# Patient Record
Sex: FEMALE | Race: WHITE | Employment: OTHER
[De-identification: names, ages, dates, MRNs, and addresses within clinical notes are randomized per-mention and may not be internally consistent; named-entity substitution may affect disease eponyms.]

---

## 2017-01-09 ENCOUNTER — OFFICE VISIT (OUTPATIENT)
Dept: NEUROLOGY | Facility: CLINIC | Age: 67
End: 2017-01-09

## 2017-01-09 VITALS
DIASTOLIC BLOOD PRESSURE: 74 MMHG | HEART RATE: 92 BPM | WEIGHT: 166 LBS | SYSTOLIC BLOOD PRESSURE: 161 MMHG | HEIGHT: 63 IN | BODY MASS INDEX: 29.41 KG/M2

## 2017-01-09 DIAGNOSIS — I67.1 CEREBRAL ANEURYSM: Primary | ICD-10-CM

## 2017-01-09 PROCEDURE — 99215 OFFICE O/P EST HI 40 MIN: CPT | Performed by: PSYCHIATRY & NEUROLOGY

## 2017-01-09 RX ORDER — HYDROCHLOROTHIAZIDE 25 MG/1
1 TABLET ORAL DAILY
COMMUNITY
Start: 2016-12-30 | End: 2017-08-03 | Stop reason: ALTCHOICE

## 2017-01-09 RX ORDER — ROSUVASTATIN CALCIUM 5 MG/1
5 TABLET, COATED ORAL NIGHTLY
Qty: 30 TABLET | Refills: 5 | Status: SHIPPED | OUTPATIENT
Start: 2017-01-09 | End: 2017-08-07 | Stop reason: ALTCHOICE

## 2017-01-18 ENCOUNTER — OFFICE VISIT (OUTPATIENT)
Dept: INTERNAL MEDICINE | Facility: CLINIC | Age: 67
End: 2017-01-18

## 2017-01-18 VITALS
HEIGHT: 63 IN | HEART RATE: 79 BPM | SYSTOLIC BLOOD PRESSURE: 160 MMHG | DIASTOLIC BLOOD PRESSURE: 77 MMHG | RESPIRATION RATE: 12 BRPM | BODY MASS INDEX: 29.8 KG/M2 | OXYGEN SATURATION: 98 % | WEIGHT: 168.2 LBS

## 2017-01-18 DIAGNOSIS — J02.9 SORE THROAT: ICD-10-CM

## 2017-01-18 DIAGNOSIS — I10 ESSENTIAL HYPERTENSION: ICD-10-CM

## 2017-01-18 DIAGNOSIS — E11.22 TYPE 2 DIABETES MELLITUS WITH STAGE 4 CHRONIC KIDNEY DISEASE, WITH LONG-TERM CURRENT USE OF INSULIN (HCC): Primary | ICD-10-CM

## 2017-01-18 DIAGNOSIS — N18.4 TYPE 2 DIABETES MELLITUS WITH STAGE 4 CHRONIC KIDNEY DISEASE, WITH LONG-TERM CURRENT USE OF INSULIN (HCC): Primary | ICD-10-CM

## 2017-01-18 DIAGNOSIS — Z79.4 TYPE 2 DIABETES MELLITUS WITH STAGE 4 CHRONIC KIDNEY DISEASE, WITH LONG-TERM CURRENT USE OF INSULIN (HCC): Primary | ICD-10-CM

## 2017-01-18 PROCEDURE — 87880 STREP A ASSAY W/OPTIC: CPT | Performed by: INTERNAL MEDICINE

## 2017-01-18 PROCEDURE — 3014F SCREEN MAMMO DOC REV: CPT | Performed by: INTERNAL MEDICINE

## 2017-01-18 PROCEDURE — 1111F DSCHRG MED/CURRENT MED MERGE: CPT | Performed by: INTERNAL MEDICINE

## 2017-01-18 PROCEDURE — G8419 CALC BMI OUT NRM PARAM NOF/U: HCPCS | Performed by: INTERNAL MEDICINE

## 2017-01-18 PROCEDURE — G8427 DOCREV CUR MEDS BY ELIG CLIN: HCPCS | Performed by: INTERNAL MEDICINE

## 2017-01-18 PROCEDURE — 3046F HEMOGLOBIN A1C LEVEL >9.0%: CPT | Performed by: INTERNAL MEDICINE

## 2017-01-18 PROCEDURE — 4004F PT TOBACCO SCREEN RCVD TLK: CPT | Performed by: INTERNAL MEDICINE

## 2017-01-18 PROCEDURE — 99214 OFFICE O/P EST MOD 30 MIN: CPT | Performed by: INTERNAL MEDICINE

## 2017-01-18 PROCEDURE — 3017F COLORECTAL CA SCREEN DOC REV: CPT | Performed by: INTERNAL MEDICINE

## 2017-01-18 PROCEDURE — G8484 FLU IMMUNIZE NO ADMIN: HCPCS | Performed by: INTERNAL MEDICINE

## 2017-01-18 PROCEDURE — G8598 ASA/ANTIPLAT THER USED: HCPCS | Performed by: INTERNAL MEDICINE

## 2017-01-18 RX ORDER — FOLIC ACID 1 MG/1
1 TABLET ORAL 2 TIMES DAILY
Qty: 60 TABLET | Refills: 3 | Status: SHIPPED | OUTPATIENT
Start: 2017-01-18 | End: 2017-05-30 | Stop reason: SDUPTHER

## 2017-01-18 ASSESSMENT — PATIENT HEALTH QUESTIONNAIRE - PHQ9
SUM OF ALL RESPONSES TO PHQ9 QUESTIONS 1 & 2: 0
2. FEELING DOWN, DEPRESSED OR HOPELESS: 0
1. LITTLE INTEREST OR PLEASURE IN DOING THINGS: 0
SUM OF ALL RESPONSES TO PHQ QUESTIONS 1-9: 0

## 2017-01-30 ENCOUNTER — OFFICE VISIT (OUTPATIENT)
Dept: NEUROLOGY | Facility: CLINIC | Age: 67
End: 2017-01-30

## 2017-01-30 VITALS
BODY MASS INDEX: 29.77 KG/M2 | WEIGHT: 168 LBS | SYSTOLIC BLOOD PRESSURE: 122 MMHG | DIASTOLIC BLOOD PRESSURE: 68 MMHG | HEART RATE: 84 BPM | HEIGHT: 63 IN

## 2017-01-30 DIAGNOSIS — I63.81 LEFT SIDED LACUNAR INFARCTION (HCC): Primary | ICD-10-CM

## 2017-01-30 PROCEDURE — 4004F PT TOBACCO SCREEN RCVD TLK: CPT | Performed by: PSYCHIATRY & NEUROLOGY

## 2017-01-30 PROCEDURE — G8400 PT W/DXA NO RESULTS DOC: HCPCS | Performed by: PSYCHIATRY & NEUROLOGY

## 2017-01-30 PROCEDURE — 99215 OFFICE O/P EST HI 40 MIN: CPT | Performed by: PSYCHIATRY & NEUROLOGY

## 2017-01-30 PROCEDURE — 3017F COLORECTAL CA SCREEN DOC REV: CPT | Performed by: PSYCHIATRY & NEUROLOGY

## 2017-01-30 PROCEDURE — 4040F PNEUMOC VAC/ADMIN/RCVD: CPT | Performed by: PSYCHIATRY & NEUROLOGY

## 2017-01-30 PROCEDURE — G8598 ASA/ANTIPLAT THER USED: HCPCS | Performed by: PSYCHIATRY & NEUROLOGY

## 2017-01-30 PROCEDURE — 3014F SCREEN MAMMO DOC REV: CPT | Performed by: PSYCHIATRY & NEUROLOGY

## 2017-01-30 PROCEDURE — 1123F ACP DISCUSS/DSCN MKR DOCD: CPT | Performed by: PSYCHIATRY & NEUROLOGY

## 2017-01-30 PROCEDURE — G8420 CALC BMI NORM PARAMETERS: HCPCS | Performed by: PSYCHIATRY & NEUROLOGY

## 2017-01-30 PROCEDURE — G8484 FLU IMMUNIZE NO ADMIN: HCPCS | Performed by: PSYCHIATRY & NEUROLOGY

## 2017-01-30 PROCEDURE — G8427 DOCREV CUR MEDS BY ELIG CLIN: HCPCS | Performed by: PSYCHIATRY & NEUROLOGY

## 2017-01-30 PROCEDURE — 1090F PRES/ABSN URINE INCON ASSESS: CPT | Performed by: PSYCHIATRY & NEUROLOGY

## 2017-02-04 DIAGNOSIS — N18.4 CHRONIC KIDNEY DISEASE, STAGE IV (SEVERE) (HCC): ICD-10-CM

## 2017-02-04 DIAGNOSIS — E11.22 TYPE 2 DIABETES MELLITUS WITH STAGE 4 CHRONIC KIDNEY DISEASE, WITHOUT LONG-TERM CURRENT USE OF INSULIN (HCC): ICD-10-CM

## 2017-02-04 DIAGNOSIS — I10 ESSENTIAL HYPERTENSION: ICD-10-CM

## 2017-02-04 DIAGNOSIS — N18.4 TYPE 2 DIABETES MELLITUS WITH STAGE 4 CHRONIC KIDNEY DISEASE, WITHOUT LONG-TERM CURRENT USE OF INSULIN (HCC): ICD-10-CM

## 2017-02-06 RX ORDER — LABETALOL 300 MG/1
TABLET, FILM COATED ORAL
Qty: 60 TABLET | Refills: 3 | Status: SHIPPED | OUTPATIENT
Start: 2017-02-06 | End: 2017-11-30 | Stop reason: SDUPTHER

## 2017-03-04 ENCOUNTER — HOSPITAL ENCOUNTER (OUTPATIENT)
Age: 67
Setting detail: SPECIMEN
Discharge: HOME | End: 2017-03-04
Payer: COMMERCIAL

## 2017-03-04 LAB
ALBUMIN SERPL-MCNC: 4.8 G/DL (ref 3.5–5.2)
ANION GAP SERPL CALCULATED.3IONS-SCNC: 18 MMOL/L (ref 9–17)
BUN BLDV-MCNC: 21 MG/DL (ref 8–23)
BUN/CREAT BLD: ABNORMAL (ref 9–20)
CALCIUM IONIZED: 1.19 MMOL/L (ref 1.13–1.33)
CALCIUM SERPL-MCNC: 9.1 MG/DL (ref 8.6–10.4)
CHLORIDE BLD-SCNC: 94 MMOL/L (ref 98–107)
CO2: 24 MMOL/L (ref 20–31)
CREAT SERPL-MCNC: 2.48 MG/DL (ref 0.5–0.9)
CREATININE URINE: 80.2 MG/DL (ref 28–217)
GFR AFRICAN AMERICAN: 24 ML/MIN
GFR NON-AFRICAN AMERICAN: 20 ML/MIN
GFR SERPL CREATININE-BSD FRML MDRD: ABNORMAL ML/MIN/{1.73_M2}
GFR SERPL CREATININE-BSD FRML MDRD: ABNORMAL ML/MIN/{1.73_M2}
GLUCOSE BLD-MCNC: 208 MG/DL (ref 70–99)
HCT VFR BLD CALC: 31.4 % (ref 36–46)
HEMOGLOBIN: 10.8 G/DL (ref 12–16)
MCH RBC QN AUTO: 28.9 PG (ref 26–34)
MCHC RBC AUTO-ENTMCNC: 34.2 G/DL (ref 31–37)
MCV RBC AUTO: 84.4 FL (ref 80–100)
MICROALBUMIN/CREAT 24H UR: 1414 MG/L
MICROALBUMIN/CREAT UR-RTO: 1763 MCG/MG CREAT
PDW BLD-RTO: 14.7 % (ref 12.5–15.4)
PHOSPHORUS: 4 MG/DL (ref 2.6–4.5)
PLATELET # BLD: 293 K/UL (ref 140–450)
PMV BLD AUTO: 9.3 FL (ref 6–12)
POTASSIUM SERPL-SCNC: 3.3 MMOL/L (ref 3.7–5.3)
PTH INTACT: 221.9 PG/ML (ref 15–65)
RBC # BLD: 3.73 M/UL (ref 4–5.2)
SODIUM BLD-SCNC: 136 MMOL/L (ref 135–144)
URIC ACID: 5.8 MG/DL (ref 2.4–5.7)
VITAMIN D 25-HYDROXY: 26.9 NG/ML (ref 30–100)
WBC # BLD: 10.2 K/UL (ref 3.5–11)

## 2017-03-04 PROCEDURE — 84550 ASSAY OF BLOOD/URIC ACID: CPT

## 2017-03-04 PROCEDURE — 82043 UR ALBUMIN QUANTITATIVE: CPT

## 2017-03-04 PROCEDURE — 85027 COMPLETE CBC AUTOMATED: CPT

## 2017-03-04 PROCEDURE — 82330 ASSAY OF CALCIUM: CPT

## 2017-03-04 PROCEDURE — 83970 ASSAY OF PARATHORMONE: CPT

## 2017-03-04 PROCEDURE — 82570 ASSAY OF URINE CREATININE: CPT

## 2017-03-04 PROCEDURE — 80048 BASIC METABOLIC PNL TOTAL CA: CPT

## 2017-03-04 PROCEDURE — 82040 ASSAY OF SERUM ALBUMIN: CPT

## 2017-03-04 PROCEDURE — 36415 COLL VENOUS BLD VENIPUNCTURE: CPT

## 2017-03-04 PROCEDURE — 82306 VITAMIN D 25 HYDROXY: CPT

## 2017-03-04 PROCEDURE — 84100 ASSAY OF PHOSPHORUS: CPT

## 2017-04-27 ENCOUNTER — OFFICE VISIT (OUTPATIENT)
Dept: INTERNAL MEDICINE | Age: 67
End: 2017-04-27
Payer: COMMERCIAL

## 2017-04-27 VITALS
HEART RATE: 84 BPM | HEIGHT: 63 IN | WEIGHT: 163.8 LBS | OXYGEN SATURATION: 99 % | SYSTOLIC BLOOD PRESSURE: 161 MMHG | BODY MASS INDEX: 29.02 KG/M2 | RESPIRATION RATE: 12 BRPM | DIASTOLIC BLOOD PRESSURE: 79 MMHG

## 2017-04-27 DIAGNOSIS — I10 ESSENTIAL HYPERTENSION: ICD-10-CM

## 2017-04-27 DIAGNOSIS — N18.4 TYPE 2 DIABETES MELLITUS WITH STAGE 4 CHRONIC KIDNEY DISEASE, WITH LONG-TERM CURRENT USE OF INSULIN (HCC): Primary | ICD-10-CM

## 2017-04-27 DIAGNOSIS — E11.22 TYPE 2 DIABETES MELLITUS WITH STAGE 4 CHRONIC KIDNEY DISEASE, WITH LONG-TERM CURRENT USE OF INSULIN (HCC): Primary | ICD-10-CM

## 2017-04-27 DIAGNOSIS — Z79.4 TYPE 2 DIABETES MELLITUS WITH STAGE 4 CHRONIC KIDNEY DISEASE, WITH LONG-TERM CURRENT USE OF INSULIN (HCC): Primary | ICD-10-CM

## 2017-04-27 LAB — HBA1C MFR BLD: 9.8 %

## 2017-04-27 PROCEDURE — G8420 CALC BMI NORM PARAMETERS: HCPCS | Performed by: INTERNAL MEDICINE

## 2017-04-27 PROCEDURE — 83036 HEMOGLOBIN GLYCOSYLATED A1C: CPT | Performed by: INTERNAL MEDICINE

## 2017-04-27 PROCEDURE — 3017F COLORECTAL CA SCREEN DOC REV: CPT | Performed by: INTERNAL MEDICINE

## 2017-04-27 PROCEDURE — G8427 DOCREV CUR MEDS BY ELIG CLIN: HCPCS | Performed by: INTERNAL MEDICINE

## 2017-04-27 PROCEDURE — 99214 OFFICE O/P EST MOD 30 MIN: CPT | Performed by: INTERNAL MEDICINE

## 2017-04-27 PROCEDURE — G8598 ASA/ANTIPLAT THER USED: HCPCS | Performed by: INTERNAL MEDICINE

## 2017-04-27 PROCEDURE — 1090F PRES/ABSN URINE INCON ASSESS: CPT | Performed by: INTERNAL MEDICINE

## 2017-04-27 PROCEDURE — 4004F PT TOBACCO SCREEN RCVD TLK: CPT | Performed by: INTERNAL MEDICINE

## 2017-04-27 PROCEDURE — 3014F SCREEN MAMMO DOC REV: CPT | Performed by: INTERNAL MEDICINE

## 2017-04-27 PROCEDURE — 1123F ACP DISCUSS/DSCN MKR DOCD: CPT | Performed by: INTERNAL MEDICINE

## 2017-04-27 PROCEDURE — 4040F PNEUMOC VAC/ADMIN/RCVD: CPT | Performed by: INTERNAL MEDICINE

## 2017-04-27 PROCEDURE — G8400 PT W/DXA NO RESULTS DOC: HCPCS | Performed by: INTERNAL MEDICINE

## 2017-04-27 PROCEDURE — 3046F HEMOGLOBIN A1C LEVEL >9.0%: CPT | Performed by: INTERNAL MEDICINE

## 2017-04-27 RX ORDER — CLOPIDOGREL BISULFATE 75 MG/1
75 TABLET ORAL DAILY
Qty: 90 TABLET | Refills: 3 | Status: SHIPPED | OUTPATIENT
Start: 2017-04-27 | End: 2018-05-01 | Stop reason: SDUPTHER

## 2017-04-27 ASSESSMENT — PATIENT HEALTH QUESTIONNAIRE - PHQ9
2. FEELING DOWN, DEPRESSED OR HOPELESS: 0
1. LITTLE INTEREST OR PLEASURE IN DOING THINGS: 0
SUM OF ALL RESPONSES TO PHQ9 QUESTIONS 1 & 2: 0
SUM OF ALL RESPONSES TO PHQ QUESTIONS 1-9: 0

## 2017-05-30 RX ORDER — FOLIC ACID 1 MG/1
1 TABLET ORAL 2 TIMES DAILY
Qty: 60 TABLET | Refills: 3 | Status: SHIPPED | OUTPATIENT
Start: 2017-05-30 | End: 2017-11-01 | Stop reason: SDUPTHER

## 2017-07-26 ENCOUNTER — HOSPITAL ENCOUNTER (OUTPATIENT)
Age: 67
Setting detail: SPECIMEN
Discharge: HOME | End: 2017-07-26
Payer: MEDICARE

## 2017-07-26 DIAGNOSIS — Z79.4 TYPE 2 DIABETES MELLITUS WITH STAGE 4 CHRONIC KIDNEY DISEASE, WITH LONG-TERM CURRENT USE OF INSULIN (HCC): ICD-10-CM

## 2017-07-26 DIAGNOSIS — D63.1 ANEMIA OF CHRONIC KIDNEY FAILURE, STAGE 4 (SEVERE) (HCC): ICD-10-CM

## 2017-07-26 DIAGNOSIS — N18.4 TYPE 2 DIABETES MELLITUS WITH STAGE 4 CHRONIC KIDNEY DISEASE, WITH LONG-TERM CURRENT USE OF INSULIN (HCC): ICD-10-CM

## 2017-07-26 DIAGNOSIS — R80.9 PROTEINURIA: ICD-10-CM

## 2017-07-26 DIAGNOSIS — I10 ESSENTIAL HYPERTENSION: ICD-10-CM

## 2017-07-26 DIAGNOSIS — N18.4 ANEMIA OF CHRONIC KIDNEY FAILURE, STAGE 4 (SEVERE) (HCC): ICD-10-CM

## 2017-07-26 DIAGNOSIS — E11.22 TYPE 2 DIABETES MELLITUS WITH STAGE 4 CHRONIC KIDNEY DISEASE, WITH LONG-TERM CURRENT USE OF INSULIN (HCC): ICD-10-CM

## 2017-07-26 LAB
ALBUMIN SERPL-MCNC: 4.7 G/DL (ref 3.5–5.2)
ANION GAP SERPL CALCULATED.3IONS-SCNC: 18 MMOL/L (ref 9–17)
BUN BLDV-MCNC: 38 MG/DL (ref 8–23)
BUN/CREAT BLD: ABNORMAL (ref 9–20)
CALCIUM SERPL-MCNC: 9.6 MG/DL (ref 8.6–10.4)
CHLORIDE BLD-SCNC: 94 MMOL/L (ref 98–107)
CO2: 21 MMOL/L (ref 20–31)
CREAT SERPL-MCNC: 2.96 MG/DL (ref 0.5–0.9)
CREATININE URINE: 59 MG/DL (ref 28–217)
GFR AFRICAN AMERICAN: 19 ML/MIN
GFR NON-AFRICAN AMERICAN: 16 ML/MIN
GFR SERPL CREATININE-BSD FRML MDRD: ABNORMAL ML/MIN/{1.73_M2}
GFR SERPL CREATININE-BSD FRML MDRD: ABNORMAL ML/MIN/{1.73_M2}
GLUCOSE BLD-MCNC: 119 MG/DL (ref 70–99)
HCT VFR BLD CALC: 32.4 % (ref 36–46)
HEMOGLOBIN: 11.2 G/DL (ref 12–16)
MCH RBC QN AUTO: 29.2 PG (ref 26–34)
MCHC RBC AUTO-ENTMCNC: 34.7 G/DL (ref 31–37)
MCV RBC AUTO: 84.2 FL (ref 80–100)
PDW BLD-RTO: 14.4 % (ref 12.5–15.4)
PHOSPHORUS: 4.3 MG/DL (ref 2.6–4.5)
PLATELET # BLD: 303 K/UL (ref 140–450)
PMV BLD AUTO: 8.5 FL (ref 6–12)
POTASSIUM SERPL-SCNC: 3.4 MMOL/L (ref 3.7–5.3)
PTH INTACT: 111 PG/ML (ref 15–65)
RBC # BLD: 3.85 M/UL (ref 4–5.2)
SODIUM BLD-SCNC: 133 MMOL/L (ref 135–144)
TOTAL PROTEIN, URINE: 103 MG/DL
URIC ACID: 6.2 MG/DL (ref 2.4–5.7)
VITAMIN D 25-HYDROXY: 31.7 NG/ML (ref 30–100)
WBC # BLD: 10.4 K/UL (ref 3.5–11)

## 2017-07-26 PROCEDURE — 84100 ASSAY OF PHOSPHORUS: CPT

## 2017-07-26 PROCEDURE — 85027 COMPLETE CBC AUTOMATED: CPT

## 2017-07-26 PROCEDURE — 83970 ASSAY OF PARATHORMONE: CPT

## 2017-07-26 PROCEDURE — 84550 ASSAY OF BLOOD/URIC ACID: CPT

## 2017-07-26 PROCEDURE — 82570 ASSAY OF URINE CREATININE: CPT

## 2017-07-26 PROCEDURE — 84156 ASSAY OF PROTEIN URINE: CPT

## 2017-07-26 PROCEDURE — 82306 VITAMIN D 25 HYDROXY: CPT

## 2017-07-26 PROCEDURE — 82040 ASSAY OF SERUM ALBUMIN: CPT

## 2017-07-26 PROCEDURE — 80048 BASIC METABOLIC PNL TOTAL CA: CPT

## 2017-07-26 PROCEDURE — 36415 COLL VENOUS BLD VENIPUNCTURE: CPT

## 2017-08-07 ENCOUNTER — OFFICE VISIT (OUTPATIENT)
Dept: INTERNAL MEDICINE | Age: 67
End: 2017-08-07
Payer: MEDICARE

## 2017-08-07 VITALS
BODY MASS INDEX: 29.23 KG/M2 | HEART RATE: 55 BPM | DIASTOLIC BLOOD PRESSURE: 71 MMHG | OXYGEN SATURATION: 98 % | WEIGHT: 165 LBS | SYSTOLIC BLOOD PRESSURE: 134 MMHG | HEIGHT: 63 IN

## 2017-08-07 DIAGNOSIS — N18.4 TYPE 2 DIABETES MELLITUS WITH STAGE 4 CHRONIC KIDNEY DISEASE, WITH LONG-TERM CURRENT USE OF INSULIN (HCC): Primary | ICD-10-CM

## 2017-08-07 DIAGNOSIS — E11.22 TYPE 2 DIABETES MELLITUS WITH STAGE 4 CHRONIC KIDNEY DISEASE, WITH LONG-TERM CURRENT USE OF INSULIN (HCC): Primary | ICD-10-CM

## 2017-08-07 DIAGNOSIS — I10 ESSENTIAL HYPERTENSION: ICD-10-CM

## 2017-08-07 DIAGNOSIS — Z13.820 OSTEOPOROSIS SCREENING: ICD-10-CM

## 2017-08-07 DIAGNOSIS — Z79.4 TYPE 2 DIABETES MELLITUS WITH STAGE 4 CHRONIC KIDNEY DISEASE, WITH LONG-TERM CURRENT USE OF INSULIN (HCC): Primary | ICD-10-CM

## 2017-08-07 LAB — HBA1C MFR BLD: 7.9 %

## 2017-08-07 PROCEDURE — 99214 OFFICE O/P EST MOD 30 MIN: CPT | Performed by: INTERNAL MEDICINE

## 2017-08-07 PROCEDURE — 83036 HEMOGLOBIN GLYCOSYLATED A1C: CPT | Performed by: INTERNAL MEDICINE

## 2017-08-07 RX ORDER — AMLODIPINE BESYLATE 5 MG/1
5 TABLET ORAL DAILY
Qty: 90 TABLET | Refills: 3 | Status: SHIPPED
Start: 2017-08-07 | End: 2017-12-11 | Stop reason: SDUPTHER

## 2017-08-07 ASSESSMENT — ENCOUNTER SYMPTOMS: SHORTNESS OF BREATH: 1

## 2017-08-11 ENCOUNTER — HOSPITAL ENCOUNTER (OUTPATIENT)
Dept: MAMMOGRAPHY | Age: 67
Discharge: HOME | End: 2017-08-11
Payer: MEDICARE

## 2017-08-11 DIAGNOSIS — Z13.820 OSTEOPOROSIS SCREENING: ICD-10-CM

## 2017-08-11 PROCEDURE — 77080 DXA BONE DENSITY AXIAL: CPT

## 2017-08-16 ENCOUNTER — HOSPITAL ENCOUNTER (OUTPATIENT)
Age: 67
Discharge: HOME | End: 2017-08-16
Payer: MEDICARE

## 2017-08-16 DIAGNOSIS — E87.6 HYPOKALEMIA: ICD-10-CM

## 2017-08-16 DIAGNOSIS — D63.1 ANEMIA OF CHRONIC KIDNEY FAILURE, STAGE 4 (SEVERE) (HCC): ICD-10-CM

## 2017-08-16 DIAGNOSIS — Z79.4 TYPE 2 DIABETES MELLITUS WITH STAGE 4 CHRONIC KIDNEY DISEASE, WITH LONG-TERM CURRENT USE OF INSULIN (HCC): ICD-10-CM

## 2017-08-16 DIAGNOSIS — N18.4 TYPE 2 DIABETES MELLITUS WITH STAGE 4 CHRONIC KIDNEY DISEASE, WITH LONG-TERM CURRENT USE OF INSULIN (HCC): ICD-10-CM

## 2017-08-16 DIAGNOSIS — E87.1 HYPONATREMIA: ICD-10-CM

## 2017-08-16 DIAGNOSIS — I10 ESSENTIAL HYPERTENSION: ICD-10-CM

## 2017-08-16 DIAGNOSIS — N18.4 ANEMIA OF CHRONIC KIDNEY FAILURE, STAGE 4 (SEVERE) (HCC): ICD-10-CM

## 2017-08-16 DIAGNOSIS — E11.22 TYPE 2 DIABETES MELLITUS WITH STAGE 4 CHRONIC KIDNEY DISEASE, WITH LONG-TERM CURRENT USE OF INSULIN (HCC): ICD-10-CM

## 2017-08-16 LAB
ANION GAP SERPL CALCULATED.3IONS-SCNC: 21 MMOL/L (ref 9–17)
BUN BLDV-MCNC: 24 MG/DL (ref 8–23)
BUN/CREAT BLD: ABNORMAL (ref 9–20)
CALCIUM SERPL-MCNC: 9.7 MG/DL (ref 8.6–10.4)
CHLORIDE BLD-SCNC: 97 MMOL/L (ref 98–107)
CO2: 19 MMOL/L (ref 20–31)
CREAT SERPL-MCNC: 2.46 MG/DL (ref 0.5–0.9)
GFR AFRICAN AMERICAN: 24 ML/MIN
GFR NON-AFRICAN AMERICAN: 20 ML/MIN
GFR SERPL CREATININE-BSD FRML MDRD: ABNORMAL ML/MIN/{1.73_M2}
GFR SERPL CREATININE-BSD FRML MDRD: ABNORMAL ML/MIN/{1.73_M2}
GLUCOSE BLD-MCNC: 132 MG/DL (ref 70–99)
POTASSIUM SERPL-SCNC: 3.7 MMOL/L (ref 3.7–5.3)
SODIUM BLD-SCNC: 137 MMOL/L (ref 135–144)

## 2017-08-16 PROCEDURE — 36415 COLL VENOUS BLD VENIPUNCTURE: CPT

## 2017-08-16 PROCEDURE — 80048 BASIC METABOLIC PNL TOTAL CA: CPT

## 2017-11-01 NOTE — TELEPHONE ENCOUNTER
From: Dagmar Rueda  Sent: 11/1/2017 3:23 PM EDT  Subject: Medication Renewal Request    Joan Raquel.  Dudley Isbell would like a refill of the following medications:  allopurinol (ZYLOPRIM) 300 MG tablet Kiana Avery MD]  folic acid (FOLVITE) 1 MG tablet Kiana Avery MD]    Preferred pharmacy: [address] [phone] [facility] [phone]    Comment:

## 2017-11-02 RX ORDER — FOLIC ACID 1 MG/1
1 TABLET ORAL 2 TIMES DAILY
Qty: 60 TABLET | Refills: 3 | Status: SHIPPED | OUTPATIENT
Start: 2017-11-02 | End: 2017-11-07 | Stop reason: SDUPTHER

## 2017-11-02 RX ORDER — ALLOPURINOL 300 MG/1
300 TABLET ORAL DAILY
Qty: 90 TABLET | Refills: 3 | Status: SHIPPED | OUTPATIENT
Start: 2017-11-02 | End: 2018-05-01 | Stop reason: SDUPTHER

## 2017-11-07 ENCOUNTER — HOSPITAL ENCOUNTER (OUTPATIENT)
Age: 67
Discharge: HOME | End: 2017-11-07
Payer: MEDICARE

## 2017-11-07 ENCOUNTER — OFFICE VISIT (OUTPATIENT)
Dept: INTERNAL MEDICINE | Age: 67
End: 2017-11-07
Payer: MEDICARE

## 2017-11-07 VITALS
BODY MASS INDEX: 29.12 KG/M2 | HEART RATE: 80 BPM | DIASTOLIC BLOOD PRESSURE: 79 MMHG | SYSTOLIC BLOOD PRESSURE: 130 MMHG | WEIGHT: 170.6 LBS | RESPIRATION RATE: 12 BRPM | HEIGHT: 64 IN

## 2017-11-07 DIAGNOSIS — Z79.4 TYPE 2 DIABETES MELLITUS WITH STAGE 4 CHRONIC KIDNEY DISEASE, WITH LONG-TERM CURRENT USE OF INSULIN (HCC): ICD-10-CM

## 2017-11-07 DIAGNOSIS — E55.9 HYPOVITAMINOSIS D: ICD-10-CM

## 2017-11-07 DIAGNOSIS — Z79.4 TYPE 2 DIABETES MELLITUS WITH STAGE 4 CHRONIC KIDNEY DISEASE, WITH LONG-TERM CURRENT USE OF INSULIN (HCC): Primary | ICD-10-CM

## 2017-11-07 DIAGNOSIS — D63.1 ANEMIA OF CHRONIC KIDNEY FAILURE, STAGE 4 (SEVERE) (HCC): ICD-10-CM

## 2017-11-07 DIAGNOSIS — M54.50 CHRONIC BILATERAL LOW BACK PAIN WITHOUT SCIATICA: ICD-10-CM

## 2017-11-07 DIAGNOSIS — N18.4 TYPE 2 DIABETES MELLITUS WITH STAGE 4 CHRONIC KIDNEY DISEASE, WITH LONG-TERM CURRENT USE OF INSULIN (HCC): ICD-10-CM

## 2017-11-07 DIAGNOSIS — I10 ESSENTIAL HYPERTENSION: ICD-10-CM

## 2017-11-07 DIAGNOSIS — N18.4 ANEMIA OF CHRONIC KIDNEY FAILURE, STAGE 4 (SEVERE) (HCC): ICD-10-CM

## 2017-11-07 DIAGNOSIS — E11.22 TYPE 2 DIABETES MELLITUS WITH STAGE 4 CHRONIC KIDNEY DISEASE, WITH LONG-TERM CURRENT USE OF INSULIN (HCC): Primary | ICD-10-CM

## 2017-11-07 DIAGNOSIS — E11.22 TYPE 2 DIABETES MELLITUS WITH STAGE 4 CHRONIC KIDNEY DISEASE, WITH LONG-TERM CURRENT USE OF INSULIN (HCC): ICD-10-CM

## 2017-11-07 DIAGNOSIS — N18.4 TYPE 2 DIABETES MELLITUS WITH STAGE 4 CHRONIC KIDNEY DISEASE, WITH LONG-TERM CURRENT USE OF INSULIN (HCC): Primary | ICD-10-CM

## 2017-11-07 DIAGNOSIS — I73.9 CLAUDICATION (HCC): ICD-10-CM

## 2017-11-07 DIAGNOSIS — G89.29 CHRONIC BILATERAL LOW BACK PAIN WITHOUT SCIATICA: ICD-10-CM

## 2017-11-07 LAB
ALBUMIN SERPL-MCNC: 4.4 G/DL (ref 3.5–5.2)
ANION GAP SERPL CALCULATED.3IONS-SCNC: 20 MMOL/L (ref 9–17)
BUN BLDV-MCNC: 37 MG/DL (ref 8–23)
BUN/CREAT BLD: ABNORMAL (ref 9–20)
CALCIUM SERPL-MCNC: 9.6 MG/DL (ref 8.6–10.4)
CHLORIDE BLD-SCNC: 97 MMOL/L (ref 98–107)
CO2: 18 MMOL/L (ref 20–31)
CREAT SERPL-MCNC: 2.74 MG/DL (ref 0.5–0.9)
CREATININE URINE: 81 MG/DL (ref 28–217)
GFR AFRICAN AMERICAN: 21 ML/MIN
GFR NON-AFRICAN AMERICAN: 17 ML/MIN
GFR SERPL CREATININE-BSD FRML MDRD: ABNORMAL ML/MIN/{1.73_M2}
GFR SERPL CREATININE-BSD FRML MDRD: ABNORMAL ML/MIN/{1.73_M2}
GLUCOSE BLD-MCNC: 134 MG/DL (ref 70–99)
HBA1C MFR BLD: 7.8 %
HCT VFR BLD CALC: 29 % (ref 36.3–47.1)
HEMOGLOBIN: 9.6 G/DL (ref 11.9–15.1)
MCH RBC QN AUTO: 29 PG (ref 25.2–33.5)
MCHC RBC AUTO-ENTMCNC: 33.1 G/DL (ref 28.4–34.8)
MCV RBC AUTO: 87.6 FL (ref 82.6–102.9)
PDW BLD-RTO: 14 % (ref 11.8–14.4)
PHOSPHORUS: 4.5 MG/DL (ref 2.6–4.5)
PLATELET # BLD: 287 K/UL (ref 138–453)
PMV BLD AUTO: 10.8 FL (ref 8.1–13.5)
POTASSIUM SERPL-SCNC: 4.4 MMOL/L (ref 3.7–5.3)
PTH INTACT: 117 PG/ML (ref 15–65)
RBC # BLD: 3.31 M/UL (ref 3.95–5.11)
SODIUM BLD-SCNC: 135 MMOL/L (ref 135–144)
TOTAL PROTEIN, URINE: 179 MG/DL
URIC ACID: 5.6 MG/DL (ref 2.4–5.7)
VITAMIN D 25-HYDROXY: 23.1 NG/ML (ref 30–100)
WBC # BLD: 11.5 K/UL (ref 3.5–11.3)

## 2017-11-07 PROCEDURE — 83970 ASSAY OF PARATHORMONE: CPT

## 2017-11-07 PROCEDURE — 80048 BASIC METABOLIC PNL TOTAL CA: CPT

## 2017-11-07 PROCEDURE — 85027 COMPLETE CBC AUTOMATED: CPT

## 2017-11-07 PROCEDURE — 82040 ASSAY OF SERUM ALBUMIN: CPT

## 2017-11-07 PROCEDURE — 99214 OFFICE O/P EST MOD 30 MIN: CPT | Performed by: INTERNAL MEDICINE

## 2017-11-07 PROCEDURE — 82570 ASSAY OF URINE CREATININE: CPT

## 2017-11-07 PROCEDURE — 83036 HEMOGLOBIN GLYCOSYLATED A1C: CPT | Performed by: INTERNAL MEDICINE

## 2017-11-07 PROCEDURE — 36415 COLL VENOUS BLD VENIPUNCTURE: CPT

## 2017-11-07 PROCEDURE — 84550 ASSAY OF BLOOD/URIC ACID: CPT

## 2017-11-07 PROCEDURE — 82306 VITAMIN D 25 HYDROXY: CPT

## 2017-11-07 PROCEDURE — 84100 ASSAY OF PHOSPHORUS: CPT

## 2017-11-07 PROCEDURE — 84156 ASSAY OF PROTEIN URINE: CPT

## 2017-11-07 RX ORDER — NICOTINE 21 MG/24HR
1 PATCH, TRANSDERMAL 24 HOURS TRANSDERMAL DAILY
Qty: 30 PATCH | Refills: 3 | Status: SHIPPED | OUTPATIENT
Start: 2017-11-07 | End: 2019-09-05

## 2017-11-07 RX ORDER — FOLIC ACID 1 MG/1
1 TABLET ORAL 2 TIMES DAILY
Qty: 60 TABLET | Refills: 3 | Status: SHIPPED | OUTPATIENT
Start: 2017-11-07 | End: 2018-04-20 | Stop reason: SDUPTHER

## 2017-11-07 ASSESSMENT — ENCOUNTER SYMPTOMS
BACK PAIN: 1
GASTROINTESTINAL NEGATIVE: 1

## 2017-11-07 NOTE — PROGRESS NOTES
[facility] PHYSICIANS  [facility] INTERNAL MEDICINE [place] [address] [address]  [address] [facility] [address]  Dept: [phone]  Dept Fax: [phone]    Rolan Garcia is a 74 y.o. female who presents today for   Chief Complaint   Patient presents with    Hypertension    Diabetes    Back Pain     x's 5 months gets worse when PT attempts to go for walks     and follow up of chronic medical problems:   Patient Active Problem List   Diagnosis    Hypercholesteremia    Gout    Vitamin D deficiency    Anemia in CKD (chronic kidney disease)    Type 2 diabetes mellitus with diabetic chronic kidney disease ([address]    Chronic kidney disease (CKD)    Essential hypertension    Hypercholesteremia    Asymptomatic bacteriuria    Hypokalemia    Acute infarct posterior limb internal capsule on the left    Left sided lacunar infarction ([address]    Type 2 diabetes mellitus with stage 4 chronic kidney disease, with long-term current use of insulin (HCC)    Cerebral aneurysm   .     Past Medical History:   Diagnosis Date    Anemia in chronic kidney disease     Cerebral artery occlusion with cerebral infarction ([address] 12/2016    LEFT SIDED LACUNAL INFARCTION    Chronic kidney disease     STAGE 4 / FOLLOWS WITH DR RODRIGUEZ    Gout, arthritis     Hyperlipidemia     Hypertension     Left sided lacunar infarction ([facility] 12/2016    Peripheral vascular disease (HCC)     Type II or unspecified type diabetes mellitus without mention of complication, not stated as uncontrolled        Past Surgical History:   Procedure Laterality Date    COLONOSCOPY      X2    OTHER SURGICAL HISTORY  01/27/2017    CEREBRAL ANGIOGRAM / DIAGNOSTIC / DR NOVA    TONSILLECTOMY         Family History   Problem Relation Age of Onset    Diabetes Mother     Heart Disease Father        Social History   Substance Use Topics    Smoking status: Current Every Day Smoker     Packs/day: 1.00     Years: 40.00     Types: Cigarettes     Last attempt to quit: colonoscopy  11/02/2020    Zostavax vaccine  Addressed    DEXA (modify frequency per FRAX score)  Completed     /79   Pulse 80   Resp 12   Ht 5' 4\" (1.626 m)   Wt 170 lb 9.6 oz (77.4 kg)   Breastfeeding? No   BMI 29.28 kg/m²     Assessment:      1. Type 2 diabetes mellitus with stage 4 chronic kidney disease, with long-term current use of insulin ([facility])  POCT glycosylated hemoglobin (Hb A1C)   2. Claudication ([address]  Ultrasound doppler arterial legs bilateral   3. Chronic bilateral low back pain without sciatica  XR LUMBAR AP LAT L5S1 [place]       Plan:       1. Gale Darling received counseling on the following healthy behaviors: medication adherence    2. Prior labs and health maintenance reviewed. 3.  Discussed use, benefit, and side effects of prescribed medications. Barriers to medication compliance addressed. All her questions were answered. Pt voiced understanding. Gale Darling will continue current medications, diet and exercise. Orders Placed This Encounter   Medications    folic acid (FOLVITE) 1 MG tablet     Sig: Take 1 tablet by mouth 2 times daily     Dispense:  60 tablet     Refill:  3    nicotine (NICODERM CQ) 21 MG/24HR     Sig: Place 1 patch onto the skin daily     Dispense:  30 patch     Refill:  3          Completed Refills               Requested Prescriptions     Signed Prescriptions Disp Refills    folic acid (FOLVITE) 1 MG tablet 60 tablet 3     Sig: Take 1 tablet by mouth 2 times daily    nicotine (NICODERM CQ) 21 MG/24HR 30 patch 3     Sig: Place 1 patch onto the skin daily       4. Patient given educational materials - see patient instructions    5. Was a self-tracking handout given in paper form or via [employer]? No  If yes, see orders or list here.       Orders Placed This Encounter   Procedures    XR LUMBAR AP LAT L5S1 [place]     Order Specific Question:   Reason for exam:     Answer:   claudication     Order Specific Question:   Reason for exam:     Answer:

## 2017-11-10 ENCOUNTER — HOSPITAL ENCOUNTER (OUTPATIENT)
Dept: VASCULAR LAB | Age: 67
Discharge: HOME | End: 2017-11-10
Payer: MEDICARE

## 2017-11-10 DIAGNOSIS — I73.9 CLAUDICATION (HCC): ICD-10-CM

## 2017-11-10 PROCEDURE — 93923 UPR/LXTR ART STDY 3+ LVLS: CPT

## 2017-11-20 ENCOUNTER — OFFICE VISIT (OUTPATIENT)
Dept: INTERNAL MEDICINE | Age: 67
End: 2017-11-20
Payer: MEDICARE

## 2017-11-20 VITALS — WEIGHT: 169.2 LBS | HEIGHT: 64 IN | BODY MASS INDEX: 28.89 KG/M2 | RESPIRATION RATE: 12 BRPM

## 2017-11-20 DIAGNOSIS — Z00.00 ROUTINE GENERAL MEDICAL EXAMINATION AT A HEALTH CARE FACILITY: ICD-10-CM

## 2017-11-20 DIAGNOSIS — Z00.00 INITIAL MEDICARE ANNUAL WELLNESS VISIT: Primary | ICD-10-CM

## 2017-11-20 PROCEDURE — G0438 PPPS, INITIAL VISIT: HCPCS | Performed by: INTERNAL MEDICINE

## 2017-11-20 ASSESSMENT — ENCOUNTER SYMPTOMS
GASTROINTESTINAL NEGATIVE: 1
EYES NEGATIVE: 1
RESPIRATORY NEGATIVE: 1

## 2017-11-20 ASSESSMENT — LIFESTYLE VARIABLES: HOW OFTEN DO YOU HAVE A DRINK CONTAINING ALCOHOL: 0

## 2017-11-20 ASSESSMENT — ANXIETY QUESTIONNAIRES: GAD7 TOTAL SCORE: 0

## 2017-11-20 ASSESSMENT — PATIENT HEALTH QUESTIONNAIRE - PHQ9: SUM OF ALL RESPONSES TO PHQ QUESTIONS 1-9: 2

## 2017-11-20 NOTE — PROGRESS NOTES
[facility] PHYSICIANS  [facility] INTERNAL MEDICINE  [place] [address] [address]  [address]  [address]  [address]  Dept: [phone]  Dept Fax: [phone]    Connie Vasquez is a 74 y.o. female who presents today for No chief complaint on file. and follow up of chronic medical problems:   Patient Active Problem List   Diagnosis    Hypercholesteremia    Gout    Vitamin D deficiency    Anemia in CKD (chronic kidney disease)    Type 2 diabetes mellitus with diabetic chronic kidney disease (HCC)    Chronic kidney disease (CKD)    Essential hypertension    Hypercholesteremia    Asymptomatic bacteriuria    Hypokalemia    Acute infarct posterior limb internal capsule on the left    Left sided lacunar infarction ([address]    Type 2 diabetes mellitus with stage 4 chronic kidney disease, with long-term current use of insulin (HCC)    Cerebral aneurysm   .     Past Medical History:   Diagnosis Date    Anemia in chronic kidney disease     Cerebral artery occlusion with cerebral infarction ([address] 12/2016    LEFT SIDED LACUNAL INFARCTION    Chronic kidney disease     STAGE 4 / FOLLOWS WITH DR RODRIGUEZ    Gout, arthritis     Hyperlipidemia     Hypertension     Left sided lacunar infarction ([address] 12/2016    Peripheral vascular disease (HCC)     Type II or unspecified type diabetes mellitus without mention of complication, not stated as uncontrolled        Past Surgical History:   Procedure Laterality Date    COLONOSCOPY      X2    OTHER SURGICAL HISTORY  01/27/2017    CEREBRAL ANGIOGRAM / DIAGNOSTIC / DR NOVA    TONSILLECTOMY         Family History   Problem Relation Age of Onset    Diabetes Mother     Heart Disease Father        Social History   Substance Use Topics    Smoking status: Current Every Day Smoker     Packs/day: 1.00     Years: 40.00     Types: Cigarettes     Last attempt to quit: 9/6/2014    Smokeless tobacco: Never Used      Comment: down to 5 cigs a day     Alcohol use No      Current Outpatient Prescriptions   Medication Sig Dispense Refill    folic acid (FOLVITE) 1 MG tablet Take 1 tablet by mouth 2 times daily 60 tablet 3    nicotine (NICODERM CQ) 21 MG/24HR Place 1 patch onto the skin daily 30 patch 3    allopurinol (ZYLOPRIM) 300 MG tablet Take 1 tablet by mouth daily 90 tablet 3    amLODIPine (NORVASC) 5 MG tablet Take 1 tablet by mouth daily 90 tablet 3    insulin glargine (LANTUS SOLOSTAR) 100 UNIT/ML injection pen 20 units in skin every night 5 Pen 3    clopidogrel (PLAVIX) 75 MG tablet Take 1 tablet by mouth daily 90 tablet 3    labetalol (NORMODYNE) 300 MG tablet TAKE ONE TABLET BY MOUTH TWICE DAILY 60 tablet 3    magnesium hydroxide (MILK OF MAGNESIA) 400 MG/5ML suspension Take 30 mLs by mouth daily as needed for Constipation      glucose monitoring kit (FREESTYLE) monitoring kit 1 1 kit 0    Insulin Pen Needle 31G X 4 MM MISC 4 times daily 100 each 5    Alcohol Swabs (ALCOHOL PREP) 70 % PADS 4 time daily 100 each 3    FREESTYLE LANCETS MISC 1 each by Does not apply route daily 100 each 3    Glucose Blood (BLOOD GLUCOSE TEST STRIPS) STRP Test 4 times daily Diagnosis 100 strip 11    Lancet Device MISC 1 Device by Does not apply route once for 1 dose 100 Device 0    b complex vitamins capsule Take 1 capsule by mouth daily      vitamin D (ERGOCALCIFEROL) 400 UNITS CAPS Take 1 capsule by mouth daily. 90 capsule 3     No current facility-administered medications for this visit.       No Known Allergies    Health Maintenance   Topic Date Due    Smoker: low dose lung CT screening  01/28/2007    Breast cancer screen  12/18/2017    Hepatitis C screen  12/21/2017 (Originally 1950)    HIV screen  12/21/2017 (Originally 1/28/1967)    Diabetic retinal exam  01/07/2018 (Originally 5/10/2017)    Diabetic foot exam  04/27/2018 (Originally 12/10/2016)    DTaP/Tdap/Td vaccine (1 - Tdap) 04/27/2018 (Originally 1/28/1971)    Pneumococcal high/highest risk (1 of 2 - PCV13) 04/27/2018 STRIPS) STRP Test 4 times daily Diagnosis Yes Pamela Hoover MD   Lancet Device MISC 1 Device by Does not apply route once for 1 dose Yes Pamela Hoover MD   b complex vitamins capsule Take 1 capsule by mouth daily Yes Historical Provider, MD   vitamin D (ERGOCALCIFEROL) 400 UNITS CAPS Take 1 capsule by mouth daily.  Yes Yumi Cronin MD     Past Medical History:   Diagnosis Date    Anemia in chronic kidney disease     Cerebral artery occlusion with cerebral infarction ([facility] 12/2016    LEFT SIDED LACUNAL INFARCTION    Chronic kidney disease     STAGE 4 / FOLLOWS WITH DR RODRIGUEZ    Gout, arthritis     Hyperlipidemia     Hypertension     Left sided lacunar infarction [facility]) 12/2016    Peripheral vascular disease (HCC)     Type II or unspecified type diabetes mellitus without mention of complication, not stated as uncontrolled      Past Surgical History:   Procedure Laterality Date    COLONOSCOPY      X2    OTHER SURGICAL HISTORY  01/27/2017    CEREBRAL ANGIOGRAM / DIAGNOSTIC / DR Annelise Dhaliwal    TONSILLECTOMY       Family History   Problem Relation Age of Onset    Diabetes Mother     Heart Disease Father        CareTeam (Including outside providers/suppliers regularly involved in providing care):   Patient Care Team:  Yumi Cronin MD as PCP - General (Internal Medicine)  Nathan Carbajal as Consulting Physician (Ophthalmology)    Wt Readings from Last 3 Encounters:   11/20/17 169 lb 3.2 oz (76.7 kg)   11/07/17 170 lb 9.6 oz (77.4 kg)   09/07/17 168 lb 9.6 oz (76.5 kg)     Vitals:    11/20/17 0801   Resp: 12   Weight: 169 lb 3.2 oz (76.7 kg)   Height: 5' 4\" (1.626 m)       General Appearance: alert and oriented to person, place and time, well developed and well- nourished, in no acute distress  Skin: warm and dry, no rash or erythema  Head: normocephalic and atraumatic  Eyes: pupils equal, round, and reactive to light, extraocular eye movements intact, conjunctivae normal  ENT: tympanic membrane, external ear and ear canal normal bilaterally, nose without deformity, nasal mucosa and turbinates normal without polyps  Neck: supple and non-tender without mass, no thyromegaly or thyroid nodules, no cervical lymphadenopathy  Pulmonary/Chest: clear to auscultation bilaterally- no wheezes, rales or rhonchi, normal air movement, no respiratory distress  Cardiovascular: normal rate, regular rhythm, normal S1 and S2, no murmurs, rubs, clicks, or gallops, distal pulses intact, no carotid bruits  Abdomen: soft, non-tender, non-distended, normal bowel sounds, no masses or organomegaly  Extremities: no cyanosis, clubbing or edema  Musculoskeletal: normal range of motion, no joint swelling, deformity or tenderness  Neurologic: reflexes normal and symmetric, no cranial nerve deficit, gait, coordination and speech normal      Patient's complete Health Risk Assessment and screening values have been reviewed and are found in Flowsheets. The following problems were reviewed today and where indicated follow up appointments were made and/or referrals ordered.     Positive Risk Factor Screenings with Interventions:     Substance Abuse:  Social History     Tobacco History     Smoking Status  Current Every Day Smoker Last attempt to quit  9/6/2014 Smoking Frequency  1 pack/day for 40 years (40 pk yrs) Smoking Tobacco Type  Cigarettes    Smokeless Tobacco Use  Never Used    Tobacco Comment  down to 5 cigs a day           Alcohol History     Alcohol Use Status  No          Drug Use     Drug Use Status  No          Sexual Activity     Sexually Active  Not Asked               Audit Questionnaire: Screen for Alcohol Misuse  How often do you have a drink containing alcohol?: Never  Substance Abuse Interventions:  · None indicated    General Health:  General  In general, how would you say your health is?: Fair  In the past 7 days, have you experienced any of the following?: None of These  Do you get the social and emotional support that you need?: Yes  Do you have a Living Will?: (!) No  General Health Risk Interventions:  · None indicated    Health Habits/Nutrition:  Health Habits/Nutrition  Do you exercise for at least 20 minutes 2-3 times per week?: Yes  Have you lost any weight without trying in the past 3 months?: No  Do you eat fewer than 2 meals per day?: No  Have you seen a dentist within the past year?: (!) No  Body mass index is 29.04 kg/m². Health Habits/Nutrition Interventions:  · None indicated    Safety:  Safety  Do you have working smoke detectors?: Yes  Have all throw rugs been removed or fastened?: Yes  Do you have non-slip mats in all bathtubs?: (!) No  Do all of your stairways have a railing or banister?: (!) No  Are your doorways, halls and stairs free of clutter?: Yes  Do you always fasten your seatbelt when you are in a car?: Yes  Safety Interventions:  · None indicated      Personalized Preventive Plan   Current Health Maintenance Status    There is no immunization history on file for this patient. Health Maintenance   Topic Date Due    Breast cancer screen  12/18/2017    Hepatitis C screen  12/21/2015 (Originally 1950)    HIV screen  12/21/2017 (Originally 1/28/1967)    Diabetic retinal exam  01/07/2018 (Originally 5/10/2017)    Smoker: low dose lung CT screening  02/20/2018 (Originally 1/28/2007)    Diabetic foot exam  04/27/2018 (Originally 12/10/2016)    DTaP/Tdap/Td vaccine (1 - Tdap) 04/27/2018 (Originally 1/28/1971)    Pneumococcal high/highest risk (1 of 2 - PCV13) 04/27/2018 (Originally 1/28/2017)    Flu vaccine (1) 05/07/2018 (Originally 9/1/2017)    Lipid screen  12/20/2017    Diabetic hemoglobin A1C test  11/07/2018    Cervical cancer screen  04/21/2019    Colon cancer screen colonoscopy  11/02/2020    Zostavax vaccine  Addressed    DEXA (modify frequency per FRAX score)  Completed     Recommendations for Preventive Services Due: see orders.   Recommended screening schedule for the next 5-10 years is provided to the patient in written form: see Patient Instructions/AVS.

## 2017-11-30 DIAGNOSIS — N18.4 TYPE 2 DIABETES MELLITUS WITH STAGE 4 CHRONIC KIDNEY DISEASE, WITHOUT LONG-TERM CURRENT USE OF INSULIN (HCC): ICD-10-CM

## 2017-11-30 DIAGNOSIS — N18.4 CHRONIC KIDNEY DISEASE, STAGE IV (SEVERE) (HCC): ICD-10-CM

## 2017-11-30 DIAGNOSIS — E11.22 TYPE 2 DIABETES MELLITUS WITH STAGE 4 CHRONIC KIDNEY DISEASE, WITHOUT LONG-TERM CURRENT USE OF INSULIN (HCC): ICD-10-CM

## 2017-11-30 DIAGNOSIS — I10 ESSENTIAL HYPERTENSION: ICD-10-CM

## 2017-11-30 RX ORDER — LABETALOL 300 MG/1
TABLET, FILM COATED ORAL
Qty: 60 TABLET | Refills: 3 | Status: SHIPPED | OUTPATIENT
Start: 2017-11-30 | End: 2018-04-01 | Stop reason: SDUPTHER

## 2017-11-30 NOTE — TELEPHONE ENCOUNTER
(chronic kidney disease)     Type 2 diabetes mellitus with diabetic chronic kidney disease (HCC)     Chronic kidney disease (CKD)     Essential hypertension     Hypercholesteremia     Asymptomatic bacteriuria     Hypokalemia     Acute infarct posterior limb internal capsule on the left     Left sided lacunar infarction ([address]     Type 2 diabetes mellitus with stage 4 chronic kidney disease, with long-term current use of insulin (HCC)     Cerebral aneurysm

## 2018-02-08 NOTE — TELEPHONE ENCOUNTER
kidney disease)     Type 2 diabetes mellitus with diabetic chronic kidney disease (HCC)     Chronic kidney disease (CKD)     Essential hypertension     Hypercholesteremia     Asymptomatic bacteriuria     Hypokalemia     Acute infarct posterior limb internal capsule on the left     Left sided lacunar infarction ([address]     Type 2 diabetes mellitus with stage 4 chronic kidney disease, with long-term current use of insulin (HCC)     Cerebral aneurysm

## 2018-02-14 ENCOUNTER — OFFICE VISIT (OUTPATIENT)
Dept: INTERNAL MEDICINE | Age: 68
End: 2018-02-14
Payer: MEDICARE

## 2018-02-14 VITALS
SYSTOLIC BLOOD PRESSURE: 164 MMHG | RESPIRATION RATE: 12 BRPM | OXYGEN SATURATION: 98 % | WEIGHT: 170.6 LBS | HEIGHT: 64 IN | DIASTOLIC BLOOD PRESSURE: 79 MMHG | BODY MASS INDEX: 29.12 KG/M2 | HEART RATE: 73 BPM

## 2018-02-14 DIAGNOSIS — N18.4 TYPE 2 DIABETES MELLITUS WITH STAGE 4 CHRONIC KIDNEY DISEASE, WITH LONG-TERM CURRENT USE OF INSULIN (HCC): Primary | ICD-10-CM

## 2018-02-14 DIAGNOSIS — Z13.220 SCREENING FOR HYPERLIPIDEMIA: ICD-10-CM

## 2018-02-14 DIAGNOSIS — E11.22 TYPE 2 DIABETES MELLITUS WITH STAGE 4 CHRONIC KIDNEY DISEASE, WITH LONG-TERM CURRENT USE OF INSULIN (HCC): Primary | ICD-10-CM

## 2018-02-14 DIAGNOSIS — Z79.4 TYPE 2 DIABETES MELLITUS WITH STAGE 4 CHRONIC KIDNEY DISEASE, WITH LONG-TERM CURRENT USE OF INSULIN (HCC): Primary | ICD-10-CM

## 2018-02-14 DIAGNOSIS — Z12.31 ENCOUNTER FOR SCREENING MAMMOGRAM FOR BREAST CANCER: ICD-10-CM

## 2018-02-14 DIAGNOSIS — I73.9 PAD (PERIPHERAL ARTERY DISEASE) (HCC): ICD-10-CM

## 2018-02-14 LAB — HBA1C MFR BLD: 7.8 %

## 2018-02-14 PROCEDURE — 99214 OFFICE O/P EST MOD 30 MIN: CPT | Performed by: INTERNAL MEDICINE

## 2018-02-14 PROCEDURE — 83036 HEMOGLOBIN GLYCOSYLATED A1C: CPT | Performed by: INTERNAL MEDICINE

## 2018-02-14 ASSESSMENT — ENCOUNTER SYMPTOMS
RESPIRATORY NEGATIVE: 1
EYES NEGATIVE: 1
ALLERGIC/IMMUNOLOGIC NEGATIVE: 1
BACK PAIN: 1
CONSTIPATION: 1

## 2018-02-14 NOTE — PROGRESS NOTES
day     Alcohol use No      Current Outpatient Prescriptions   Medication Sig Dispense Refill    insulin glargine (LANTUS SOLOSTAR) 100 UNIT/ML injection pen 25 units in skin every night 10 pen 3    amLODIPine (NORVASC) 5 MG tablet Take 1 tablet by mouth daily 90 tablet 3    labetalol (NORMODYNE) 300 MG tablet TAKE ONE TABLET BY MOUTH TWICE DAILY 60 tablet 3    folic acid (FOLVITE) 1 MG tablet Take 1 tablet by mouth 2 times daily 60 tablet 3    nicotine (NICODERM CQ) 21 MG/24HR Place 1 patch onto the skin daily 30 patch 3    allopurinol (ZYLOPRIM) 300 MG tablet Take 1 tablet by mouth daily 90 tablet 3    clopidogrel (PLAVIX) 75 MG tablet Take 1 tablet by mouth daily 90 tablet 3    magnesium hydroxide (MILK OF MAGNESIA) 400 MG/5ML suspension Take 30 mLs by mouth daily as needed for Constipation      glucose monitoring kit (FREESTYLE) monitoring kit 1 1 kit 0    Insulin Pen Needle 31G X 4 MM MISC 4 times daily 100 each 5    Alcohol Swabs (ALCOHOL PREP) 70 % PADS 4 time daily 100 each 3    FREESTYLE LANCETS MISC 1 each by Does not apply route daily 100 each 3    Glucose Blood (BLOOD GLUCOSE TEST STRIPS) STRP Test 4 times daily Diagnosis 100 strip 11    b complex vitamins capsule Take 1 capsule by mouth daily      vitamin D (ERGOCALCIFEROL) 400 UNITS CAPS Take 1 capsule by mouth daily. 90 capsule 3    Lancet Device MISC 1 Device by Does not apply route once for 1 dose 100 Device 0     No current facility-administered medications for this visit.       No Known Allergies    Health Maintenance   Topic Date Due    Breast cancer screen  12/18/2017    Lipid screen  12/20/2017    Smoker: low dose lung CT screening  02/20/2018 (Originally 1/28/2007)    Diabetic foot exam  04/27/2018 (Originally 12/10/2016)    DTaP/Tdap/Td vaccine (1 - Tdap) 04/27/2018 (Originally 1/28/1971)    Pneumococcal high/highest risk (1 of 2 - PCV13) 04/27/2018 (Originally 1/28/2017)    Hepatitis C screen  05/06/2016 (Originally 1950)    Flu vaccine (1) 05/07/2018 (Originally 9/1/2017)    Diabetic retinal exam  08/14/2018 (Originally 5/10/2017)    A1C test (Diabetic or Prediabetic)  11/07/2018    Potassium monitoring  11/07/2018    Creatinine monitoring  11/07/2018    Colon cancer screen colonoscopy  11/02/2020    Zostavax vaccine  Addressed    DEXA (modify frequency per FRAX score)  Completed       Controlled Substances Monitoring:      HPI:     Doppler of legs shows significant PAD. Will refer to vascular. Diabetes   She presents for her follow-up diabetic visit. She has type 2 diabetes mellitus. Diabetic complications include nephropathy. Risk factors for coronary artery disease include diabetes mellitus and hypertension. Current diabetic treatment includes insulin injections and oral agent (monotherapy). She is compliant with treatment all of the time. An ACE inhibitor/angiotensin II receptor blocker is contraindicated. Hypertension   This is a chronic problem. The current episode started more than 1 year ago. The problem is unchanged. The problem is controlled. Risk factors for coronary artery disease include diabetes mellitus and dyslipidemia. Past treatments include calcium channel blockers and beta blockers. The current treatment provides moderate improvement. Hypertensive end-organ damage includes kidney disease. Identifiable causes of hypertension include chronic renal disease. ROS:     Review of Systems   Constitutional: Negative. HENT: Negative. Eyes: Negative. Respiratory: Negative. Cardiovascular: Negative. Gastrointestinal: Positive for constipation. Endocrine: Negative. Genitourinary: Negative. Musculoskeletal: Positive for arthralgias and back pain. Skin: Negative. Allergic/Immunologic: Negative. Neurological: Negative. Hematological: Negative. Psychiatric/Behavioral: Negative.         Objective:     Physical Exam   Constitutional: She is oriented to person, orders or list here. Orders Placed This Encounter   Procedures    WHITNEY Digital Screen Bilateral [id number]     Standing Status:   Future     Standing Expiration Date:   2/14/2019     Order Specific Question:   Reason for exam:     Answer:   Breast cancer screening    Lipid Panel     Standing Status:   Future     Standing Expiration Date:   2/14/2019     Order Specific Question:   Is Patient Fasting?/# of Hours     Answer:   12 hours   Ziggy Encinas MD, Vascular Surgery [address]     Referral Priority:   Routine     Referral Type:   Consult for Advice and Opinion     Referral Reason:   Specialty Services Required     Referred to Provider:   Gopal Markham MD     Requested Specialty:   Vascular Surgery     Number of Visits Requested:   1    POCT glycosylated hemoglobin (Hb A1C)       Return in about 3 months (around 5/14/2018). Patient agreed with treatment plan.     Electronically signed by Ruba Waldron MD on 2/14/2018 at 2:39 PM

## 2018-02-20 ENCOUNTER — TELEPHONE (OUTPATIENT)
Dept: VASCULAR SURGERY | Age: 68
End: 2018-02-20

## 2018-03-02 ENCOUNTER — INITIAL CONSULT (OUTPATIENT)
Dept: VASCULAR SURGERY | Age: 68
End: 2018-03-02
Payer: MEDICARE

## 2018-03-02 VITALS
DIASTOLIC BLOOD PRESSURE: 80 MMHG | HEIGHT: 64 IN | OXYGEN SATURATION: 97 % | WEIGHT: 170.64 LBS | SYSTOLIC BLOOD PRESSURE: 142 MMHG | HEART RATE: 99 BPM | BODY MASS INDEX: 29.13 KG/M2 | RESPIRATION RATE: 19 BRPM

## 2018-03-02 DIAGNOSIS — I73.9 PAD (PERIPHERAL ARTERY DISEASE) (HCC): Primary | ICD-10-CM

## 2018-03-02 PROCEDURE — 99203 OFFICE O/P NEW LOW 30 MIN: CPT | Performed by: SURGERY

## 2018-03-03 ASSESSMENT — ENCOUNTER SYMPTOMS
SHORTNESS OF BREATH: 0
BACK PAIN: 1
ALLERGIC/IMMUNOLOGIC NEGATIVE: 1
GASTROINTESTINAL NEGATIVE: 1
EYES NEGATIVE: 1

## 2018-03-14 ENCOUNTER — TELEPHONE (OUTPATIENT)
Dept: VASCULAR SURGERY | Age: 68
End: 2018-03-14

## 2018-03-14 NOTE — TELEPHONE ENCOUNTER
Are we suppose to be getting a carotid u/s on this patient? If so, can you put an order in for this? Thanks!

## 2018-03-15 ENCOUNTER — OFFICE VISIT (OUTPATIENT)
Dept: FAMILY MEDICINE CLINIC | Age: 68
End: 2018-03-15
Payer: MEDICARE

## 2018-03-15 VITALS
HEIGHT: 64 IN | WEIGHT: 168 LBS | DIASTOLIC BLOOD PRESSURE: 78 MMHG | BODY MASS INDEX: 28.68 KG/M2 | TEMPERATURE: 98.3 F | SYSTOLIC BLOOD PRESSURE: 138 MMHG | HEART RATE: 94 BPM | OXYGEN SATURATION: 98 %

## 2018-03-15 DIAGNOSIS — T78.40XA ALLERGIC DISORDER, INITIAL ENCOUNTER: ICD-10-CM

## 2018-03-15 DIAGNOSIS — L03.211 CELLULITIS OF FACE: Primary | ICD-10-CM

## 2018-03-15 DIAGNOSIS — R22.0 SWELLING OF RIGHT SIDE OF FACE: ICD-10-CM

## 2018-03-15 PROCEDURE — 99213 OFFICE O/P EST LOW 20 MIN: CPT | Performed by: FAMILY MEDICINE

## 2018-03-15 RX ORDER — AMOXICILLIN AND CLAVULANATE POTASSIUM 250; 125 MG/1; MG/1
1 TABLET, FILM COATED ORAL 3 TIMES DAILY
Qty: 30 TABLET | Refills: 0 | Status: SHIPPED | OUTPATIENT
Start: 2018-03-15 | End: 2018-03-25

## 2018-03-15 RX ORDER — PREDNISONE 20 MG/1
20 TABLET ORAL 2 TIMES DAILY
Qty: 10 TABLET | Refills: 0 | Status: SHIPPED | OUTPATIENT
Start: 2018-03-15 | End: 2018-03-20

## 2018-03-15 ASSESSMENT — ENCOUNTER SYMPTOMS
SINUS PRESSURE: 0
CHEST TIGHTNESS: 0
WHEEZING: 0
COUGH: 0
SINUS PAIN: 0
ABDOMINAL PAIN: 0
BACK PAIN: 0
SORE THROAT: 0
DIARRHEA: 0
EYE PAIN: 0
EYE DISCHARGE: 0
SHORTNESS OF BREATH: 0
RHINORRHEA: 0
EYE ITCHING: 0
COLOR CHANGE: 1
PHOTOPHOBIA: 0
EYE REDNESS: 0
NAUSEA: 0

## 2018-03-15 NOTE — PROGRESS NOTES
[address] WALK-IN FAMILY MEDICINE   [address] [address]  Dept: [phone]  Dept Fax: [phone]    Leonel Ratliff is a 79 y.o. female who presents today for her medical conditions/complaints as noted below. Leonel Ratliff is c/o of Swelling (right side of face and around eye)        HPI:     Patient here with right facial swelling. She first noticed swelling and mild pain just below her right ear in her neck glands 2 days ago. Yesterday swelling extended on to her right cheek and today around her right eye reddened and warm to the touch. No drainage. No fever. No visual problems. No eye drainage. No cold/cough. Mild sore throat. No earache. Right ear feels clogged at times. Has had chronic sinus problems in the past. No insect sting recalled. No topical preparation on the face. Similar problem 12 years ago treated with steroids and antibiotics.         Past Medical History:   Diagnosis Date    Anemia in chronic kidney disease     Cerebral artery occlusion with cerebral infarction ([address] 12/2016    LEFT SIDED LACUNAL INFARCTION    Chronic kidney disease     STAGE 4 / FOLLOWS WITH DR RODRIGUEZ    Gout, arthritis     Hyperlipidemia     Hypertension     Left sided lacunar infarction ([address] 12/2016    Peripheral vascular disease (HCC)     Type II or unspecified type diabetes mellitus without mention of complication, not stated as uncontrolled       Past Surgical History:   Procedure Laterality Date    COLONOSCOPY      X2    OTHER SURGICAL HISTORY  01/27/2017    CEREBRAL ANGIOGRAM / DIAGNOSTIC / DR NOVA    TONSILLECTOMY         Family History   Problem Relation Age of Onset    Diabetes Mother     Heart Disease Father        Social History   Substance Use Topics    Smoking status: Former Smoker     Packs/day: 1.00     Years: 40.00     Types: Cigarettes     Start date: 3/15/1973     Quit date: 9/6/2014    Smokeless tobacco: Never Used      Comment:

## 2018-03-15 NOTE — PATIENT INSTRUCTIONS
Patient Education        Allergic Reaction: Care Instructions  Your Care Instructions    An allergic reaction is an excessive response from your immune system to a medicine, chemical, food, insect bite, or other substance. A reaction can range from mild to life-threatening. Some people have a mild rash, hives, and itching or stomach cramps. In severe reactions, swelling of your tongue and throat can close up your airway so that you cannot breathe. Follow-up care is a key part of your treatment and safety. Be sure to make and go to all appointments, and call your doctor if you are having problems. It's also a good idea to know your test results and keep a list of the medicines you take. How can you care for yourself at home? · If you know what caused your allergic reaction, be sure to avoid it. Your allergy may become more severe each time you have a reaction. · Take an over-the-counter antihistamine, such as cetirizine (Zyrtec) or loratadine (Claritin), to treat mild symptoms. Read and follow directions on the label. Some antihistamines can make you feel sleepy. Do not give antihistamines to a child unless you have checked with your doctor first. Mild symptoms include sneezing or an itchy or runny nose; an itchy mouth; a few hives or mild itching; and mild nausea or stomach discomfort. · Do not scratch hives or a rash. Put a cold, moist towel on them or take cool baths to relieve itching. Put ice packs on hives, swelling, or insect stings for 10 to 15 minutes at a time. Put a thin cloth between the ice pack and your skin. Do not take hot baths or showers. They will make the itching worse. · Your doctor may prescribe a shot of epinephrine to carry with you in case you have a severe reaction. Learn how to give yourself the shot and keep it with you at all times. Make sure it is not .   · Go to the emergency room every time you have a severe reaction, even if you have used your shot of epinephrine and are feeling better. Symptoms can come back after a shot. · Wear medical alert jewelry that lists your allergies. You can buy this at most drugstores. · If your child has a severe allergy, make sure that his or her teachers, babysitters, coaches, and other caregivers know about the allergy. They should have an epinephrine shot, know how and when to give it, and have a plan to take your child to the hospital.  When should you call for help? Give an epinephrine shot if:  ? · You think you are having a severe allergic reaction. ? · You have symptoms in more than one body area, such as mild nausea and an itchy mouth. ? After giving an epinephrine shot call 911, even if you feel better. ?Call 911 if:  ? · You have symptoms of a severe allergic reaction. These may include:  ¨ Sudden raised, red areas (hives) all over your body. ¨ Swelling of the throat, mouth, lips, or tongue. ¨ Trouble breathing. ¨ Passing out (losing consciousness). Or you may feel very lightheaded or suddenly feel weak, confused, or restless. ? · You have been given an epinephrine shot, even if you feel better. ?Call your doctor now or seek immediate medical care if:  ? · You have symptoms of an allergic reaction, such as:  ¨ A rash or hives (raised, red areas on the skin). ¨ Itching. ¨ Swelling. ¨ Belly pain, nausea, or vomiting. ? Watch closely for changes in your health, and be sure to contact your doctor if:  ? · You do not get better as expected. Where can you learn more? Go to [email or web address] org and sign in to your [facility] account. Enter V346 in the [facility] box to learn more about \"Allergic Reaction: Care Instructions. \"     If you do not have an account, please click on the \"Sign Up Now\" link. Current as of: September 29, 2016  Content Version: 11.5  © 4948-5218 [employer]wise, Incorporated. Care instructions adapted under license by [facility] ([facility]).  If you have questions about a medical condition or this instruction, always ask your healthcare professional. [address] any warranty or liability for your use of this information. Patient Education        Cellulitis: Care Instructions  Your Care Instructions    Cellulitis is a skin infection. It often occurs after a break in the skin from a scrape, cut, bite, or puncture, or after a rash. The doctor has checked you carefully, but problems can develop later. If you notice any problems or new symptoms, get medical treatment right away. Follow-up care is a key part of your treatment and safety. Be sure to make and go to all appointments, and call your doctor if you are having problems. It's also a good idea to know your test results and keep a list of the medicines you take. How can you care for yourself at home? · Take your antibiotics as directed. Do not stop taking them just because you feel better. You need to take the full course of antibiotics. · Prop up the infected area on pillows to reduce pain and swelling. Try to keep the area above the level of your heart as often as you can. · If your doctor told you how to care for your wound, follow your doctor's instructions. If you did not get instructions, follow this general advice:  ¨ Wash the wound with clean water 2 times a day. Don't use hydrogen peroxide or alcohol, which can slow healing. ¨ You may cover the wound with a thin layer of petroleum jelly, such as Vaseline, and a nonstick bandage. ¨ Apply more petroleum jelly and replace the bandage as needed. · Be safe with medicines. Take pain medicines exactly as directed. ¨ If the doctor gave you a prescription medicine for pain, take it as prescribed. ¨ If you are not taking a prescription pain medicine, ask your doctor if you can take an over-the-counter medicine. To prevent cellulitis in the future  · Try to prevent cuts, scrapes, or other injuries to your skin.  Cellulitis most often occurs where there is a break

## 2018-04-01 DIAGNOSIS — E11.22 TYPE 2 DIABETES MELLITUS WITH STAGE 4 CHRONIC KIDNEY DISEASE, WITHOUT LONG-TERM CURRENT USE OF INSULIN (HCC): ICD-10-CM

## 2018-04-01 DIAGNOSIS — N18.4 TYPE 2 DIABETES MELLITUS WITH STAGE 4 CHRONIC KIDNEY DISEASE, WITHOUT LONG-TERM CURRENT USE OF INSULIN (HCC): ICD-10-CM

## 2018-04-01 DIAGNOSIS — N18.4 CHRONIC KIDNEY DISEASE, STAGE IV (SEVERE) (HCC): ICD-10-CM

## 2018-04-01 DIAGNOSIS — I10 ESSENTIAL HYPERTENSION: ICD-10-CM

## 2018-04-02 RX ORDER — LABETALOL 300 MG/1
TABLET, FILM COATED ORAL
Qty: 60 TABLET | Refills: 3 | Status: SHIPPED | OUTPATIENT
Start: 2018-04-02 | End: 2018-05-01 | Stop reason: SDUPTHER

## 2018-04-11 ENCOUNTER — HOSPITAL ENCOUNTER (OUTPATIENT)
Age: 68
Discharge: HOME | End: 2018-04-11
Payer: MEDICARE

## 2018-04-11 DIAGNOSIS — N18.4 STAGE 4 CHRONIC KIDNEY DISEASE (HCC): ICD-10-CM

## 2018-04-11 DIAGNOSIS — D63.1 ANEMIA OF CHRONIC RENAL FAILURE, STAGE 4 (SEVERE) (HCC): ICD-10-CM

## 2018-04-11 DIAGNOSIS — I10 ESSENTIAL HYPERTENSION: ICD-10-CM

## 2018-04-11 DIAGNOSIS — N18.4 ANEMIA OF CHRONIC RENAL FAILURE, STAGE 4 (SEVERE) (HCC): ICD-10-CM

## 2018-04-11 DIAGNOSIS — E55.9 VITAMIN D DEFICIENCY: ICD-10-CM

## 2018-04-11 DIAGNOSIS — Z13.220 SCREENING FOR HYPERLIPIDEMIA: ICD-10-CM

## 2018-04-11 LAB
CHOLESTEROL/HDL RATIO: 5.1
CHOLESTEROL: 193 MG/DL
CREATININE URINE: 131.4 MG/DL (ref 28–217)
HDLC SERPL-MCNC: 38 MG/DL
LDL CHOLESTEROL: 115 MG/DL (ref 0–130)
TOTAL PROTEIN, URINE: 398 MG/DL
TRIGL SERPL-MCNC: 199 MG/DL
VLDLC SERPL CALC-MCNC: ABNORMAL MG/DL (ref 1–30)

## 2018-04-11 PROCEDURE — 80061 LIPID PANEL: CPT

## 2018-04-11 PROCEDURE — 84156 ASSAY OF PROTEIN URINE: CPT

## 2018-04-11 PROCEDURE — 36415 COLL VENOUS BLD VENIPUNCTURE: CPT

## 2018-04-11 PROCEDURE — 82570 ASSAY OF URINE CREATININE: CPT

## 2018-04-20 RX ORDER — FOLIC ACID 1 MG/1
1 TABLET ORAL 2 TIMES DAILY
Qty: 60 TABLET | Refills: 3 | Status: SHIPPED | OUTPATIENT
Start: 2018-04-20 | End: 2018-09-02 | Stop reason: SDUPTHER

## 2018-05-01 DIAGNOSIS — I10 ESSENTIAL HYPERTENSION: ICD-10-CM

## 2018-05-01 DIAGNOSIS — N18.4 CHRONIC KIDNEY DISEASE, STAGE IV (SEVERE) (HCC): ICD-10-CM

## 2018-05-01 DIAGNOSIS — N18.4 TYPE 2 DIABETES MELLITUS WITH STAGE 4 CHRONIC KIDNEY DISEASE, WITHOUT LONG-TERM CURRENT USE OF INSULIN (HCC): ICD-10-CM

## 2018-05-01 DIAGNOSIS — E11.22 TYPE 2 DIABETES MELLITUS WITH STAGE 4 CHRONIC KIDNEY DISEASE, WITHOUT LONG-TERM CURRENT USE OF INSULIN (HCC): ICD-10-CM

## 2018-05-01 RX ORDER — ALLOPURINOL 300 MG/1
300 TABLET ORAL DAILY
Qty: 90 TABLET | Refills: 3 | Status: SHIPPED | OUTPATIENT
Start: 2018-05-01 | End: 2019-05-23 | Stop reason: SDUPTHER

## 2018-05-01 RX ORDER — CLOPIDOGREL BISULFATE 75 MG/1
75 TABLET ORAL DAILY
Qty: 90 TABLET | Refills: 3 | Status: SHIPPED | OUTPATIENT
Start: 2018-05-01 | End: 2019-05-23 | Stop reason: SDUPTHER

## 2018-05-01 RX ORDER — LABETALOL 300 MG/1
TABLET, FILM COATED ORAL
Qty: 60 TABLET | Refills: 3 | Status: SHIPPED | OUTPATIENT
Start: 2018-05-01 | End: 2019-06-21

## 2018-05-16 ENCOUNTER — OFFICE VISIT (OUTPATIENT)
Dept: INTERNAL MEDICINE | Age: 68
End: 2018-05-16
Payer: MEDICARE

## 2018-05-16 ENCOUNTER — HOSPITAL ENCOUNTER (OUTPATIENT)
Age: 68
Discharge: HOME | End: 2018-05-16
Payer: MEDICARE

## 2018-05-16 VITALS
BODY MASS INDEX: 27.62 KG/M2 | WEIGHT: 161 LBS | DIASTOLIC BLOOD PRESSURE: 75 MMHG | SYSTOLIC BLOOD PRESSURE: 124 MMHG | OXYGEN SATURATION: 98 % | HEART RATE: 89 BPM

## 2018-05-16 DIAGNOSIS — N18.4 TYPE 2 DIABETES MELLITUS WITH STAGE 4 CHRONIC KIDNEY DISEASE, WITH LONG-TERM CURRENT USE OF INSULIN (HCC): Primary | ICD-10-CM

## 2018-05-16 DIAGNOSIS — I10 ESSENTIAL HYPERTENSION: ICD-10-CM

## 2018-05-16 DIAGNOSIS — Z79.4 TYPE 2 DIABETES MELLITUS WITH STAGE 4 CHRONIC KIDNEY DISEASE, WITH LONG-TERM CURRENT USE OF INSULIN (HCC): Primary | ICD-10-CM

## 2018-05-16 DIAGNOSIS — E11.22 TYPE 2 DIABETES MELLITUS WITH STAGE 4 CHRONIC KIDNEY DISEASE, WITH LONG-TERM CURRENT USE OF INSULIN (HCC): Primary | ICD-10-CM

## 2018-05-16 LAB
ANION GAP SERPL CALCULATED.3IONS-SCNC: 17 MMOL/L (ref 9–17)
BUN BLDV-MCNC: 33 MG/DL (ref 8–23)
BUN/CREAT BLD: ABNORMAL (ref 9–20)
CALCIUM SERPL-MCNC: 9.3 MG/DL (ref 8.6–10.4)
CHLORIDE BLD-SCNC: 100 MMOL/L (ref 98–107)
CO2: 19 MMOL/L (ref 20–31)
CREAT SERPL-MCNC: 2.76 MG/DL (ref 0.5–0.9)
GFR AFRICAN AMERICAN: 21 ML/MIN
GFR NON-AFRICAN AMERICAN: 17 ML/MIN
GFR SERPL CREATININE-BSD FRML MDRD: ABNORMAL ML/MIN/{1.73_M2}
GFR SERPL CREATININE-BSD FRML MDRD: ABNORMAL ML/MIN/{1.73_M2}
GLUCOSE BLD-MCNC: 175 MG/DL (ref 70–99)
HCT VFR BLD CALC: 25.7 % (ref 36.3–47.1)
HEMOGLOBIN: 8.4 G/DL (ref 11.9–15.1)
MCH RBC QN AUTO: 28.9 PG (ref 25.2–33.5)
MCHC RBC AUTO-ENTMCNC: 32.7 G/DL (ref 28.4–34.8)
MCV RBC AUTO: 88.3 FL (ref 82.6–102.9)
NRBC AUTOMATED: 0 PER 100 WBC
PDW BLD-RTO: 15.2 % (ref 11.8–14.4)
PHOSPHORUS: 3.8 MG/DL (ref 2.6–4.5)
PLATELET # BLD: 271 K/UL (ref 138–453)
PMV BLD AUTO: 11.4 FL (ref 8.1–13.5)
POTASSIUM SERPL-SCNC: 3.9 MMOL/L (ref 3.7–5.3)
RBC # BLD: 2.91 M/UL (ref 3.95–5.11)
SODIUM BLD-SCNC: 136 MMOL/L (ref 135–144)
URIC ACID: 4.6 MG/DL (ref 2.4–5.7)
VITAMIN D 25-HYDROXY: 24.3 NG/ML (ref 30–100)
WBC # BLD: 9.8 K/UL (ref 3.5–11.3)

## 2018-05-16 PROCEDURE — 85027 COMPLETE CBC AUTOMATED: CPT

## 2018-05-16 PROCEDURE — 84100 ASSAY OF PHOSPHORUS: CPT

## 2018-05-16 PROCEDURE — 80048 BASIC METABOLIC PNL TOTAL CA: CPT

## 2018-05-16 PROCEDURE — 36415 COLL VENOUS BLD VENIPUNCTURE: CPT

## 2018-05-16 PROCEDURE — 84550 ASSAY OF BLOOD/URIC ACID: CPT

## 2018-05-16 PROCEDURE — 82306 VITAMIN D 25 HYDROXY: CPT

## 2018-05-16 PROCEDURE — 99214 OFFICE O/P EST MOD 30 MIN: CPT | Performed by: INTERNAL MEDICINE

## 2018-05-16 ASSESSMENT — ENCOUNTER SYMPTOMS
EYES NEGATIVE: 1
GASTROINTESTINAL NEGATIVE: 1
ALLERGIC/IMMUNOLOGIC NEGATIVE: 1
RESPIRATORY NEGATIVE: 1
BACK PAIN: 1

## 2018-05-29 ENCOUNTER — TELEPHONE (OUTPATIENT)
Dept: VASCULAR SURGERY | Age: 68
End: 2018-05-29

## 2018-06-29 ENCOUNTER — HOSPITAL ENCOUNTER (OUTPATIENT)
Age: 68
Discharge: HOME | End: 2018-06-29
Payer: MEDICARE

## 2018-06-29 DIAGNOSIS — D63.1 ANEMIA IN STAGE 4 CHRONIC KIDNEY DISEASE (HCC): ICD-10-CM

## 2018-06-29 DIAGNOSIS — N18.4 ANEMIA IN STAGE 4 CHRONIC KIDNEY DISEASE (HCC): ICD-10-CM

## 2018-06-29 LAB
FERRITIN: 16 UG/L (ref 13–150)
IRON SATURATION: 22 % (ref 20–55)
IRON: 78 UG/DL (ref 37–145)
TOTAL IRON BINDING CAPACITY: 360 UG/DL (ref 250–450)
UNSATURATED IRON BINDING CAPACITY: 282 UG/DL (ref 112–347)

## 2018-06-29 PROCEDURE — 82728 ASSAY OF FERRITIN: CPT

## 2018-06-29 PROCEDURE — 83550 IRON BINDING TEST: CPT

## 2018-06-29 PROCEDURE — 36415 COLL VENOUS BLD VENIPUNCTURE: CPT

## 2018-06-29 PROCEDURE — 83540 ASSAY OF IRON: CPT

## 2018-07-19 ENCOUNTER — TELEPHONE (OUTPATIENT)
Dept: INFUSION THERAPY | Facility: MEDICAL CENTER | Age: 68
End: 2018-07-19

## 2018-07-26 ENCOUNTER — HOSPITAL ENCOUNTER (OUTPATIENT)
Dept: INFUSION THERAPY | Facility: MEDICAL CENTER | Age: 68
Discharge: HOME | End: 2018-07-26
Payer: MEDICARE

## 2018-07-26 VITALS — DIASTOLIC BLOOD PRESSURE: 71 MMHG | TEMPERATURE: 98.4 F | SYSTOLIC BLOOD PRESSURE: 160 MMHG | RESPIRATION RATE: 18 BRPM

## 2018-07-26 DIAGNOSIS — N18.4 ANEMIA IN STAGE 4 CHRONIC KIDNEY DISEASE (HCC): ICD-10-CM

## 2018-07-26 DIAGNOSIS — D63.1 ANEMIA IN STAGE 4 CHRONIC KIDNEY DISEASE (HCC): ICD-10-CM

## 2018-07-26 PROCEDURE — 2580000003 HC RX 258: Performed by: INTERNAL MEDICINE

## 2018-07-26 PROCEDURE — 6360000002 HC RX W HCPCS: Performed by: INTERNAL MEDICINE

## 2018-07-26 PROCEDURE — 96365 THER/PROPH/DIAG IV INF INIT: CPT

## 2018-07-26 RX ORDER — SODIUM CHLORIDE 9 MG/ML
INJECTION, SOLUTION INTRAVENOUS ONCE
Status: COMPLETED | OUTPATIENT
Start: 2018-07-26 | End: 2018-07-26

## 2018-07-26 RX ORDER — SODIUM CHLORIDE 9 MG/ML
INJECTION, SOLUTION INTRAVENOUS ONCE
Status: CANCELLED | OUTPATIENT
Start: 2018-07-26 | End: 2018-07-26

## 2018-07-26 RX ADMIN — SODIUM CHLORIDE: 9 INJECTION, SOLUTION INTRAVENOUS at 13:37

## 2018-07-26 RX ADMIN — IRON SUCROSE 200 MG: 20 INJECTION, SOLUTION INTRAVENOUS at 13:47

## 2018-07-26 NOTE — PROGRESS NOTES
Pt arrives per ambulatory per self and given lexicomp information on venofer and possible side effects discussed. Pt states understanding. Pt tolerated venofer well and no reactions or complaints and blood return present throughout infusions. Pt has all next appts. Pt discharged per ambulatory per self.

## 2018-08-02 ENCOUNTER — HOSPITAL ENCOUNTER (OUTPATIENT)
Dept: INFUSION THERAPY | Facility: MEDICAL CENTER | Age: 68
Discharge: HOME | End: 2018-08-02
Payer: MEDICARE

## 2018-08-02 VITALS
DIASTOLIC BLOOD PRESSURE: 62 MMHG | SYSTOLIC BLOOD PRESSURE: 131 MMHG | HEART RATE: 74 BPM | RESPIRATION RATE: 18 BRPM | TEMPERATURE: 98.4 F

## 2018-08-02 DIAGNOSIS — N18.4 ANEMIA IN STAGE 4 CHRONIC KIDNEY DISEASE (HCC): ICD-10-CM

## 2018-08-02 DIAGNOSIS — D63.1 ANEMIA IN STAGE 4 CHRONIC KIDNEY DISEASE (HCC): ICD-10-CM

## 2018-08-02 PROCEDURE — 2580000003 HC RX 258: Performed by: INTERNAL MEDICINE

## 2018-08-02 PROCEDURE — 6360000002 HC RX W HCPCS: Performed by: INTERNAL MEDICINE

## 2018-08-02 PROCEDURE — 96365 THER/PROPH/DIAG IV INF INIT: CPT

## 2018-08-02 RX ORDER — SODIUM CHLORIDE 9 MG/ML
INJECTION, SOLUTION INTRAVENOUS ONCE
Status: CANCELLED | OUTPATIENT
Start: 2018-08-02 | End: 2018-08-02

## 2018-08-02 RX ORDER — SODIUM CHLORIDE 9 MG/ML
INJECTION, SOLUTION INTRAVENOUS ONCE
Status: COMPLETED | OUTPATIENT
Start: 2018-08-02 | End: 2018-08-02

## 2018-08-02 RX ADMIN — SODIUM CHLORIDE: 9 INJECTION, SOLUTION INTRAVENOUS at 13:20

## 2018-08-02 RX ADMIN — IRON SUCROSE 200 MG: 20 INJECTION, SOLUTION INTRAVENOUS at 13:45

## 2018-08-02 NOTE — PROGRESS NOTES
1310:  Pt arrives ambulatory for #2 of 5 Venofer infusions. Dr. Jono Rowan orders from 7/5/18 reviewed. Labs from 6/29/18 reviewed and copy of lab results given to patient as requested. VS obtained and PIV established. Pt tolerated Venofer infusion well without complaints. Pt observed for 30 min post Venofer infusion. No s/s adverse reactions noted. PIV discontinued without complications. Sterile gauze dressing applied clean, dry and intact with Coflex dressing overtop to secure. Appointment calendar given to patient. RTC on 8/9/18. Pt discharged home ambulatory without complaints and/or no new concerns voiced.

## 2018-08-09 ENCOUNTER — HOSPITAL ENCOUNTER (OUTPATIENT)
Dept: INFUSION THERAPY | Facility: MEDICAL CENTER | Age: 68
Discharge: HOME | End: 2018-08-09
Payer: MEDICARE

## 2018-08-09 ENCOUNTER — APPOINTMENT (OUTPATIENT)
Dept: INFUSION THERAPY | Facility: MEDICAL CENTER | Age: 68
End: 2018-08-09
Payer: MEDICARE

## 2018-08-09 ENCOUNTER — HOSPITAL ENCOUNTER (OUTPATIENT)
Facility: MEDICAL CENTER | Age: 68
End: 2018-08-09
Payer: MEDICARE

## 2018-08-09 VITALS
TEMPERATURE: 98.3 F | HEART RATE: 90 BPM | DIASTOLIC BLOOD PRESSURE: 64 MMHG | SYSTOLIC BLOOD PRESSURE: 150 MMHG | RESPIRATION RATE: 18 BRPM

## 2018-08-09 DIAGNOSIS — N18.4 ANEMIA IN STAGE 4 CHRONIC KIDNEY DISEASE (HCC): ICD-10-CM

## 2018-08-09 DIAGNOSIS — D63.1 ANEMIA IN STAGE 4 CHRONIC KIDNEY DISEASE (HCC): ICD-10-CM

## 2018-08-09 PROCEDURE — 6360000002 HC RX W HCPCS: Performed by: INTERNAL MEDICINE

## 2018-08-09 PROCEDURE — 96365 THER/PROPH/DIAG IV INF INIT: CPT

## 2018-08-09 PROCEDURE — 2580000003 HC RX 258: Performed by: INTERNAL MEDICINE

## 2018-08-09 RX ORDER — SODIUM CHLORIDE 9 MG/ML
INJECTION, SOLUTION INTRAVENOUS ONCE
Status: CANCELLED | OUTPATIENT
Start: 2018-08-09 | End: 2018-08-09

## 2018-08-09 RX ORDER — SODIUM CHLORIDE 9 MG/ML
INJECTION, SOLUTION INTRAVENOUS ONCE
Status: COMPLETED | OUTPATIENT
Start: 2018-08-09 | End: 2018-08-09

## 2018-08-09 RX ADMIN — SODIUM CHLORIDE: 9 INJECTION, SOLUTION INTRAVENOUS at 14:15

## 2018-08-09 RX ADMIN — IRON SUCROSE 200 MG: 20 INJECTION, SOLUTION INTRAVENOUS at 14:16

## 2018-08-09 NOTE — PROGRESS NOTES
Pt. Arrives for 3rd dose of venofer  Pt. Denies c/o. IV placed with ease. Venofer started over one hour.   venofer completed without difficulty

## 2018-08-16 ENCOUNTER — HOSPITAL ENCOUNTER (OUTPATIENT)
Dept: INFUSION THERAPY | Facility: MEDICAL CENTER | Age: 68
Discharge: HOME | End: 2018-08-16
Payer: MEDICARE

## 2018-08-16 VITALS
DIASTOLIC BLOOD PRESSURE: 59 MMHG | RESPIRATION RATE: 18 BRPM | TEMPERATURE: 98.7 F | HEART RATE: 85 BPM | SYSTOLIC BLOOD PRESSURE: 134 MMHG

## 2018-08-16 DIAGNOSIS — N18.4 ANEMIA IN STAGE 4 CHRONIC KIDNEY DISEASE (HCC): ICD-10-CM

## 2018-08-16 DIAGNOSIS — D63.1 ANEMIA IN STAGE 4 CHRONIC KIDNEY DISEASE (HCC): ICD-10-CM

## 2018-08-16 PROCEDURE — 6360000002 HC RX W HCPCS: Performed by: INTERNAL MEDICINE

## 2018-08-16 PROCEDURE — 96365 THER/PROPH/DIAG IV INF INIT: CPT

## 2018-08-16 PROCEDURE — 2580000003 HC RX 258: Performed by: INTERNAL MEDICINE

## 2018-08-16 RX ORDER — SODIUM CHLORIDE 9 MG/ML
INJECTION, SOLUTION INTRAVENOUS ONCE
Status: CANCELLED | OUTPATIENT
Start: 2018-08-16 | End: 2018-08-16

## 2018-08-16 RX ORDER — SODIUM CHLORIDE 9 MG/ML
INJECTION, SOLUTION INTRAVENOUS ONCE
Status: COMPLETED | OUTPATIENT
Start: 2018-08-16 | End: 2018-08-16

## 2018-08-16 RX ADMIN — SODIUM CHLORIDE: 9 INJECTION, SOLUTION INTRAVENOUS at 13:23

## 2018-08-16 RX ADMIN — IRON SUCROSE 200 MG: 20 INJECTION, SOLUTION INTRAVENOUS at 13:34

## 2018-08-16 NOTE — PROGRESS NOTES
Pt arrives per ambulatory per self and orders reviewed and pt tolerating previous venofer infusions well. Pt tolerated venofer well today and NS flushing line before and after. No reactions or complaints and blood return present throughout infusions. Pt discharged per ambulatory per self and pt has appt to return next week.

## 2018-08-20 ENCOUNTER — OFFICE VISIT (OUTPATIENT)
Dept: INTERNAL MEDICINE | Age: 68
End: 2018-08-20
Payer: MEDICARE

## 2018-08-20 ENCOUNTER — HOSPITAL ENCOUNTER (OUTPATIENT)
Age: 68
Discharge: HOME | End: 2018-08-20
Payer: MEDICARE

## 2018-08-20 VITALS
BODY MASS INDEX: 27.79 KG/M2 | WEIGHT: 162 LBS | SYSTOLIC BLOOD PRESSURE: 146 MMHG | OXYGEN SATURATION: 97 % | DIASTOLIC BLOOD PRESSURE: 72 MMHG | HEART RATE: 84 BPM

## 2018-08-20 DIAGNOSIS — E11.22 TYPE 2 DIABETES MELLITUS WITH STAGE 4 CHRONIC KIDNEY DISEASE, WITH LONG-TERM CURRENT USE OF INSULIN (HCC): Primary | ICD-10-CM

## 2018-08-20 DIAGNOSIS — I10 ESSENTIAL HYPERTENSION: ICD-10-CM

## 2018-08-20 DIAGNOSIS — N18.4 TYPE 2 DIABETES MELLITUS WITH STAGE 4 CHRONIC KIDNEY DISEASE, WITH LONG-TERM CURRENT USE OF INSULIN (HCC): Primary | ICD-10-CM

## 2018-08-20 DIAGNOSIS — N18.4 CHRONIC KIDNEY DISEASE (CKD), ACTIVE MEDICAL MANAGEMENT WITHOUT DIALYSIS, STAGE 4 (SEVERE) (HCC): ICD-10-CM

## 2018-08-20 DIAGNOSIS — D63.1 ANEMIA OF CHRONIC KIDNEY FAILURE, STAGE 4 (SEVERE) (HCC): ICD-10-CM

## 2018-08-20 DIAGNOSIS — Z79.4 TYPE 2 DIABETES MELLITUS WITH STAGE 4 CHRONIC KIDNEY DISEASE, WITH LONG-TERM CURRENT USE OF INSULIN (HCC): Primary | ICD-10-CM

## 2018-08-20 DIAGNOSIS — R80.1 PERSISTENT PROTEINURIA: ICD-10-CM

## 2018-08-20 DIAGNOSIS — N18.4 ANEMIA OF CHRONIC KIDNEY FAILURE, STAGE 4 (SEVERE) (HCC): ICD-10-CM

## 2018-08-20 LAB
ANION GAP SERPL CALCULATED.3IONS-SCNC: 19 MMOL/L (ref 9–17)
BUN BLDV-MCNC: 19 MG/DL (ref 8–23)
BUN/CREAT BLD: ABNORMAL (ref 9–20)
CALCIUM SERPL-MCNC: 9.4 MG/DL (ref 8.6–10.4)
CHLORIDE BLD-SCNC: 103 MMOL/L (ref 98–107)
CO2: 19 MMOL/L (ref 20–31)
CREAT SERPL-MCNC: 2.83 MG/DL (ref 0.5–0.9)
GFR AFRICAN AMERICAN: 20 ML/MIN
GFR NON-AFRICAN AMERICAN: 17 ML/MIN
GFR SERPL CREATININE-BSD FRML MDRD: ABNORMAL ML/MIN/{1.73_M2}
GFR SERPL CREATININE-BSD FRML MDRD: ABNORMAL ML/MIN/{1.73_M2}
GLUCOSE BLD-MCNC: 97 MG/DL (ref 70–99)
HBA1C MFR BLD: 6.7 %
POTASSIUM SERPL-SCNC: 4 MMOL/L (ref 3.7–5.3)
SODIUM BLD-SCNC: 141 MMOL/L (ref 135–144)

## 2018-08-20 PROCEDURE — 99214 OFFICE O/P EST MOD 30 MIN: CPT | Performed by: INTERNAL MEDICINE

## 2018-08-20 PROCEDURE — 36415 COLL VENOUS BLD VENIPUNCTURE: CPT

## 2018-08-20 PROCEDURE — 83036 HEMOGLOBIN GLYCOSYLATED A1C: CPT | Performed by: INTERNAL MEDICINE

## 2018-08-20 PROCEDURE — 80048 BASIC METABOLIC PNL TOTAL CA: CPT

## 2018-08-20 ASSESSMENT — ENCOUNTER SYMPTOMS: GASTROINTESTINAL NEGATIVE: 1

## 2018-08-20 NOTE — PROGRESS NOTES
[facility] PHYSIC[facility]  [facility] INTERNAL MEDICINE [address]  [address] [facility] [place]  [address]  Dept: [phone]  Dept Fax: [phone]    Som Del Real is a 79 y.o. female who presents today for   Chief Complaint   Patient presents with    3 Month Follow-Up    Diabetes    Health Maintenance     Patient will schedule michael    and follow up of chronic medical problems:   Patient Active Problem List   Diagnosis    Hypercholesteremia    Gout    Vitamin D deficiency    Anemia in stage 4 chronic kidney disease ([address]    Type 2 diabetes mellitus with diabetic chronic kidney disease ([address]    Chronic kidney disease (CKD)    Essential hypertension    Hypercholesteremia    Asymptomatic bacteriuria    Hypokalemia    Acute infarct posterior limb internal capsule on the left    Left sided lacunar infarction ([address]    Type 2 diabetes mellitus with stage 4 chronic kidney disease, with long-term current use of insulin (HCC)    Cerebral aneurysm   .     Past Medical History:   Diagnosis Date    Anemia in chronic kidney disease     Cerebral artery occlusion with cerebral infarction ([address] 12/2016    LEFT SIDED LACUNAL INFARCTION    Chronic kidney disease     STAGE 4 / FOLLOWS WITH DR RODRIGUEZ    Gout, arthritis     Hyperlipidemia     Hypertension     Left sided lacunar infarction ([address] 12/2016    Peripheral vascular disease (HCC)     Type II or unspecified type diabetes mellitus without mention of complication, not stated as uncontrolled        Past Surgical History:   Procedure Laterality Date    COLONOSCOPY      X2    OTHER SURGICAL HISTORY  01/27/2017    CEREBRAL ANGIOGRAM / DIAGNOSTIC / DR Naye Aponte    TONSILLECTOMY         Family History   Problem Relation Age of Onset    Diabetes Mother     Heart Disease Father        Social History   Substance Use Topics    Smoking status: Current Every Day Smoker     Packs/day: 1.00     Years: 40.00     Types: Cigarettes     Start date: 3/15/1973     Last

## 2018-08-23 ENCOUNTER — HOSPITAL ENCOUNTER (OUTPATIENT)
Dept: INFUSION THERAPY | Facility: MEDICAL CENTER | Age: 68
Discharge: HOME | End: 2018-08-23
Payer: MEDICARE

## 2018-08-23 VITALS
DIASTOLIC BLOOD PRESSURE: 55 MMHG | SYSTOLIC BLOOD PRESSURE: 134 MMHG | RESPIRATION RATE: 18 BRPM | TEMPERATURE: 98.5 F | HEART RATE: 91 BPM

## 2018-08-23 DIAGNOSIS — D63.1 ANEMIA IN STAGE 4 CHRONIC KIDNEY DISEASE (HCC): ICD-10-CM

## 2018-08-23 DIAGNOSIS — N18.4 ANEMIA IN STAGE 4 CHRONIC KIDNEY DISEASE (HCC): ICD-10-CM

## 2018-08-23 PROCEDURE — 2580000003 HC RX 258: Performed by: INTERNAL MEDICINE

## 2018-08-23 PROCEDURE — 96365 THER/PROPH/DIAG IV INF INIT: CPT

## 2018-08-23 PROCEDURE — 6360000002 HC RX W HCPCS: Performed by: INTERNAL MEDICINE

## 2018-08-23 RX ORDER — SODIUM CHLORIDE 9 MG/ML
INJECTION, SOLUTION INTRAVENOUS ONCE
Status: CANCELLED | OUTPATIENT
Start: 2018-08-23 | End: 2018-08-23

## 2018-08-23 RX ORDER — SODIUM CHLORIDE 9 MG/ML
INJECTION, SOLUTION INTRAVENOUS ONCE
Status: COMPLETED | OUTPATIENT
Start: 2018-08-23 | End: 2018-08-23

## 2018-08-23 RX ADMIN — SODIUM CHLORIDE: 9 INJECTION, SOLUTION INTRAVENOUS at 13:15

## 2018-08-23 RX ADMIN — IRON SUCROSE 200 MG: 20 INJECTION, SOLUTION INTRAVENOUS at 13:15

## 2018-08-23 NOTE — PROGRESS NOTES
Pt. Arrives for venofer  5 of 5  Iv placed with ease. Venofer started over one hour. Venofer completed without difficutly.

## 2018-09-03 NOTE — TELEPHONE ENCOUNTER
Patient Active Problem List:     Hypercholesteremia     Gout     Vitamin D deficiency     Anemia in stage 4 chronic kidney disease (HCC)     Type 2 diabetes mellitus with diabetic chronic kidney disease (HCC)     Chronic kidney disease (CKD)     Essential hypertension     Hypercholesteremia     Asymptomatic bacteriuria     Hypokalemia     Acute infarct posterior limb internal capsule on the left     Left sided lacunar infarction ([facility]     Type 2 diabetes mellitus with stage 4 chronic kidney disease, with long-term current use of insulin (HCC)     Cerebral aneurysm

## 2018-09-04 RX ORDER — FOLIC ACID 1 MG/1
TABLET ORAL
Qty: 60 TABLET | Refills: 3 | Status: SHIPPED | OUTPATIENT
Start: 2018-09-04 | End: 2018-12-30 | Stop reason: SDUPTHER

## 2018-10-02 ENCOUNTER — HOSPITAL ENCOUNTER (OUTPATIENT)
Age: 68
Discharge: HOME | End: 2018-10-02
Payer: MEDICARE

## 2018-10-02 DIAGNOSIS — I10 ESSENTIAL HYPERTENSION: ICD-10-CM

## 2018-10-02 DIAGNOSIS — E11.22 TYPE 2 DIABETES MELLITUS WITH STAGE 4 CHRONIC KIDNEY DISEASE, WITH LONG-TERM CURRENT USE OF INSULIN (HCC): ICD-10-CM

## 2018-10-02 DIAGNOSIS — D63.1 ANEMIA IN STAGE 4 CHRONIC KIDNEY DISEASE (HCC): ICD-10-CM

## 2018-10-02 DIAGNOSIS — N18.4 ANEMIA IN STAGE 4 CHRONIC KIDNEY DISEASE (HCC): ICD-10-CM

## 2018-10-02 DIAGNOSIS — Z79.4 TYPE 2 DIABETES MELLITUS WITH STAGE 4 CHRONIC KIDNEY DISEASE, WITH LONG-TERM CURRENT USE OF INSULIN (HCC): ICD-10-CM

## 2018-10-02 DIAGNOSIS — N18.4 TYPE 2 DIABETES MELLITUS WITH STAGE 4 CHRONIC KIDNEY DISEASE, WITH LONG-TERM CURRENT USE OF INSULIN (HCC): ICD-10-CM

## 2018-10-02 LAB
FERRITIN: 117 UG/L (ref 13–150)
HCT VFR BLD CALC: 29.8 % (ref 36.3–47.1)
HEMOGLOBIN: 9.8 G/DL (ref 11.9–15.1)
IRON SATURATION: 21 % (ref 20–55)
IRON: 58 UG/DL (ref 37–145)
TOTAL IRON BINDING CAPACITY: 278 UG/DL (ref 250–450)
UNSATURATED IRON BINDING CAPACITY: 220 UG/DL (ref 112–347)

## 2018-10-02 PROCEDURE — 83550 IRON BINDING TEST: CPT

## 2018-10-02 PROCEDURE — 85014 HEMATOCRIT: CPT

## 2018-10-02 PROCEDURE — 83540 ASSAY OF IRON: CPT

## 2018-10-02 PROCEDURE — 85018 HEMOGLOBIN: CPT

## 2018-10-02 PROCEDURE — 82728 ASSAY OF FERRITIN: CPT

## 2018-10-02 PROCEDURE — 36415 COLL VENOUS BLD VENIPUNCTURE: CPT

## 2018-10-11 ENCOUNTER — TELEPHONE (OUTPATIENT)
Dept: ONCOLOGY | Age: 68
End: 2018-10-11

## 2018-10-11 DIAGNOSIS — D63.1 ANEMIA IN STAGE 4 CHRONIC KIDNEY DISEASE (HCC): Primary | ICD-10-CM

## 2018-10-11 DIAGNOSIS — N18.4 ANEMIA DUE TO STAGE 4 CHRONIC KIDNEY DISEASE TREATED WITH ERYTHROPOIETIN (HCC): ICD-10-CM

## 2018-10-11 DIAGNOSIS — D63.1 ANEMIA DUE TO STAGE 4 CHRONIC KIDNEY DISEASE TREATED WITH ERYTHROPOIETIN (HCC): ICD-10-CM

## 2018-10-11 DIAGNOSIS — N18.4 ANEMIA IN STAGE 4 CHRONIC KIDNEY DISEASE (HCC): Primary | ICD-10-CM

## 2018-10-26 ENCOUNTER — HOSPITAL ENCOUNTER (OUTPATIENT)
Facility: MEDICAL CENTER | Age: 68
Discharge: HOME | End: 2018-10-26
Payer: MEDICARE

## 2018-10-26 DIAGNOSIS — N18.4 ANEMIA IN STAGE 4 CHRONIC KIDNEY DISEASE (HCC): ICD-10-CM

## 2018-10-26 DIAGNOSIS — N18.4 STAGE 4 CHRONIC KIDNEY DISEASE (HCC): ICD-10-CM

## 2018-10-26 DIAGNOSIS — D63.1 ANEMIA IN STAGE 4 CHRONIC KIDNEY DISEASE (HCC): ICD-10-CM

## 2018-10-29 ENCOUNTER — HOSPITAL ENCOUNTER (OUTPATIENT)
Dept: INFUSION THERAPY | Facility: MEDICAL CENTER | Age: 68
Discharge: HOME | End: 2018-10-29
Payer: MEDICARE

## 2018-10-29 LAB
FERRITIN: 93 UG/L (ref 13–150)
HCT VFR BLD CALC: 30 % (ref 36–46)
HEMOGLOBIN: 10.4 G/DL (ref 12–16)
IRON SATURATION: 24 % (ref 20–55)
IRON: 73 UG/DL (ref 37–145)
TOTAL IRON BINDING CAPACITY: 298 UG/DL (ref 250–450)
UNSATURATED IRON BINDING CAPACITY: 225 UG/DL (ref 112–347)

## 2018-10-29 PROCEDURE — 83550 IRON BINDING TEST: CPT

## 2018-10-29 PROCEDURE — 36415 COLL VENOUS BLD VENIPUNCTURE: CPT

## 2018-10-29 PROCEDURE — 85018 HEMOGLOBIN: CPT

## 2018-10-29 PROCEDURE — 85014 HEMATOCRIT: CPT

## 2018-10-29 PROCEDURE — 82728 ASSAY OF FERRITIN: CPT

## 2018-10-29 PROCEDURE — 83540 ASSAY OF IRON: CPT

## 2018-10-29 NOTE — PROGRESS NOTES
Patient arrives per ambulation for aranesp injection. Orders reviewed. Today's hemoglobin=10.4. As per order, aranesp held. Patient informed of above with understanding voiced. RTC 11/12/18.

## 2018-11-12 ENCOUNTER — HOSPITAL ENCOUNTER (OUTPATIENT)
Dept: INFUSION THERAPY | Facility: MEDICAL CENTER | Age: 68
Discharge: HOME | End: 2018-11-12
Payer: MEDICARE

## 2018-11-12 ENCOUNTER — HOSPITAL ENCOUNTER (OUTPATIENT)
Facility: MEDICAL CENTER | Age: 68
Discharge: HOME | End: 2018-11-12
Payer: MEDICARE

## 2018-11-12 DIAGNOSIS — D63.1 ANEMIA IN STAGE 4 CHRONIC KIDNEY DISEASE (HCC): ICD-10-CM

## 2018-11-12 DIAGNOSIS — N18.4 ANEMIA IN STAGE 4 CHRONIC KIDNEY DISEASE (HCC): ICD-10-CM

## 2018-11-12 LAB
HCT VFR BLD CALC: 31.1 % (ref 36–46)
HEMOGLOBIN: 10.6 G/DL (ref 12–16)

## 2018-11-12 PROCEDURE — 85018 HEMOGLOBIN: CPT

## 2018-11-12 PROCEDURE — 36415 COLL VENOUS BLD VENIPUNCTURE: CPT

## 2018-11-12 PROCEDURE — 85014 HEMATOCRIT: CPT

## 2018-11-12 NOTE — PROGRESS NOTES
1400:  Pt arrives ambulatory for Aranesp injection after having her Hemoglobin/Hematocrit level drawn in outpatient lab today 11/12/18 @1331. Dr. Gillis [place] orders from 10/05/18 reviewed. Labs reviewed. Hemoglobin=10.6 therefore no Aranesp injection indicated for Hemoglobin equal to or greater than 10.0. Pt informed of this and verbalized understanding of this. Copy of labs given to patient as requested. Appointment calendar given to the patient. RTC on 11/26/18. Pt discharged home ambulatory without complaints and/or no new concerns voiced.

## 2018-11-26 ENCOUNTER — HOSPITAL ENCOUNTER (OUTPATIENT)
Facility: MEDICAL CENTER | Age: 68
Discharge: HOME | End: 2018-11-26
Payer: MEDICARE

## 2018-11-26 ENCOUNTER — HOSPITAL ENCOUNTER (OUTPATIENT)
Dept: INFUSION THERAPY | Facility: MEDICAL CENTER | Age: 68
Discharge: HOME | End: 2018-11-26
Payer: MEDICARE

## 2018-11-26 DIAGNOSIS — D63.1 ANEMIA IN STAGE 4 CHRONIC KIDNEY DISEASE (HCC): ICD-10-CM

## 2018-11-26 DIAGNOSIS — N18.4 ANEMIA IN STAGE 4 CHRONIC KIDNEY DISEASE (HCC): ICD-10-CM

## 2018-11-26 LAB
HCT VFR BLD CALC: 31.4 % (ref 36–46)
HEMOGLOBIN: 10.9 G/DL (ref 12–16)
IRON SATURATION: 23 % (ref 20–55)
IRON: 74 UG/DL (ref 37–145)
TOTAL IRON BINDING CAPACITY: 318 UG/DL (ref 250–450)
UNSATURATED IRON BINDING CAPACITY: 244 UG/DL (ref 112–347)

## 2018-11-26 PROCEDURE — 85014 HEMATOCRIT: CPT

## 2018-11-26 PROCEDURE — 85018 HEMOGLOBIN: CPT

## 2018-11-26 PROCEDURE — 83550 IRON BINDING TEST: CPT

## 2018-11-26 PROCEDURE — 36415 COLL VENOUS BLD VENIPUNCTURE: CPT

## 2018-11-26 PROCEDURE — 83540 ASSAY OF IRON: CPT

## 2018-11-26 NOTE — PROGRESS NOTES
Patient here for labs and possible Aranesp. Labs reviewed. Hemoglobin=10.9. No Aranesp needed. Has a return visit scheduled. Discharged ambulatory per self.

## 2018-12-03 DIAGNOSIS — N18.4 CHRONIC KIDNEY DISEASE, STAGE IV (SEVERE) (HCC): ICD-10-CM

## 2018-12-03 DIAGNOSIS — N18.4 TYPE 2 DIABETES MELLITUS WITH STAGE 4 CHRONIC KIDNEY DISEASE, WITHOUT LONG-TERM CURRENT USE OF INSULIN (HCC): ICD-10-CM

## 2018-12-03 DIAGNOSIS — E11.22 TYPE 2 DIABETES MELLITUS WITH STAGE 4 CHRONIC KIDNEY DISEASE, WITHOUT LONG-TERM CURRENT USE OF INSULIN (HCC): ICD-10-CM

## 2018-12-03 DIAGNOSIS — I10 ESSENTIAL HYPERTENSION: ICD-10-CM

## 2018-12-04 RX ORDER — LABETALOL 300 MG/1
TABLET, FILM COATED ORAL
Qty: 60 TABLET | Refills: 3 | Status: SHIPPED | OUTPATIENT
Start: 2018-12-04 | End: 2019-03-21

## 2018-12-10 ENCOUNTER — HOSPITAL ENCOUNTER (OUTPATIENT)
Dept: INFUSION THERAPY | Facility: MEDICAL CENTER | Age: 68
Discharge: HOME | End: 2018-12-10
Payer: MEDICARE

## 2018-12-10 ENCOUNTER — HOSPITAL ENCOUNTER (OUTPATIENT)
Facility: MEDICAL CENTER | Age: 68
Discharge: HOME | End: 2018-12-10
Payer: MEDICARE

## 2018-12-10 VITALS
TEMPERATURE: 98 F | RESPIRATION RATE: 20 BRPM | DIASTOLIC BLOOD PRESSURE: 64 MMHG | HEART RATE: 79 BPM | SYSTOLIC BLOOD PRESSURE: 156 MMHG

## 2018-12-10 DIAGNOSIS — D63.1 ANEMIA IN STAGE 4 CHRONIC KIDNEY DISEASE (HCC): ICD-10-CM

## 2018-12-10 DIAGNOSIS — N18.4 ANEMIA IN STAGE 4 CHRONIC KIDNEY DISEASE (HCC): ICD-10-CM

## 2018-12-10 DIAGNOSIS — N18.4 ANEMIA DUE TO STAGE 4 CHRONIC KIDNEY DISEASE TREATED WITH ERYTHROPOIETIN (HCC): ICD-10-CM

## 2018-12-10 DIAGNOSIS — D63.1 ANEMIA DUE TO STAGE 4 CHRONIC KIDNEY DISEASE TREATED WITH ERYTHROPOIETIN (HCC): ICD-10-CM

## 2018-12-10 LAB
HCT VFR BLD CALC: 29.7 % (ref 36–46)
HEMOGLOBIN: 9.9 G/DL (ref 12–16)

## 2018-12-10 PROCEDURE — 6360000002 HC RX W HCPCS: Performed by: INTERNAL MEDICINE

## 2018-12-10 PROCEDURE — 85018 HEMOGLOBIN: CPT

## 2018-12-10 PROCEDURE — 36415 COLL VENOUS BLD VENIPUNCTURE: CPT

## 2018-12-10 PROCEDURE — 96372 THER/PROPH/DIAG INJ SC/IM: CPT

## 2018-12-10 PROCEDURE — 85014 HEMATOCRIT: CPT

## 2018-12-10 RX ADMIN — DARBEPOETIN ALFA 60 MCG: 60 INJECTION, SOLUTION INTRAVENOUS; SUBCUTANEOUS at 15:12

## 2018-12-10 NOTE — PROGRESS NOTES
Patient here for labs and Aranesp if needed. Feels well today. Labs reviewed. Hemoglobin =9.9. Aranesp given per [facility], band aid to site. Has a return visit scheduled. Discharged ambulatory per self.

## 2018-12-23 PROBLEM — R80.1 PERSISTENT PROTEINURIA: Status: ACTIVE | Noted: 2018-12-23

## 2018-12-23 PROBLEM — Z72.0 TOBACCO ABUSE DISORDER: Status: ACTIVE | Noted: 2018-12-23

## 2018-12-23 PROBLEM — N25.81 SECONDARY HYPERPARATHYROIDISM OF RENAL ORIGIN (HCC): Status: ACTIVE | Noted: 2018-12-23

## 2018-12-23 PROBLEM — E87.20 METABOLIC ACIDOSIS: Status: ACTIVE | Noted: 2018-12-23

## 2018-12-28 ENCOUNTER — HOSPITAL ENCOUNTER (OUTPATIENT)
Facility: MEDICAL CENTER | Age: 68
Discharge: HOME | End: 2018-12-28
Payer: MEDICARE

## 2018-12-28 ENCOUNTER — HOSPITAL ENCOUNTER (OUTPATIENT)
Dept: INFUSION THERAPY | Facility: MEDICAL CENTER | Age: 68
Discharge: HOME | End: 2018-12-28
Payer: MEDICARE

## 2018-12-28 DIAGNOSIS — D63.1 ANEMIA IN STAGE 4 CHRONIC KIDNEY DISEASE (HCC): ICD-10-CM

## 2018-12-28 DIAGNOSIS — N18.4 ANEMIA IN STAGE 4 CHRONIC KIDNEY DISEASE (HCC): ICD-10-CM

## 2018-12-28 LAB
HCT VFR BLD CALC: 31.7 % (ref 36–46)
HEMOGLOBIN: 10.6 G/DL (ref 12–16)

## 2018-12-28 PROCEDURE — 85018 HEMOGLOBIN: CPT

## 2018-12-28 PROCEDURE — 85014 HEMATOCRIT: CPT

## 2018-12-28 PROCEDURE — 36415 COLL VENOUS BLD VENIPUNCTURE: CPT

## 2018-12-28 NOTE — PROGRESS NOTES
Met with patient in [facility] after lab results obtained. Patient Hgb 10.6 therefore patient informed that no Aranesp injection to be administered today. Provided patient with January calendar; RTC 1/11/19.

## 2019-01-03 RX ORDER — FOLIC ACID 1 MG/1
TABLET ORAL
Qty: 60 TABLET | Refills: 3 | Status: SHIPPED | OUTPATIENT
Start: 2019-01-03 | End: 2019-05-23 | Stop reason: SDUPTHER

## 2019-01-11 ENCOUNTER — HOSPITAL ENCOUNTER (OUTPATIENT)
Facility: MEDICAL CENTER | Age: 69
Discharge: HOME | End: 2019-01-11
Payer: MEDICARE

## 2019-01-11 ENCOUNTER — HOSPITAL ENCOUNTER (OUTPATIENT)
Dept: INFUSION THERAPY | Facility: MEDICAL CENTER | Age: 69
Discharge: HOME | End: 2019-01-11
Payer: MEDICARE

## 2019-01-11 DIAGNOSIS — D63.1 ANEMIA IN STAGE 4 CHRONIC KIDNEY DISEASE (HCC): ICD-10-CM

## 2019-01-11 DIAGNOSIS — N18.4 ANEMIA IN STAGE 4 CHRONIC KIDNEY DISEASE (HCC): ICD-10-CM

## 2019-01-11 LAB
HCT VFR BLD CALC: 31.4 % (ref 36–46)
HEMOGLOBIN: 10.5 G/DL (ref 12–16)

## 2019-01-11 PROCEDURE — 36415 COLL VENOUS BLD VENIPUNCTURE: CPT

## 2019-01-11 PROCEDURE — 85014 HEMATOCRIT: CPT

## 2019-01-11 PROCEDURE — 85018 HEMOGLOBIN: CPT

## 2019-01-11 NOTE — PROGRESS NOTES
Pt in waiting area and given copy of labs and HGB 10.5 and given copy of appt. Pt states weak. States very tired and states needs later appt. Pt when asked states needs help changing appt. Pt to front window and appt changed to 1:30 pm on 1/25/19. Pt given calendar and time of appt recorded. Then pt states I do not know if my BP or my blood sugar is ok, because I had to get up so early. BP is 90/46 and notified not able to get blood sugar. Pt given piece of candy and taken by wheelchair to cafeteria. Once there pt states needs to go to the bathroom first due to feels nauseated. Did not want to go to ER. Pt steady with walking.

## 2019-01-22 ENCOUNTER — HOSPITAL ENCOUNTER (OUTPATIENT)
Facility: MEDICAL CENTER | Age: 69
End: 2019-01-22
Payer: MEDICARE

## 2019-01-25 ENCOUNTER — HOSPITAL ENCOUNTER (OUTPATIENT)
Dept: INFUSION THERAPY | Facility: MEDICAL CENTER | Age: 69
End: 2019-01-25
Payer: MEDICARE

## 2019-01-25 ENCOUNTER — TELEPHONE (OUTPATIENT)
Dept: INFUSION THERAPY | Facility: MEDICAL CENTER | Age: 69
End: 2019-01-25

## 2019-02-04 ENCOUNTER — HOSPITAL ENCOUNTER (OUTPATIENT)
Dept: INFUSION THERAPY | Facility: MEDICAL CENTER | Age: 69
Discharge: HOME | End: 2019-02-04
Payer: MEDICARE

## 2019-02-04 ENCOUNTER — HOSPITAL ENCOUNTER (OUTPATIENT)
Facility: MEDICAL CENTER | Age: 69
Discharge: HOME | End: 2019-02-04
Payer: MEDICARE

## 2019-02-04 DIAGNOSIS — D63.1 ANEMIA IN STAGE 4 CHRONIC KIDNEY DISEASE (HCC): ICD-10-CM

## 2019-02-04 DIAGNOSIS — N18.4 ANEMIA IN STAGE 4 CHRONIC KIDNEY DISEASE (HCC): ICD-10-CM

## 2019-02-04 LAB
HCT VFR BLD CALC: 29.4 % (ref 36–46)
HEMOGLOBIN: 10 G/DL (ref 12–16)

## 2019-02-04 PROCEDURE — 36415 COLL VENOUS BLD VENIPUNCTURE: CPT

## 2019-02-04 PROCEDURE — 85018 HEMOGLOBIN: CPT

## 2019-02-04 PROCEDURE — 85014 HEMATOCRIT: CPT

## 2019-02-04 NOTE — PROGRESS NOTES
Pt arrives per ambulatory with lab check and HGB is 10.0, no aranesp indicated today per order. Pt given copy of lab value and calendar with next appt in 2 weeks. Pt states feeling better than when seen per writer at last appt. Again encouraged pt to see her md sooner than March, when she is scheduled with Dr Yusef Higginbotham. Pt states will see PCP or go to ER if needed. Pt discharged from waiting area, and no further concerns voiced.

## 2019-02-14 DIAGNOSIS — N18.4 ANEMIA IN STAGE 4 CHRONIC KIDNEY DISEASE (HCC): Primary | ICD-10-CM

## 2019-02-14 DIAGNOSIS — D63.1 ANEMIA IN STAGE 4 CHRONIC KIDNEY DISEASE (HCC): Primary | ICD-10-CM

## 2019-02-18 ENCOUNTER — HOSPITAL ENCOUNTER (OUTPATIENT)
Dept: INFUSION THERAPY | Facility: MEDICAL CENTER | Age: 69
Discharge: HOME | End: 2019-02-18
Payer: MEDICARE

## 2019-02-18 ENCOUNTER — HOSPITAL ENCOUNTER (OUTPATIENT)
Facility: MEDICAL CENTER | Age: 69
Discharge: HOME | End: 2019-02-18
Payer: MEDICARE

## 2019-02-18 VITALS
DIASTOLIC BLOOD PRESSURE: 65 MMHG | SYSTOLIC BLOOD PRESSURE: 151 MMHG | RESPIRATION RATE: 18 BRPM | HEART RATE: 93 BPM | TEMPERATURE: 98 F

## 2019-02-18 DIAGNOSIS — D63.1 ANEMIA IN STAGE 4 CHRONIC KIDNEY DISEASE (HCC): ICD-10-CM

## 2019-02-18 DIAGNOSIS — N18.4 STAGE 4 CHRONIC KIDNEY DISEASE (HCC): ICD-10-CM

## 2019-02-18 DIAGNOSIS — N18.4 ANEMIA DUE TO STAGE 4 CHRONIC KIDNEY DISEASE TREATED WITH ERYTHROPOIETIN (HCC): ICD-10-CM

## 2019-02-18 DIAGNOSIS — D63.1 ANEMIA DUE TO STAGE 4 CHRONIC KIDNEY DISEASE TREATED WITH ERYTHROPOIETIN (HCC): ICD-10-CM

## 2019-02-18 DIAGNOSIS — N18.4 ANEMIA IN STAGE 4 CHRONIC KIDNEY DISEASE (HCC): ICD-10-CM

## 2019-02-18 LAB
FERRITIN: 41 UG/L (ref 13–150)
HCT VFR BLD CALC: 28.4 % (ref 36–46)
HEMOGLOBIN: 9.4 G/DL (ref 12–16)
IRON SATURATION: 20 % (ref 20–55)
IRON: 63 UG/DL (ref 37–145)
TOTAL IRON BINDING CAPACITY: 316 UG/DL (ref 250–450)
UNSATURATED IRON BINDING CAPACITY: 253 UG/DL (ref 112–347)

## 2019-02-18 PROCEDURE — 83550 IRON BINDING TEST: CPT

## 2019-02-18 PROCEDURE — 82728 ASSAY OF FERRITIN: CPT

## 2019-02-18 PROCEDURE — 85018 HEMOGLOBIN: CPT

## 2019-02-18 PROCEDURE — 36415 COLL VENOUS BLD VENIPUNCTURE: CPT

## 2019-02-18 PROCEDURE — 85014 HEMATOCRIT: CPT

## 2019-02-18 PROCEDURE — 83540 ASSAY OF IRON: CPT

## 2019-02-18 PROCEDURE — 6360000002 HC RX W HCPCS: Performed by: INTERNAL MEDICINE

## 2019-02-18 PROCEDURE — 96372 THER/PROPH/DIAG INJ SC/IM: CPT

## 2019-02-18 RX ADMIN — DARBEPOETIN ALFA 60 MCG: 60 INJECTION, SOLUTION INTRAVENOUS; SUBCUTANEOUS at 14:52

## 2019-02-18 NOTE — PROGRESS NOTES
Pt arrives per ambulatory per self and labs and orders reviewed and HGB is 9.4 today and pt tolerated aranesp 60 mcg well and bandaid to site and no bleeding. No reactions or complaints. Pt given calendar with next 2 appts. Pt discharged per ambulatory per self.

## 2019-02-22 RX ORDER — SODIUM CHLORIDE 9 MG/ML
INJECTION, SOLUTION INTRAVENOUS ONCE
Status: CANCELLED | OUTPATIENT
Start: 2019-02-22 | End: 2019-02-22

## 2019-03-01 ENCOUNTER — HOSPITAL ENCOUNTER (OUTPATIENT)
Age: 69
Discharge: HOME | End: 2019-03-01
Payer: MEDICARE

## 2019-03-01 DIAGNOSIS — N18.4 ANEMIA DUE TO STAGE 4 CHRONIC KIDNEY DISEASE TREATED WITH ERYTHROPOIETIN (HCC): ICD-10-CM

## 2019-03-01 DIAGNOSIS — Z72.0 TOBACCO ABUSE DISORDER: ICD-10-CM

## 2019-03-01 DIAGNOSIS — I10 ESSENTIAL HYPERTENSION: ICD-10-CM

## 2019-03-01 DIAGNOSIS — Z79.4 TYPE 2 DIABETES MELLITUS WITH STAGE 4 CHRONIC KIDNEY DISEASE, WITH LONG-TERM CURRENT USE OF INSULIN (HCC): ICD-10-CM

## 2019-03-01 DIAGNOSIS — E11.22 TYPE 2 DIABETES MELLITUS WITH STAGE 4 CHRONIC KIDNEY DISEASE, WITH LONG-TERM CURRENT USE OF INSULIN (HCC): ICD-10-CM

## 2019-03-01 DIAGNOSIS — N18.4 TYPE 2 DIABETES MELLITUS WITH STAGE 4 CHRONIC KIDNEY DISEASE, WITH LONG-TERM CURRENT USE OF INSULIN (HCC): ICD-10-CM

## 2019-03-01 DIAGNOSIS — D63.1 ANEMIA DUE TO STAGE 4 CHRONIC KIDNEY DISEASE TREATED WITH ERYTHROPOIETIN (HCC): ICD-10-CM

## 2019-03-01 LAB
ALBUMIN SERPL-MCNC: 4.7 G/DL (ref 3.5–5.2)
ANION GAP SERPL CALCULATED.3IONS-SCNC: 19 MMOL/L (ref 9–17)
BUN BLDV-MCNC: 38 MG/DL (ref 8–23)
BUN/CREAT BLD: ABNORMAL (ref 9–20)
CALCIUM SERPL-MCNC: 9.7 MG/DL (ref 8.6–10.4)
CHLORIDE BLD-SCNC: 106 MMOL/L (ref 98–107)
CO2: 15 MMOL/L (ref 20–31)
CREAT SERPL-MCNC: 3.26 MG/DL (ref 0.5–0.9)
CREATININE URINE: 80.6 MG/DL (ref 28–217)
GFR AFRICAN AMERICAN: 17 ML/MIN
GFR NON-AFRICAN AMERICAN: 14 ML/MIN
GFR SERPL CREATININE-BSD FRML MDRD: ABNORMAL ML/MIN/{1.73_M2}
GFR SERPL CREATININE-BSD FRML MDRD: ABNORMAL ML/MIN/{1.73_M2}
GLUCOSE BLD-MCNC: 125 MG/DL (ref 70–99)
HCT VFR BLD CALC: 30 % (ref 36.3–47.1)
HEMOGLOBIN: 9.8 G/DL (ref 11.9–15.1)
MCH RBC QN AUTO: 30.2 PG (ref 25.2–33.5)
MCHC RBC AUTO-ENTMCNC: 32.7 G/DL (ref 28.4–34.8)
MCV RBC AUTO: 92.3 FL (ref 82.6–102.9)
NRBC AUTOMATED: 0 PER 100 WBC
PDW BLD-RTO: 14.6 % (ref 11.8–14.4)
PHOSPHORUS: 4.7 MG/DL (ref 2.6–4.5)
PLATELET # BLD: 287 K/UL (ref 138–453)
PMV BLD AUTO: 10.6 FL (ref 8.1–13.5)
POTASSIUM SERPL-SCNC: 4.9 MMOL/L (ref 3.7–5.3)
PTH INTACT: 177 PG/ML (ref 15–65)
RBC # BLD: 3.25 M/UL (ref 3.95–5.11)
SODIUM BLD-SCNC: 140 MMOL/L (ref 135–144)
TOTAL PROTEIN, URINE: 116 MG/DL
URIC ACID: 4.8 MG/DL (ref 2.4–5.7)
VITAMIN D 25-HYDROXY: 37.8 NG/ML (ref 30–100)
WBC # BLD: 10.5 K/UL (ref 3.5–11.3)

## 2019-03-01 PROCEDURE — 82040 ASSAY OF SERUM ALBUMIN: CPT

## 2019-03-01 PROCEDURE — 84156 ASSAY OF PROTEIN URINE: CPT

## 2019-03-01 PROCEDURE — 36415 COLL VENOUS BLD VENIPUNCTURE: CPT

## 2019-03-01 PROCEDURE — 80048 BASIC METABOLIC PNL TOTAL CA: CPT

## 2019-03-01 PROCEDURE — 83970 ASSAY OF PARATHORMONE: CPT

## 2019-03-01 PROCEDURE — 85027 COMPLETE CBC AUTOMATED: CPT

## 2019-03-01 PROCEDURE — 82306 VITAMIN D 25 HYDROXY: CPT

## 2019-03-01 PROCEDURE — 82570 ASSAY OF URINE CREATININE: CPT

## 2019-03-01 PROCEDURE — 84100 ASSAY OF PHOSPHORUS: CPT

## 2019-03-01 PROCEDURE — 84550 ASSAY OF BLOOD/URIC ACID: CPT

## 2019-03-04 ENCOUNTER — HOSPITAL ENCOUNTER (OUTPATIENT)
Dept: INFUSION THERAPY | Facility: MEDICAL CENTER | Age: 69
Discharge: HOME | End: 2019-03-04
Payer: MEDICARE

## 2019-03-04 ENCOUNTER — HOSPITAL ENCOUNTER (OUTPATIENT)
Dept: INFUSION THERAPY | Facility: MEDICAL CENTER | Age: 69
End: 2019-03-04

## 2019-03-04 ENCOUNTER — HOSPITAL ENCOUNTER (OUTPATIENT)
Facility: MEDICAL CENTER | Age: 69
Discharge: HOME | End: 2019-03-04
Payer: MEDICARE

## 2019-03-04 DIAGNOSIS — N18.4 ANEMIA IN STAGE 4 CHRONIC KIDNEY DISEASE (HCC): ICD-10-CM

## 2019-03-04 DIAGNOSIS — D63.1 ANEMIA IN STAGE 4 CHRONIC KIDNEY DISEASE (HCC): ICD-10-CM

## 2019-03-04 LAB
HCT VFR BLD CALC: 32.3 % (ref 36–46)
HEMOGLOBIN: 10.8 G/DL (ref 12–16)

## 2019-03-04 PROCEDURE — 36415 COLL VENOUS BLD VENIPUNCTURE: CPT

## 2019-03-04 PROCEDURE — 85014 HEMATOCRIT: CPT

## 2019-03-04 PROCEDURE — 85018 HEMOGLOBIN: CPT

## 2019-03-04 NOTE — PROGRESS NOTES
Orders and labs reviewed. Patient hgb 10.8 therefore no Aranesp injection today. Met with patient in outer lobby informing of same. Copy of lab result provided to patient with calendar with RTC date.

## 2019-03-18 ENCOUNTER — HOSPITAL ENCOUNTER (OUTPATIENT)
Dept: INFUSION THERAPY | Facility: MEDICAL CENTER | Age: 69
Discharge: HOME | End: 2019-03-18
Payer: MEDICARE

## 2019-03-18 ENCOUNTER — HOSPITAL ENCOUNTER (OUTPATIENT)
Facility: MEDICAL CENTER | Age: 69
Discharge: HOME | End: 2019-03-18
Payer: MEDICARE

## 2019-03-18 VITALS
RESPIRATION RATE: 18 BRPM | HEART RATE: 69 BPM | SYSTOLIC BLOOD PRESSURE: 148 MMHG | DIASTOLIC BLOOD PRESSURE: 62 MMHG | TEMPERATURE: 97.4 F

## 2019-03-18 DIAGNOSIS — N18.4 ANEMIA IN STAGE 4 CHRONIC KIDNEY DISEASE (HCC): ICD-10-CM

## 2019-03-18 DIAGNOSIS — D63.1 ANEMIA DUE TO STAGE 4 CHRONIC KIDNEY DISEASE TREATED WITH ERYTHROPOIETIN (HCC): Primary | ICD-10-CM

## 2019-03-18 DIAGNOSIS — D63.1 ANEMIA IN STAGE 4 CHRONIC KIDNEY DISEASE (HCC): ICD-10-CM

## 2019-03-18 DIAGNOSIS — N18.4 ANEMIA DUE TO STAGE 4 CHRONIC KIDNEY DISEASE TREATED WITH ERYTHROPOIETIN (HCC): Primary | ICD-10-CM

## 2019-03-18 LAB
HCT VFR BLD CALC: 32.3 % (ref 36–46)
HEMOGLOBIN: 10.9 G/DL (ref 12–16)
IRON SATURATION: 19 % (ref 20–55)
IRON: 61 UG/DL (ref 37–145)
TOTAL IRON BINDING CAPACITY: 319 UG/DL (ref 250–450)
UNSATURATED IRON BINDING CAPACITY: 258 UG/DL (ref 112–347)

## 2019-03-18 PROCEDURE — 83550 IRON BINDING TEST: CPT

## 2019-03-18 PROCEDURE — 85018 HEMOGLOBIN: CPT

## 2019-03-18 PROCEDURE — 99212 OFFICE O/P EST SF 10 MIN: CPT

## 2019-03-18 PROCEDURE — 83540 ASSAY OF IRON: CPT

## 2019-03-18 PROCEDURE — 85014 HEMATOCRIT: CPT

## 2019-03-18 PROCEDURE — 36415 COLL VENOUS BLD VENIPUNCTURE: CPT

## 2019-03-18 RX ORDER — SODIUM CHLORIDE 9 MG/ML
INJECTION, SOLUTION INTRAVENOUS ONCE
Status: CANCELLED | OUTPATIENT
Start: 2019-03-25 | End: 2019-03-25

## 2019-03-18 RX ORDER — SODIUM CHLORIDE 9 MG/ML
INJECTION, SOLUTION INTRAVENOUS ONCE
Status: DISCONTINUED | OUTPATIENT
Start: 2019-03-18 | End: 2019-03-19 | Stop reason: HOSPADM

## 2019-03-18 RX ORDER — SODIUM CHLORIDE 9 MG/ML
INJECTION, SOLUTION INTRAVENOUS ONCE
Status: CANCELLED | OUTPATIENT
Start: 2019-03-18 | End: 2019-03-18

## 2019-03-18 NOTE — PROGRESS NOTES
Pt here informed her she does not need Aranesp. Hemoglobin of 10.9. Attempted to start iv to left arm iv with #242gauge, unable to obtain blood return. 2nd attempt to left forearm with # 24 gauge, blood return obtained and then iv infiltrated. 2 nd RN attempted if to right forearm, Rahel Yeung. #22 gauge, good blood return, started IV of odalys saline. IV infiltrated, removed IV. Odin Levine attempted IV to right antecubital unable to obtain iv. Pt in tears and wants to reschedule for next week. Informed pharmacy, and had pt reschedule next week. Pt obtained new schedule.

## 2019-03-25 ENCOUNTER — APPOINTMENT (OUTPATIENT)
Dept: INFUSION THERAPY | Facility: MEDICAL CENTER | Age: 69
End: 2019-03-25
Payer: MEDICARE

## 2019-03-27 ENCOUNTER — HOSPITAL ENCOUNTER (OUTPATIENT)
Dept: INFUSION THERAPY | Facility: MEDICAL CENTER | Age: 69
Discharge: HOME | End: 2019-03-27
Payer: MEDICARE

## 2019-03-27 VITALS
RESPIRATION RATE: 18 BRPM | SYSTOLIC BLOOD PRESSURE: 156 MMHG | TEMPERATURE: 97.7 F | DIASTOLIC BLOOD PRESSURE: 88 MMHG | HEART RATE: 85 BPM

## 2019-03-27 DIAGNOSIS — D63.1 ANEMIA IN STAGE 4 CHRONIC KIDNEY DISEASE (HCC): ICD-10-CM

## 2019-03-27 DIAGNOSIS — D63.1 ANEMIA DUE TO STAGE 4 CHRONIC KIDNEY DISEASE TREATED WITH ERYTHROPOIETIN (HCC): Primary | ICD-10-CM

## 2019-03-27 DIAGNOSIS — N18.4 ANEMIA DUE TO STAGE 4 CHRONIC KIDNEY DISEASE TREATED WITH ERYTHROPOIETIN (HCC): Primary | ICD-10-CM

## 2019-03-27 DIAGNOSIS — N18.4 ANEMIA IN STAGE 4 CHRONIC KIDNEY DISEASE (HCC): ICD-10-CM

## 2019-03-27 PROCEDURE — 6360000002 HC RX W HCPCS: Performed by: INTERNAL MEDICINE

## 2019-03-27 PROCEDURE — 96374 THER/PROPH/DIAG INJ IV PUSH: CPT

## 2019-03-27 PROCEDURE — 2580000003 HC RX 258: Performed by: INTERNAL MEDICINE

## 2019-03-27 RX ORDER — SODIUM CHLORIDE 9 MG/ML
INJECTION, SOLUTION INTRAVENOUS ONCE
Status: COMPLETED | OUTPATIENT
Start: 2019-03-27 | End: 2019-03-27

## 2019-03-27 RX ORDER — SODIUM CHLORIDE 9 MG/ML
INJECTION, SOLUTION INTRAVENOUS ONCE
Status: CANCELLED | OUTPATIENT
Start: 2019-03-27 | End: 2019-03-27

## 2019-03-27 RX ADMIN — SODIUM CHLORIDE: 9 INJECTION, SOLUTION INTRAVENOUS at 13:45

## 2019-03-27 RX ADMIN — FERRIC CARBOXYMALTOSE INJECTION 750 MG: 50 INJECTION, SOLUTION INTRAVENOUS at 13:50

## 2019-04-01 ENCOUNTER — HOSPITAL ENCOUNTER (OUTPATIENT)
Dept: INFUSION THERAPY | Facility: MEDICAL CENTER | Age: 69
Discharge: HOME | End: 2019-04-01
Payer: MEDICARE

## 2019-04-01 ENCOUNTER — HOSPITAL ENCOUNTER (OUTPATIENT)
Facility: MEDICAL CENTER | Age: 69
Discharge: HOME | End: 2019-04-01
Payer: MEDICARE

## 2019-04-01 VITALS
TEMPERATURE: 97.4 F | DIASTOLIC BLOOD PRESSURE: 72 MMHG | HEART RATE: 75 BPM | RESPIRATION RATE: 18 BRPM | SYSTOLIC BLOOD PRESSURE: 158 MMHG

## 2019-04-01 DIAGNOSIS — N18.4 ANEMIA IN STAGE 4 CHRONIC KIDNEY DISEASE (HCC): ICD-10-CM

## 2019-04-01 DIAGNOSIS — D63.1 ANEMIA DUE TO STAGE 4 CHRONIC KIDNEY DISEASE TREATED WITH ERYTHROPOIETIN (HCC): Primary | ICD-10-CM

## 2019-04-01 DIAGNOSIS — D63.1 ANEMIA IN STAGE 4 CHRONIC KIDNEY DISEASE (HCC): ICD-10-CM

## 2019-04-01 DIAGNOSIS — N18.4 ANEMIA DUE TO STAGE 4 CHRONIC KIDNEY DISEASE TREATED WITH ERYTHROPOIETIN (HCC): Primary | ICD-10-CM

## 2019-04-01 LAB
HCT VFR BLD CALC: 30.4 % (ref 36–46)
HEMOGLOBIN: 10.6 G/DL (ref 12–16)

## 2019-04-01 PROCEDURE — 85018 HEMOGLOBIN: CPT

## 2019-04-01 PROCEDURE — 6360000002 HC RX W HCPCS: Performed by: INTERNAL MEDICINE

## 2019-04-01 PROCEDURE — 96374 THER/PROPH/DIAG INJ IV PUSH: CPT

## 2019-04-01 PROCEDURE — 85014 HEMATOCRIT: CPT

## 2019-04-01 PROCEDURE — 36415 COLL VENOUS BLD VENIPUNCTURE: CPT

## 2019-04-01 PROCEDURE — 2580000003 HC RX 258: Performed by: INTERNAL MEDICINE

## 2019-04-01 RX ORDER — SODIUM CHLORIDE 9 MG/ML
INJECTION, SOLUTION INTRAVENOUS ONCE
Status: CANCELLED | OUTPATIENT
Start: 2019-04-08

## 2019-04-01 RX ORDER — SODIUM CHLORIDE 9 MG/ML
INJECTION, SOLUTION INTRAVENOUS ONCE
Status: COMPLETED | OUTPATIENT
Start: 2019-04-01 | End: 2019-04-01

## 2019-04-01 RX ADMIN — FERRIC CARBOXYMALTOSE INJECTION 750 MG: 50 INJECTION, SOLUTION INTRAVENOUS at 15:41

## 2019-04-01 RX ADMIN — SODIUM CHLORIDE: 9 INJECTION, SOLUTION INTRAVENOUS at 15:30

## 2019-04-01 NOTE — PROGRESS NOTES
Patient arrive ambulatory for possible Aranesp injection and 2 of 2 injectafer infusions. Order and labs reviewed. Hgb 10.6 today therefore no Aranesp injection to be administered. Vitals as charted. Peripheral IV established per policy. Injectafer infused with no sign of adverse reaction; line flushed with normal saline while monitoring patient for 30 minutes post infusion. Patient continues to deny complaint or concern. Intact IV catheter removed with pressure dressing applied. Patient ambulate off unit per self at discharge.

## 2019-04-03 DIAGNOSIS — N18.4 TYPE 2 DIABETES MELLITUS WITH STAGE 4 CHRONIC KIDNEY DISEASE, WITHOUT LONG-TERM CURRENT USE OF INSULIN (HCC): ICD-10-CM

## 2019-04-03 DIAGNOSIS — E11.22 TYPE 2 DIABETES MELLITUS WITH STAGE 4 CHRONIC KIDNEY DISEASE, WITHOUT LONG-TERM CURRENT USE OF INSULIN (HCC): ICD-10-CM

## 2019-04-03 DIAGNOSIS — I10 ESSENTIAL HYPERTENSION: ICD-10-CM

## 2019-04-03 DIAGNOSIS — N18.4 CHRONIC KIDNEY DISEASE, STAGE IV (SEVERE) (HCC): ICD-10-CM

## 2019-04-04 RX ORDER — LABETALOL 300 MG/1
TABLET, FILM COATED ORAL
Qty: 60 TABLET | Refills: 3 | Status: SHIPPED | OUTPATIENT
Start: 2019-04-04 | End: 2019-07-05 | Stop reason: SDUPTHER

## 2019-04-15 ENCOUNTER — HOSPITAL ENCOUNTER (OUTPATIENT)
Dept: INFUSION THERAPY | Facility: MEDICAL CENTER | Age: 69
Discharge: HOME | End: 2019-04-15
Payer: MEDICARE

## 2019-04-15 ENCOUNTER — HOSPITAL ENCOUNTER (OUTPATIENT)
Facility: MEDICAL CENTER | Age: 69
Discharge: HOME | End: 2019-04-15
Payer: MEDICARE

## 2019-04-15 DIAGNOSIS — N18.4 ANEMIA IN STAGE 4 CHRONIC KIDNEY DISEASE (HCC): ICD-10-CM

## 2019-04-15 DIAGNOSIS — D63.1 ANEMIA IN STAGE 4 CHRONIC KIDNEY DISEASE (HCC): ICD-10-CM

## 2019-04-15 LAB
HCT VFR BLD CALC: 30.3 % (ref 36–46)
HEMOGLOBIN: 10.6 G/DL (ref 12–16)
IRON SATURATION: 39 % (ref 20–55)
IRON: 97 UG/DL (ref 37–145)
TOTAL IRON BINDING CAPACITY: 246 UG/DL (ref 250–450)
UNSATURATED IRON BINDING CAPACITY: 149 UG/DL (ref 112–347)

## 2019-04-15 PROCEDURE — 83540 ASSAY OF IRON: CPT

## 2019-04-15 PROCEDURE — 36415 COLL VENOUS BLD VENIPUNCTURE: CPT

## 2019-04-15 PROCEDURE — 85014 HEMATOCRIT: CPT

## 2019-04-15 PROCEDURE — 83550 IRON BINDING TEST: CPT

## 2019-04-15 PROCEDURE — 85018 HEMOGLOBIN: CPT

## 2019-04-15 NOTE — PROGRESS NOTES
1355:   Met with patient out in the front lobby after labs drawn in outpatient lab today 4/15/19 @1307 reviewed for possible Aranesp injection. Hemoglobin=10.6 and as per Dr. Lisa Hemp orders from 10/5/18 Aranesp held for Hemoglobin greater than or equal to 10.0. Repeat labs and RTC on 4/29/19. Discharged home ambulatory without concerns or complaints.

## 2019-04-29 ENCOUNTER — HOSPITAL ENCOUNTER (OUTPATIENT)
Dept: INFUSION THERAPY | Facility: MEDICAL CENTER | Age: 69
Discharge: HOME | End: 2019-04-29
Payer: MEDICARE

## 2019-04-29 ENCOUNTER — HOSPITAL ENCOUNTER (OUTPATIENT)
Facility: MEDICAL CENTER | Age: 69
Discharge: HOME | End: 2019-04-29
Payer: MEDICARE

## 2019-04-29 DIAGNOSIS — D63.1 ANEMIA IN STAGE 4 CHRONIC KIDNEY DISEASE (HCC): ICD-10-CM

## 2019-04-29 DIAGNOSIS — N18.4 ANEMIA IN STAGE 4 CHRONIC KIDNEY DISEASE (HCC): ICD-10-CM

## 2019-04-29 LAB
HCT VFR BLD CALC: 30.6 % (ref 36.3–47.1)
HEMOGLOBIN: 10.4 G/DL (ref 11.9–15.1)

## 2019-04-29 PROCEDURE — 85018 HEMOGLOBIN: CPT

## 2019-04-29 PROCEDURE — 85014 HEMATOCRIT: CPT

## 2019-04-29 PROCEDURE — 36415 COLL VENOUS BLD VENIPUNCTURE: CPT

## 2019-04-29 NOTE — PROGRESS NOTES
Pt arrives per ambulatory and given lab results , HGB 10.4 today. Aranesp not needed. Pt given calendar with next appt. Pt discharged from waiting area.

## 2019-05-10 ENCOUNTER — TELEPHONE (OUTPATIENT)
Dept: ONCOLOGY | Age: 69
End: 2019-05-10

## 2019-05-13 ENCOUNTER — HOSPITAL ENCOUNTER (OUTPATIENT)
Dept: INFUSION THERAPY | Facility: MEDICAL CENTER | Age: 69
End: 2019-05-13

## 2019-05-23 ENCOUNTER — OFFICE VISIT (OUTPATIENT)
Dept: PRIMARY CARE CLINIC | Age: 69
End: 2019-05-23
Payer: MEDICARE

## 2019-05-23 ENCOUNTER — HOSPITAL ENCOUNTER (OUTPATIENT)
Age: 69
Discharge: HOME | End: 2019-05-25
Payer: MEDICARE

## 2019-05-23 VITALS
OXYGEN SATURATION: 99 % | BODY MASS INDEX: 30.65 KG/M2 | DIASTOLIC BLOOD PRESSURE: 76 MMHG | WEIGHT: 173 LBS | SYSTOLIC BLOOD PRESSURE: 174 MMHG | HEART RATE: 91 BPM

## 2019-05-23 DIAGNOSIS — N18.4 CKD (CHRONIC KIDNEY DISEASE), STAGE IV (HCC): ICD-10-CM

## 2019-05-23 DIAGNOSIS — E55.9 HYPOVITAMINOSIS D: ICD-10-CM

## 2019-05-23 DIAGNOSIS — N18.4 ANEMIA OF CHRONIC KIDNEY FAILURE, STAGE 4 (SEVERE) (HCC): ICD-10-CM

## 2019-05-23 DIAGNOSIS — E87.20 METABOLIC ACIDOSIS: ICD-10-CM

## 2019-05-23 DIAGNOSIS — Z13.220 SCREENING FOR HYPERLIPIDEMIA: ICD-10-CM

## 2019-05-23 DIAGNOSIS — E11.22 TYPE 2 DIABETES MELLITUS WITH STAGE 4 CHRONIC KIDNEY DISEASE, WITH LONG-TERM CURRENT USE OF INSULIN (HCC): Primary | ICD-10-CM

## 2019-05-23 DIAGNOSIS — I10 ESSENTIAL HYPERTENSION: ICD-10-CM

## 2019-05-23 DIAGNOSIS — D63.1 ANEMIA OF CHRONIC KIDNEY FAILURE, STAGE 4 (SEVERE) (HCC): ICD-10-CM

## 2019-05-23 DIAGNOSIS — Z79.4 TYPE 2 DIABETES MELLITUS WITH STAGE 4 CHRONIC KIDNEY DISEASE, WITH LONG-TERM CURRENT USE OF INSULIN (HCC): Primary | ICD-10-CM

## 2019-05-23 DIAGNOSIS — N18.4 TYPE 2 DIABETES MELLITUS WITH STAGE 4 CHRONIC KIDNEY DISEASE, WITH LONG-TERM CURRENT USE OF INSULIN (HCC): Primary | ICD-10-CM

## 2019-05-23 DIAGNOSIS — Z12.31 ENCOUNTER FOR SCREENING MAMMOGRAM FOR BREAST CANCER: ICD-10-CM

## 2019-05-23 LAB
ALBUMIN SERPL-MCNC: 4.3 G/DL (ref 3.5–5.2)
ANION GAP SERPL CALCULATED.3IONS-SCNC: 17 MMOL/L (ref 9–17)
BUN BLDV-MCNC: 25 MG/DL (ref 8–23)
BUN/CREAT BLD: ABNORMAL (ref 9–20)
CALCIUM SERPL-MCNC: 9.4 MG/DL (ref 8.6–10.4)
CHLORIDE BLD-SCNC: 99 MMOL/L (ref 98–107)
CO2: 20 MMOL/L (ref 20–31)
CREAT SERPL-MCNC: 3.14 MG/DL (ref 0.5–0.9)
GFR AFRICAN AMERICAN: 18 ML/MIN
GFR NON-AFRICAN AMERICAN: 15 ML/MIN
GFR SERPL CREATININE-BSD FRML MDRD: ABNORMAL ML/MIN/{1.73_M2}
GFR SERPL CREATININE-BSD FRML MDRD: ABNORMAL ML/MIN/{1.73_M2}
GLUCOSE BLD-MCNC: 138 MG/DL (ref 70–99)
PHOSPHORUS: 3.3 MG/DL (ref 2.6–4.5)
POTASSIUM SERPL-SCNC: 4.2 MMOL/L (ref 3.7–5.3)
PTH INTACT: 175.8 PG/ML (ref 15–65)
SODIUM BLD-SCNC: 136 MMOL/L (ref 135–144)
URIC ACID: 5 MG/DL (ref 2.4–5.7)
VITAMIN D 25-HYDROXY: 30.1 NG/ML (ref 30–100)

## 2019-05-23 PROCEDURE — 84100 ASSAY OF PHOSPHORUS: CPT

## 2019-05-23 PROCEDURE — 36415 COLL VENOUS BLD VENIPUNCTURE: CPT

## 2019-05-23 PROCEDURE — 80048 BASIC METABOLIC PNL TOTAL CA: CPT

## 2019-05-23 PROCEDURE — 83970 ASSAY OF PARATHORMONE: CPT

## 2019-05-23 PROCEDURE — 84550 ASSAY OF BLOOD/URIC ACID: CPT

## 2019-05-23 PROCEDURE — 82040 ASSAY OF SERUM ALBUMIN: CPT

## 2019-05-23 PROCEDURE — 99213 OFFICE O/P EST LOW 20 MIN: CPT | Performed by: INTERNAL MEDICINE

## 2019-05-23 PROCEDURE — 82306 VITAMIN D 25 HYDROXY: CPT

## 2019-05-23 RX ORDER — ALLOPURINOL 300 MG/1
300 TABLET ORAL DAILY
Qty: 90 TABLET | Refills: 2 | Status: SHIPPED | OUTPATIENT
Start: 2019-05-23

## 2019-05-23 RX ORDER — FOLIC ACID 1 MG/1
TABLET ORAL
Qty: 60 TABLET | Refills: 2 | Status: SHIPPED | OUTPATIENT
Start: 2019-05-23 | End: 2019-06-21 | Stop reason: SDUPTHER

## 2019-05-23 RX ORDER — CLOPIDOGREL BISULFATE 75 MG/1
75 TABLET ORAL DAILY
Qty: 90 TABLET | Refills: 2 | Status: ON HOLD | OUTPATIENT
Start: 2019-05-23 | End: 2020-02-20 | Stop reason: SDUPTHER

## 2019-05-23 ASSESSMENT — PATIENT HEALTH QUESTIONNAIRE - PHQ9
SUM OF ALL RESPONSES TO PHQ QUESTIONS 1-9: 2
2. FEELING DOWN, DEPRESSED OR HOPELESS: 1
SUM OF ALL RESPONSES TO PHQ QUESTIONS 1-9: 2
1. LITTLE INTEREST OR PLEASURE IN DOING THINGS: 1
SUM OF ALL RESPONSES TO PHQ9 QUESTIONS 1 & 2: 2

## 2019-05-23 NOTE — PROGRESS NOTES
Tobacco Use    Smoking status: Current Every Day Smoker     Packs/day: 1.00     Years: 40.00     Pack years: 40.00     Types: Cigarettes     Start date: 3/15/1973     Last attempt to quit: 2014     Years since quittin.7    Smokeless tobacco: Current User    Tobacco comment: down to 5 cigs a day    Substance Use Topics    Alcohol use: No      Current Outpatient Medications   Medication Sig Dispense Refill    sodium bicarbonate 650 MG tablet Take 1 tablet by mouth 2 times daily 60 tablet 3    labetalol (NORMODYNE) 300 MG tablet TAKE 1 TABLET BY MOUTH TWICE DAILY 60 tablet 3    folic acid (FOLVITE) 1 MG tablet TAKE 1 TABLET BY MOUTH TWICE DAILY 60 tablet 3    amLODIPine (NORVASC) 5 MG tablet Take 1 tablet by mouth daily 90 tablet 3    allopurinol (ZYLOPRIM) 300 MG tablet Take 1 tablet by mouth daily 90 tablet 3    clopidogrel (PLAVIX) 75 MG tablet Take 1 tablet by mouth daily 90 tablet 3    labetalol (NORMODYNE) 300 MG tablet TAKE ONE TABLET BY MOUTH TWICE DAILY 60 tablet 3    insulin glargine (LANTUS SOLOSTAR) 100 UNIT/ML injection pen 25 units in skin every night 10 pen 3    magnesium hydroxide (MILK OF MAGNESIA) 400 MG/5ML suspension Take 30 mLs by mouth daily as needed for Constipation      glucose monitoring kit (FREESTYLE) monitoring kit 1 1 kit 0    Insulin Pen Needle 31G X 4 MM MISC 4 times daily 100 each 5    Alcohol Swabs (ALCOHOL PREP) 70 % PADS 4 time daily 100 each 3    FREESTYLE LANCETS MISC 1 each by Does not apply route daily 100 each 3    Glucose Blood (BLOOD GLUCOSE TEST STRIPS) STRP Test 4 times daily Diagnosis 100 strip 11    b complex vitamins capsule Take 1 capsule by mouth daily      vitamin D (ERGOCALCIFEROL) 400 UNITS CAPS Take 1 capsule by mouth daily.  (Patient taking differently: Take 400 Units by mouth 2 times daily ) 90 capsule 3    nicotine (NICODERM CQ) 21 MG/24HR Place 1 patch onto the skin daily 30 patch 3    Lancet Device MISC 1 Device by Does not apply route once for 1 dose 100 Device 0     No current facility-administered medications for this visit. No Known Allergies    Health Maintenance   Topic Date Due    Hepatitis B Vaccine (1 of 3 - Risk 3-dose series) 01/28/1971    DTaP/Tdap/Td vaccine (1 - Tdap) 01/28/1971    Shingles Vaccine (1 of 2) 01/28/2002    Low dose CT lung screening  01/28/2007    Diabetic foot exam  12/10/2016    Pneumococcal 65+ years Vaccine (1 of 2 - PCV13) 01/28/2017    Diabetic retinal exam  05/10/2017    Breast cancer screen  12/18/2017    Lipid screen  04/11/2019    Hepatitis C screen  02/20/2018 (Originally 1950)    A1C test (Diabetic or Prediabetic)  08/20/2019    Flu vaccine (Season Ended) 09/01/2019    Potassium monitoring  03/01/2020    Creatinine monitoring  03/01/2020    Colon cancer screen colonoscopy  11/02/2020    DEXA (modify frequency per FRAX score)  Completed    HPV vaccine  Aged Out       Controlled Substances Monitoring:      HPI:     HPI    ROS:     Review of Systems    Objective:     Physical Exam   Visit Information    Have you changed or started any medications since your last visit including any over-the-counter medicines, vitamins, or herbal medicines? no   Are you having any side effects from any of your medications? -  no  Have you stopped taking any of your medications? Is so, why? -  no    Have you seen any other physician or provider since your last visit? Yes - Records Obtained  Have you had any other diagnostic tests since your last visit? Yes - Records Obtained  Have you been seen in the emergency room and/or had an admission to a hospital since we last saw you? No  Have you had your routine dental cleaning in the past 6 months? no    Have you activated your [employer] account? If not, what are your barriers?  Yes     Patient Care Team:  Harjit Diaz MD as PCP - S Attributed Provider  Chrystal Quiles MD as Consulting Physician (Ophthalmology)    Medical History Review  Past Medical, Family, and Social History reviewed and does contribute to the patient presenting condition    Health Maintenance   Topic Date Due    Hepatitis B Vaccine (1 of 3 - Risk 3-dose series) 01/28/1971    DTaP/Tdap/Td vaccine (1 - Tdap) 01/28/1971    Shingles Vaccine (1 of 2) 01/28/2002    Low dose CT lung screening  01/28/2007    Diabetic foot exam  12/10/2016    Pneumococcal 65+ years Vaccine (1 of 2 - PCV13) 01/28/2017    Diabetic retinal exam  05/10/2017    Breast cancer screen  12/18/2017    Lipid screen  04/11/2019    Hepatitis C screen  02/20/2018 (Originally 1950)    A1C test (Diabetic or Prediabetic)  08/20/2019    Flu vaccine (Season Ended) 09/01/2019    Potassium monitoring  03/01/2020    Creatinine monitoring  03/01/2020    Colon cancer screen colonoscopy  11/02/2020    DEXA (modify frequency per FRAX score)  Completed    HPV vaccine  Aged Out

## 2019-05-23 NOTE — PROGRESS NOTES
[place] [address] PRIMARY CARE  [id number] [facility] [address]  Dept: [phone]  Dept Fax: [phone]    Talisha Rutherford is a 81 y.o. female who presents today for   Chief Complaint   Patient presents with    Medication Refill    Diabetes    and follow upof chronic medical problems:   Patient Active Problem List   Diagnosis    Hypercholesteremia    Gout    Hypovitaminosis D    Anemia in stage 4 chronic kidney disease ([address]    Type 2 diabetes mellitus with diabetic chronic kidney disease ([address]    Chronic kidney disease (CKD)    Essential hypertension    Hypercholesteremia    Asymptomatic bacteriuria    Hypokalemia    Acute infarct posterior limb internal capsule on the left    Left sided lacunar infarction    Type 2 diabetes mellitus with stage 4 chronic kidney disease, with long-term current use of insulin (HCC)    Cerebral aneurysm    Anemia due to stage 4 chronic kidney disease treated with erythropoietin (HCC)    Tobacco abuse disorder    Secondary hyperparathyroidism of renal origin ([address]    Persistent proteinuria    Metabolic acidosis   .     Past Medical History:   Diagnosis Date    Anemia in chronic kidney disease     Cerebral artery occlusion with cerebral infarction ([address] 12/2016    LEFT SIDED LACUNAL INFARCTION    Chronic kidney disease     STAGE 4 / FOLLOWS WITH DR RODRIGUEZ    Gout, arthritis     Hyperlipidemia     Hypertension     Left sided lacunar infarction 12/2016    Peripheral vascular disease ([facility]     Type II or unspecified type diabetes mellitus without mention of complication, not stated as uncontrolled        Past Surgical History:   Procedure Laterality Date    COLONOSCOPY      X2    OTHER SURGICAL HISTORY  01/27/2017    CEREBRAL ANGIOGRAM / DIAGNOSTIC / DR NOVA    TONSILLECTOMY         Family History   Problem Relation Age of Onset    Diabetes Mother     Heart Disease Father        Social History person, place, and time. Skin: Skin is warm and dry. Psychiatric: She has a normal mood and affect.  Her behavior is normal.

## 2019-06-10 ENCOUNTER — HOSPITAL ENCOUNTER (OUTPATIENT)
Facility: MEDICAL CENTER | Age: 69
Discharge: HOME | End: 2019-06-10
Payer: MEDICARE

## 2019-06-10 ENCOUNTER — HOSPITAL ENCOUNTER (OUTPATIENT)
Dept: INFUSION THERAPY | Facility: MEDICAL CENTER | Age: 69
Discharge: HOME | End: 2019-06-10
Payer: MEDICARE

## 2019-06-10 DIAGNOSIS — N18.4 ANEMIA IN STAGE 4 CHRONIC KIDNEY DISEASE (HCC): ICD-10-CM

## 2019-06-10 DIAGNOSIS — D63.1 ANEMIA IN STAGE 4 CHRONIC KIDNEY DISEASE (HCC): ICD-10-CM

## 2019-06-10 LAB
FERRITIN: 432 UG/L (ref 13–150)
HCT VFR BLD CALC: 32.5 % (ref 36.3–47.1)
HEMOGLOBIN: 10.9 G/DL (ref 11.9–15.1)
IRON SATURATION: 22 % (ref 20–55)
IRON: 53 UG/DL (ref 37–145)
TOTAL IRON BINDING CAPACITY: 236 UG/DL (ref 250–450)
UNSATURATED IRON BINDING CAPACITY: 183 UG/DL (ref 112–347)

## 2019-06-10 PROCEDURE — 36415 COLL VENOUS BLD VENIPUNCTURE: CPT

## 2019-06-10 PROCEDURE — 85014 HEMATOCRIT: CPT

## 2019-06-10 PROCEDURE — 82728 ASSAY OF FERRITIN: CPT

## 2019-06-10 PROCEDURE — 83540 ASSAY OF IRON: CPT

## 2019-06-10 PROCEDURE — 85018 HEMOGLOBIN: CPT

## 2019-06-10 PROCEDURE — 83550 IRON BINDING TEST: CPT

## 2019-06-10 NOTE — PROGRESS NOTES
Pt arrived to clinic in stable condition. Labs reviewed.  HGB 10.9 pt left ambulatory without treatment per protocal

## 2019-06-18 ENCOUNTER — HOSPITAL ENCOUNTER (OUTPATIENT)
Age: 69
Discharge: HOME | End: 2019-06-18
Payer: MEDICARE

## 2019-06-18 DIAGNOSIS — Z13.220 SCREENING FOR HYPERLIPIDEMIA: ICD-10-CM

## 2019-06-18 LAB
CHOLESTEROL, FASTING: 186 MG/DL
CHOLESTEROL/HDL RATIO: 6.6
HDLC SERPL-MCNC: 28 MG/DL
LDL CHOLESTEROL: 106 MG/DL (ref 0–130)
TRIGLYCERIDE, FASTING: 261 MG/DL
VLDLC SERPL CALC-MCNC: ABNORMAL MG/DL (ref 1–30)

## 2019-06-18 PROCEDURE — 80061 LIPID PANEL: CPT

## 2019-06-18 PROCEDURE — 36415 COLL VENOUS BLD VENIPUNCTURE: CPT

## 2019-06-21 ENCOUNTER — OFFICE VISIT (OUTPATIENT)
Dept: PRIMARY CARE CLINIC | Age: 69
End: 2019-06-21
Payer: MEDICARE

## 2019-06-21 VITALS
DIASTOLIC BLOOD PRESSURE: 76 MMHG | OXYGEN SATURATION: 97 % | HEART RATE: 85 BPM | RESPIRATION RATE: 20 BRPM | HEIGHT: 64 IN | BODY MASS INDEX: 28.85 KG/M2 | SYSTOLIC BLOOD PRESSURE: 142 MMHG | WEIGHT: 169 LBS

## 2019-06-21 DIAGNOSIS — N18.4 TYPE 2 DIABETES MELLITUS WITH STAGE 4 CHRONIC KIDNEY DISEASE, WITH LONG-TERM CURRENT USE OF INSULIN (HCC): ICD-10-CM

## 2019-06-21 DIAGNOSIS — I10 ESSENTIAL HYPERTENSION: Primary | ICD-10-CM

## 2019-06-21 DIAGNOSIS — E11.22 TYPE 2 DIABETES MELLITUS WITH STAGE 4 CHRONIC KIDNEY DISEASE, WITH LONG-TERM CURRENT USE OF INSULIN (HCC): ICD-10-CM

## 2019-06-21 DIAGNOSIS — E66.3 OVERWEIGHT (BMI 25.0-29.9): ICD-10-CM

## 2019-06-21 DIAGNOSIS — Z76.0 MEDICATION REFILL: ICD-10-CM

## 2019-06-21 DIAGNOSIS — Z79.4 TYPE 2 DIABETES MELLITUS WITH STAGE 4 CHRONIC KIDNEY DISEASE, WITH LONG-TERM CURRENT USE OF INSULIN (HCC): ICD-10-CM

## 2019-06-21 LAB — HBA1C MFR BLD: 7.1 %

## 2019-06-21 PROCEDURE — 99214 OFFICE O/P EST MOD 30 MIN: CPT | Performed by: PHYSICIAN ASSISTANT

## 2019-06-21 PROCEDURE — 83036 HEMOGLOBIN GLYCOSYLATED A1C: CPT | Performed by: PHYSICIAN ASSISTANT

## 2019-06-21 RX ORDER — FOLIC ACID 1 MG/1
1 TABLET ORAL 2 TIMES DAILY
Qty: 60 TABLET | Refills: 3 | Status: SHIPPED | OUTPATIENT
Start: 2019-06-21 | End: 2019-11-21 | Stop reason: SDUPTHER

## 2019-06-21 ASSESSMENT — ENCOUNTER SYMPTOMS: CONSTIPATION: 1

## 2019-06-21 NOTE — PROGRESS NOTES
[facility]  [address]  [address]  Dept: [phone]  Dept Fax: [phone]    Office Progress/Follow Up Note  Date of patient's visit: 6/21/2019  Patient's Name:  Ramon Avery YOB: 1950            Patient Care Team:  Kala Gonzalez PA-C as PCP - General (Physician Assistant)  Kala Gonzalez PA-C as PCP - [facility]  London East MD as Consulting Physician (Ophthalmology)  ================================================================    REASON FOR VISIT/CHIEF COMPLAINT:  Hypertension    HISTORY OF PRESENTING ILLNESS:  History was obtained from: patient. Ramon Avery is a 81 y.o. is here for follow-up for high blood pressure, diabetes. Patient states she is compliant with medications. Patient A1c in office is 7.1, increased from 6.7. Discussed diabetes, A1c reading in depth with patient. Patient blood pressure slightly elevated in office. Discussed high blood pressure in depth with patient. Patient weight is stable. Patient is a smoker, 5 to 10 cigarettes daily. Discussed tobacco cessation and risk in depth with patient. Patient requested medication refills. Labs reviewed, inform patient lab work will be ordered and have completed in the next several weeks. Health maintenance reviewed. Medications reviewed, refilled folic acid 1 mg.     HPI    Patient Active Problem List   Diagnosis    Gout    Hypovitaminosis D    Anemia in stage 4 chronic kidney disease (HCC)    Type 2 diabetes mellitus with diabetic chronic kidney disease (HCC)    Chronic kidney disease (CKD)    Essential hypertension    Hypercholesteremia    Hypokalemia    Acute infarct posterior limb internal capsule on the left    Left sided [facility]ar infarction ([facility] [address]    Type 2 diabetes mellitus with stage 4 chronic kidney disease, with long-term current use of insulin (HCC)    Cerebral aneurysm    Anemia due to stage 4 chronic kidney disease treated with erythropoietin ([facility] [address]    Tobacco abuse disorder    Secondary hyperparathyroidism of renal origin ([facility] [address]    Persistent proteinuria    Metabolic acidosis       Health Maintenance Due   Topic Date Due    Hepatitis B Vaccine (1 of 3 - Risk 3-dose series) 01/28/1971    Shingles Vaccine (1 of 2) 01/28/2002    Low dose CT lung screening  01/28/2007    Diabetic retinal exam  05/10/2017    Annual Wellness Visit (AWV)  11/20/2018       No Known Allergies      Current Outpatient Medications   Medication Sig Dispense Refill    folic acid (FOLVITE) 1 MG tablet Take 1 tablet by mouth 2 times daily 60 tablet 3    allopurinol (ZYLOPRIM) 300 MG tablet Take 1 tablet by mouth daily 90 tablet 2    clopidogrel (PLAVIX) 75 MG tablet Take 1 tablet by mouth daily 90 tablet 2    Insulin Pen Needle 31G X 4 MM MISC 4 times daily 100 each 2    insulin glargine (LANTUS SOLOSTAR) 100 UNIT/ML injection pen 25 units in skin every night 10 pen 2    sodium bicarbonate 650 MG tablet Take 1 tablet by mouth 2 times daily 60 tablet 3    labetalol (NORMODYNE) 300 MG tablet TAKE 1 TABLET BY MOUTH TWICE DAILY 60 tablet 3    amLODIPine (NORVASC) 5 MG tablet Take 1 tablet by mouth daily 90 tablet 3    nicotine (NICODERM CQ) 21 MG/24HR Place 1 patch onto the skin daily 30 patch 3    magnesium hydroxide (MILK OF MAGNESIA) 400 MG/5ML suspension Take 30 mLs by mouth daily as needed for Constipation      glucose monitoring kit (FREESTYLE) monitoring kit 1 1 kit 0    Alcohol Swabs (ALCOHOL PREP) 70 % PADS 4 time daily 100 each 3    FREESTYLE LANCETS MISC 1 each by Does not apply route daily 100 each 3    Glucose Blood (BLOOD GLUCOSE TEST STRIPS) STRP Test 4 times daily Diagnosis 100 strip 11    Lancet Device MISC 1 Device by Does not apply route once for 1 dose 100 Device 0    b complex vitamins capsule Take 1 capsule by mouth daily      vitamin D (ERGOCALCIFEROL) 400 UNITS CAPS Take 1 Cardiovascular: Normal rate, regular rhythm and normal heart sounds. Pulmonary/Chest: Effort normal and breath sounds normal.   Abdominal: Soft. She exhibits no mass. There is no tenderness. Musculoskeletal: She exhibits no tenderness. Neurological: She is alert and oriented to person, place, and time. Skin: Skin is warm and dry. Vitals reviewed. DIAGNOSTIC FINDINGS:  CBC:  Lab Results   Component Value Date    WBC 10.5 03/01/2019    HGB 10.9 06/24/2019     03/01/2019       BMP:    Lab Results   Component Value Date     05/23/2019    K 4.2 05/23/2019    CL 99 05/23/2019    CO2 20 05/23/2019    BUN 25 05/23/2019    CREATININE 3.14 05/23/2019    GLUCOSE 138 05/23/2019       HEMOGLOBIN A1C:   Lab Results   Component Value Date    LABA1C 7.1 06/21/2019       FASTING LIPID PANEL:  Lab Results   Component Value Date    CHOL 193 04/11/2018    HDL 28 (L) 06/18/2019    TRIG 199 (H) 04/11/2018       ASSESSMENT AND PLAN:  Violeta Ontiveros was seen today for hypertension. Diagnoses and all orders for this visit:    Essential hypertension    Type 2 diabetes mellitus with stage 4 chronic kidney disease, with long-term current use of insulin (HCC)  -     POCT glycosylated hemoglobin (Hb A1C)  -     Hepatic Function Panel; Future    Overweight (BMI 25.0-29. 9)    Medication refill  -     folic acid (FOLVITE) 1 MG tablet; Take 1 tablet by mouth 2 times daily      FOLLOW UP AND INSTRUCTIONS:  Return in about 5 months (around 11/21/2019) for DM, HTN, HM. · Violeta Ontiveros received counseling on the following healthy behaviors:tobacco cessation    · Discussed use, benefit, and side effects of prescribed medications. Barriers to medication compliance addressed. All patient questions answered. Pt voiced understanding.      · Patient given educational materials - see patient instructions    Electronically signed by James Oneill PA-C on 6/21/19 at 11:50 AM    This note is created with the assistance of silvestre speech-recognition program. While intendingto generate a document that actually reflects the content of the visit, the document can still have some mistakes which may not have been identified and corrected by editing.

## 2019-06-21 NOTE — PROGRESS NOTES
Visit Information    Have you changed or started any medications since your last visit including any over-the-counter medicines, vitamins, or herbal medicines? no   Have you stopped taking any of your medications? Is so, why? -  no  Are you having any side effects from any of your medications? - no    Have you seen any other physician or provider since your last visit?  no   Have you had any other diagnostic tests since your last visit?  no   Have you been seen in the emergency room and/or had an admission in a hospital since we last saw you?  no   Have you had your routine dental cleaning in the past 6 months?  no     Do you have an active MyChart account? If no, what is the barrier?   Yes    Patient Care Team:  Krista Casillas PA-C as PCP - General (Physician Assistant)  Krista Casillas PA-C as PCP - [facility] Provider  Regina Murillo MD as Consulting Physician (Ophthalmology)    Medical History Review  Past Medical, Family, and Social History reviewed and does not contribute to the patient presenting condition    Health Maintenance   Topic Date Due    Hepatitis B Vaccine (1 of 3 - Risk 3-dose series) 01/28/1971    Shingles Vaccine (1 of 2) 01/28/2002    Low dose CT lung screening  01/28/2007    Diabetic retinal exam  05/10/2017    Breast cancer screen  12/18/2017    Annual Wellness Visit (AWV)  11/20/2018    Pneumococcal 65+ years Vaccine (1 of 2 - PCV13) 08/14/2019 (Originally 1/28/2017)    Hepatitis C screen  02/20/2020 (Originally 1950)    DTaP/Tdap/Td vaccine (1 - Tdap) 05/23/2020 (Originally 1/28/1971)    A1C test (Diabetic or Prediabetic)  08/20/2019    Flu vaccine (Season Ended) 09/01/2019    Diabetic foot exam  05/23/2020    Potassium monitoring  05/23/2020    Creatinine monitoring  05/23/2020    Lipid screen  06/18/2020    Colon cancer screen colonoscopy  11/02/2020    DEXA (modify frequency per FRAX score)  Completed    HPV vaccine  Aged Out

## 2019-06-24 ENCOUNTER — HOSPITAL ENCOUNTER (OUTPATIENT)
Facility: MEDICAL CENTER | Age: 69
Discharge: HOME | End: 2019-06-24
Payer: MEDICARE

## 2019-06-24 ENCOUNTER — HOSPITAL ENCOUNTER (OUTPATIENT)
Dept: INFUSION THERAPY | Facility: MEDICAL CENTER | Age: 69
Discharge: HOME | End: 2019-06-24
Payer: MEDICARE

## 2019-06-24 DIAGNOSIS — N18.4 ANEMIA IN STAGE 4 CHRONIC KIDNEY DISEASE (HCC): ICD-10-CM

## 2019-06-24 DIAGNOSIS — D63.1 ANEMIA IN STAGE 4 CHRONIC KIDNEY DISEASE (HCC): ICD-10-CM

## 2019-06-24 LAB
FERRITIN: 379 UG/L (ref 13–150)
HCT VFR BLD CALC: 32.8 % (ref 36.3–47.1)
HEMOGLOBIN: 10.9 G/DL (ref 11.9–15.1)
IRON SATURATION: 19 % (ref 20–55)
IRON: 47 UG/DL (ref 37–145)
TOTAL IRON BINDING CAPACITY: 245 UG/DL (ref 250–450)
UNSATURATED IRON BINDING CAPACITY: 198 UG/DL (ref 112–347)

## 2019-06-24 PROCEDURE — 83540 ASSAY OF IRON: CPT

## 2019-06-24 PROCEDURE — 36415 COLL VENOUS BLD VENIPUNCTURE: CPT

## 2019-06-24 PROCEDURE — 85014 HEMATOCRIT: CPT

## 2019-06-24 PROCEDURE — 82728 ASSAY OF FERRITIN: CPT

## 2019-06-24 PROCEDURE — 83550 IRON BINDING TEST: CPT

## 2019-06-24 PROCEDURE — 85018 HEMOGLOBIN: CPT

## 2019-06-28 ENCOUNTER — HOSPITAL ENCOUNTER (OUTPATIENT)
Dept: MAMMOGRAPHY | Age: 69
Discharge: HOME | End: 2019-06-30
Payer: MEDICARE

## 2019-06-28 DIAGNOSIS — Z12.31 ENCOUNTER FOR SCREENING MAMMOGRAM FOR BREAST CANCER: ICD-10-CM

## 2019-06-28 PROCEDURE — 77063 BREAST TOMOSYNTHESIS BI: CPT

## 2019-06-30 ASSESSMENT — ENCOUNTER SYMPTOMS
VOMITING: 0
DIARRHEA: 0
COUGH: 0
BACK PAIN: 0
NAUSEA: 0
WHEEZING: 0
SHORTNESS OF BREATH: 0

## 2019-07-05 DIAGNOSIS — I10 ESSENTIAL HYPERTENSION: ICD-10-CM

## 2019-07-05 DIAGNOSIS — N18.4 TYPE 2 DIABETES MELLITUS WITH STAGE 4 CHRONIC KIDNEY DISEASE, WITHOUT LONG-TERM CURRENT USE OF INSULIN (HCC): ICD-10-CM

## 2019-07-05 DIAGNOSIS — E11.22 TYPE 2 DIABETES MELLITUS WITH STAGE 4 CHRONIC KIDNEY DISEASE, WITHOUT LONG-TERM CURRENT USE OF INSULIN (HCC): ICD-10-CM

## 2019-07-05 DIAGNOSIS — N18.4 CHRONIC KIDNEY DISEASE, STAGE IV (SEVERE) (HCC): ICD-10-CM

## 2019-07-05 NOTE — TELEPHONE ENCOUNTER
Health Maintenance   Topic Date Due    Hepatitis B Vaccine (1 of 3 - Risk 3-dose series) 01/28/1971    Shingles Vaccine (1 of 2) 01/28/2002    Low dose CT lung screening  01/28/2007    Diabetic retinal exam  05/10/2017    Annual Wellness Visit (AWV)  11/20/2018    Pneumococcal 65+ years Vaccine (1 of 2 - PCV13) 08/14/2019 (Originally 1/28/2017)    Hepatitis C screen  02/20/2020 (Originally 1950)    DTaP/Tdap/Td vaccine (1 - Tdap) 05/23/2020 (Originally 1/28/1971)    Flu vaccine (1) 09/01/2019    Diabetic foot exam  05/23/2020    Potassium monitoring  05/23/2020    Creatinine monitoring  05/23/2020    Lipid screen  06/18/2020    A1C test (Diabetic or Prediabetic)  06/21/2020    Colon cancer screen colonoscopy  11/02/2020    Breast cancer screen  06/28/2021    DEXA (modify frequency per FRAX score)  Completed    HPV vaccine  Aged Out             (applicable per patient's age: Cancer Screenings, Depression Screening, Fall Risk Screening, Immunizations)    Hemoglobin A1C (%)   Date Value   06/21/2019 7.1   08/20/2018 6.7   02/14/2018 7.8     Microalb/Crt.  Ratio (mcg/mg creat)   Date Value   03/04/2017 1,763 (H)     LDL Cholesterol (mg/dL)   Date Value   06/18/2019 106     AST (U/L)   Date Value   12/19/2016 20     ALT (U/L)   Date Value   12/19/2016 16     BUN (mg/dL)   Date Value   05/23/2019 25 (H)      (goal A1C is < 7)   (goal LDL is <100) need 30-50% reduction from baseline     BP Readings from Last 3 Encounters:   06/21/19 (!) 142/76   05/30/19 (!) 170/68   05/23/19 (!) 174/76    (goal /80)      All Future Testing planned in [facility]:  Lab Frequency Next Occurrence   Hepatic Function Panel Once 12/01/2019   Hemoglobin and Hematocrit, Blood     Iron And TIBC     Ferritin     Basic Metabolic Panel Every 8 Weeks 7/5/2019, 8/30/2019, 10/25/2019, 12/20/2019, 2/14/2020   Albumin Every 8 Weeks 7/5/2019, 8/30/2019, 10/25/2019, 12/20/2019, 2/14/2020   Phosphorus Every 8 Weeks 7/5/2019, 8/30/2019, 10/25/2019, 12/20/2019, 2/14/2020   PTH, Intact Every 8 Weeks 7/5/2019, 8/30/2019, 10/25/2019, 12/20/2019, 2/14/2020   Uric Acid Every 8 Weeks 7/5/2019, 8/30/2019, 10/25/2019, 12/20/2019, 2/14/2020   Vitamin D 25 Hydroxy Every 8 Weeks 7/5/2019, 8/30/2019, 10/25/2019, 12/20/2019, 2/14/2020       Next Visit Date:  Future Appointments   Date Time Provider Brayden Michelle   7/8/2019 11:30 AM STV STA CHAIR 18 [facility] STA MED None   7/22/2019  2:30 PM STV STA CHAIR 17 [facility] STA MED None   9/5/2019  1:30 PM Geoffrey Velazquez MD AFL Neph Ivan None   11/21/2019 11:40 AM Neri Sharma PA-C [facility] WALK IN [facility]            Patient Active Problem List:     Gout     Hypovitaminosis D     Anemia in stage 4 chronic kidney disease (HCC)     Type 2 diabetes mellitus with diabetic chronic kidney disease ([address]     Chronic kidney disease (CKD)     Essential hypertension     Hypercholesteremia     Hypokalemia     Acute infarct posterior limb internal capsule on the left     Left sided lacunar infarction ([address]     Type 2 diabetes mellitus with stage 4 chronic kidney disease, with long-term current use of insulin (HCC)     Cerebral aneurysm     Anemia due to stage 4 chronic kidney disease treated with erythropoietin (HCC)     Tobacco abuse disorder     Secondary hyperparathyroidism of renal origin ([address]     Persistent proteinuria     Metabolic acidosis

## 2019-07-06 RX ORDER — LABETALOL 300 MG/1
TABLET, FILM COATED ORAL
Qty: 60 TABLET | Refills: 3 | Status: SHIPPED | OUTPATIENT
Start: 2019-07-06 | End: 2019-11-21 | Stop reason: SDUPTHER

## 2019-07-08 ENCOUNTER — HOSPITAL ENCOUNTER (OUTPATIENT)
Dept: INFUSION THERAPY | Facility: MEDICAL CENTER | Age: 69
Discharge: HOME | End: 2019-07-08
Payer: MEDICARE

## 2019-07-08 ENCOUNTER — HOSPITAL ENCOUNTER (OUTPATIENT)
Facility: MEDICAL CENTER | Age: 69
Discharge: HOME | End: 2019-07-08
Payer: MEDICARE

## 2019-07-08 DIAGNOSIS — D63.1 ANEMIA IN STAGE 4 CHRONIC KIDNEY DISEASE (HCC): ICD-10-CM

## 2019-07-08 DIAGNOSIS — N18.4 ANEMIA IN STAGE 4 CHRONIC KIDNEY DISEASE (HCC): ICD-10-CM

## 2019-07-08 LAB
FERRITIN: 384 UG/L (ref 13–150)
HCT VFR BLD CALC: 33.4 % (ref 36.3–47.1)
HEMOGLOBIN: 11.2 G/DL (ref 11.9–15.1)
IRON SATURATION: 21 % (ref 20–55)
IRON: 54 UG/DL (ref 37–145)
TOTAL IRON BINDING CAPACITY: 252 UG/DL (ref 250–450)
UNSATURATED IRON BINDING CAPACITY: 198 UG/DL (ref 112–347)

## 2019-07-08 PROCEDURE — 83540 ASSAY OF IRON: CPT

## 2019-07-08 PROCEDURE — 36415 COLL VENOUS BLD VENIPUNCTURE: CPT

## 2019-07-08 PROCEDURE — 85018 HEMOGLOBIN: CPT

## 2019-07-08 PROCEDURE — 85014 HEMATOCRIT: CPT

## 2019-07-08 PROCEDURE — 83550 IRON BINDING TEST: CPT

## 2019-07-08 PROCEDURE — 82728 ASSAY OF FERRITIN: CPT

## 2019-07-08 NOTE — PROGRESS NOTES
Pt notified HGB 11.2 and aranesp not indicated and pt given copy of labs and calendar with next appts. Pt discharged per ambulatory per self from the waiting area.

## 2019-07-19 RX ORDER — SODIUM CHLORIDE 9 MG/ML
INJECTION, SOLUTION INTRAVENOUS CONTINUOUS
Status: CANCELLED | OUTPATIENT
Start: 2019-08-06

## 2019-07-22 ENCOUNTER — HOSPITAL ENCOUNTER (OUTPATIENT)
Age: 69
Discharge: HOME | End: 2019-07-22
Payer: MEDICARE

## 2019-07-22 ENCOUNTER — HOSPITAL ENCOUNTER (OUTPATIENT)
Dept: INFUSION THERAPY | Facility: MEDICAL CENTER | Age: 69
Discharge: HOME | End: 2019-07-22
Payer: MEDICARE

## 2019-07-22 ENCOUNTER — HOSPITAL ENCOUNTER (OUTPATIENT)
Facility: MEDICAL CENTER | Age: 69
Discharge: HOME | End: 2019-07-22
Payer: MEDICARE

## 2019-07-22 DIAGNOSIS — I10 ESSENTIAL HYPERTENSION: ICD-10-CM

## 2019-07-22 DIAGNOSIS — D63.1 ANEMIA IN STAGE 4 CHRONIC KIDNEY DISEASE (HCC): ICD-10-CM

## 2019-07-22 DIAGNOSIS — E11.22 TYPE 2 DIABETES MELLITUS WITH STAGE 4 CHRONIC KIDNEY DISEASE, WITH LONG-TERM CURRENT USE OF INSULIN (HCC): ICD-10-CM

## 2019-07-22 DIAGNOSIS — N18.4 TYPE 2 DIABETES MELLITUS WITH STAGE 4 CHRONIC KIDNEY DISEASE, WITH LONG-TERM CURRENT USE OF INSULIN (HCC): ICD-10-CM

## 2019-07-22 DIAGNOSIS — N18.4 ANEMIA IN STAGE 4 CHRONIC KIDNEY DISEASE (HCC): ICD-10-CM

## 2019-07-22 DIAGNOSIS — Z79.4 TYPE 2 DIABETES MELLITUS WITH STAGE 4 CHRONIC KIDNEY DISEASE, WITH LONG-TERM CURRENT USE OF INSULIN (HCC): ICD-10-CM

## 2019-07-22 DIAGNOSIS — E87.20 METABOLIC ACIDOSIS: ICD-10-CM

## 2019-07-22 LAB
ALBUMIN SERPL-MCNC: 4.4 G/DL (ref 3.5–5.2)
ALBUMIN SERPL-MCNC: 4.6 G/DL (ref 3.5–5.2)
ALBUMIN/GLOBULIN RATIO: 1.4 (ref 1–2.5)
ALP BLD-CCNC: 127 U/L (ref 35–104)
ALT SERPL-CCNC: 11 U/L (ref 5–33)
ANION GAP SERPL CALCULATED.3IONS-SCNC: 20 MMOL/L (ref 9–17)
AST SERPL-CCNC: 14 U/L
BILIRUB SERPL-MCNC: 0.16 MG/DL (ref 0.3–1.2)
BILIRUBIN DIRECT: <0.08 MG/DL
BILIRUBIN, INDIRECT: ABNORMAL MG/DL (ref 0–1)
BUN BLDV-MCNC: 28 MG/DL (ref 8–23)
BUN/CREAT BLD: ABNORMAL (ref 9–20)
CALCIUM SERPL-MCNC: 9.7 MG/DL (ref 8.6–10.4)
CHLORIDE BLD-SCNC: 102 MMOL/L (ref 98–107)
CO2: 19 MMOL/L (ref 20–31)
CREAT SERPL-MCNC: 3.38 MG/DL (ref 0.5–0.9)
FERRITIN: 382 UG/L (ref 13–150)
GFR AFRICAN AMERICAN: 16 ML/MIN
GFR NON-AFRICAN AMERICAN: 14 ML/MIN
GFR SERPL CREATININE-BSD FRML MDRD: ABNORMAL ML/MIN/{1.73_M2}
GFR SERPL CREATININE-BSD FRML MDRD: ABNORMAL ML/MIN/{1.73_M2}
GLOBULIN: ABNORMAL G/DL (ref 1.5–3.8)
GLUCOSE BLD-MCNC: 144 MG/DL (ref 70–99)
HCT VFR BLD CALC: 33.9 % (ref 36.3–47.1)
HEMOGLOBIN: 11.6 G/DL (ref 11.9–15.1)
IRON SATURATION: 28 % (ref 20–55)
IRON: 69 UG/DL (ref 37–145)
PHOSPHORUS: 4.1 MG/DL (ref 2.6–4.5)
POTASSIUM SERPL-SCNC: 4 MMOL/L (ref 3.7–5.3)
PTH INTACT: 198 PG/ML (ref 15–65)
SODIUM BLD-SCNC: 141 MMOL/L (ref 135–144)
TOTAL IRON BINDING CAPACITY: 244 UG/DL (ref 250–450)
TOTAL PROTEIN: 7.5 G/DL (ref 6.4–8.3)
UNSATURATED IRON BINDING CAPACITY: 175 UG/DL (ref 112–347)
URIC ACID: 5 MG/DL (ref 2.4–5.7)
VITAMIN D 25-HYDROXY: 30.8 NG/ML (ref 30–100)

## 2019-07-22 PROCEDURE — 83540 ASSAY OF IRON: CPT

## 2019-07-22 PROCEDURE — 82306 VITAMIN D 25 HYDROXY: CPT

## 2019-07-22 PROCEDURE — 84550 ASSAY OF BLOOD/URIC ACID: CPT

## 2019-07-22 PROCEDURE — 36415 COLL VENOUS BLD VENIPUNCTURE: CPT

## 2019-07-22 PROCEDURE — 84100 ASSAY OF PHOSPHORUS: CPT

## 2019-07-22 PROCEDURE — 80076 HEPATIC FUNCTION PANEL: CPT

## 2019-07-22 PROCEDURE — 82728 ASSAY OF FERRITIN: CPT

## 2019-07-22 PROCEDURE — 85018 HEMOGLOBIN: CPT

## 2019-07-22 PROCEDURE — 80048 BASIC METABOLIC PNL TOTAL CA: CPT

## 2019-07-22 PROCEDURE — 83550 IRON BINDING TEST: CPT

## 2019-07-22 PROCEDURE — 85014 HEMATOCRIT: CPT

## 2019-07-22 PROCEDURE — 83970 ASSAY OF PARATHORMONE: CPT

## 2019-07-22 NOTE — PROGRESS NOTES
Met with patient in [facility] to inform of HGB 11.6 and that no Aranesp injection needed today. Patient provided with copy Hgb level. Other labs still in process and patient states will obtain those from [address] [place] [address] [phone] when available. Provided patient with copy of schedule with RTC date.

## 2019-07-23 LAB
ALBUMIN SERPL-MCNC: 4.8 G/DL (ref 3.5–5.2)
ALBUMIN/GLOBULIN RATIO: ABNORMAL (ref 1–2.5)
ALP BLD-CCNC: 128 U/L (ref 35–104)
ALT SERPL-CCNC: 11 U/L (ref 5–33)
AST SERPL-CCNC: 22 U/L
BILIRUB SERPL-MCNC: 0.18 MG/DL (ref 0.3–1.2)
BILIRUBIN DIRECT: <0.08 MG/DL
BILIRUBIN, INDIRECT: ABNORMAL MG/DL (ref 0–1)
GLOBULIN: ABNORMAL G/DL (ref 1.5–3.8)
TOTAL PROTEIN: 7.4 G/DL (ref 6.4–8.3)

## 2019-08-05 ENCOUNTER — APPOINTMENT (OUTPATIENT)
Dept: INFUSION THERAPY | Facility: MEDICAL CENTER | Age: 69
End: 2019-08-05
Payer: MEDICARE

## 2019-08-06 ENCOUNTER — HOSPITAL ENCOUNTER (OUTPATIENT)
Facility: MEDICAL CENTER | Age: 69
Discharge: HOME | End: 2019-08-06
Payer: MEDICARE

## 2019-08-06 ENCOUNTER — HOSPITAL ENCOUNTER (OUTPATIENT)
Dept: INFUSION THERAPY | Facility: MEDICAL CENTER | Age: 69
Discharge: HOME | End: 2019-08-06
Payer: MEDICARE

## 2019-08-06 VITALS
SYSTOLIC BLOOD PRESSURE: 172 MMHG | RESPIRATION RATE: 18 BRPM | TEMPERATURE: 97.8 F | DIASTOLIC BLOOD PRESSURE: 90 MMHG | HEART RATE: 72 BPM

## 2019-08-06 DIAGNOSIS — D63.1 ANEMIA IN STAGE 4 CHRONIC KIDNEY DISEASE (HCC): ICD-10-CM

## 2019-08-06 DIAGNOSIS — N18.4 ANEMIA DUE TO STAGE 4 CHRONIC KIDNEY DISEASE TREATED WITH ERYTHROPOIETIN (HCC): Primary | ICD-10-CM

## 2019-08-06 DIAGNOSIS — N18.4 ANEMIA IN STAGE 4 CHRONIC KIDNEY DISEASE (HCC): ICD-10-CM

## 2019-08-06 DIAGNOSIS — D63.1 ANEMIA DUE TO STAGE 4 CHRONIC KIDNEY DISEASE TREATED WITH ERYTHROPOIETIN (HCC): Primary | ICD-10-CM

## 2019-08-06 LAB
FERRITIN: 355 UG/L (ref 13–150)
HCT VFR BLD CALC: 32.9 % (ref 36.3–47.1)
HEMOGLOBIN: 11.4 G/DL (ref 11.9–15.1)
IRON SATURATION: 25 % (ref 20–55)
IRON: 63 UG/DL (ref 37–145)
TOTAL IRON BINDING CAPACITY: 255 UG/DL (ref 250–450)
UNSATURATED IRON BINDING CAPACITY: 192 UG/DL (ref 112–347)

## 2019-08-06 PROCEDURE — 6360000002 HC RX W HCPCS: Performed by: INTERNAL MEDICINE

## 2019-08-06 PROCEDURE — 82728 ASSAY OF FERRITIN: CPT

## 2019-08-06 PROCEDURE — 96365 THER/PROPH/DIAG IV INF INIT: CPT

## 2019-08-06 PROCEDURE — 83540 ASSAY OF IRON: CPT

## 2019-08-06 PROCEDURE — 85014 HEMATOCRIT: CPT

## 2019-08-06 PROCEDURE — 2580000003 HC RX 258: Performed by: INTERNAL MEDICINE

## 2019-08-06 PROCEDURE — 96374 THER/PROPH/DIAG INJ IV PUSH: CPT

## 2019-08-06 PROCEDURE — 36415 COLL VENOUS BLD VENIPUNCTURE: CPT

## 2019-08-06 PROCEDURE — 83550 IRON BINDING TEST: CPT

## 2019-08-06 PROCEDURE — 85018 HEMOGLOBIN: CPT

## 2019-08-06 RX ORDER — SODIUM CHLORIDE 9 MG/ML
INJECTION, SOLUTION INTRAVENOUS CONTINUOUS
Status: CANCELLED | OUTPATIENT
Start: 2019-08-13

## 2019-08-06 RX ORDER — SODIUM CHLORIDE 9 MG/ML
INJECTION, SOLUTION INTRAVENOUS CONTINUOUS
Status: DISCONTINUED | OUTPATIENT
Start: 2019-08-06 | End: 2019-08-06 | Stop reason: HOSPADM

## 2019-08-06 RX ADMIN — FERRIC CARBOXYMALTOSE INJECTION 750 MG: 50 INJECTION, SOLUTION INTRAVENOUS at 12:00

## 2019-08-06 RX ADMIN — SODIUM CHLORIDE: 9 INJECTION, SOLUTION INTRAVENOUS at 11:50

## 2019-08-06 NOTE — PROGRESS NOTES
1140:  Pt arrives ambulatory for her #1 of 2 Injectafer infusions and possible Aranesp injection. Orders reviewed. VS obtained. Pt feels well today. Denies fevers/chills/illnesses or infections. Labs drawn in outpatient lab today @7945 reviewed. Hemoglobin=11.4 therefore no Aranesp indicated for Hemoglobin greater than or equal to 10.0. PIV established as charted in LDA's. IVF's hung as per [facility] for med line. Pt educated on Injectafer and side effects to look for and report right away and that they can happen up to 24 hours post infusion. Pt verbalized understanding of this. Injectafer hung as per MAR. Pt tolerated Injectafer well without complaints. Pt observed for 30 minutes post Injectafer infusions. No adverse reactions noted. PIV discontinued as charted in LDA's. Pt has next appointment. RTC on 8/13/19. Discharged to home.

## 2019-08-13 ENCOUNTER — HOSPITAL ENCOUNTER (OUTPATIENT)
Dept: INFUSION THERAPY | Facility: MEDICAL CENTER | Age: 69
Discharge: HOME | End: 2019-08-13
Payer: MEDICARE

## 2019-08-13 VITALS
TEMPERATURE: 98 F | DIASTOLIC BLOOD PRESSURE: 47 MMHG | HEART RATE: 64 BPM | SYSTOLIC BLOOD PRESSURE: 104 MMHG | RESPIRATION RATE: 18 BRPM

## 2019-08-13 DIAGNOSIS — N18.4 ANEMIA IN STAGE 4 CHRONIC KIDNEY DISEASE (HCC): ICD-10-CM

## 2019-08-13 DIAGNOSIS — D63.1 ANEMIA DUE TO STAGE 4 CHRONIC KIDNEY DISEASE TREATED WITH ERYTHROPOIETIN (HCC): Primary | ICD-10-CM

## 2019-08-13 DIAGNOSIS — N18.4 ANEMIA DUE TO STAGE 4 CHRONIC KIDNEY DISEASE TREATED WITH ERYTHROPOIETIN (HCC): Primary | ICD-10-CM

## 2019-08-13 DIAGNOSIS — D63.1 ANEMIA IN STAGE 4 CHRONIC KIDNEY DISEASE (HCC): ICD-10-CM

## 2019-08-13 PROCEDURE — 96374 THER/PROPH/DIAG INJ IV PUSH: CPT

## 2019-08-13 PROCEDURE — 6360000002 HC RX W HCPCS: Performed by: INTERNAL MEDICINE

## 2019-08-13 PROCEDURE — 2580000003 HC RX 258: Performed by: INTERNAL MEDICINE

## 2019-08-13 RX ORDER — SODIUM CHLORIDE 9 MG/ML
INJECTION, SOLUTION INTRAVENOUS CONTINUOUS
Status: CANCELLED | OUTPATIENT
Start: 2019-08-13

## 2019-08-13 RX ORDER — SODIUM CHLORIDE 9 MG/ML
INJECTION, SOLUTION INTRAVENOUS CONTINUOUS
Status: ACTIVE | OUTPATIENT
Start: 2019-08-13 | End: 2019-08-13

## 2019-08-13 RX ADMIN — FERRIC CARBOXYMALTOSE INJECTION 750 MG: 50 INJECTION, SOLUTION INTRAVENOUS at 11:47

## 2019-08-13 RX ADMIN — SODIUM CHLORIDE: 9 INJECTION, SOLUTION INTRAVENOUS at 11:36

## 2019-08-13 NOTE — PROGRESS NOTES
Patient here for Injectafer infusion. No complaints today. IV started. Infusing. No complaints. Completed. Tolerated well. Observed x 30 minutes. No problems or complaints. IV discontinued intact, dressing to site. Has a return visit scheduled. Discharged ambulatory per self.

## 2019-08-20 ENCOUNTER — HOSPITAL ENCOUNTER (OUTPATIENT)
Facility: MEDICAL CENTER | Age: 69
Discharge: HOME | End: 2019-08-20
Payer: MEDICARE

## 2019-08-20 ENCOUNTER — HOSPITAL ENCOUNTER (OUTPATIENT)
Dept: INFUSION THERAPY | Facility: MEDICAL CENTER | Age: 69
Discharge: HOME | End: 2019-08-20
Payer: MEDICARE

## 2019-08-20 DIAGNOSIS — N18.4 ANEMIA IN STAGE 4 CHRONIC KIDNEY DISEASE (HCC): ICD-10-CM

## 2019-08-20 DIAGNOSIS — D63.1 ANEMIA DUE TO STAGE 4 CHRONIC KIDNEY DISEASE TREATED WITH ERYTHROPOIETIN (HCC): ICD-10-CM

## 2019-08-20 DIAGNOSIS — N18.4 ANEMIA DUE TO STAGE 4 CHRONIC KIDNEY DISEASE TREATED WITH ERYTHROPOIETIN (HCC): ICD-10-CM

## 2019-08-20 DIAGNOSIS — D63.1 ANEMIA IN STAGE 4 CHRONIC KIDNEY DISEASE (HCC): ICD-10-CM

## 2019-08-20 LAB
HCT VFR BLD CALC: 34 % (ref 36.3–47.1)
HEMOGLOBIN: 11.4 G/DL (ref 11.9–15.1)

## 2019-08-20 PROCEDURE — 96372 THER/PROPH/DIAG INJ SC/IM: CPT

## 2019-08-20 PROCEDURE — 36415 COLL VENOUS BLD VENIPUNCTURE: CPT

## 2019-08-20 PROCEDURE — 85014 HEMATOCRIT: CPT

## 2019-08-20 PROCEDURE — 85018 HEMOGLOBIN: CPT

## 2019-08-20 NOTE — PROGRESS NOTES
Patient here for labs and possible Aranesp. Feels well. Labs reviewed. Hemoglobin=11.4. No Aranesp needed today. Has a return visit scheduled. Discharged ambulatory per self.

## 2019-09-03 ENCOUNTER — HOSPITAL ENCOUNTER (OUTPATIENT)
Dept: INFUSION THERAPY | Facility: MEDICAL CENTER | Age: 69
Discharge: HOME | End: 2019-09-03
Payer: MEDICARE

## 2019-09-03 ENCOUNTER — HOSPITAL ENCOUNTER (OUTPATIENT)
Facility: MEDICAL CENTER | Age: 69
Discharge: HOME | End: 2019-09-03
Payer: MEDICARE

## 2019-09-03 DIAGNOSIS — N18.4 ANEMIA IN STAGE 4 CHRONIC KIDNEY DISEASE (HCC): ICD-10-CM

## 2019-09-03 DIAGNOSIS — D63.1 ANEMIA IN STAGE 4 CHRONIC KIDNEY DISEASE (HCC): ICD-10-CM

## 2019-09-03 LAB
HCT VFR BLD CALC: 33.5 % (ref 36.3–47.1)
HEMOGLOBIN: 11.5 G/DL (ref 11.9–15.1)
IRON SATURATION: 47 % (ref 20–55)
IRON: 103 UG/DL (ref 37–145)
TOTAL IRON BINDING CAPACITY: 217 UG/DL (ref 250–450)
UNSATURATED IRON BINDING CAPACITY: 114 UG/DL (ref 112–347)

## 2019-09-03 PROCEDURE — 83540 ASSAY OF IRON: CPT

## 2019-09-03 PROCEDURE — 85014 HEMATOCRIT: CPT

## 2019-09-03 PROCEDURE — 83550 IRON BINDING TEST: CPT

## 2019-09-03 PROCEDURE — 36415 COLL VENOUS BLD VENIPUNCTURE: CPT

## 2019-09-03 PROCEDURE — 85018 HEMOGLOBIN: CPT

## 2019-09-03 NOTE — PROGRESS NOTES
Bard Murphy arrives ambulatory alone  Order for [employer] reviewed and hg is 11.5  aranesp held per orders  Reviewed next appointment and verbalizes understanding  Discharged per ambulatory

## 2019-10-02 ENCOUNTER — HOSPITAL ENCOUNTER (OUTPATIENT)
Dept: INFUSION THERAPY | Facility: MEDICAL CENTER | Age: 69
Discharge: HOME | End: 2019-10-02
Payer: MEDICARE

## 2019-10-02 ENCOUNTER — HOSPITAL ENCOUNTER (OUTPATIENT)
Facility: MEDICAL CENTER | Age: 69
Discharge: HOME | End: 2019-10-02
Payer: MEDICARE

## 2019-10-02 DIAGNOSIS — D63.1 ANEMIA IN STAGE 4 CHRONIC KIDNEY DISEASE (HCC): ICD-10-CM

## 2019-10-02 DIAGNOSIS — N18.4 ANEMIA IN STAGE 4 CHRONIC KIDNEY DISEASE (HCC): ICD-10-CM

## 2019-10-02 LAB
HCT VFR BLD CALC: 34.1 % (ref 36.3–47.1)
HEMOGLOBIN: 11.6 G/DL (ref 11.9–15.1)

## 2019-10-02 PROCEDURE — 85018 HEMOGLOBIN: CPT

## 2019-10-02 PROCEDURE — 36415 COLL VENOUS BLD VENIPUNCTURE: CPT

## 2019-10-02 PROCEDURE — 85014 HEMATOCRIT: CPT

## 2019-10-02 NOTE — PROGRESS NOTES
Patient here for labs and Aranesp injection. Labs reviewed. Hemoglobin=11.6  No Aranesp needed. Order has  and have called MD office to request new one. Patient also will call to expedite the process. No appointment scheduled as order will have to be precerted again. Office to call when approved. Discharged ambulatory with friend.

## 2019-11-21 ENCOUNTER — OFFICE VISIT (OUTPATIENT)
Dept: PRIMARY CARE CLINIC | Age: 69
End: 2019-11-21
Payer: MEDICARE

## 2019-11-21 VITALS
DIASTOLIC BLOOD PRESSURE: 64 MMHG | SYSTOLIC BLOOD PRESSURE: 121 MMHG | WEIGHT: 170 LBS | OXYGEN SATURATION: 99 % | HEART RATE: 59 BPM | BODY MASS INDEX: 29.18 KG/M2 | TEMPERATURE: 97 F

## 2019-11-21 DIAGNOSIS — N18.4 TYPE 2 DIABETES MELLITUS WITH STAGE 4 CHRONIC KIDNEY DISEASE, WITH LONG-TERM CURRENT USE OF INSULIN (HCC): Primary | ICD-10-CM

## 2019-11-21 DIAGNOSIS — E66.3 OVERWEIGHT (BMI 25.0-29.9): ICD-10-CM

## 2019-11-21 DIAGNOSIS — Z76.0 MEDICATION REFILL: ICD-10-CM

## 2019-11-21 DIAGNOSIS — Z79.4 TYPE 2 DIABETES MELLITUS WITH STAGE 4 CHRONIC KIDNEY DISEASE, WITH LONG-TERM CURRENT USE OF INSULIN (HCC): Primary | ICD-10-CM

## 2019-11-21 DIAGNOSIS — E11.22 TYPE 2 DIABETES MELLITUS WITH STAGE 4 CHRONIC KIDNEY DISEASE, WITH LONG-TERM CURRENT USE OF INSULIN (HCC): Primary | ICD-10-CM

## 2019-11-21 DIAGNOSIS — Z71.6 TOBACCO ABUSE COUNSELING: ICD-10-CM

## 2019-11-21 DIAGNOSIS — N18.4 CHRONIC KIDNEY DISEASE, STAGE IV (SEVERE) (HCC): ICD-10-CM

## 2019-11-21 DIAGNOSIS — Z72.0 TOBACCO ABUSE: ICD-10-CM

## 2019-11-21 DIAGNOSIS — Z00.00 ANNUAL PHYSICAL EXAM: ICD-10-CM

## 2019-11-21 LAB — HBA1C MFR BLD: 6.8 %

## 2019-11-21 PROCEDURE — 83036 HEMOGLOBIN GLYCOSYLATED A1C: CPT | Performed by: PHYSICIAN ASSISTANT

## 2019-11-21 PROCEDURE — G0439 PPPS, SUBSEQ VISIT: HCPCS | Performed by: PHYSICIAN ASSISTANT

## 2019-11-21 RX ORDER — FOLIC ACID 1 MG/1
1 TABLET ORAL 2 TIMES DAILY
Qty: 60 TABLET | Refills: 3 | Status: SHIPPED | OUTPATIENT
Start: 2019-11-21

## 2019-11-21 RX ORDER — LABETALOL 300 MG/1
TABLET, FILM COATED ORAL
Qty: 60 TABLET | Refills: 3 | Status: SHIPPED | OUTPATIENT
Start: 2019-11-21

## 2019-11-21 ASSESSMENT — ENCOUNTER SYMPTOMS
CONSTIPATION: 1
COUGH: 0
BACK PAIN: 1
SHORTNESS OF BREATH: 1
WHEEZING: 0

## 2019-11-29 ASSESSMENT — ENCOUNTER SYMPTOMS
DIARRHEA: 0
VOMITING: 0
SORE THROAT: 0
EYE PAIN: 0
RHINORRHEA: 0
ABDOMINAL PAIN: 0
NAUSEA: 0

## 2020-01-29 ENCOUNTER — HOSPITAL ENCOUNTER (OUTPATIENT)
Age: 70
Discharge: HOME | End: 2020-01-29
Payer: MEDICARE

## 2020-01-29 LAB
ALBUMIN SERPL-MCNC: 4.6 G/DL (ref 3.5–5.2)
ANION GAP SERPL CALCULATED.3IONS-SCNC: 18 MMOL/L (ref 9–17)
BUN BLDV-MCNC: 25 MG/DL (ref 8–23)
BUN/CREAT BLD: ABNORMAL (ref 9–20)
CALCIUM SERPL-MCNC: 9.7 MG/DL (ref 8.6–10.4)
CHLORIDE BLD-SCNC: 104 MMOL/L (ref 98–107)
CO2: 19 MMOL/L (ref 20–31)
CREAT SERPL-MCNC: 3.67 MG/DL (ref 0.5–0.9)
GFR AFRICAN AMERICAN: 15 ML/MIN
GFR NON-AFRICAN AMERICAN: 12 ML/MIN
GFR SERPL CREATININE-BSD FRML MDRD: ABNORMAL ML/MIN/{1.73_M2}
GFR SERPL CREATININE-BSD FRML MDRD: ABNORMAL ML/MIN/{1.73_M2}
GLUCOSE BLD-MCNC: 78 MG/DL (ref 70–99)
PHOSPHORUS: 4 MG/DL (ref 2.6–4.5)
POTASSIUM SERPL-SCNC: 3.9 MMOL/L (ref 3.7–5.3)
PTH INTACT: 236.7 PG/ML (ref 15–65)
SODIUM BLD-SCNC: 141 MMOL/L (ref 135–144)
URIC ACID: 4.5 MG/DL (ref 2.4–5.7)
VITAMIN D 25-HYDROXY: 25.1 NG/ML (ref 30–100)

## 2020-01-29 PROCEDURE — 82306 VITAMIN D 25 HYDROXY: CPT

## 2020-01-29 PROCEDURE — 83970 ASSAY OF PARATHORMONE: CPT

## 2020-01-29 PROCEDURE — 82040 ASSAY OF SERUM ALBUMIN: CPT

## 2020-01-29 PROCEDURE — 36415 COLL VENOUS BLD VENIPUNCTURE: CPT

## 2020-01-29 PROCEDURE — 84100 ASSAY OF PHOSPHORUS: CPT

## 2020-01-29 PROCEDURE — 84550 ASSAY OF BLOOD/URIC ACID: CPT

## 2020-01-29 PROCEDURE — 80048 BASIC METABOLIC PNL TOTAL CA: CPT

## 2020-02-19 ENCOUNTER — APPOINTMENT (OUTPATIENT)
Dept: GENERAL RADIOLOGY | Age: 70
DRG: 233 | End: 2020-02-19
Payer: MEDICARE

## 2020-02-19 ENCOUNTER — HOSPITAL ENCOUNTER (INPATIENT)
Age: 70
LOS: 21 days | Discharge: HOSPICE | DRG: 233 | End: 2020-03-12
Attending: EMERGENCY MEDICINE | Admitting: INTERNAL MEDICINE
Payer: MEDICARE

## 2020-02-19 LAB
ABSOLUTE EOS #: 0.98 K/UL (ref 0–0.44)
ABSOLUTE IMMATURE GRANULOCYTE: 0.09 K/UL (ref 0–0.3)
ABSOLUTE LYMPH #: 1.78 K/UL (ref 1.1–3.7)
ABSOLUTE MONO #: 0.55 K/UL (ref 0.1–1.2)
ALLEN TEST: ABNORMAL
ANION GAP: 12 MMOL/L (ref 7–16)
BASOPHILS # BLD: 0 % (ref 0–2)
BASOPHILS ABSOLUTE: 0.06 K/UL (ref 0–0.2)
DIFFERENTIAL TYPE: ABNORMAL
EOSINOPHILS RELATIVE PERCENT: 6 % (ref 1–4)
FIO2: ABNORMAL
GFR NON-AFRICAN AMERICAN: 11 ML/MIN
GFR SERPL CREATININE-BSD FRML MDRD: 13 ML/MIN
GFR SERPL CREATININE-BSD FRML MDRD: ABNORMAL ML/MIN/{1.73_M2}
GLUCOSE BLD-MCNC: 270 MG/DL (ref 74–100)
HCO3 VENOUS: 21.6 MMOL/L (ref 22–29)
HCT VFR BLD CALC: 31.3 % (ref 36.3–47.1)
HEMOGLOBIN: 10.4 G/DL (ref 11.9–15.1)
IMMATURE GRANULOCYTES: 1 %
LYMPHOCYTES # BLD: 12 % (ref 24–43)
MCH RBC QN AUTO: 31.1 PG (ref 25.2–33.5)
MCHC RBC AUTO-ENTMCNC: 33.2 G/DL (ref 28.4–34.8)
MCV RBC AUTO: 93.7 FL (ref 82.6–102.9)
MODE: ABNORMAL
MONOCYTES # BLD: 4 % (ref 3–12)
NEGATIVE BASE EXCESS, VEN: 3 (ref 0–2)
NRBC AUTOMATED: 0 PER 100 WBC
O2 DEVICE/FLOW/%: ABNORMAL
O2 SAT, VEN: 99 % (ref 60–85)
PATIENT TEMP: ABNORMAL
PCO2, VEN: 37.4 MM HG (ref 41–51)
PDW BLD-RTO: 13.7 % (ref 11.8–14.4)
PH VENOUS: 7.37 (ref 7.32–7.43)
PLATELET # BLD: 267 K/UL (ref 138–453)
PLATELET ESTIMATE: ABNORMAL
PMV BLD AUTO: 11.4 FL (ref 8.1–13.5)
PO2, VEN: 150.5 MM HG (ref 30–50)
POC CHLORIDE: 105 MMOL/L (ref 98–107)
POC CREATININE: 4.14 MG/DL (ref 0.51–1.19)
POC HEMATOCRIT: 31 % (ref 36–46)
POC HEMOGLOBIN: 10.4 G/DL (ref 12–16)
POC IONIZED CALCIUM: 1.18 MMOL/L (ref 1.15–1.33)
POC LACTIC ACID: 1.15 MMOL/L (ref 0.56–1.39)
POC PCO2 TEMP: ABNORMAL MM HG
POC PH TEMP: ABNORMAL
POC PO2 TEMP: ABNORMAL MM HG
POC POTASSIUM: 3.6 MMOL/L (ref 3.5–4.5)
POC SODIUM: 139 MMOL/L (ref 138–146)
POSITIVE BASE EXCESS, VEN: ABNORMAL (ref 0–3)
RBC # BLD: 3.34 M/UL (ref 3.95–5.11)
RBC # BLD: ABNORMAL 10*6/UL
SAMPLE SITE: ABNORMAL
SEG NEUTROPHILS: 77 % (ref 36–65)
SEGMENTED NEUTROPHILS ABSOLUTE COUNT: 11.88 K/UL (ref 1.5–8.1)
TOTAL CO2, VENOUS: 23 MMOL/L (ref 23–30)
WBC # BLD: 15.3 K/UL (ref 3.5–11.3)
WBC # BLD: ABNORMAL 10*3/UL

## 2020-02-19 PROCEDURE — 99285 EMERGENCY DEPT VISIT HI MDM: CPT

## 2020-02-19 PROCEDURE — 80048 BASIC METABOLIC PNL TOTAL CA: CPT

## 2020-02-19 PROCEDURE — 85025 COMPLETE CBC W/AUTO DIFF WBC: CPT

## 2020-02-19 PROCEDURE — 85014 HEMATOCRIT: CPT

## 2020-02-19 PROCEDURE — 94660 CPAP INITIATION&MGMT: CPT

## 2020-02-19 PROCEDURE — 84132 ASSAY OF SERUM POTASSIUM: CPT

## 2020-02-19 PROCEDURE — 82330 ASSAY OF CALCIUM: CPT

## 2020-02-19 PROCEDURE — 96375 TX/PRO/DX INJ NEW DRUG ADDON: CPT

## 2020-02-19 PROCEDURE — 82803 BLOOD GASES ANY COMBINATION: CPT

## 2020-02-19 PROCEDURE — 6360000002 HC RX W HCPCS: Performed by: GENERAL PRACTICE

## 2020-02-19 PROCEDURE — 94640 AIRWAY INHALATION TREATMENT: CPT

## 2020-02-19 PROCEDURE — 2500000003 HC RX 250 WO HCPCS

## 2020-02-19 PROCEDURE — 83880 ASSAY OF NATRIURETIC PEPTIDE: CPT

## 2020-02-19 PROCEDURE — 82565 ASSAY OF CREATININE: CPT

## 2020-02-19 PROCEDURE — 82435 ASSAY OF BLOOD CHLORIDE: CPT

## 2020-02-19 PROCEDURE — 84484 ASSAY OF TROPONIN QUANT: CPT

## 2020-02-19 PROCEDURE — 83605 ASSAY OF LACTIC ACID: CPT

## 2020-02-19 PROCEDURE — 84295 ASSAY OF SERUM SODIUM: CPT

## 2020-02-19 PROCEDURE — 71045 X-RAY EXAM CHEST 1 VIEW: CPT

## 2020-02-19 PROCEDURE — 83735 ASSAY OF MAGNESIUM: CPT

## 2020-02-19 PROCEDURE — 93005 ELECTROCARDIOGRAM TRACING: CPT | Performed by: GENERAL PRACTICE

## 2020-02-19 PROCEDURE — 6360000002 HC RX W HCPCS

## 2020-02-19 PROCEDURE — 96365 THER/PROPH/DIAG IV INF INIT: CPT

## 2020-02-19 PROCEDURE — 82947 ASSAY GLUCOSE BLOOD QUANT: CPT

## 2020-02-19 RX ORDER — METHYLPREDNISOLONE SODIUM SUCCINATE 125 MG/2ML
125 INJECTION, POWDER, LYOPHILIZED, FOR SOLUTION INTRAMUSCULAR; INTRAVENOUS ONCE
Status: COMPLETED | OUTPATIENT
Start: 2020-02-19 | End: 2020-02-19

## 2020-02-19 RX ORDER — ALBUTEROL SULFATE 2.5 MG/3ML
15 SOLUTION RESPIRATORY (INHALATION)
Status: DISCONTINUED | OUTPATIENT
Start: 2020-02-19 | End: 2020-02-22

## 2020-02-19 RX ORDER — MAGNESIUM SULFATE 1 G/100ML
1 INJECTION INTRAVENOUS
Status: DISPENSED | OUTPATIENT
Start: 2020-02-20 | End: 2020-02-20

## 2020-02-19 RX ORDER — ALBUTEROL SULFATE 2.5 MG/3ML
2.5 SOLUTION RESPIRATORY (INHALATION)
Status: DISCONTINUED | OUTPATIENT
Start: 2020-02-19 | End: 2020-02-22

## 2020-02-19 RX ADMIN — MAGNESIUM SULFATE HEPTAHYDRATE 1 G: 1 INJECTION, SOLUTION INTRAVENOUS at 23:36

## 2020-02-19 RX ADMIN — ALBUTEROL SULFATE 5 MG: 2.5 SOLUTION RESPIRATORY (INHALATION) at 23:20

## 2020-02-19 RX ADMIN — METHYLPREDNISOLONE SODIUM SUCCINATE 125 MG: 125 INJECTION, POWDER, FOR SOLUTION INTRAMUSCULAR; INTRAVENOUS at 23:35

## 2020-02-19 RX ADMIN — IPRATROPIUM BROMIDE 0.5 MG: 0.5 SOLUTION RESPIRATORY (INHALATION) at 23:20

## 2020-02-19 NOTE — TELEPHONE ENCOUNTER
Health Maintenance   Topic Date Due    Hepatitis B vaccine (1 of 3 - Risk 3-dose series) 01/28/1971    Shingles Vaccine (1 of 2) 01/28/2002    Low dose CT lung screening  01/28/2007    Pneumococcal 65+ yrs at Risk Vaccine (1 of 2 - PCV13) 01/28/2017    Diabetic retinal exam  05/10/2017    Hepatitis C screen  02/20/2020 (Originally 1950)    DTaP/Tdap/Td vaccine (1 - Tdap) 05/23/2020 (Originally 1[birth year]28/1961)    Flu vaccine (1) 06/30/2020 (Originally 9/1/2019)    Diabetic foot exam  05/23/2020    Lipid screen  06/18/2020    A1C test (Diabetic or Prediabetic)  06/21/2020    Colon cancer screen colonoscopy  11/02/2020    Annual Wellness Visit (AWV)  11/21/2020    Potassium monitoring  01/29/2021    Creatinine monitoring  01/29/2021    Breast cancer screen  06/28/2021    DEXA (modify frequency per FRAX score)  Completed    Hepatitis A vaccine  Aged Out    Hib vaccine  Aged Out    Meningococcal (ACWY) vaccine  Aged Out             (applicable per patient's age: Cancer Screenings, Depression Screening, Fall Risk Screening, Immunizations)    Hemoglobin A1C (%)   Date Value   11/21/2019 6.8   06/21/2019 7.1   08/20/2018 6.7     Microalb/Crt.  Ratio (mcg/mg creat)   Date Value   03/04/2017 1,763 (H)     LDL Cholesterol (mg/dL)   Date Value   06/18/2019 106     AST (U/L)   Date Value   07/22/2019 14     ALT (U/L)   Date Value   07/22/2019 11     BUN (mg/dL)   Date Value   01/29/2020 25 (H)      (goal A1C is < 7)   (goal LDL is <100) need 30-50% reduction from baseline     BP Readings from Last 3 Encounters:   11/21/19 121/64   09/05/19 124/68   08/13/19 (!) 104/47    (goal /80)      All Future Testing planned in [facility]:  Lab Frequency Next Occurrence   Basic Metabolic Panel     Albumin     Phosphorus     PTH, Intact     Uric Acid     Vitamin D 25 Hydroxy         Next Visit Date:  Future Appointments   Date Time Provider Brayden Evangelistai   4/2/2020  3:10 PM Shea Dailey MD AFL Neph Ivan None 4/23/2020 11:00 AM [email or web address]            Patient Active Problem List:     Gout     Hypovitaminosis D     Anemia in stage 4 chronic kidney disease (HCC)     Type 2 diabetes mellitus with diabetic chronic kidney disease (HCC)     Chronic kidney disease (CKD)     Essential hypertension     Hypercholesteremia     Hypokalemia     Acute infarct posterior limb internal capsule on the left     Left sided lacunar infarction ([address]     Type 2 diabetes mellitus with stage 4 chronic kidney disease, with long-term current use of insulin (HCC)     Cerebral aneurysm     Anemia due to stage 4 chronic kidney disease treated with erythropoietin (HCC)     Tobacco abuse     Secondary hyperparathyroidism of renal origin ([address]     Persistent proteinuria     Metabolic acidosis

## 2020-02-20 ENCOUNTER — APPOINTMENT (OUTPATIENT)
Dept: ULTRASOUND IMAGING | Age: 70
DRG: 233 | End: 2020-02-20
Payer: MEDICARE

## 2020-02-20 PROBLEM — N17.9 AKI (ACUTE KIDNEY INJURY) (HCC): Status: ACTIVE | Noted: 2020-02-20

## 2020-02-20 LAB
-: ABNORMAL
AMORPHOUS: ABNORMAL
ANION GAP SERPL CALCULATED.3IONS-SCNC: 22 MMOL/L (ref 9–17)
BACTERIA: ABNORMAL
BILIRUBIN URINE: NEGATIVE
BNP INTERPRETATION: ABNORMAL
BUN BLDV-MCNC: 24 MG/DL (ref 8–23)
BUN/CREAT BLD: ABNORMAL (ref 9–20)
CALCIUM SERPL-MCNC: 9.5 MG/DL (ref 8.6–10.4)
CASTS UA: ABNORMAL /LPF (ref 0–8)
CHLORIDE BLD-SCNC: 104 MMOL/L (ref 98–107)
CO2: 16 MMOL/L (ref 20–31)
COLOR: YELLOW
CREAT SERPL-MCNC: 3.97 MG/DL (ref 0.5–0.9)
CRYSTALS, UA: ABNORMAL /HPF
EKG ATRIAL RATE: 118 BPM
EKG ATRIAL RATE: 82 BPM
EKG ATRIAL RATE: 85 BPM
EKG P AXIS: 46 DEGREES
EKG P AXIS: 50 DEGREES
EKG P AXIS: 54 DEGREES
EKG P-R INTERVAL: 122 MS
EKG P-R INTERVAL: 134 MS
EKG P-R INTERVAL: 136 MS
EKG Q-T INTERVAL: 352 MS
EKG Q-T INTERVAL: 424 MS
EKG Q-T INTERVAL: 436 MS
EKG QRS DURATION: 102 MS
EKG QRS DURATION: 92 MS
EKG QRS DURATION: 98 MS
EKG QTC CALCULATION (BAZETT): 493 MS
EKG QTC CALCULATION (BAZETT): 504 MS
EKG QTC CALCULATION (BAZETT): 509 MS
EKG R AXIS: 68 DEGREES
EKG R AXIS: 81 DEGREES
EKG R AXIS: 81 DEGREES
EKG T AXIS: -33 DEGREES
EKG T AXIS: 115 DEGREES
EKG T AXIS: 125 DEGREES
EKG VENTRICULAR RATE: 118 BPM
EKG VENTRICULAR RATE: 82 BPM
EKG VENTRICULAR RATE: 85 BPM
EPITHELIAL CELLS UA: ABNORMAL /HPF (ref 0–5)
GFR AFRICAN AMERICAN: 14 ML/MIN
GFR NON-AFRICAN AMERICAN: 11 ML/MIN
GFR SERPL CREATININE-BSD FRML MDRD: ABNORMAL ML/MIN/{1.73_M2}
GFR SERPL CREATININE-BSD FRML MDRD: ABNORMAL ML/MIN/{1.73_M2}
GLUCOSE BLD-MCNC: 254 MG/DL (ref 65–105)
GLUCOSE BLD-MCNC: 267 MG/DL (ref 70–99)
GLUCOSE BLD-MCNC: 287 MG/DL (ref 65–105)
GLUCOSE BLD-MCNC: 301 MG/DL (ref 65–105)
GLUCOSE URINE: ABNORMAL
INR BLD: 1
KETONES, URINE: ABNORMAL
LEUKOCYTE ESTERASE, URINE: NEGATIVE
MAGNESIUM: 1.5 MG/DL (ref 1.6–2.6)
MUCUS: ABNORMAL
NITRITE, URINE: NEGATIVE
OTHER OBSERVATIONS UA: ABNORMAL
PARTIAL THROMBOPLASTIN TIME: 24.8 SEC (ref 20.5–30.5)
PARTIAL THROMBOPLASTIN TIME: 36.5 SEC (ref 20.5–30.5)
PARTIAL THROMBOPLASTIN TIME: 36.8 SEC (ref 20.5–30.5)
PARTIAL THROMBOPLASTIN TIME: 38.2 SEC (ref 20.5–30.5)
PH UA: 6 (ref 5–8)
POTASSIUM SERPL-SCNC: 3.8 MMOL/L (ref 3.7–5.3)
PRO-BNP: 7287 PG/ML
PROTEIN UA: ABNORMAL
PROTHROMBIN TIME: 10.3 SEC (ref 9–12)
RBC UA: ABNORMAL /HPF (ref 0–4)
RENAL EPITHELIAL, UA: ABNORMAL /HPF
SODIUM BLD-SCNC: 142 MMOL/L (ref 135–144)
SODIUM,UR: 51 MMOL/L
SPECIFIC GRAVITY UA: 1.02 (ref 1–1.03)
TOTAL PROTEIN, URINE: 551 MG/DL
TRICHOMONAS: ABNORMAL
TROPONIN INTERP: ABNORMAL
TROPONIN T: ABNORMAL NG/ML
TROPONIN, HIGH SENSITIVITY: 160 NG/L (ref 0–14)
TROPONIN, HIGH SENSITIVITY: 409 NG/L (ref 0–14)
TROPONIN, HIGH SENSITIVITY: 476 NG/L (ref 0–14)
TROPONIN, HIGH SENSITIVITY: 500 NG/L (ref 0–14)
TROPONIN, HIGH SENSITIVITY: 56 NG/L (ref 0–14)
TURBIDITY: CLEAR
URINE HGB: ABNORMAL
UROBILINOGEN, URINE: NORMAL
WBC UA: ABNORMAL /HPF (ref 0–5)
YEAST: ABNORMAL

## 2020-02-20 PROCEDURE — 84156 ASSAY OF PROTEIN URINE: CPT

## 2020-02-20 PROCEDURE — 93010 ELECTROCARDIOGRAM REPORT: CPT | Performed by: INTERNAL MEDICINE

## 2020-02-20 PROCEDURE — 97161 PT EVAL LOW COMPLEX 20 MIN: CPT

## 2020-02-20 PROCEDURE — 94761 N-INVAS EAR/PLS OXIMETRY MLT: CPT

## 2020-02-20 PROCEDURE — 2700000000 HC OXYGEN THERAPY PER DAY

## 2020-02-20 PROCEDURE — 85730 THROMBOPLASTIN TIME PARTIAL: CPT

## 2020-02-20 PROCEDURE — 94640 AIRWAY INHALATION TREATMENT: CPT

## 2020-02-20 PROCEDURE — 2580000003 HC RX 258: Performed by: NURSE PRACTITIONER

## 2020-02-20 PROCEDURE — 6360000002 HC RX W HCPCS: Performed by: NURSE PRACTITIONER

## 2020-02-20 PROCEDURE — 6360000002 HC RX W HCPCS: Performed by: GENERAL PRACTICE

## 2020-02-20 PROCEDURE — 84484 ASSAY OF TROPONIN QUANT: CPT

## 2020-02-20 PROCEDURE — 2060000000 HC ICU INTERMEDIATE R&B

## 2020-02-20 PROCEDURE — 2500000003 HC RX 250 WO HCPCS: Performed by: INTERNAL MEDICINE

## 2020-02-20 PROCEDURE — 6370000000 HC RX 637 (ALT 250 FOR IP): Performed by: NURSE PRACTITIONER

## 2020-02-20 PROCEDURE — 6370000000 HC RX 637 (ALT 250 FOR IP): Performed by: INTERNAL MEDICINE

## 2020-02-20 PROCEDURE — 94660 CPAP INITIATION&MGMT: CPT

## 2020-02-20 PROCEDURE — 81001 URINALYSIS AUTO W/SCOPE: CPT

## 2020-02-20 PROCEDURE — 93005 ELECTROCARDIOGRAM TRACING: CPT | Performed by: GENERAL PRACTICE

## 2020-02-20 PROCEDURE — 84300 ASSAY OF URINE SODIUM: CPT

## 2020-02-20 PROCEDURE — 99222 1ST HOSP IP/OBS MODERATE 55: CPT | Performed by: INTERNAL MEDICINE

## 2020-02-20 PROCEDURE — 2500000003 HC RX 250 WO HCPCS: Performed by: NURSE PRACTITIONER

## 2020-02-20 PROCEDURE — 85610 PROTHROMBIN TIME: CPT

## 2020-02-20 PROCEDURE — 97530 THERAPEUTIC ACTIVITIES: CPT

## 2020-02-20 PROCEDURE — 36415 COLL VENOUS BLD VENIPUNCTURE: CPT

## 2020-02-20 PROCEDURE — 76770 US EXAM ABDO BACK WALL COMP: CPT

## 2020-02-20 PROCEDURE — 82947 ASSAY GLUCOSE BLOOD QUANT: CPT

## 2020-02-20 PROCEDURE — 6370000000 HC RX 637 (ALT 250 FOR IP): Performed by: GENERAL PRACTICE

## 2020-02-20 RX ORDER — HEPARIN SODIUM 5000 [USP'U]/ML
5000 INJECTION, SOLUTION INTRAVENOUS; SUBCUTANEOUS EVERY 8 HOURS SCHEDULED
Status: DISCONTINUED | OUTPATIENT
Start: 2020-02-20 | End: 2020-02-20

## 2020-02-20 RX ORDER — ASPIRIN 81 MG/1
81 TABLET, CHEWABLE ORAL DAILY
Status: DISCONTINUED | OUTPATIENT
Start: 2020-02-20 | End: 2020-03-02

## 2020-02-20 RX ORDER — SODIUM BICARBONATE 650 MG/1
1300 TABLET ORAL 2 TIMES DAILY
Status: DISCONTINUED | OUTPATIENT
Start: 2020-02-20 | End: 2020-02-25

## 2020-02-20 RX ORDER — HEPARIN SODIUM 1000 [USP'U]/ML
4000 INJECTION, SOLUTION INTRAVENOUS; SUBCUTANEOUS PRN
Status: DISCONTINUED | OUTPATIENT
Start: 2020-02-20 | End: 2020-03-02

## 2020-02-20 RX ORDER — FAMOTIDINE 20 MG/1
20 TABLET, FILM COATED ORAL DAILY
Status: DISCONTINUED | OUTPATIENT
Start: 2020-02-21 | End: 2020-03-02

## 2020-02-20 RX ORDER — METHYLPREDNISOLONE SODIUM SUCCINATE 125 MG/2ML
40 INJECTION, POWDER, LYOPHILIZED, FOR SOLUTION INTRAMUSCULAR; INTRAVENOUS EVERY 6 HOURS
Status: DISPENSED | OUTPATIENT
Start: 2020-02-20 | End: 2020-02-22

## 2020-02-20 RX ORDER — FOLIC ACID 1 MG/1
1 TABLET ORAL 2 TIMES DAILY
Status: DISCONTINUED | OUTPATIENT
Start: 2020-02-20 | End: 2020-03-02

## 2020-02-20 RX ORDER — FUROSEMIDE 10 MG/ML
10 INJECTION INTRAMUSCULAR; INTRAVENOUS ONCE
Status: DISCONTINUED | OUTPATIENT
Start: 2020-02-20 | End: 2020-02-20

## 2020-02-20 RX ORDER — LABETALOL 100 MG/1
300 TABLET, FILM COATED ORAL 2 TIMES DAILY
Status: DISCONTINUED | OUTPATIENT
Start: 2020-02-20 | End: 2020-02-22

## 2020-02-20 RX ORDER — ATORVASTATIN CALCIUM 40 MG/1
40 TABLET, FILM COATED ORAL NIGHTLY
Status: DISCONTINUED | OUTPATIENT
Start: 2020-02-20 | End: 2020-03-02

## 2020-02-20 RX ORDER — ONDANSETRON 2 MG/ML
4 INJECTION INTRAMUSCULAR; INTRAVENOUS EVERY 6 HOURS PRN
Status: DISCONTINUED | OUTPATIENT
Start: 2020-02-20 | End: 2020-03-02

## 2020-02-20 RX ORDER — HEPARIN SODIUM 1000 [USP'U]/ML
4000 INJECTION, SOLUTION INTRAVENOUS; SUBCUTANEOUS ONCE
Status: COMPLETED | OUTPATIENT
Start: 2020-02-20 | End: 2020-02-20

## 2020-02-20 RX ORDER — POLYETHYLENE GLYCOL 3350 17 G/17G
17 POWDER, FOR SOLUTION ORAL DAILY PRN
Status: DISCONTINUED | OUTPATIENT
Start: 2020-02-20 | End: 2020-03-02

## 2020-02-20 RX ORDER — NICOTINE POLACRILEX 4 MG
15 LOZENGE BUCCAL PRN
Status: DISCONTINUED | OUTPATIENT
Start: 2020-02-20 | End: 2020-03-02

## 2020-02-20 RX ORDER — ALLOPURINOL 300 MG/1
300 TABLET ORAL DAILY
Status: DISCONTINUED | OUTPATIENT
Start: 2020-02-20 | End: 2020-03-02

## 2020-02-20 RX ORDER — SODIUM CHLORIDE 0.9 % (FLUSH) 0.9 %
10 SYRINGE (ML) INJECTION EVERY 12 HOURS SCHEDULED
Status: DISCONTINUED | OUTPATIENT
Start: 2020-02-20 | End: 2020-03-02

## 2020-02-20 RX ORDER — CLOPIDOGREL BISULFATE 75 MG/1
75 TABLET ORAL DAILY
Status: DISCONTINUED | OUTPATIENT
Start: 2020-02-20 | End: 2020-02-24

## 2020-02-20 RX ORDER — SODIUM POLYSTYRENE SULFONATE 15 G/60ML
15 SUSPENSION ORAL; RECTAL
Status: ACTIVE | OUTPATIENT
Start: 2020-02-20 | End: 2020-02-20

## 2020-02-20 RX ORDER — FAMOTIDINE 20 MG/1
20 TABLET, FILM COATED ORAL 2 TIMES DAILY
Status: DISCONTINUED | OUTPATIENT
Start: 2020-02-20 | End: 2020-02-20

## 2020-02-20 RX ORDER — HYDRALAZINE HYDROCHLORIDE 25 MG/1
25 TABLET, FILM COATED ORAL EVERY 8 HOURS SCHEDULED
Status: DISCONTINUED | OUTPATIENT
Start: 2020-02-20 | End: 2020-02-22

## 2020-02-20 RX ORDER — HEPARIN SODIUM 10000 [USP'U]/100ML
12 INJECTION, SOLUTION INTRAVENOUS CONTINUOUS
Status: DISCONTINUED | OUTPATIENT
Start: 2020-02-20 | End: 2020-03-04

## 2020-02-20 RX ORDER — PROMETHAZINE HYDROCHLORIDE 25 MG/1
12.5 TABLET ORAL EVERY 6 HOURS PRN
Status: DISCONTINUED | OUTPATIENT
Start: 2020-02-20 | End: 2020-03-02

## 2020-02-20 RX ORDER — SODIUM POLYSTYRENE SULFONATE 15 G/60ML
30 SUSPENSION ORAL; RECTAL
Status: ACTIVE | OUTPATIENT
Start: 2020-02-20 | End: 2020-02-20

## 2020-02-20 RX ORDER — ALBUTEROL SULFATE 2.5 MG/3ML
2.5 SOLUTION RESPIRATORY (INHALATION)
Status: DISCONTINUED | OUTPATIENT
Start: 2020-02-20 | End: 2020-02-22

## 2020-02-20 RX ORDER — OMEGA-3S/DHA/EPA/FISH OIL/D3 300MG-1000
400 CAPSULE ORAL 2 TIMES DAILY
Status: DISCONTINUED | OUTPATIENT
Start: 2020-02-20 | End: 2020-03-07

## 2020-02-20 RX ORDER — LORAZEPAM 2 MG/ML
1 INJECTION INTRAMUSCULAR ONCE
Status: COMPLETED | OUTPATIENT
Start: 2020-02-20 | End: 2020-02-20

## 2020-02-20 RX ORDER — SODIUM BICARBONATE 650 MG/1
650 TABLET ORAL 2 TIMES DAILY
Status: DISCONTINUED | OUTPATIENT
Start: 2020-02-20 | End: 2020-02-20

## 2020-02-20 RX ORDER — INSULIN GLARGINE 100 [IU]/ML
25 INJECTION, SOLUTION SUBCUTANEOUS NIGHTLY
Status: DISCONTINUED | OUTPATIENT
Start: 2020-02-20 | End: 2020-03-12 | Stop reason: HOSPADM

## 2020-02-20 RX ORDER — BUMETANIDE 0.25 MG/ML
2 INJECTION, SOLUTION INTRAMUSCULAR; INTRAVENOUS DAILY
Status: DISCONTINUED | OUTPATIENT
Start: 2020-02-20 | End: 2020-03-10

## 2020-02-20 RX ORDER — ACETAMINOPHEN 650 MG/1
650 SUPPOSITORY RECTAL EVERY 6 HOURS PRN
Status: DISCONTINUED | OUTPATIENT
Start: 2020-02-20 | End: 2020-03-02

## 2020-02-20 RX ORDER — CLOPIDOGREL BISULFATE 75 MG/1
75 TABLET ORAL DAILY
Qty: 90 TABLET | Refills: 0 | Status: SHIPPED | OUTPATIENT
Start: 2020-02-20

## 2020-02-20 RX ORDER — HEPARIN SODIUM 1000 [USP'U]/ML
2000 INJECTION, SOLUTION INTRAVENOUS; SUBCUTANEOUS PRN
Status: DISCONTINUED | OUTPATIENT
Start: 2020-02-20 | End: 2020-03-02

## 2020-02-20 RX ORDER — SODIUM CHLORIDE 0.9 % (FLUSH) 0.9 %
10 SYRINGE (ML) INJECTION PRN
Status: DISCONTINUED | OUTPATIENT
Start: 2020-02-20 | End: 2020-03-02

## 2020-02-20 RX ORDER — ACETAMINOPHEN 325 MG/1
650 TABLET ORAL EVERY 6 HOURS PRN
Status: DISCONTINUED | OUTPATIENT
Start: 2020-02-20 | End: 2020-03-02

## 2020-02-20 RX ORDER — DEXTROSE MONOHYDRATE 25 G/50ML
12.5 INJECTION, SOLUTION INTRAVENOUS PRN
Status: DISCONTINUED | OUTPATIENT
Start: 2020-02-20 | End: 2020-03-02

## 2020-02-20 RX ORDER — NICOTINE 21 MG/24HR
1 PATCH, TRANSDERMAL 24 HOURS TRANSDERMAL DAILY PRN
Status: DISCONTINUED | OUTPATIENT
Start: 2020-02-20 | End: 2020-03-12 | Stop reason: HOSPADM

## 2020-02-20 RX ORDER — ASPIRIN 81 MG/1
324 TABLET, CHEWABLE ORAL ONCE
Status: COMPLETED | OUTPATIENT
Start: 2020-02-20 | End: 2020-02-20

## 2020-02-20 RX ORDER — DEXTROSE MONOHYDRATE 50 MG/ML
100 INJECTION, SOLUTION INTRAVENOUS PRN
Status: DISCONTINUED | OUTPATIENT
Start: 2020-02-20 | End: 2020-03-02

## 2020-02-20 RX ORDER — IPRATROPIUM BROMIDE AND ALBUTEROL SULFATE 2.5; .5 MG/3ML; MG/3ML
1 SOLUTION RESPIRATORY (INHALATION)
Status: DISCONTINUED | OUTPATIENT
Start: 2020-02-20 | End: 2020-02-22

## 2020-02-20 RX ORDER — PREDNISONE 20 MG/1
40 TABLET ORAL DAILY
Status: DISCONTINUED | OUTPATIENT
Start: 2020-02-22 | End: 2020-02-22

## 2020-02-20 RX ORDER — METOPROLOL TARTRATE 5 MG/5ML
5 INJECTION INTRAVENOUS EVERY 4 HOURS PRN
Status: DISCONTINUED | OUTPATIENT
Start: 2020-02-20 | End: 2020-02-22

## 2020-02-20 RX ADMIN — ASPIRIN 81 MG 324 MG: 81 TABLET ORAL at 03:45

## 2020-02-20 RX ADMIN — SODIUM CHLORIDE, PRESERVATIVE FREE 10 ML: 5 INJECTION INTRAVENOUS at 10:53

## 2020-02-20 RX ADMIN — HEPARIN SODIUM 2000 UNITS: 1000 INJECTION INTRAVENOUS; SUBCUTANEOUS at 23:52

## 2020-02-20 RX ADMIN — INSULIN LISPRO 4 UNITS: 100 INJECTION, SOLUTION INTRAVENOUS; SUBCUTANEOUS at 18:01

## 2020-02-20 RX ADMIN — LABETALOL HCL 300 MG: 100 TABLET, FILM COATED ORAL at 21:25

## 2020-02-20 RX ADMIN — LABETALOL HCL 300 MG: 100 TABLET, FILM COATED ORAL at 10:53

## 2020-02-20 RX ADMIN — HEPARIN SODIUM 4000 UNITS: 1000 INJECTION INTRAVENOUS; SUBCUTANEOUS at 04:15

## 2020-02-20 RX ADMIN — FOLIC ACID 1 MG: 1 TABLET ORAL at 10:53

## 2020-02-20 RX ADMIN — ALLOPURINOL 300 MG: 300 TABLET ORAL at 11:38

## 2020-02-20 RX ADMIN — SODIUM BICARBONATE 1300 MG: 650 TABLET ORAL at 20:01

## 2020-02-20 RX ADMIN — HYDRALAZINE HYDROCHLORIDE 25 MG: 25 TABLET, FILM COATED ORAL at 16:42

## 2020-02-20 RX ADMIN — LABETALOL HCL 300 MG: 100 TABLET, FILM COATED ORAL at 05:40

## 2020-02-20 RX ADMIN — METOPROLOL TARTRATE 5 MG: 5 INJECTION, SOLUTION INTRAVENOUS at 22:20

## 2020-02-20 RX ADMIN — CLOPIDOGREL 75 MG: 75 TABLET, FILM COATED ORAL at 10:53

## 2020-02-20 RX ADMIN — METHYLPREDNISOLONE SODIUM SUCCINATE 40 MG: 125 INJECTION, POWDER, FOR SOLUTION INTRAMUSCULAR; INTRAVENOUS at 07:20

## 2020-02-20 RX ADMIN — HEPARIN SODIUM 12 UNITS/KG/HR: 10000 INJECTION, SOLUTION INTRAVENOUS at 04:14

## 2020-02-20 RX ADMIN — CHOLECALCIFEROL TAB 10 MCG (400 UNIT) 400 UNITS: 10 TAB at 21:25

## 2020-02-20 RX ADMIN — IPRATROPIUM BROMIDE AND ALBUTEROL SULFATE 1 AMPULE: .5; 3 SOLUTION RESPIRATORY (INHALATION) at 08:37

## 2020-02-20 RX ADMIN — INSULIN LISPRO 3 UNITS: 100 INJECTION, SOLUTION INTRAVENOUS; SUBCUTANEOUS at 12:56

## 2020-02-20 RX ADMIN — HYDRALAZINE HYDROCHLORIDE 25 MG: 25 TABLET, FILM COATED ORAL at 20:02

## 2020-02-20 RX ADMIN — BUMETANIDE 2 MG: 0.25 INJECTION INTRAMUSCULAR; INTRAVENOUS at 11:37

## 2020-02-20 RX ADMIN — LORAZEPAM 1 MG: 2 INJECTION INTRAMUSCULAR at 23:15

## 2020-02-20 RX ADMIN — HEPARIN SODIUM 2000 UNITS: 1000 INJECTION INTRAVENOUS; SUBCUTANEOUS at 10:50

## 2020-02-20 RX ADMIN — CHOLECALCIFEROL TAB 10 MCG (400 UNIT) 400 UNITS: 10 TAB at 10:52

## 2020-02-20 RX ADMIN — INSULIN GLARGINE 25 UNITS: 100 INJECTION, SOLUTION SUBCUTANEOUS at 22:11

## 2020-02-20 RX ADMIN — FOLIC ACID 1 MG: 1 TABLET ORAL at 20:02

## 2020-02-20 RX ADMIN — SODIUM CHLORIDE, PRESERVATIVE FREE 10 ML: 5 INJECTION INTRAVENOUS at 21:25

## 2020-02-20 RX ADMIN — IPRATROPIUM BROMIDE AND ALBUTEROL SULFATE 1 AMPULE: .5; 3 SOLUTION RESPIRATORY (INHALATION) at 15:47

## 2020-02-20 RX ADMIN — INSULIN LISPRO 2 UNITS: 100 INJECTION, SOLUTION INTRAVENOUS; SUBCUTANEOUS at 22:11

## 2020-02-20 RX ADMIN — IPRATROPIUM BROMIDE AND ALBUTEROL SULFATE 1 AMPULE: .5; 3 SOLUTION RESPIRATORY (INHALATION) at 11:57

## 2020-02-20 RX ADMIN — METHYLPREDNISOLONE SODIUM SUCCINATE 40 MG: 125 INJECTION, POWDER, FOR SOLUTION INTRAMUSCULAR; INTRAVENOUS at 20:01

## 2020-02-20 RX ADMIN — ASPIRIN 81 MG: 81 TABLET, CHEWABLE ORAL at 16:42

## 2020-02-20 ASSESSMENT — PAIN SCALES - GENERAL: PAINLEVEL_OUTOF10: 0

## 2020-02-20 ASSESSMENT — ENCOUNTER SYMPTOMS
NAUSEA: 0
ABDOMINAL PAIN: 0
EYES NEGATIVE: 1
DIARRHEA: 0
VOMITING: 0
SHORTNESS OF BREATH: 1
WHEEZING: 1

## 2020-02-20 NOTE — ED NOTES
Bed: 20  Expected date: 2/19/20  Expected time: 10:54 PM  Means of arrival: [facility] [employer]  Comments:  LS1  Diff breather      Sedonia GARFIELD Philip  02/19/20 [id number]

## 2020-02-20 NOTE — CARE COORDINATION
Case Management Initial Discharge Plan  Sivakumar Clancy,             Met with:patient to discuss discharge plans. Information verified: address, contacts, phone number, , insurance Yes  PCP: Becky Bullard PA-C  Date of last visit: last month    Insurance Provider: [employer]    Discharge Planning    Living Arrangements:  [place] has 2 stories  6 stairs to climb to get into front door, 14 stairs to climb to reach second floor  Location of bedroom/bathroom in home second    Patient able to perform ADL's:Independent    Current Services (outpatient & in home) none  DME equipment: none  DME provider: none      Potential Assistance Needed:  N/A    Patient agreeable to home care: No  [place] of choice provided:  no    Prior SNF/Rehab Placement and Facility: no  Agreeable to SNF/Rehab: No  [place] of choice provided: no   Evaluation: no    Expected Discharge date:     Patient expects to be discharged to:  home  Follow Up Appointment: Best Day/ Time:      Transportation provider: family  Transportation arrangements needed for discharge: No    Readmission Risk              Risk of Unplanned Readmission:        23             Does patient have a readmission risk score greater than 14?: Yes  If yes, follow-up appointment must be made within 7 days of discharge.      Goals of Care:       Discharge Plan: home with family support          Electronically signed by Marcio Swan RN on 20 at 10:42 AM

## 2020-02-20 NOTE — ED PROVIDER NOTES
[facility]  ED  Emergency Department Encounter  EmergencyMedicine Resident     Pt Kody Copeland  MRN: [id number]  [place] 1950  Date of evaluation: 2/19/20  PCP:  Estrada Phillips PA-C    CHIEF COMPLAINT       Chief Complaint   Patient presents with    Shortness of Breath       HISTORY OF PRESENT ILLNESS  (Location/Symptom, Timing/Onset, Context/Setting, Quality, Duration, Modifying Factors, Severity.)      Kvng Hill is a 78 y.o. female who presents with worsening shortness of breath that started this afternoon. EMS was called and patient was found to be anxious, short of breath saturating at 85% on room air, placed on BiPAP with oxygen saturations increasing to 95% with improvement in work of breathing. Patient states that she has been short of breath on exertion for the last several months. No increased cough or mucus production. She is a every day smoker, smokes 1 pack/day. Stage IV chronic kidney disease not on hemodialysis but \"in discussions with her physician\" regarding the possibility of starting. Still making urine. Patient denies chest pain now or in the past.  No fevers or chills. Patient is not currently taking any anticoagulation. PAST MEDICAL / SURGICAL / SOCIAL / FAMILY HISTORY      has a past medical history of Anemia in chronic kidney disease, Anemia in stage 4 chronic kidney disease ([address] Cerebral artery occlusion with cerebral infarction ([facility] [address] [address] Chronic kidney disease, Gout, arthritis, Hyperlipidemia, Hypertension, Left sided lacunar infarction [facility]), Peripheral vascular disease ([facility] [address] and Type II or unspecified type diabetes mellitus without mention of complication, not stated as uncontrolled. has a past surgical history that includes Tonsillectomy; other surgical history (01/27/2017); and Colonoscopy.       Social History     Socioeconomic History    Marital status: Legally      Spouse name: Not on file    Number of children: Not on 31.1 25.2 - 33.5 pg    MCHC 33.2 28.4 - 34.8 g/dL    RDW 13.7 11.8 - 14.4 %    Platelets 617 371 - 062 k/uL    MPV 11.4 8.1 - 13.5 fL    NRBC Automated 0.0 0.0 per 100 WBC    Differential Type NOT REPORTED     Seg Neutrophils 77 (H) 36 - 65 %    Lymphocytes 12 (L) 24 - 43 %    Monocytes 4 3 - 12 %    Eosinophils % 6 (H) 1 - 4 %    Basophils 0 0 - 2 %    Immature Granulocytes 1 (H) 0 %    Segs Absolute 11.88 (H) 1.50 - 8.10 k/uL    Absolute Lymph # 1.78 1.10 - 3.70 k/uL    Absolute Mono # 0.55 0.10 - 1.20 k/uL    Absolute Eos # 0.98 (H) 0.00 - 0.44 k/uL    Basophils Absolute 0.06 0.00 - 0.20 k/uL    Absolute Immature Granulocyte 0.09 0.00 - 0.30 k/uL    WBC Morphology NOT REPORTED     RBC Morphology NOT REPORTED     Platelet Estimate NOT REPORTED    Troponin   Result Value Ref Range    Troponin, High Sensitivity 56 (HH) 0 - 14 ng/L    Troponin T NOT REPORTED <0.03 ng/mL    Troponin Interp NOT REPORTED    MAGNESIUM   Result Value Ref Range    Magnesium 1.5 (L) 1.6 - 2.6 mg/dL   Troponin   Result Value Ref Range    Troponin, High Sensitivity 160 (HH) 0 - 14 ng/L    Troponin T NOT REPORTED <0.03 ng/mL    Troponin Interp NOT REPORTED    Troponin   Result Value Ref Range    Troponin, High Sensitivity 409 (HH) 0 - 14 ng/L    Troponin T NOT REPORTED <0.03 ng/mL    Troponin Interp NOT REPORTED    Protime-INR   Result Value Ref Range    Protime 10.3 9.0 - 12.0 sec    INR 1.0    APTT   Result Value Ref Range    PTT 24.8 20.5 - 30.5 sec   Venous Blood Gas, POC   Result Value Ref Range    pH, Sushil 7.370 7.320 - 7.430    pCO2, Sushil 37.4 (L) 41.0 - 51.0 mm Hg    pO2, Sushil 150.5 (H) 30.0 - 50.0 mm Hg    HCO3, Venous 21.6 (L) 22.0 - 29.0 mmol/L    Total CO2, Venous 23 23.0 - 30.0 mmol/L    Negative Base Excess, Sushil 3 (H) 0.0 - 2.0    Positive Base Excess, Sushil NOT REPORTED 0.0 - 3.0    O2 Sat, Sushil 99 (H) 60.0 - 85.0 %    O2 Device/Flow/% NOT REPORTED     Parish Test NOT REPORTED     Sample Site NOT REPORTED     Mode NOT REPORTED     FIO2 NOT REPORTED     Pt Temp NOT REPORTED     POC pH Temp NOT REPORTED     POC pCO2 Temp NOT REPORTED mm Hg    POC pO2 Temp NOT REPORTED mm Hg   Creatinine W/GFR Point of Care   Result Value Ref Range    POC Creatinine 4.14 (H) 0.51 - 1.19 mg/dL    GFR Comment 13 (L) >60 mL/min    GFR Non- 11 (L) >60 mL/min    GFR Comment         Lactic Acid, POC   Result Value Ref Range    POC Lactic Acid 1.15 0.56 - 1.39 mmol/L   POCT Glucose   Result Value Ref Range    POC Glucose 270 (H) 74 - 100 mg/dL   Anion Gap (Calc) POC   Result Value Ref Range    Anion Gap 12 7 - 16 mmol/L   EKG 12 Lead   Result Value Ref Range    Ventricular Rate 82 BPM    Atrial Rate 82 BPM    P-R Interval 134 ms    QRS Duration 92 ms    Q-T Interval 436 ms    QTc Calculation (Bazett) 509 ms    P Axis 50 degrees    R Axis 81 degrees    T Axis 125 degrees   EKG 12 Lead   Result Value Ref Range    Ventricular Rate 85 BPM    Atrial Rate 85 BPM    P-R Interval 136 ms    QRS Duration 98 ms    Q-T Interval 424 ms    QTc Calculation (Bazett) 504 ms    P Axis 54 degrees    R Axis 68 degrees    T Axis 115 degrees       RADIOLOGY:  Xr Chest Portable    Result Date: 2/19/2020  EXAMINATION: ONE XRAY VIEW OF THE CHEST 2/19/2020 11:23 pm COMPARISON: December 19 2016 HISTORY: ORDERING SYSTEM PROVIDED HISTORY: sob TECHNOLOGIST PROVIDED HISTORY: sob Reason for Exam: short of breath Acuity: Unknown Type of Exam: Unknown FINDINGS: Moderate edema. Heart and mediastinum normal.  Bony thorax intact. Moderate edema     EKG  Sinus tachycardia, heart rate 118, normal axis, qtc 493, st depression inferior leads,   st change v1 v2, compared with ekg from dec 2016    All EKG's are interpreted by the Emergency Department Physician who either signs or Co-signs this chart in the absence of a cardiologist.    EMERGENCY DEPARTMENT COURSE:  78 y.o. female who presents with acute shortness of breath without chest pain.   It is an end-stage renal disease patient not currently on hemodialysis. Over the course of her stay, patient had a rise in troponin, with no chest pain. Shortness of breath improved significantly on BiPAP. Serial EKGs were performed, and patient developed new T wave inversions in V4 V5. ST segment elevation seen in V1 and V2 with reciprocal ST depression in the inferior leads seen in the initial EKG improved over the course of her stay. Cardiology was updated numerous times throughout patient's ER stay. Patient is currently boarded in the ED, admitted under [facility]. Patient received IV magnesium, Solu-Medrol, aspirin, duo nebs, and heparin drip. ED Course as of Feb 20 0508   Thu Feb 20, 2020   0229 Discussed case with cardiology, sent both EKGs via perfect serve. Cardiology does not believe patient meets criteria for STEMI at this time, however he would like heparin started. Patient already received aspirin. She is already admitted, boarding here in the ED. Upon reevaluation, patient states she feels significant improvement although she is still BiPAP dependent. She currently has no shortness of breath. She denies any chest pain at all at any point in this admission. Her lungs are clear to auscultation bilaterally    [JEAN PIERRE]   0250 Patient continues to feel well. She is agreeable to admission, although reluctantly. She remains on BiPAP    [JEAN PIERRE]   0405 Cardiology alerted to delta. PTT/INR pending and required results before heparin drip started. Continues to deny pain   Troponin, High Sensitivity(!!): 409 [JEAN PIERRE]   0507 EKG obtained at 424: ST depression in inferior leads and ST elevation in precordial leads mildly improved. New T wave inversions in V5 and V6    [JEAN PIERRE]      ED Course User Index  [JEAN PIERRE] Anatoliy Hall,            PROCEDURES:  None    CONSULTS:  IP CONSULT TO CARDIOLOGY  IP CONSULT TO INTERNAL MEDICINE  IP CONSULT TO NEPHROLOGY    CRITICAL CARE:  None    FINAL IMPRESSION      1. COPD exacerbation ([address]    2.  Congestive heart

## 2020-02-20 NOTE — ED NOTES
Pt remains on BIPAP and full cardiac monitor, VSS, NAD noted, Dr. Piero Green updated on pt status.       Kaleigh Talbert RN  02/20/20 8284

## 2020-02-20 NOTE — ED NOTES
Dr. Maria G Du updated on pt elevate troponin level, awaiting cardiology consult for further orders.        Jameson Magana RN  02/20/20 1895

## 2020-02-20 NOTE — PROGRESS NOTES
Paged Cardiology to see if they have been made aware of pt status. Asked about cath today or not. Pt wants to eat. Bun 24 and Cr 3.97. Pt is CKD. Awaiting call back.

## 2020-02-20 NOTE — ED NOTES
Pt resting on cot, NAD noted, remains on BIPAP and full cardiac monitor.       Danie Paul RN  02/20/20 9955

## 2020-02-20 NOTE — PROGRESS NOTES
[facility]  Documentation Note                Admission Dx: HERNÁN (acute kidney injury) ([facility] [address] [N17.9]    Past Medical History:   has a past medical history of Anemia in chronic kidney disease, Anemia in stage 4 chronic kidney disease ([facility] [address] Cerebral artery occlusion with cerebral infarction ([facility] Chronic kidney disease, Gout, arthritis, Hyperlipidemia, Hypertension, Left sided lacunar infarction [facility]), Peripheral vascular disease ([facility] and Type II or unspecified type diabetes mellitus without mention of complication, not stated as uncontrolled. Previous Testing:     ECHO 12/20/16: EF 55%, mild AS/PHTN. Previous office/hospital visit:   None    Home Medications:      Prior to Admission medications    Medication Sig Start Date End Date Taking?  Authorizing Provider   sodium bicarbonate 650 MG tablet TAKE 1 TABLET BY MOUTH TWICE DAILY 2/17/20   Radha Witt MD   folic acid (FOLVITE) 1 MG tablet Take 1 tablet by mouth 2 times daily 11/21/19   Mikhail Blackmon PA-C   labetalol (NORMODYNE) 300 MG tablet TAKE 1 TABLET BY MOUTH TWICE DAILY 11/21/19   Mikhail Blackmon PA-C   allopurinol (ZYLOPRIM) 300 MG tablet Take 1 tablet by mouth daily 5/23/19   Yennifer Watson MD   clopidogrel (PLAVIX) 75 MG tablet Take 1 tablet by mouth daily 5/23/19   Yennifer Watson MD   Insulin Pen Needle 31G X 4 MM MISC 4 times daily 5/23/19   Yennifer Watson MD   insulin glargine (LANTUS SOLOSTAR) 100 UNIT/ML injection pen 25 units in skin every night 5/23/19   Yennifer Watson MD   magnesium hydroxide (MILK OF MAGNESIA) 400 MG/5ML suspension Take 30 mLs by mouth daily as needed for Constipation 12/20/16   Ana Noel MD   glucose monitoring kit (FREESTYLE) monitoring kit 1 12/20/16   Herman Bajwa MD   Alcohol Swabs (ALCOHOL PREP) 70 % PADS 4 time daily 12/20/16   Herman Bajwa MD   FREESTYLE LANCETS MISC 1 each by Does not apply route daily 12/20/16   Herman Bajwa MD   Glucose Blood (BLOOD GLUCOSE TEST STRIPS) STRP Test 4 times daily Diagnosis 12/20/16   Susan Holter, MD   Lancet Device MISC 1 Device by Does not apply route once for 1 dose 12/20/16 6/21/19  Susan Holter, MD   b complex vitamins capsule Take 1 capsule by mouth daily    Historical Provider, MD   vitamin D (ERGOCALCIFEROL) 400 UNITS CAPS Take 1 capsule by mouth daily.   Patient taking differently: Take 400 Units by mouth 2 times daily  12/11/13   Samuel Dorado MD      Current Facility-Administered Medications: furosemide (LASIX) injection 10 mg, 10 mg, Intravenous, Once  allopurinol (ZYLOPRIM) tablet 300 mg, 300 mg, Oral, Daily  clopidogrel (PLAVIX) tablet 75 mg, 75 mg, Oral, Daily  folic acid (FOLVITE) tablet 1 mg, 1 mg, Oral, BID  insulin glargine (LANTUS) injection vial 25 Units, 25 Units, Subcutaneous, Nightly  labetalol (NORMODYNE) tablet 300 mg, 300 mg, Oral, BID  sodium bicarbonate tablet 650 mg, 650 mg, Oral, BID  vitamin D3 (CHOLECALCIFEROL) tablet 400 Units, 400 Units, Oral, BID  sodium chloride flush 0.9 % injection 10 mL, 10 mL, Intravenous, 2 times per day  sodium chloride flush 0.9 % injection 10 mL, 10 mL, Intravenous, PRN  acetaminophen (TYLENOL) tablet 650 mg, 650 mg, Oral, Q6H PRN  acetaminophen (TYLENOL) suppository 650 mg, 650 mg, Rectal, Q6H PRN  promethazine (PHENERGAN) tablet 12.5 mg, 12.5 mg, Oral, Q6H PRN  ondansetron (ZOFRAN) injection 4 mg, 4 mg, Intravenous, Q6H PRN  nicotine (NICODERM CQ) 21 MG/24HR 1 patch, 1 patch, Transdermal, Daily PRN  glucose (GLUTOSE) 40 % oral gel 15 g, 15 g, Oral, PRN  dextrose 50 % IV solution, 12.5 g, Intravenous, PRN  glucagon (rDNA) injection 1 mg, 1 mg, Intramuscular, PRN  dextrose 5 % solution, 100 mL/hr, Intravenous, PRN  sodium polystyrene (KAYEXALATE) 15 GM/60ML suspension 15 g, 15 g, Oral, Once PRN  sodium polystyrene (KAYEXALATE) 15 GM/60ML suspension 30 g, 30 g, Oral, Once PRN  insulin lispro (HUMALOG) injection vial 0-6 Units, 0-6 Units, Subcutaneous, TID WC  insulin lispro (HUMALOG) injection vial 0-3 Units, 0-3 Units, Subcutaneous, Nightly  polyethylene glycol (GLYCOLAX) packet 17 g, 17 g, Oral, Daily PRN  famotidine (PEPCID) tablet 20 mg, 20 mg, Oral, BID  albuterol (PROVENTIL) nebulizer solution 2.5 mg, 2.5 mg, Nebulization, Q2H PRN  ipratropium-albuterol (DUONEB) nebulizer solution 1 ampule, 1 ampule, Inhalation, Q4H WA  methylPREDNISolone sodium (SOLU-MEDROL) injection 40 mg, 40 mg, Intravenous, Q6H **FOLLOWED BY** [START ON 2020] predniSONE (DELTASONE) tablet 40 mg, 40 mg, Oral, Daily  heparin (porcine) injection 4,000 Units, 4,000 Units, Intravenous, PRN  heparin (porcine) injection 2,000 Units, 2,000 Units, Intravenous, PRN  heparin 25,000 units in dextrose 5% 250 mL infusion, 12 Units/kg/hr, Intravenous, Continuous  albuterol (PROVENTIL) nebulizer solution 2.5 mg, 2.5 mg, Nebulization, Q20 Min PRN **AND** [] ipratropium (ATROVENT) 0.02 % nebulizer solution 0.25 mg, 0.25 mg, Nebulization, Once PRN  albuterol (PROVENTIL) nebulizer solution 15 mg, 15 mg, Nebulization, Q1H PRN **AND** [COMPLETED] ipratropium (ATROVENT) 0.02 % nebulizer solution 0.25 mg, 0.25 mg, Nebulization, Once PRN    Labs:     CBC:   Recent Labs     20  2331   WBC 15.3*   HGB 10.4*   HCT 31.3*        BMP:   Recent Labs     20  2311 20  2331   NA  --  142   K  --  3.8   CO2  --  16*   BUN  --  24*   CREATININE 4.14* 3.97*   LABGLOM 11* 11*   GLUCOSE  --  267*     PT/INR:   Recent Labs     20  0401   PROTIME 10.3   INR 1.0     APTT:  Recent Labs     20  0401   APTT 24.8     CARDIAC ENZYMES:  Recent Labs     20  0106 20  0310 20  0657   TROPHS 160* 409* 476*     FASTING LIPID PANEL:  Lab Results   Component Value Date    HDL 28 2019    LDLDIRECT 114 2016    TRIG 199 2018       Harrison Greer [facility]   Pager: [phone]

## 2020-02-20 NOTE — ED NOTES
Pt to ED via EMS a/o x3 with c/o SOB. Pt stated she started to have SOB tonight and her daughter called 911. Pt was given 3 nitro, 2mg versed and an albuterol treatment. Pt has IV in place and arrives on Bipap.    ED team at bedside, call light in reach pt placed on cardiac monitor   Will continue to monitor      Luis Velasco RN  02/19/20 0649

## 2020-02-20 NOTE — ED NOTES
Pt ambulated to  with steady gait, urine obtained and sent to lab.       Ramon Nelson, RN  02/20/20 6759

## 2020-02-20 NOTE — ED PROVIDER NOTES
FACULTY SIGN-OUT  ADDENDUM     Care of this patient was assumed from Dr Sergey Moser. The patient was seen for Shortness of Breath  . The patient's initial evaluation and plan have been discussed with the prior provider who initially evaluated the patient. Nursing Notes, Past Medical Hx, Past Surgical Hx, Social Hx, Allergies, and Family Hx were all reviewed. ED COURSE      The patient was given the following medications:  Orders Placed This Encounter   Medications    magnesium sulfate 1 g in dextrose 5% 100 mL IVPB    methylPREDNISolone sodium (SOLU-MEDROL) injection 125 mg    AND Linked Order Group     albuterol (PROVENTIL) nebulizer solution 2.5 mg     ipratropium (ATROVENT) 0.02 % nebulizer solution 0.25 mg    AND Linked Order Group     albuterol (PROVENTIL) nebulizer solution 15 mg     ipratropium (ATROVENT) 0.02 % nebulizer solution 0.25 mg    furosemide (LASIX) injection 10 mg    allopurinol (ZYLOPRIM) tablet 300 mg    clopidogrel (PLAVIX) tablet 75 mg    folic acid (FOLVITE) tablet 1 mg    insulin glargine (LANTUS) injection vial 25 Units    labetalol (NORMODYNE) tablet 300 mg    sodium bicarbonate tablet 650 mg    vitamin D3 (CHOLECALCIFEROL) tablet 400 Units    sodium chloride flush 0.9 % injection 10 mL    sodium chloride flush 0.9 % injection 10 mL    acetaminophen (TYLENOL) tablet 650 mg    acetaminophen (TYLENOL) suppository 650 mg    promethazine (PHENERGAN) tablet 12.5 mg    ondansetron (ZOFRAN) injection 4 mg    nicotine (NICODERM CQ) 21 MG/24HR 1 patch     As needed if patient is a smoker.     glucose (GLUTOSE) 40 % oral gel 15 g    dextrose 50 % IV solution    glucagon (rDNA) injection 1 mg    dextrose 5 % solution    sodium polystyrene (KAYEXALATE) 15 GM/60ML suspension 15 g    sodium polystyrene (KAYEXALATE) 15 GM/60ML suspension 30 g    insulin lispro (HUMALOG) injection vial 0-6 Units    insulin lispro (HUMALOG) injection vial 0-3 Units    polyethylene glycol

## 2020-02-20 NOTE — CONSULTS
Diabetes Mother     Heart Disease Father        Outpatient Medications:     Medications Prior to Admission: sodium bicarbonate 650 MG tablet, TAKE 1 TABLET BY MOUTH TWICE DAILY  folic acid (FOLVITE) 1 MG tablet, Take 1 tablet by mouth 2 times daily  labetalol (NORMODYNE) 300 MG tablet, TAKE 1 TABLET BY MOUTH TWICE DAILY  allopurinol (ZYLOPRIM) 300 MG tablet, Take 1 tablet by mouth daily  clopidogrel (PLAVIX) 75 MG tablet, Take 1 tablet by mouth daily  Insulin Pen Needle 31G X 4 MM MISC, 4 times daily  insulin glargine (LANTUS SOLOSTAR) 100 UNIT/ML injection pen, 25 units in skin every night  magnesium hydroxide (MILK OF MAGNESIA) 400 MG/5ML suspension, Take 30 mLs by mouth daily as needed for Constipation  glucose monitoring kit (FREESTYLE) monitoring kit, 1  Alcohol Swabs (ALCOHOL PREP) 70 % PADS, 4 time daily  FREESTYLE LANCETS MISC, 1 each by Does not apply route daily  Glucose Blood (BLOOD GLUCOSE TEST STRIPS) STRP, Test 4 times daily Diagnosis  Lancet Device MISC, 1 Device by Does not apply route once for 1 dose  b complex vitamins capsule, Take 1 capsule by mouth daily  vitamin D (ERGOCALCIFEROL) 400 UNITS CAPS, Take 1 capsule by mouth daily.  (Patient taking differently: Take 400 Units by mouth 2 times daily )    Current Medications:     Scheduled Meds:    furosemide  10 mg Intravenous Once    allopurinol  300 mg Oral Daily    clopidogrel  75 mg Oral Daily    folic acid  1 mg Oral BID    insulin glargine  25 Units Subcutaneous Nightly    labetalol  300 mg Oral BID    vitamin D3  400 Units Oral BID    sodium chloride flush  10 mL Intravenous 2 times per day    insulin lispro  0-6 Units Subcutaneous TID     insulin lispro  0-3 Units Subcutaneous Nightly    ipratropium-albuterol  1 ampule Inhalation Q4H WA    methylPREDNISolone  40 mg Intravenous Q6H    Followed by   Kiana Benedict ON 2/22/2020] predniSONE  40 mg Oral Daily    [START ON 2/21/2020] famotidine  20 mg Oral Daily    sodium bicarbonate 1,300 mg Oral BID    aspirin  81 mg Oral Daily     Continuous Infusions:    dextrose      heparin (porcine) 12 Units/kg/hr (20 0414)     PRN Meds:  sodium chloride flush, acetaminophen, acetaminophen, promethazine, ondansetron, nicotine, glucose, dextrose, glucagon (rDNA), dextrose, sodium polystyrene, sodium polystyrene, polyethylene glycol, albuterol, heparin (porcine), heparin (porcine), albuterol **AND** [] ipratropium, albuterol **AND** [COMPLETED] ipratropium    Review of Systems:     Constitutional: No fever, no chills, no lethargy, no weakness. HEENT:  No headache, otalgia, itchy eyes, nasal discharge or sore throat. Cardiac:  No chest pain, dyspnea, orthopnea or PND. Chest:  No cough, phlegm or wheezing. Abdomen:  No abdominal pain, nausea or vomiting. Neuro:  No focal weakness, abnormal movements orseizure like activity. Skin:   No rashes, no itching. :   No hematuria, no pyuria, no dysuria, no flank pain. Extremities:  No swelling or joint pains. ROS was otherwise negative except as mentioned in the [address] Input/Output:       No intake/output data recorded. Patient Vitals for the past 96 hrs (Last 3 readings):   Weight   20 0408 170 lb (77.1 kg)     Vital Signs:   Temperature:  Temp: 99.3 °F (37.4 °C)  TMax:   Temp (24hrs), Av.3 °F (37.4 °C), Min:99.3 °F (37.4 °C), Max:99.3 °F (37.4 °C)    Respirations:  Resp: 18  Pulse:   Pulse: 86  BP:    BP: (!) 184/98  BP Range: Systolic (89QXW), DPX:498 , Min:172 , QN       Diastolic (81KWU), SXX:16, Min:68, Max:99      Physical Examination:     General:  AAO x 3, speaking in full sentences, no accessory muscle use. HEENT: Atraumatic, normocephalic, no throat congestion, moist mucosa. Eyes:   Pupils equal, round and reactive to light, EOMI. Neck:   No JVD, no thyromegaly, no lymphadenopathy.   Chest:  Marked decrease in AE, Bilateral vesicular breath sounds, occ basal crackles  Cardiac:  S1 S2 RR, no murmurs, gallops or

## 2020-02-20 NOTE — CONSULTS
nebulizer solution 15 mg, 15 mg, Nebulization, Q1H PRN **AND** [COMPLETED] ipratropium (ATROVENT) 0.02 % nebulizer solution 0.25 mg, 0.25 mg, Nebulization, Once PRN    Allergies:  Patient has no known allergies. Social History:   reports that she has been smoking cigarettes. She started smoking about 46 years ago. She has a 40.00 pack-year smoking history. She uses smokeless tobacco. She reports that she does not drink alcohol or use drugs. Family History: family history includes Diabetes in her mother; Heart Disease in her father. No h/o sudden cardiac death. No for premature CAD    REVIEW OF SYSTEMS:    · Constitutional: there has been no unanticipated weight loss. There's been No change in energy level, No change in activity level. · Eyes: No visual changes or diplopia. No scleral icterus. · ENT: No Headaches  · Cardiovascular: As mentioned below  · Respiratory: No previous pulmonary problems, No cough  · Gastrointestinal: No abdominal pain. No change in bowel or bladder habits. · Genitourinary: No dysuria, trouble voiding, or hematuria. · Musculoskeletal:  No gait disturbance, No weakness or joint complaints. · Integumentary: No rash or pruritis. · Neurological: No headache, diplopia, change in muscle strength, numbness or tingling. No change in gait, balance, coordination, mood, affect, memory, mentation, behavior. · Psychiatric: No anxiety, or depression. · Endocrine: No temperature intolerance. No excessive thirst, fluid intake, or urination. No tremor. · Hematologic/Lymphatic: No abnormal bruising or bleeding, blood clots or swollen lymph nodes. · Allergic/Immunologic: No nasal congestion or hives. PHYSICAL EXAM:      BP (!) 184/98   Pulse 86   Temp 99.3 °F (37.4 °C) (Axillary)   Resp 18   Wt 170 lb (77.1 kg)   SpO2 97%   BMI 29.18 kg/m²    Constitutional and General Appearance: alert, cooperative, no distress and appears stated age  HEENT: PERRL, no cervical lymphadenopathy. No masses palpable. Normal oral mucosa  Respiratory:  · Decreased breath sound at bases with mild wheezing   Cardiovascular:  · The apical impulse is not displaced  · Heart tones are crisp and normal. regular S1 and S2.  · Jugular venous pulsation Normal  · The carotid upstroke is normal in amplitude and contour without delay or bruit  · Peripheral pulses are symmetrical and full   Abdomen:   · No masses or tenderness  · Bowel sounds present  Extremities:  · +Mild LE edema  Neurological:  · Alert and oriented. · Moves all extremities well  · No abnormalities of mood, affect, memory, mentation, or behavior are noted    DATA:    Diagnostics:      EKG:   Sinus rhythm with possible old septal infarct and inferolateral ST depressions    ECHO:   12/2016  Mild aortic stenosis by doppler (mean gradient 16mmHg, MVA 1.4cm2) and by  planimeter (1.25cm2). No aortic insufficiency  Pulmonic stenosis: mild . Mild pulmonary hypertension. Normal right ventricular size and function. Normal left ventricle size, wall thickness and function (systolic &  diastolic) with an estimated EF > 55%. No segmental wall motion abnormalities seen         Labs:     CBC:   Recent Labs     02/19/20  2331   WBC 15.3*   HGB 10.4*   HCT 31.3*        BMP:   Recent Labs     02/19/20  2311 02/19/20  2331   NA  --  142   K  --  3.8   CO2  --  16*   BUN  --  24*   CREATININE 4.14* 3.97*   LABGLOM 11* 11*   GLUCOSE  --  267*     BNP: No results for input(s): BNP in the last 72 hours. PT/INR:   Recent Labs     02/20/20  0401   PROTIME 10.3   INR 1.0     APTT:  Recent Labs     02/20/20  0401   APTT 24.8     CARDIAC ENZYMES:No results for input(s): CKTOTAL, CKMB, CKMBINDEX, TROPONINI in the last 72 hours. FASTING LIPID PANEL:  Lab Results   Component Value Date    HDL 28 06/18/2019    LDLDIRECT 114 12/20/2016    TRIG 199 04/11/2018     LIVER PROFILE:No results for input(s): AST, ALT, LABALBU in the last 72 hours.     Patient [facility]  [facility]  [phone]

## 2020-02-20 NOTE — PROGRESS NOTES
Physical Therapy    Facility/Department: [facility] CAR 1  Initial Assessment    NAME: Ly Del Rosario  : 1950  MRN: [id number]    Date of Service: 2020  AOB  Discharge Recommendations:  No further therapy required at discharge. PT Equipment Recommendations  Equipment Needed: No    Assessment    Pt cooperative, motivated; per RN, she can get anxious and go into a panic attack, but she didn't during PT session. She tolerated ambulation and ex well on 3L O2 (she states she doesn't wear O2 at home). Body structures, Functions, Activity limitations: Decreased endurance  Prognosis: Good  Decision Making: Low Complexity  PT Education: Goals;PT Role;Plan of Care  Barriers to Learning: none  REQUIRES PT FOLLOW UP: Yes  Activity Tolerance  Activity Tolerance: Patient Tolerated treatment well       Patient Diagnosis(es): The primary encounter diagnosis was COPD exacerbation ([facility] Diagnoses of Congestive heart failure, unspecified HF chronicity, unspecified heart failure type ([address] Dyspnea and respiratory abnormalities, and Stage 4 chronic kidney disease ([address] were also pertinent to this visit. has a past medical history of Anemia in chronic kidney disease, Anemia in stage 4 chronic kidney disease ([address] Cerebral artery occlusion with cerebral infarction ([facility] Chronic kidney disease, Gout, arthritis, Hyperlipidemia, Hypertension, Left sided lacunar infarction [facility]), Peripheral vascular disease ([facility] and Type II or unspecified type diabetes mellitus without mention of complication, not stated as uncontrolled. has a past surgical history that includes Tonsillectomy; other surgical history (2017); and Colonoscopy.     Restrictions  Restrictions/Precautions  Restrictions/Precautions: General Precautions, Fall Risk, Up as Tolerated  Required Braces or Orthoses?: No  Vision/Hearing  Vision: Impaired  Vision Exceptions: Wears glasses at all times  Hearing: Within functional limits Subjective  General  Patient assessed for rehabilitation services?: Yes  Response To Previous Treatment: Not applicable  Family / Caregiver Present: No  Follows Commands: Within Functional Limits  Pain Screening  Patient Currently in Pain: Denies  Vital Signs  Patient Currently in Pain: Denies       Orientation  Orientation  Overall Orientation Status: Within Normal Limits  Social/Functional History  Social/Functional History  Lives With: Alone  Type of Home: House  Home Layout: Two level, Bed/Bath upstairs  Home Access: Stairs to enter without rails  Entrance Stairs - Number of Steps: 6  Receives Help From: Family  ADL Assistance: Independent  Homemaking Assistance: Independent  Homemaking Responsibilities: Yes  Ambulation Assistance: Independent  Transfer Assistance: Independent  Active : Yes  Mode of Transportation: Car  Occupation: Retired    Objective     Observation/Palpation  Posture: Good    AROM RLE (degrees)  RLE AROM: WFL  AROM LLE (degrees)  LLE AROM : WFL  AROM RUE (degrees)  RUE AROM : WFL  AROM LUE (degrees)  LUE AROM : WFL  Strength RLE  Strength RLE: WFL  Strength LLE  Strength LLE: WFL  Strength RUE  Strength RUE: WFL  Strength LUE  Strength LUE: WFL  Tone RLE  RLE Tone: Normotonic  Tone LLE  LLE Tone: Normotonic  Motor Control  Gross Motor?: WFL  Sensation  Overall Sensation Status: WFL  Bed mobility  Rolling to Right: Independent  Supine to Sit: Independent  Scooting: Independent  Transfers  Sit to Stand: Supervision  Stand to sit: Supervision  Bed to Chair: Supervision  Stand Pivot Transfers: Supervision  Ambulation  Ambulation?: Yes  Ambulation 1  Surface: level tile  Device: No Device  Assistance: Supervision  Gait Deviations: None  Distance: 75'x1  Stairs/Curb  Stairs?: No     Balance  Posture: Good  Sitting - Static: Good  Sitting - Dynamic: Good  Standing - Static: Good  Other exercises  Other exercises 1: AROM BLE's seated x 15 reps     Plan   Plan  Times per week: 5 visits weekly  Times per day: Daily  Current Treatment Recommendations: Strengthening, ROM, Balance Training, Functional Mobility Training, Transfer Training, Endurance Training, Gait Training, Stair training  Safety Devices  Type of devices: Call light within reach, Gait belt, Patient at risk for falls, Left in chair, Nurse notified  Restraints  Initially in place: No      AM-PAC Score  AM-PAC Inpatient Mobility Raw Score : 23 (02/20/20 1445)  AM-PAC Inpatient T-Scale Score : 56.93 (02/20/20 1445)  Mobility Inpatient CMS 0-100% Score: 11.2 (02/20/20 1445)  Mobility Inpatient CMS G-Code Modifier : CI (02/20/20 1445)          Goals  Short term goals  Time Frame for Short term goals: 6 visits  Short term goal 1: prevent loss of strength/mobility/independence while pt is in the hospital  Short term goal 2: pt to ambulate 100' independently without device  Short term goal 3: stair ambulation x 1 flight with 1 HR independently  Patient Goals   Patient goals : return home       Therapy Time   Individual Concurrent Group Co-treatment   Time In [address] [place] [address]         Time Out 1443         Minutes 38                 Sharon Records, PT

## 2020-02-21 ENCOUNTER — APPOINTMENT (OUTPATIENT)
Dept: INTERVENTIONAL RADIOLOGY/VASCULAR | Age: 70
DRG: 233 | End: 2020-02-21
Payer: MEDICARE

## 2020-02-21 ENCOUNTER — APPOINTMENT (OUTPATIENT)
Dept: GENERAL RADIOLOGY | Age: 70
DRG: 233 | End: 2020-02-21
Payer: MEDICARE

## 2020-02-21 PROBLEM — I21.4 NSTEMI (NON-ST ELEVATED MYOCARDIAL INFARCTION) (HCC): Status: ACTIVE | Noted: 2020-02-21

## 2020-02-21 PROBLEM — I50.9 ACUTE CHF (CONGESTIVE HEART FAILURE) (HCC): Status: ACTIVE | Noted: 2020-02-21

## 2020-02-21 LAB
ALBUMIN SERPL-MCNC: 4.2 G/DL (ref 3.5–5.2)
ALBUMIN/GLOBULIN RATIO: 1.4 (ref 1–2.5)
ALP BLD-CCNC: 103 U/L (ref 35–104)
ALT SERPL-CCNC: 13 U/L (ref 5–33)
ANION GAP SERPL CALCULATED.3IONS-SCNC: 19 MMOL/L (ref 9–17)
AST SERPL-CCNC: 25 U/L
BILIRUB SERPL-MCNC: 0.29 MG/DL (ref 0.3–1.2)
BUN BLDV-MCNC: 46 MG/DL (ref 8–23)
BUN/CREAT BLD: ABNORMAL (ref 9–20)
CALCIUM SERPL-MCNC: 9.6 MG/DL (ref 8.6–10.4)
CHLORIDE BLD-SCNC: 96 MMOL/L (ref 98–107)
CO2: 15 MMOL/L (ref 20–31)
CREAT SERPL-MCNC: 4.36 MG/DL (ref 0.5–0.9)
ESTIMATED AVERAGE GLUCOSE: 148 MG/DL
GFR AFRICAN AMERICAN: 12 ML/MIN
GFR NON-AFRICAN AMERICAN: 10 ML/MIN
GFR SERPL CREATININE-BSD FRML MDRD: ABNORMAL ML/MIN/{1.73_M2}
GFR SERPL CREATININE-BSD FRML MDRD: ABNORMAL ML/MIN/{1.73_M2}
GLUCOSE BLD-MCNC: 198 MG/DL (ref 65–105)
GLUCOSE BLD-MCNC: 207 MG/DL (ref 65–105)
GLUCOSE BLD-MCNC: 242 MG/DL (ref 70–99)
GLUCOSE BLD-MCNC: 258 MG/DL (ref 65–105)
GLUCOSE BLD-MCNC: 367 MG/DL (ref 65–105)
HBA1C MFR BLD: 6.8 % (ref 4–6)
HCT VFR BLD CALC: 31 % (ref 36.3–47.1)
HEMOGLOBIN: 10.3 G/DL (ref 11.9–15.1)
INR BLD: 1
LV EF: 36 %
LVEF MODALITY: NORMAL
MAGNESIUM: 1.9 MG/DL (ref 1.6–2.6)
MCH RBC QN AUTO: 31.9 PG (ref 25.2–33.5)
MCHC RBC AUTO-ENTMCNC: 33.2 G/DL (ref 28.4–34.8)
MCV RBC AUTO: 96 FL (ref 82.6–102.9)
NRBC AUTOMATED: 0 PER 100 WBC
PARTIAL THROMBOPLASTIN TIME: 40 SEC (ref 20.5–30.5)
PARTIAL THROMBOPLASTIN TIME: 48.7 SEC (ref 20.5–30.5)
PARTIAL THROMBOPLASTIN TIME: 56.8 SEC (ref 20.5–30.5)
PDW BLD-RTO: 13.5 % (ref 11.8–14.4)
PLATELET # BLD: 252 K/UL (ref 138–453)
PMV BLD AUTO: 12 FL (ref 8.1–13.5)
POTASSIUM SERPL-SCNC: 4.2 MMOL/L (ref 3.7–5.3)
PROTHROMBIN TIME: 10.6 SEC (ref 9–12)
RBC # BLD: 3.23 M/UL (ref 3.95–5.11)
SODIUM BLD-SCNC: 130 MMOL/L (ref 135–144)
TOTAL PROTEIN: 7.2 G/DL (ref 6.4–8.3)
TROPONIN INTERP: ABNORMAL
TROPONIN T: ABNORMAL NG/ML
TROPONIN, HIGH SENSITIVITY: 365 NG/L (ref 0–14)
WBC # BLD: 22 K/UL (ref 3.5–11.3)

## 2020-02-21 PROCEDURE — 85730 THROMBOPLASTIN TIME PARTIAL: CPT

## 2020-02-21 PROCEDURE — 94640 AIRWAY INHALATION TREATMENT: CPT

## 2020-02-21 PROCEDURE — 85027 COMPLETE CBC AUTOMATED: CPT

## 2020-02-21 PROCEDURE — 36415 COLL VENOUS BLD VENIPUNCTURE: CPT

## 2020-02-21 PROCEDURE — 6360000002 HC RX W HCPCS: Performed by: NURSE PRACTITIONER

## 2020-02-21 PROCEDURE — 6370000000 HC RX 637 (ALT 250 FOR IP): Performed by: INTERNAL MEDICINE

## 2020-02-21 PROCEDURE — 71045 X-RAY EXAM CHEST 1 VIEW: CPT

## 2020-02-21 PROCEDURE — 97116 GAIT TRAINING THERAPY: CPT

## 2020-02-21 PROCEDURE — 83036 HEMOGLOBIN GLYCOSYLATED A1C: CPT

## 2020-02-21 PROCEDURE — 6360000002 HC RX W HCPCS: Performed by: GENERAL PRACTICE

## 2020-02-21 PROCEDURE — B548ZZA ULTRASONOGRAPHY OF SUPERIOR VENA CAVA, GUIDANCE: ICD-10-PCS | Performed by: RADIOLOGY

## 2020-02-21 PROCEDURE — 6370000000 HC RX 637 (ALT 250 FOR IP): Performed by: NURSE PRACTITIONER

## 2020-02-21 PROCEDURE — 97535 SELF CARE MNGMENT TRAINING: CPT | Performed by: OCCUPATIONAL THERAPIST

## 2020-02-21 PROCEDURE — 93306 TTE W/DOPPLER COMPLETE: CPT

## 2020-02-21 PROCEDURE — 6360000002 HC RX W HCPCS: Performed by: PHYSICIAN ASSISTANT

## 2020-02-21 PROCEDURE — 77001 FLUOROGUIDE FOR VEIN DEVICE: CPT

## 2020-02-21 PROCEDURE — 2709999900 HC NON-CHARGEABLE SUPPLY

## 2020-02-21 PROCEDURE — C1752 CATH,HEMODIALYSIS,SHORT-TERM: HCPCS

## 2020-02-21 PROCEDURE — 94761 N-INVAS EAR/PLS OXIMETRY MLT: CPT

## 2020-02-21 PROCEDURE — 85610 PROTHROMBIN TIME: CPT

## 2020-02-21 PROCEDURE — 84484 ASSAY OF TROPONIN QUANT: CPT

## 2020-02-21 PROCEDURE — 2580000003 HC RX 258: Performed by: NURSE PRACTITIONER

## 2020-02-21 PROCEDURE — 93005 ELECTROCARDIOGRAM TRACING: CPT | Performed by: INTERNAL MEDICINE

## 2020-02-21 PROCEDURE — 2060000000 HC ICU INTERMEDIATE R&B

## 2020-02-21 PROCEDURE — 6370000000 HC RX 637 (ALT 250 FOR IP)

## 2020-02-21 PROCEDURE — 97110 THERAPEUTIC EXERCISES: CPT

## 2020-02-21 PROCEDURE — 2500000003 HC RX 250 WO HCPCS: Performed by: INTERNAL MEDICINE

## 2020-02-21 PROCEDURE — 2700000000 HC OXYGEN THERAPY PER DAY

## 2020-02-21 PROCEDURE — 82947 ASSAY GLUCOSE BLOOD QUANT: CPT

## 2020-02-21 PROCEDURE — 83735 ASSAY OF MAGNESIUM: CPT

## 2020-02-21 PROCEDURE — 99233 SBSQ HOSP IP/OBS HIGH 50: CPT | Performed by: INTERNAL MEDICINE

## 2020-02-21 PROCEDURE — 2500000003 HC RX 250 WO HCPCS: Performed by: STUDENT IN AN ORGANIZED HEALTH CARE EDUCATION/TRAINING PROGRAM

## 2020-02-21 PROCEDURE — 80053 COMPREHEN METABOLIC PANEL: CPT

## 2020-02-21 PROCEDURE — 76937 US GUIDE VASCULAR ACCESS: CPT

## 2020-02-21 PROCEDURE — 36556 INSERT NON-TUNNEL CV CATH: CPT

## 2020-02-21 PROCEDURE — 02H633Z INSERTION OF INFUSION DEVICE INTO RIGHT ATRIUM, PERCUTANEOUS APPROACH: ICD-10-PCS | Performed by: RADIOLOGY

## 2020-02-21 PROCEDURE — 97165 OT EVAL LOW COMPLEX 30 MIN: CPT | Performed by: OCCUPATIONAL THERAPIST

## 2020-02-21 PROCEDURE — 6370000000 HC RX 637 (ALT 250 FOR IP): Performed by: STUDENT IN AN ORGANIZED HEALTH CARE EDUCATION/TRAINING PROGRAM

## 2020-02-21 PROCEDURE — 97530 THERAPEUTIC ACTIVITIES: CPT

## 2020-02-21 RX ORDER — NITROGLYCERIN 0.4 MG/1
0.4 TABLET SUBLINGUAL EVERY 5 MIN PRN
Status: DISCONTINUED | OUTPATIENT
Start: 2020-02-21 | End: 2020-03-02

## 2020-02-21 RX ORDER — NITROGLYCERIN 20 MG/100ML
5 INJECTION INTRAVENOUS CONTINUOUS
Status: DISCONTINUED | OUTPATIENT
Start: 2020-02-21 | End: 2020-02-29

## 2020-02-21 RX ORDER — FENTANYL CITRATE 50 UG/ML
INJECTION, SOLUTION INTRAMUSCULAR; INTRAVENOUS
Status: COMPLETED | OUTPATIENT
Start: 2020-02-21 | End: 2020-02-21

## 2020-02-21 RX ORDER — HEPARIN SODIUM (PORCINE) LOCK FLUSH IV SOLN 100 UNIT/ML 100 UNIT/ML
SOLUTION INTRAVENOUS
Status: COMPLETED | OUTPATIENT
Start: 2020-02-21 | End: 2020-02-21

## 2020-02-21 RX ORDER — NITROGLYCERIN 0.4 MG/1
TABLET SUBLINGUAL
Status: COMPLETED
Start: 2020-02-21 | End: 2020-02-21

## 2020-02-21 RX ADMIN — NITROGLYCERIN 0.4 MG: 0.4 TABLET SUBLINGUAL at 20:21

## 2020-02-21 RX ADMIN — HYDRALAZINE HYDROCHLORIDE 25 MG: 25 TABLET, FILM COATED ORAL at 15:24

## 2020-02-21 RX ADMIN — FAMOTIDINE 20 MG: 20 TABLET, FILM COATED ORAL at 08:56

## 2020-02-21 RX ADMIN — METHYLPREDNISOLONE SODIUM SUCCINATE 40 MG: 125 INJECTION, POWDER, FOR SOLUTION INTRAMUSCULAR; INTRAVENOUS at 08:57

## 2020-02-21 RX ADMIN — ALLOPURINOL 300 MG: 300 TABLET ORAL at 08:56

## 2020-02-21 RX ADMIN — SODIUM BICARBONATE 1300 MG: 650 TABLET ORAL at 21:08

## 2020-02-21 RX ADMIN — ASPIRIN 81 MG: 81 TABLET, CHEWABLE ORAL at 08:57

## 2020-02-21 RX ADMIN — CHOLECALCIFEROL TAB 10 MCG (400 UNIT) 400 UNITS: 10 TAB at 21:08

## 2020-02-21 RX ADMIN — CHOLECALCIFEROL TAB 10 MCG (400 UNIT) 400 UNITS: 10 TAB at 08:56

## 2020-02-21 RX ADMIN — FOLIC ACID 1 MG: 1 TABLET ORAL at 08:56

## 2020-02-21 RX ADMIN — SODIUM CHLORIDE, PRESERVATIVE FREE 10 ML: 5 INJECTION INTRAVENOUS at 09:02

## 2020-02-21 RX ADMIN — CLOPIDOGREL 75 MG: 75 TABLET, FILM COATED ORAL at 09:00

## 2020-02-21 RX ADMIN — METHYLPREDNISOLONE SODIUM SUCCINATE 40 MG: 125 INJECTION, POWDER, FOR SOLUTION INTRAMUSCULAR; INTRAVENOUS at 13:30

## 2020-02-21 RX ADMIN — HEPARIN SODIUM 16 UNITS/KG/HR: 10000 INJECTION, SOLUTION INTRAVENOUS at 05:34

## 2020-02-21 RX ADMIN — NITROGLYCERIN 5 MCG/MIN: 20 INJECTION INTRAVENOUS at 20:31

## 2020-02-21 RX ADMIN — HEPARIN SODIUM 2000 UNITS: 1000 INJECTION INTRAVENOUS; SUBCUTANEOUS at 15:24

## 2020-02-21 RX ADMIN — HYDRALAZINE HYDROCHLORIDE 25 MG: 25 TABLET, FILM COATED ORAL at 05:45

## 2020-02-21 RX ADMIN — FENTANYL CITRATE 50 MCG: 50 INJECTION INTRAMUSCULAR; INTRAVENOUS at 14:32

## 2020-02-21 RX ADMIN — IPRATROPIUM BROMIDE AND ALBUTEROL SULFATE 1 AMPULE: .5; 3 SOLUTION RESPIRATORY (INHALATION) at 13:06

## 2020-02-21 RX ADMIN — INSULIN LISPRO 3 UNITS: 100 INJECTION, SOLUTION INTRAVENOUS; SUBCUTANEOUS at 20:41

## 2020-02-21 RX ADMIN — SODIUM CHLORIDE, PRESERVATIVE FREE 10 ML: 5 INJECTION INTRAVENOUS at 21:09

## 2020-02-21 RX ADMIN — BUMETANIDE 2 MG: 0.25 INJECTION INTRAMUSCULAR; INTRAVENOUS at 08:57

## 2020-02-21 RX ADMIN — IPRATROPIUM BROMIDE AND ALBUTEROL SULFATE 1 AMPULE: .5; 3 SOLUTION RESPIRATORY (INHALATION) at 08:13

## 2020-02-21 RX ADMIN — INSULIN GLARGINE 25 UNITS: 100 INJECTION, SOLUTION SUBCUTANEOUS at 20:41

## 2020-02-21 RX ADMIN — FOLIC ACID 1 MG: 1 TABLET ORAL at 21:08

## 2020-02-21 RX ADMIN — NITROGLYCERIN 0.4 MG: 0.4 TABLET SUBLINGUAL at 20:13

## 2020-02-21 RX ADMIN — LABETALOL HCL 300 MG: 100 TABLET, FILM COATED ORAL at 21:08

## 2020-02-21 RX ADMIN — METHYLPREDNISOLONE SODIUM SUCCINATE 40 MG: 125 INJECTION, POWDER, FOR SOLUTION INTRAMUSCULAR; INTRAVENOUS at 21:08

## 2020-02-21 RX ADMIN — LABETALOL HCL 300 MG: 100 TABLET, FILM COATED ORAL at 08:56

## 2020-02-21 RX ADMIN — METHYLPREDNISOLONE SODIUM SUCCINATE 40 MG: 125 INJECTION, POWDER, FOR SOLUTION INTRAMUSCULAR; INTRAVENOUS at 01:25

## 2020-02-21 RX ADMIN — SODIUM CHLORIDE, PRESERVATIVE FREE 1.3 ML: 5 INJECTION INTRAVENOUS at 14:42

## 2020-02-21 RX ADMIN — HYDRALAZINE HYDROCHLORIDE 25 MG: 25 TABLET, FILM COATED ORAL at 21:07

## 2020-02-21 RX ADMIN — IPRATROPIUM BROMIDE AND ALBUTEROL SULFATE 1 AMPULE: .5; 3 SOLUTION RESPIRATORY (INHALATION) at 16:38

## 2020-02-21 RX ADMIN — INSULIN LISPRO 1 UNITS: 100 INJECTION, SOLUTION INTRAVENOUS; SUBCUTANEOUS at 16:42

## 2020-02-21 RX ADMIN — SODIUM CHLORIDE, PRESERVATIVE FREE 6000 UNITS: 5 INJECTION INTRAVENOUS at 14:41

## 2020-02-21 RX ADMIN — INSULIN LISPRO 3 UNITS: 100 INJECTION, SOLUTION INTRAVENOUS; SUBCUTANEOUS at 11:57

## 2020-02-21 RX ADMIN — SODIUM BICARBONATE 1300 MG: 650 TABLET ORAL at 08:56

## 2020-02-21 ASSESSMENT — PAIN SCALES - GENERAL: PAINLEVEL_OUTOF10: 1

## 2020-02-21 NOTE — PROGRESS NOTES
mucous membranes   NECK:  Some JVD, no significant accessory muscle use, midline trachea  PULMONARY: bilateral air entry with bilateral rales in the bases and decreased breath sounds as well with some scattered wheezes  CARDIOVASCULAR: regular rate and rhythm with positive S1 and S2 and positive S3 and a positive systolic murmur  ABDOMEN: soft and non-tender and non-distended with active bowel sounds  EXTREMITIES: mild peripheral edema    CURRENT MEDICATIONS      allopurinol (ZYLOPRIM) tablet 300 mg, Daily  clopidogrel (PLAVIX) tablet 75 mg, Daily  folic acid (FOLVITE) tablet 1 mg, BID  insulin glargine (LANTUS) injection vial 25 Units, Nightly  labetalol (NORMODYNE) tablet 300 mg, BID  vitamin D3 (CHOLECALCIFEROL) tablet 400 Units, BID  sodium chloride flush 0.9 % injection 10 mL, 2 times per day  sodium chloride flush 0.9 % injection 10 mL, PRN  acetaminophen (TYLENOL) tablet 650 mg, Q6H PRN  acetaminophen (TYLENOL) suppository 650 mg, Q6H PRN  promethazine (PHENERGAN) tablet 12.5 mg, Q6H PRN  ondansetron (ZOFRAN) injection 4 mg, Q6H PRN  nicotine (NICODERM CQ) 21 MG/24HR 1 patch, Daily PRN  glucose (GLUTOSE) 40 % oral gel 15 g, PRN  dextrose 50 % IV solution, PRN  glucagon (rDNA) injection 1 mg, PRN  dextrose 5 % solution, PRN  insulin lispro (HUMALOG) injection vial 0-6 Units, TID WC  insulin lispro (HUMALOG) injection vial 0-3 Units, Nightly  polyethylene glycol (GLYCOLAX) packet 17 g, Daily PRN  albuterol (PROVENTIL) nebulizer solution 2.5 mg, Q2H PRN  ipratropium-albuterol (DUONEB) nebulizer solution 1 ampule, Q4H WA  methylPREDNISolone sodium (SOLU-MEDROL) injection 40 mg, Q6H    Followed by  Ginny La ON 2/22/2020] predniSONE (DELTASONE) tablet 40 mg, Daily  heparin (porcine) injection 4,000 Units, PRN  heparin (porcine) injection 2,000 Units, PRN  heparin 25,000 units in dextrose 5% 250 mL infusion, Continuous  famotidine (PEPCID) tablet 20 mg, Daily  sodium bicarbonate tablet 1,300 mg, BID  aspirin chewable Component Value Date    BRENT 51 02/20/2020      URINE POTASSIUM:  No results found for: KUR  URINE CHLORIDE:  No components found for: CLU  URINE PH:  No components found for: PO4U  URINE OSMOLARITY:  No results found for: OSMOU  URINE CREATININE:    Lab Results   Component Value Date    LABCREA 80.6 03/01/2019     URINE EOSINOPHILS: No components found for: EOSU  URINE PROTEIN:    Lab Results   Component Value Date    TPU 9 03/11/2013     URINALYSIS:  U/A:   Lab Results   Component Value Date    NITRU NEGATIVE 02/20/2020    COLORU YELLOW 02/20/2020    PHUR 6.0 02/20/2020    WBCUA 2 TO 5 02/20/2020    RBCUA 0 TO 2 02/20/2020    MUCUS NOT REPORTED 02/20/2020    TRICHOMONAS NOT REPORTED 02/20/2020    YEAST NOT REPORTED 02/20/2020    BACTERIA NOT REPORTED 02/20/2020    SPECGRAV 1.017 02/20/2020    LEUKOCYTESUR NEGATIVE 02/20/2020    UROBILINOGEN Normal 02/20/2020    BILIRUBINUR NEGATIVE 02/20/2020    GLUCOSEU 1+ 02/20/2020    KETUA TRACE 02/20/2020    AMORPHOUS NOT REPORTED 02/20/2020     ANTIGBM:No results found for: GBMABIGG      RADIOLOGY      Reviewed as available. ASSESSMENT      1. Acute on chronic renal failure and will require dialysis soon  2. High anion gap metabolic acidosis  3. Non-ST elevation myocardial infarction   4. Decompensated congestive heart failure with echo pending with some improvement in symptoms with IV Bumex  5. Decision pending on both cardiac catheterization and dialysis by the patient versus palliative measures  6. Anemia of chronic disease    PLAN      1. Await decision by patient regarding dialysis which would prompt a dialysis catheter placement and cardiac catheterization if she is agreeable   2. Patient discussed with cardiology  3. Follow labs  4. Follow patient's decision-making process       Please do not hesitate to call with questions.     Electronically signed by Gil Bryant MD on 2/21/2020 at 10:36 AM

## 2020-02-21 NOTE — PROGRESS NOTES
Pt had elevated BP. Messaged NP Yunior Drummond after writer (RN) gave her her night time BP medications. Orders recieved. See MAR for medications given. BP had no change. RN notified NP [facility] again, this time informing her that the patient is getting anxious. Orders received. See MAR for medications given. Will continue to monitor.

## 2020-02-21 NOTE — PROGRESS NOTES
Occupational Therapy   Occupational Therapy Initial Assessment  Date: 2020   Patient Name: Symone Smalls  MRN: [id number]     : 1950    Date of Service: 2020    Discharge Recommendations:    No further therapy required at discharge. Assessment   Performance deficits / Impairments: Decreased functional mobility ; Decreased ADL status; Decreased endurance;Decreased balance;Decreased high-level IADLs  Prognosis: Good  OT Education: OT Role;Plan of Care;Energy Conservation  REQUIRES OT FOLLOW UP: Yes  Activity Tolerance  Activity Tolerance: Patient Tolerated treatment well  Safety Devices  Safety Devices in place: Yes  Type of devices: All fall risk precautions in place; Left in chair;Nurse notified;Call light within reach;Gait belt        Patient Diagnosis(es): The primary encounter diagnosis was COPD exacerbation ([facility] Diagnoses of Congestive heart failure, unspecified HF chronicity, unspecified heart failure type ([address] Dyspnea and respiratory abnormalities, and Stage 4 chronic kidney disease ([facility] were also pertinent to this visit. has a past medical history of Acute CHF (congestive heart failure) ([address] Anemia in chronic kidney disease, Anemia in stage 4 chronic kidney disease ([address] Cerebral artery occlusion with cerebral infarction ([facility] Chronic kidney disease, Gout, arthritis, Hyperlipidemia, Hypertension, Left sided lacunar infarction [facility]), Peripheral vascular disease ([facility] and Type II or unspecified type diabetes mellitus without mention of complication, not stated as uncontrolled. has a past surgical history that includes Tonsillectomy; other surgical history (2017); and Colonoscopy.          Restrictions  Restrictions/Precautions  Restrictions/Precautions: General Precautions, Fall Risk  Required Braces or Orthoses?: No  Position Activity Restriction  Other position/activity restrictions: up with assist    Subjective   General  Patient assessed for rehabilitation services?: Yes  Family / Caregiver Present: No  Patient Currently in Pain: Denies  Vital Signs  Patient Currently in Pain: Denies     Social/Functional History  Social/Functional History  Lives With: Alone  Type of Home: House  Home Layout: Two level, Bed/Bath upstairs  Home Access: Stairs to enter without rails  Entrance Stairs - Number of Steps: 6  Bathroom Shower/Tub: Tub/Shower unit  Bathroom Toilet: Standard  Home Equipment: (no DME)  Receives Help From: Family  ADL Assistance: Independent  Homemaking Assistance: Independent  Homemaking Responsibilities: Yes  Ambulation Assistance: Independent  Transfer Assistance: Independent  Active : Yes  Mode of Transportation: Car  Occupation: Retired     Objective   Vision: Impaired  Vision Exceptions: Wears glasses at all times  Hearing: Within functional limits    Orientation  Overall Orientation Status: Within Functional Limits     Balance  Sitting Balance: Independent  Standing Balance: Stand by assistance  Standing Balance  Time: ~1 minute  Activity: functional mobility from bed to chair   Functional Mobility  Functional - Mobility Device: No device  Activity: Other(from bed to chair)  Assist Level: Stand by assistance  ADL  Feeding: Independent(i'ly eating upon exit)  Grooming: Independent(i'ly brushing hair upon arrival)  UE Bathing: Stand by assistance  LE Bathing: Stand by assistance  UE Dressing: Stand by assistance  LE Dressing: Stand by assistance  Toileting: Stand by assistance  Tone RUE  RUE Tone: Normotonic  Tone LUE  LUE Tone: Normotonic  Coordination  Movements Are Fluid And Coordinated: Yes     Bed mobility  Comment: Pt seated EOB upon arrival  Transfers  Sit to stand: Stand by assistance  Stand to sit: Stand by assistance  Transfer Comments: Pt stooping to floor to  call light     Cognition  Overall Cognitive Status: WFL        Sensation  Overall Sensation Status: WFL        LUE AROM (degrees)  LUE AROM : WFL  Left Hand AROM (degrees)  Left Hand AROM: WFL  RUE AROM (degrees)  RUE AROM : WFL  Right Hand AROM (degrees)  Right Hand AROM: WFL  LUE Strength  Gross LUE Strength: WFL  LUE Strength Comment: Pt reported previous CVA, grossly 4/5 throughout LUE  RUE Strength  Gross RUE Strength: WFL      Plan   Plan  Times per week: 3-4x    AM-PAC Score   AM-PAC Inpatient Daily Activity Raw Score: 20 (02/21/20 1054)  AM-PAC Inpatient ADL T-Scale Score : 42.03 (02/21/20 1054)  ADL Inpatient CMS 0-100% Score: 38.32 (02/21/20 1054)  ADL Inpatient CMS G-Code Modifier : Milady Kurt (02/21/20 1054)    Goals  Short term goals  Time Frame for Short term goals: By discharge pt will. Cyrus Whitney term goal 1: demo independent transfers  Short term goal 2: demo independent functional mobility   Short term goal 3: demo ADL routine with set up independently  Short term goal 4: demo 10+ minutes standing tolerance to engage in functional tasks  Short term goal 5: tolerate 20+ minutes of activity       Therapy Time   Individual Concurrent Group Co-treatment   Time In  8:28         Time Out  8:41         Minutes  13         Timed Code Treatment Minutes: [address], OTR/L

## 2020-02-21 NOTE — PROGRESS NOTES
Type 2 diabetes mellitus with diabetic chronic kidney disease ([facility]     Chronic kidney disease (CKD)     Essential hypertension     Hypercholesteremia     Hypokalemia     Acute infarct posterior limb internal capsule on the left     Left sided lacunar infarction (HCC)     Type 2 diabetes mellitus with stage 4 chronic kidney disease, with long-term current use of insulin ([facility])     Cerebral aneurysm     Anemia due to stage 4 chronic kidney disease treated with erythropoietin ([facility])     Tobacco abuse     Secondary hyperparathyroidism of renal origin ([facility]     Persistent proteinuria     Metabolic acidosis     HERNÁN (acute kidney injury) ([facility]     NSTEMI (non-ST elevated myocardial infarction) ([facility]     Acute CHF (congestive heart failure) ([facility])      Assessment / Acute Cardiac Problems:   1. Acute hypoxic resp failure -  has baseline JAY NYHA III  2. Recent possible viral infection   3. NSTEMI- likely type 1 in the setting of uptrending and significantly elevated troponins  4. Acute CHF - volume overload in the setting of CKD  5. CKD  6. H/O CVA  7. DM        Plan of Treatment:   1. Continue plavix, start aspirin  2. Continue heparin infusion   3. ECHO done read pending   4. Nephrology evaluation for clearance for cardiac cath- patient will likely need RRT after contrast exposure. Patient is still making decision and wants to talk to her family. She is inclining towards cath and starting HD after that. Per nephrology patient will need HD catheter with IR placed before cath. Will proceed with cath when the patient had made her decision   5. Will continue with medical management for NSTEMI at this time    6. Currently chest pain free  7. Repeat EKG        Jannet Dance, MD  Fellow, [address] [place] [address]      Attending Cardiologist Addendum: I have reviewed and performed the history, physical, subjective, objective, assessment, and plan with the resident/fellow and agree with the note.

## 2020-02-21 NOTE — CARE COORDINATION
[address] Transitions   2020    Patient: Elzbieta Joy Patient : 1950   MRN: [id number]  Reason for Admission: COPD exacerbation  RARS: Readmission Risk Score: 23    Spoke with: , Fran Abbott - patient sleeping. Current DC plan is to return home with family pending progress. Care Transitions will continue to follow.       Readmission Risk  Patient Active Problem List   Diagnosis    Gout    Hypovitaminosis D    Anemia in stage 4 chronic kidney disease (HCC)    Type 2 diabetes mellitus with diabetic chronic kidney disease ([address]    Chronic kidney disease (CKD)    Essential hypertension    Hypercholesteremia    Hypokalemia    Acute infarct posterior limb internal capsule on the left    Left sided lacunar infarction ([address]    Type 2 diabetes mellitus with stage 4 chronic kidney disease, with long-term current use of insulin (HCC)    Cerebral aneurysm    Anemia due to stage 4 chronic kidney disease treated with erythropoietin ([address]    Tobacco abuse    Secondary hyperparathyroidism of renal origin ([address]    Persistent proteinuria    Metabolic acidosis    HERNÁN (acute kidney injury) ([address]         Follow Up  Future Appointments   Date Time Provider Brayden Michelle   2020  3:10 PM Enzo River MD [facility] None   2020 11:00 AM Tadeo Funez PA-C [facility] Rebecca Pop RN

## 2020-02-21 NOTE — PLAN OF CARE
Problem: Infection:  Goal: Will remain free from infection  Description  Will remain free from infection  Outcome: Met This Shift     Problem: Safety:  Goal: Free from accidental physical injury  Description  Free from accidental physical injury  Outcome: Met This Shift  Goal: Free from intentional harm  Description  Free from intentional harm  Outcome: Met This Shift     Problem: Daily Care:  Goal: Daily care needs are met  Description  Daily care needs are met  Outcome: Met This Shift     Problem: Pain:  Goal: Patient's pain/discomfort is manageable  Description  Patient's pain/discomfort is manageable  Outcome: Met This Shift     Problem: Skin Integrity:  Goal: Skin integrity will stabilize  Description  Skin integrity will stabilize  Outcome: Met This Shift

## 2020-02-21 NOTE — PROGRESS NOTES
Congestive Heart Failure Education completed and charted. CHF booklet given. Patient was receptive to education. Discussed 2000 mg sodium restricted daily in diet. Patient instructed to limit fluid intake to  1.5 to 2 liters per day. Patient instructed to weigh self at the same time of each day each morning, reinforced teaching to monitor for 3-5 lb weight increase over 1-2 days notify physician if change noted. Signs and symptoms of CHF discussed with patient, such as feeling more tired than normal, feeling short of breath, coughing that increases when you lie down, sudden weight gain, swelling of your feet, legs or belly. Patient verbalized understanding to notify physician office if these symptoms occur. Pt's last echo 2016 revealed EF>55%. Her BNP is elevated, education done today. Will monitor for echo results which are Pending.

## 2020-02-21 NOTE — H&P
Soft, nontender, nondistended, normal bowel sounds, no hepatomegaly or splenomegaly  Neurologic: There are no new focal motor or sensory deficits, normal muscle tone and bulk, no abnormal sensation, normal speech, cranial nerves II through XII grossly intact  Skin: No gross lesions, rashes, bruising or bleeding on exposed skin area  Extremities: peripheral pulses palpable, no pedal edema or calf pain with palpation      Investigations:      Laboratory Testing:  Recent Results (from the past 24 hour(s))   Troponin    Collection Time: 02/20/20  3:10 AM   Result Value Ref Range    Troponin, High Sensitivity 409 (HH) 0 - 14 ng/L    Troponin T NOT REPORTED <0.03 ng/mL    Troponin Interp NOT REPORTED    Protime-INR    Collection Time: 02/20/20  4:01 AM   Result Value Ref Range    Protime 10.3 9.0 - 12.0 sec    INR 1.0    APTT    Collection Time: 02/20/20  4:01 AM   Result Value Ref Range    PTT 24.8 20.5 - 30.5 sec   EKG 12 Lead    Collection Time: 02/20/20  4:24 AM   Result Value Ref Range    Ventricular Rate 85 BPM    Atrial Rate 85 BPM    P-R Interval 136 ms    QRS Duration 98 ms    Q-T Interval 424 ms    QTc Calculation (Bazett) 504 ms    P Axis 54 degrees    R Axis 68 degrees    T Axis 115 degrees   Troponin    Collection Time: 02/20/20  6:57 AM   Result Value Ref Range    Troponin, High Sensitivity 476 (HH) 0 - 14 ng/L    Troponin T NOT REPORTED <0.03 ng/mL    Troponin Interp NOT REPORTED    Urinalysis with microscopic    Collection Time: 02/20/20  7:15 AM   Result Value Ref Range    Color, UA YELLOW YELLOW    Turbidity UA CLEAR CLEAR    Glucose, Ur 1+ (A) NEGATIVE    Bilirubin Urine NEGATIVE NEGATIVE    Ketones, Urine TRACE (A) NEGATIVE    Specific Gravity, UA 1.017 1.005 - 1.030    Urine Hgb SMALL (A) NEGATIVE    pH, UA 6.0 5.0 - 8.0    Protein, UA 3+ (A) NEGATIVE    Urobilinogen, Urine Normal Normal    Nitrite, Urine NEGATIVE NEGATIVE    Leukocyte Esterase, Urine NEGATIVE NEGATIVE    -          WBC, UA 2 TO 5 0 - Anemia in stage 4 chronic kidney disease ([facility] 2/21/2020 Yes    Overview Signed 7/29/2017  8:09 PM by [facility]     Updating deleted diagnoses         Type 2 diabetes mellitus with diabetic chronic kidney disease ([facility] 2/21/2020 Yes    Essential hypertension 2/21/2020 Yes    Hypercholesteremia 2/21/2020 Yes    HERNÁN (acute kidney injury) ([facility] 2/20/2020 Yes    NSTEMI (non-ST elevated myocardial infarction) ([facility] 2/21/2020 Yes          Plan:     Patient status inpatient in the Progressive Unit/Step down  - continue heparin WBP, Asa,plavix, statin,BB. Appreciate cardiology input. recommending cardiac cath . ECHO pending.   - continue IV diuresis, monitor renal function. Appreciate nephrology input. D/w patient : likely would need HD after cardiac cath. She had multiple questions about timeline of HD, logistics of HD over long term. Answered questions to best of ability. She is still undecided about proceeding with cardiac cath. Would like to discuss with Dr Lily Villafana before [facility] a decision.   - restart on home dose of insulin. Monitor Bs , ISS will adjust as needed. Check hba1c.   - GI prophylaxis. Consultations:   IP CONSULT TO CARDIOLOGY  IP CONSULT TO INTERNAL MEDICINE  IP CONSULT TO NEPHROLOGY     Patient is admitted as inpatient status because of co-morbidities listed above, severity of signs and symptoms as outlined, requirement for current medical therapies and most importantly because of direct risk to patient if care not provided in a hospital setting.     Violeta Brewer MD    Copy sent to Dr. Aurelia Juarez PA-C

## 2020-02-21 NOTE — PROGRESS NOTES
All fall risk precautions in place, Left in chair, Call light within reach, Gait belt, Patient at risk for falls, Nurse notified  Restraints  Initially in place: No     Therapy Time   Individual Concurrent Group Co-treatment   Time In 1033         Time Out 1118         Minutes 45         Timed Code Treatment Minutes: 40 Minutes(session paused for blood draw)       [address], PTA

## 2020-02-22 LAB
ABSOLUTE EOS #: 0 K/UL (ref 0–0.4)
ABSOLUTE IMMATURE GRANULOCYTE: 0 K/UL (ref 0–0.3)
ABSOLUTE LYMPH #: 0.49 K/UL (ref 1–4.8)
ABSOLUTE MONO #: 0.25 K/UL (ref 0.1–0.8)
ANION GAP SERPL CALCULATED.3IONS-SCNC: 20 MMOL/L (ref 9–17)
BASOPHILS # BLD: 0 % (ref 0–2)
BASOPHILS ABSOLUTE: 0 K/UL (ref 0–0.2)
BUN BLDV-MCNC: 68 MG/DL (ref 8–23)
BUN/CREAT BLD: ABNORMAL (ref 9–20)
CALCIUM SERPL-MCNC: 9.1 MG/DL (ref 8.6–10.4)
CHLORIDE BLD-SCNC: 94 MMOL/L (ref 98–107)
CO2: 18 MMOL/L (ref 20–31)
CREAT SERPL-MCNC: 4.47 MG/DL (ref 0.5–0.9)
DIFFERENTIAL TYPE: ABNORMAL
EOSINOPHILS RELATIVE PERCENT: 0 % (ref 1–4)
GFR AFRICAN AMERICAN: 12 ML/MIN
GFR NON-AFRICAN AMERICAN: 10 ML/MIN
GFR SERPL CREATININE-BSD FRML MDRD: ABNORMAL ML/MIN/{1.73_M2}
GFR SERPL CREATININE-BSD FRML MDRD: ABNORMAL ML/MIN/{1.73_M2}
GLUCOSE BLD-MCNC: 212 MG/DL (ref 65–105)
GLUCOSE BLD-MCNC: 231 MG/DL (ref 65–105)
GLUCOSE BLD-MCNC: 246 MG/DL (ref 70–99)
GLUCOSE BLD-MCNC: 277 MG/DL (ref 65–105)
GLUCOSE BLD-MCNC: 284 MG/DL (ref 65–105)
HAV IGM SER IA-ACNC: NONREACTIVE
HCT VFR BLD CALC: 25.5 % (ref 36.3–47.1)
HEMOGLOBIN: 8.5 G/DL (ref 11.9–15.1)
HEPATITIS B CORE IGM ANTIBODY: NONREACTIVE
HEPATITIS B SURFACE ANTIGEN: NONREACTIVE
HEPATITIS C ANTIBODY: NONREACTIVE
IMMATURE GRANULOCYTES: 0 %
LYMPHOCYTES # BLD: 2 % (ref 24–44)
MAGNESIUM: 1.8 MG/DL (ref 1.6–2.6)
MCH RBC QN AUTO: 31.6 PG (ref 25.2–33.5)
MCHC RBC AUTO-ENTMCNC: 33.3 G/DL (ref 28.4–34.8)
MCV RBC AUTO: 94.8 FL (ref 82.6–102.9)
MONOCYTES # BLD: 1 % (ref 1–7)
MORPHOLOGY: NORMAL
NRBC AUTOMATED: 0 PER 100 WBC
PARTIAL THROMBOPLASTIN TIME: 50 SEC (ref 20.5–30.5)
PDW BLD-RTO: 13.3 % (ref 11.8–14.4)
PHOSPHORUS: 5.5 MG/DL (ref 2.6–4.5)
PLATELET # BLD: 234 K/UL (ref 138–453)
PLATELET ESTIMATE: ABNORMAL
PMV BLD AUTO: 12.2 FL (ref 8.1–13.5)
POTASSIUM SERPL-SCNC: 4.2 MMOL/L (ref 3.7–5.3)
RBC # BLD: 2.69 M/UL (ref 3.95–5.11)
RBC # BLD: ABNORMAL 10*6/UL
SEG NEUTROPHILS: 97 % (ref 36–66)
SEGMENTED NEUTROPHILS ABSOLUTE COUNT: 23.96 K/UL (ref 1.8–7.7)
SODIUM BLD-SCNC: 132 MMOL/L (ref 135–144)
TROPONIN INTERP: ABNORMAL
TROPONIN T: ABNORMAL NG/ML
TROPONIN, HIGH SENSITIVITY: 430 NG/L (ref 0–14)
TROPONIN, HIGH SENSITIVITY: 496 NG/L (ref 0–14)
TROPONIN, HIGH SENSITIVITY: 600 NG/L (ref 0–14)
WBC # BLD: 24.7 K/UL (ref 3.5–11.3)
WBC # BLD: ABNORMAL 10*3/UL

## 2020-02-22 PROCEDURE — 82947 ASSAY GLUCOSE BLOOD QUANT: CPT

## 2020-02-22 PROCEDURE — 99232 SBSQ HOSP IP/OBS MODERATE 35: CPT | Performed by: INTERNAL MEDICINE

## 2020-02-22 PROCEDURE — 90935 HEMODIALYSIS ONE EVALUATION: CPT

## 2020-02-22 PROCEDURE — 2700000000 HC OXYGEN THERAPY PER DAY

## 2020-02-22 PROCEDURE — 6360000002 HC RX W HCPCS: Performed by: GENERAL PRACTICE

## 2020-02-22 PROCEDURE — 80074 ACUTE HEPATITIS PANEL: CPT

## 2020-02-22 PROCEDURE — 6370000000 HC RX 637 (ALT 250 FOR IP): Performed by: STUDENT IN AN ORGANIZED HEALTH CARE EDUCATION/TRAINING PROGRAM

## 2020-02-22 PROCEDURE — 6360000002 HC RX W HCPCS: Performed by: INTERNAL MEDICINE

## 2020-02-22 PROCEDURE — 6370000000 HC RX 637 (ALT 250 FOR IP): Performed by: INTERNAL MEDICINE

## 2020-02-22 PROCEDURE — 2580000003 HC RX 258: Performed by: NURSE PRACTITIONER

## 2020-02-22 PROCEDURE — 83735 ASSAY OF MAGNESIUM: CPT

## 2020-02-22 PROCEDURE — 6360000002 HC RX W HCPCS: Performed by: NURSE PRACTITIONER

## 2020-02-22 PROCEDURE — 36415 COLL VENOUS BLD VENIPUNCTURE: CPT

## 2020-02-22 PROCEDURE — 85025 COMPLETE CBC W/AUTO DIFF WBC: CPT

## 2020-02-22 PROCEDURE — 84100 ASSAY OF PHOSPHORUS: CPT

## 2020-02-22 PROCEDURE — 6370000000 HC RX 637 (ALT 250 FOR IP): Performed by: NURSE PRACTITIONER

## 2020-02-22 PROCEDURE — 2500000003 HC RX 250 WO HCPCS: Performed by: INTERNAL MEDICINE

## 2020-02-22 PROCEDURE — 93005 ELECTROCARDIOGRAM TRACING: CPT | Performed by: INTERNAL MEDICINE

## 2020-02-22 PROCEDURE — 5A1D70Z PERFORMANCE OF URINARY FILTRATION, INTERMITTENT, LESS THAN 6 HOURS PER DAY: ICD-10-PCS | Performed by: INTERNAL MEDICINE

## 2020-02-22 PROCEDURE — 2500000003 HC RX 250 WO HCPCS: Performed by: STUDENT IN AN ORGANIZED HEALTH CARE EDUCATION/TRAINING PROGRAM

## 2020-02-22 PROCEDURE — 84484 ASSAY OF TROPONIN QUANT: CPT

## 2020-02-22 PROCEDURE — 2060000000 HC ICU INTERMEDIATE R&B

## 2020-02-22 PROCEDURE — 2500000003 HC RX 250 WO HCPCS: Performed by: NURSE PRACTITIONER

## 2020-02-22 PROCEDURE — 80048 BASIC METABOLIC PNL TOTAL CA: CPT

## 2020-02-22 PROCEDURE — 85730 THROMBOPLASTIN TIME PARTIAL: CPT

## 2020-02-22 PROCEDURE — 6360000002 HC RX W HCPCS: Performed by: STUDENT IN AN ORGANIZED HEALTH CARE EDUCATION/TRAINING PROGRAM

## 2020-02-22 PROCEDURE — 94640 AIRWAY INHALATION TREATMENT: CPT

## 2020-02-22 RX ORDER — AMLODIPINE BESYLATE 10 MG/1
10 TABLET ORAL DAILY
Status: DISCONTINUED | OUTPATIENT
Start: 2020-02-22 | End: 2020-02-22

## 2020-02-22 RX ORDER — ALBUTEROL SULFATE 2.5 MG/3ML
2.5 SOLUTION RESPIRATORY (INHALATION) EVERY 4 HOURS PRN
Status: DISCONTINUED | OUTPATIENT
Start: 2020-02-22 | End: 2020-02-27

## 2020-02-22 RX ORDER — CARVEDILOL 25 MG/1
25 TABLET ORAL 2 TIMES DAILY
Status: DISCONTINUED | OUTPATIENT
Start: 2020-02-22 | End: 2020-03-01

## 2020-02-22 RX ORDER — LORAZEPAM 2 MG/ML
0.5 INJECTION INTRAMUSCULAR EVERY 4 HOURS PRN
Status: DISCONTINUED | OUTPATIENT
Start: 2020-02-22 | End: 2020-02-24

## 2020-02-22 RX ORDER — LISINOPRIL 5 MG/1
5 TABLET ORAL 2 TIMES DAILY
Status: DISCONTINUED | OUTPATIENT
Start: 2020-02-22 | End: 2020-02-23

## 2020-02-22 RX ORDER — ALBUTEROL SULFATE 2.5 MG/3ML
2.5 SOLUTION RESPIRATORY (INHALATION)
Status: DISCONTINUED | OUTPATIENT
Start: 2020-02-22 | End: 2020-02-22

## 2020-02-22 RX ORDER — LABETALOL HYDROCHLORIDE 5 MG/ML
10 INJECTION, SOLUTION INTRAVENOUS EVERY 4 HOURS PRN
Status: DISCONTINUED | OUTPATIENT
Start: 2020-02-22 | End: 2020-03-02

## 2020-02-22 RX ORDER — HEPARIN SODIUM 1000 [USP'U]/ML
1300 INJECTION, SOLUTION INTRAVENOUS; SUBCUTANEOUS PRN
Status: DISCONTINUED | OUTPATIENT
Start: 2020-02-22 | End: 2020-03-12 | Stop reason: HOSPADM

## 2020-02-22 RX ORDER — HYDRALAZINE HYDROCHLORIDE 25 MG/1
25 TABLET, FILM COATED ORAL ONCE
Status: COMPLETED | OUTPATIENT
Start: 2020-02-22 | End: 2020-02-22

## 2020-02-22 RX ORDER — MAGNESIUM SULFATE 1 G/100ML
2 INJECTION INTRAVENOUS ONCE
Status: COMPLETED | OUTPATIENT
Start: 2020-02-22 | End: 2020-02-22

## 2020-02-22 RX ORDER — HEPARIN SODIUM 1000 [USP'U]/ML
1200 INJECTION, SOLUTION INTRAVENOUS; SUBCUTANEOUS PRN
Status: DISCONTINUED | OUTPATIENT
Start: 2020-02-22 | End: 2020-03-12 | Stop reason: HOSPADM

## 2020-02-22 RX ADMIN — METOPROLOL TARTRATE 5 MG: 5 INJECTION, SOLUTION INTRAVENOUS at 01:44

## 2020-02-22 RX ADMIN — CARVEDILOL 25 MG: 25 TABLET, FILM COATED ORAL at 17:03

## 2020-02-22 RX ADMIN — HEPARIN SODIUM 18 UNITS/KG/HR: 10000 INJECTION, SOLUTION INTRAVENOUS at 03:46

## 2020-02-22 RX ADMIN — CHOLECALCIFEROL TAB 10 MCG (400 UNIT) 400 UNITS: 10 TAB at 08:49

## 2020-02-22 RX ADMIN — CARVEDILOL 25 MG: 25 TABLET, FILM COATED ORAL at 21:53

## 2020-02-22 RX ADMIN — HYDRALAZINE HYDROCHLORIDE 25 MG: 25 TABLET, FILM COATED ORAL at 05:42

## 2020-02-22 RX ADMIN — INSULIN LISPRO 3 UNITS: 100 INJECTION, SOLUTION INTRAVENOUS; SUBCUTANEOUS at 11:35

## 2020-02-22 RX ADMIN — BUMETANIDE 2 MG: 0.25 INJECTION INTRAMUSCULAR; INTRAVENOUS at 08:49

## 2020-02-22 RX ADMIN — ALLOPURINOL 300 MG: 300 TABLET ORAL at 08:49

## 2020-02-22 RX ADMIN — NITROGLYCERIN 90 MCG/MIN: 20 INJECTION INTRAVENOUS at 11:35

## 2020-02-22 RX ADMIN — SODIUM BICARBONATE 1300 MG: 650 TABLET ORAL at 21:53

## 2020-02-22 RX ADMIN — LISINOPRIL 5 MG: 5 TABLET ORAL at 21:53

## 2020-02-22 RX ADMIN — METHYLPREDNISOLONE SODIUM SUCCINATE 40 MG: 125 INJECTION, POWDER, FOR SOLUTION INTRAMUSCULAR; INTRAVENOUS at 01:13

## 2020-02-22 RX ADMIN — HEPARIN SODIUM 1300 UNITS: 1000 INJECTION INTRAVENOUS; SUBCUTANEOUS at 13:00

## 2020-02-22 RX ADMIN — FOLIC ACID 1 MG: 1 TABLET ORAL at 21:53

## 2020-02-22 RX ADMIN — DESMOPRESSIN ACETATE 40 MG: 0.2 TABLET ORAL at 21:23

## 2020-02-22 RX ADMIN — SODIUM CHLORIDE, PRESERVATIVE FREE 10 ML: 5 INJECTION INTRAVENOUS at 08:49

## 2020-02-22 RX ADMIN — INSULIN GLARGINE 25 UNITS: 100 INJECTION, SOLUTION SUBCUTANEOUS at 21:54

## 2020-02-22 RX ADMIN — LISINOPRIL 5 MG: 5 TABLET ORAL at 17:03

## 2020-02-22 RX ADMIN — INSULIN LISPRO 2 UNITS: 100 INJECTION, SOLUTION INTRAVENOUS; SUBCUTANEOUS at 08:48

## 2020-02-22 RX ADMIN — FAMOTIDINE 20 MG: 20 TABLET, FILM COATED ORAL at 08:49

## 2020-02-22 RX ADMIN — INSULIN LISPRO 2 UNITS: 100 INJECTION, SOLUTION INTRAVENOUS; SUBCUTANEOUS at 21:54

## 2020-02-22 RX ADMIN — IPRATROPIUM BROMIDE AND ALBUTEROL SULFATE 1 AMPULE: .5; 3 SOLUTION RESPIRATORY (INHALATION) at 12:57

## 2020-02-22 RX ADMIN — LORAZEPAM 0.5 MG: 2 INJECTION INTRAMUSCULAR; INTRAVENOUS at 02:47

## 2020-02-22 RX ADMIN — IPRATROPIUM BROMIDE AND ALBUTEROL SULFATE 1 AMPULE: .5; 3 SOLUTION RESPIRATORY (INHALATION) at 19:59

## 2020-02-22 RX ADMIN — LORAZEPAM 0.5 MG: 2 INJECTION INTRAMUSCULAR; INTRAVENOUS at 14:18

## 2020-02-22 RX ADMIN — CLOPIDOGREL 75 MG: 75 TABLET, FILM COATED ORAL at 08:49

## 2020-02-22 RX ADMIN — MAGNESIUM SULFATE HEPTAHYDRATE 2 G: 1 INJECTION, SOLUTION INTRAVENOUS at 05:40

## 2020-02-22 RX ADMIN — HYDRALAZINE HYDROCHLORIDE 25 MG: 25 TABLET, FILM COATED ORAL at 02:21

## 2020-02-22 RX ADMIN — IPRATROPIUM BROMIDE AND ALBUTEROL SULFATE 1 AMPULE: .5; 3 SOLUTION RESPIRATORY (INHALATION) at 08:51

## 2020-02-22 RX ADMIN — SODIUM BICARBONATE 1300 MG: 650 TABLET ORAL at 08:49

## 2020-02-22 RX ADMIN — LABETALOL HCL 300 MG: 200 TABLET, FILM COATED ORAL at 08:49

## 2020-02-22 RX ADMIN — NITROGLYCERIN 150 MCG/MIN: 20 INJECTION INTRAVENOUS at 03:08

## 2020-02-22 RX ADMIN — ASPIRIN 81 MG: 81 TABLET, CHEWABLE ORAL at 08:49

## 2020-02-22 RX ADMIN — HEPARIN SODIUM 1200 UNITS: 1000 INJECTION INTRAVENOUS; SUBCUTANEOUS at 16:30

## 2020-02-22 RX ADMIN — CHOLECALCIFEROL TAB 10 MCG (400 UNIT) 400 UNITS: 10 TAB at 21:53

## 2020-02-22 RX ADMIN — FOLIC ACID 1 MG: 1 TABLET ORAL at 08:49

## 2020-02-22 ASSESSMENT — PAIN SCALES - GENERAL
PAINLEVEL_OUTOF10: 0
PAINLEVEL_OUTOF10: 0

## 2020-02-22 NOTE — PLAN OF CARE
Problem: Infection:  Goal: Will remain free from infection  Description  Will remain free from infection  Outcome: Met This Shift     Problem: Safety:  Goal: Free from accidental physical injury  Description  Free from accidental physical injury  Outcome: Met This Shift  Goal: Free from intentional harm  Description  Free from intentional harm  Outcome: Met This Shift     Problem: Daily Care:  Goal: Daily care needs are met  Description  Daily care needs are met  Outcome: Met This Shift     Problem: Pain:  Goal: Patient's pain/discomfort is manageable  Description  Patient's pain/discomfort is manageable  Outcome: Met This Shift     Problem: Skin Integrity:  Goal: Skin integrity will stabilize  Description  Skin integrity will stabilize  Outcome: Met This Shift     Problem: Discharge Planning:  Goal: Patients continuum of care needs are met  Description  Patients continuum of care needs are met  Outcome: Met This Shift     Problem: Falls - Risk of:  Goal: Will remain free from falls  Description  Will remain free from falls  Outcome: Met This Shift  Goal: Absence of physical injury  Description  Absence of physical injury  Outcome: Met This Shift

## 2020-02-22 NOTE — PROGRESS NOTES
Nephrology Progress Note      SUBJECTIVE       The patient is seen and examined. Chronic kidney disease stage IV-V patient with acute on chronic kidney injury in the setting of non-ST elevation myocardial infarction and decompensated CHF. Bumex has helped some with breathing and other symptoms of CHF including weakness. Dr Fernanda Teran discussed at length the patient will require dialysis if she proceeds with cardiac catheterization and any other subsequent intervention/surgery. She understands completely. She has consented to dialysis. She would eventually like to have peritoneal dialysis as an outpatient. If she does not want dialysis then palliative measures/hospice would be appropriate. She understands this as well. Dr Fernanda Teran discussed at length the need to stop smoking. also the need for a dialysis catheter placement which was placed yesterday. Creat now upto 4.4, BP fluctuates intermittent CP. HD planned for today.       OBJECTIVE      CURRENT TEMPERATURE:  Temp: 97.6 °F (36.4 °C)  MAXIMUM TEMPERATURE OVER 24HRS:  Temp (24hrs), Av.9 °F (36.6 °C), Min:97.6 °F (36.4 °C), Max:98.2 °F (36.8 °C)    CURRENT RESPIRATORY RATE:  Resp: 16  CURRENT PULSE:  Pulse: 90  CURRENT BLOOD PRESSURE:  BP: (!) 146/60  24HR BLOOD PRESSURE RANGE:  Systolic (19WFJ), USV:895 , Min:132 , TPD:173   ; Diastolic (91VYT), BROCK:38, Min:55, Max:111    24HR INTAKE/OUTPUT:      Intake/Output Summary (Last 24 hours) at 2020 1235  Last data filed at 2020 1212  Gross per 24 hour   Intake --   Output 2300 ml   Net -2300 ml       PHYSICAL EXAM      GENERAL APPEARANCE:Awake, alert, in no acute distress  SKIN: warm and dry, no rash or erythema  EYES: conjunctivae normal and sclera anicteric  ENT: no thrush, moist mucous membranes   NECK:  Some JVD, no significant accessory muscle use, midline trachea  PULMONARY: bilateral air entry with bilateral rales in the bases and decreased breath sounds as well with some scattered

## 2020-02-22 NOTE — PROGRESS NOTES
[facility]    Progress Note    2/21/2020      Name:   Sean Vyas  MRN:     [id number]     Acct:      [de-identified]   Room:   [address] [place], [place] [address] Day:  1  Admit Date:  2/19/2020 11:01 PM    PCP:   Alexx Rehman PA-C  Code Status:  Full Code    Subjective:     C/C:   Chief Complaint   Patient presents with    Shortness of Breath     Interval History Status: improved. Patient sitting in bedside chair. States breathing is improved. Denied chest pain/palpitations. No cough/fever/chills. Dr Jack Marte visited this morning, patient very happy about it. Denies any further questions regarding cath /HD. Is agreeable with cardiac cath. No acute events overnight. Hemodynamically stable. HR in 90s , SBP 160s. On 2-3 lit NC o2.   Labs:CBC, BMP reviewed. K 4.2, BUN/Cr 46/4.36, HS trop 500, WBC 22, hb 10.3,   U/o: 900  CXR : improved pulm edema. No clear consolidation. Brief History:   79 y/o presented to ED with c/o SOB which started last evening, which was initially on exertion but then at rest as well. Denies asso chest pain. EMS noted hypoxia sats in 80s. ED w/u: CXR pulm edema, HStrops >400, cr 3.97. EKG with inferolateral ST depressions. Known prior h/o CKD4 baseline Cr 3.2-3.5, left sided CVA , HPL, gout, DM2, AOCD, COPD.   prior ECHO 12/2016: EF >55%, mild pulm HTN  U/a negative for UTI. Review of Systems:     Constitutional:  negative for chills, fevers, sweats  Respiratory:  negative for cough, +dyspnea on exertion, shortness of breath, no wheezing  Cardiovascular:  negative for chest pain, chest pressure/discomfort,+ lower extremity edema, no palpitations  Gastrointestinal:  negative for abdominal pain, constipation, diarrhea, nausea, vomiting  Neurological:  negative for dizziness, headache    Medications:      Allergies:  No Known Allergies    Current Meds:   Scheduled Meds:    allopurinol  300 mg Oral Daily    clopidogrel  75 mg Oral Daily    folic acid  1 mg Oral BID    insulin glargine  25 Units Subcutaneous Nightly    labetalol  300 mg Oral BID    vitamin D3  400 Units Oral BID    sodium chloride flush  10 mL Intravenous 2 times per day    insulin lispro  0-6 Units Subcutaneous TID WC    insulin lispro  0-3 Units Subcutaneous Nightly    ipratropium-albuterol  1 ampule Inhalation Q4H WA    methylPREDNISolone  40 mg Intravenous Q6H    Followed by   Ulysess Person ON 2020] predniSONE  40 mg Oral Daily    famotidine  20 mg Oral Daily    sodium bicarbonate  1,300 mg Oral BID    aspirin  81 mg Oral Daily    bumetanide  2 mg Intravenous Daily    hydrALAZINE  25 mg Oral 3 times per day    atorvastatin  40 mg Oral Nightly     Continuous Infusions:    nitroGLYCERIN 170 mcg/min (20)    dextrose      heparin (porcine) 18 Units/kg/hr (20)     PRN Meds: nitroGLYCERIN, sodium chloride flush, acetaminophen, acetaminophen, promethazine, ondansetron, nicotine, glucose, dextrose, glucagon (rDNA), dextrose, polyethylene glycol, albuterol, heparin (porcine), heparin (porcine), metoprolol, albuterol **AND** [] ipratropium, albuterol **AND** [COMPLETED] ipratropium    Data:     Past Medical History:   has a past medical history of Acute CHF (congestive heart failure) ([facility] Anemia in chronic kidney disease, Anemia in stage 4 chronic kidney disease ([facility] Cerebral artery occlusion with cerebral infarction [facility] Chronic kidney disease, Gout, arthritis, Hyperlipidemia, Hypertension, Left sided lacunar infarction [facility]), Peripheral vascular disease ([facility] and Type II or unspecified type diabetes mellitus without mention of complication, not stated as uncontrolled. Social History:   reports that she has been smoking cigarettes. She started smoking about 46 years ago. She has a 40.00 pack-year smoking history. She uses smokeless tobacco. She reports that she does not drink alcohol or use drugs.      Family History: Family History   Problem Relation Age of Onset    Diabetes Mother     Heart Disease Father        Vitals:  BP (!) 154/55   Pulse 97   Temp 97.9 °F (36.6 °C) (Oral)   Resp 15   Wt 170 lb (77.1 kg)   SpO2 98%   BMI 29.18 kg/m²   Temp (24hrs), Av °F (36.7 °C), Min:97.8 °F (36.6 °C), Max:98.2 °F (36.8 °C)    Recent Labs     20  0651 20  1133 20  1639 20   POCGLU 207* 258* 198* 367*       I/O (24Hr): Intake/Output Summary (Last 24 hours) at 2020 235  Last data filed at 2020 233  Gross per 24 hour   Intake 366 ml   Output 1800 ml   Net -1434 ml       Labs:  Hematology:  Recent Labs     20  23320  0401 20   WBC 15.3*  --  22.0*   RBC 3.34*  --  3.23*   HGB 10.4*  --  10.3*   HCT 31.3*  --  31.0*   MCV 93.7  --  96.0   MCH 31.1  --  31.9   MCHC 33.2  --  33.2   RDW 13.7  --  13.5     --  252   MPV 11.4  --  12.0   INR  --  1.0 1.0     Chemistry:  Recent Labs     20  2311 20  23320  0657 20  1812 20  0439 20   NA  --  142  --   --   --  130*  --    K  --  3.8  --   --   --  4.2  --    CL  --  104  --   --   --  96*  --    CO2  --  16*  --   --   --  15*  --    GLUCOSE  --  267*  --   --   --  242*  --    BUN  --  24*  --   --   --  46*  --    CREATININE 4.14* 3.97*  --   --   --  4.36*  --    MG  --  1.5*  --   --   --  1.9  --    ANIONGAP  --  22*  --   --   --  19*  --    LABGLOM 11* 11*  --   --   --  10*  --    GFRAA  --  14*  --   --   --  12*  --    CALCIUM  --  9.5  --   --   --  9.6  --    PROBNP  --  7,287*  --   --   --   --   --    TROPHS  --  56*   < > 476* 500*  --  365*    < > = values in this interval not displayed.      Recent Labs     20  1645 20  2110 20  0439 20  0651 20  1133 20  1639 20   PROT  --   --  7.2  --   --   --   --    LABALBU  --   --  4.2  --   --   --   --    LABA1C  --   --  6.8*  --   --   --   --    AST  -- --  25  --   --   --   --    ALT  --   --  13  --   --   --   --    ALKPHOS  --   --  103  --   --   --   --    BILITOT  --   --  0.29*  --   --   --   --    POCGLU 301* 254*  --  207* 258* 198* 367*     ABG:  Lab Results   Component Value Date    FIO2 NOT REPORTED 02/19/2020     Lab Results   Component Value Date/Time    SPECIAL NOT REPORTED 12/19/2016 10:11 PM     Lab Results   Component Value Date/Time    CULTURE NO SIGNIFICANT GROWTH 12/19/2016 10:11 PM    CULTURE  12/19/2016 10:11 PM     [facility] [address] [facility], [address] [place] [address] [phone]       Radiology:  Us Renal Complete    Result Date: 2/20/2020  Echogenic kidneys consistent with intrinsic renal parenchymal disease. Xr Chest Portable    Result Date: 2/21/2020  1. Interval decrease in now mild pulmonary interstitial edema. 2. Suspected right basilar multifocal ill-defined consolidation consistent with overlying alveolar edema versus pneumonia. Xr Chest Portable    Result Date: 2/19/2020  Moderate edema     Ir Nontunneled Vascular Catheter > 5 Years    Result Date: 2/21/2020  Successful ultrasound and fluoroscopy guided non-tunneled dialysis catheter placement. Okay to use dialysis catheter. Physical Examination:        General appearance:  alert, cooperative and no distress, obese body habitus. Mental Status:  oriented to person, place and time and normal affect  Lungs:  Improved air entry. Basal b/l crackles.    Heart:  regular rate and rhythm, no murmur  Abdomen:  soft, nontender, nondistended, normal bowel sounds,  Extremities: +b/l pedal  Edema,no  redness, tenderness in the calves  Skin:  no gross lesions, rashes, induration    Assessment:        Hospital Problems           Last Modified POA    * (Principal) Acute CHF (congestive heart failure) ([facility] 2/21/2020 Yes    Anemia in stage 4 chronic kidney disease ([facility] 2/21/2020 Yes    Overview Signed 7/29/2017  8:09 PM by Kady Saunders     Updating deleted diagnoses Type 2 diabetes mellitus with diabetic chronic kidney disease ([facility] 2/21/2020 Yes    Essential hypertension 2/21/2020 Yes    Hypercholesteremia 2/21/2020 Yes    HERNÁN (acute kidney injury) ([facility] 2/20/2020 Yes    NSTEMI (non-ST elevated myocardial infarction) ([facility] 2/21/2020 Yes          Plan:      - Acute CHF NSTEMI with HERNÁN on CKD4. Continue ASA, plavix, statin, heparin WBP. Patient now agreeable to cardiac cath. Will ask IR to place Gerson. D/w RN. Continue IV diuretics. Appreciate consultants input.   - Was started on steroids at admission as she has diminished air entry. Doubt COPD exacerbation. Clinically consistent with CHF. D/c steroids. Leukocytosis sec to steroids. No clear infection apparent clinically. Continue to monitor.   - uncontrolled blood glucose from steroids. Continue ISS for now along with home dose of lantus. Continue to monitor, will adjust as needed. - GI prophylaxis.      Deon Mccarthy MD  2/21/2020  11:54 PM

## 2020-02-22 NOTE — PROGRESS NOTES
[facility]   Progress Note                    Date:   2/22/2020  Patient name:  Alma Doll  Date of admission:  2/19/2020 11:01 PM  MRN:   [id number]  YOB: 1950  PCP:    Citlali Pop PA-C    Reason for Admission:  HERNÁN (acute kidney injury) ([facility] [N17.9]    Subjective:       Patient seen and examined. States still feeling anxious. Overnight events noted. ST changes persist and somewhat worse in anterolateral leads. No check pain or shortness of breath. Having withdrawal from smoking as well. D/W RN. I/O last 3 completed shifts: In: 366 [P.O.:100;  I.V.:266]  Out: 1500 [Urine:1500]  I/O this shift:  In: -   Out: 1300 [Urine:1300]      In: -   Out: 2500 [Urine:2500]      Intake/Output Summary (Last 24 hours) at 2/22/2020 0655  Last data filed at 2/22/2020 0636  Gross per 24 hour   Intake --   Output 2500 ml   Net -2500 ml       Medications:   Scheduled Meds:   magnesium sulfate  2 g Intravenous Once    allopurinol  300 mg Oral Daily    clopidogrel  75 mg Oral Daily    folic acid  1 mg Oral BID    insulin glargine  25 Units Subcutaneous Nightly    labetalol  300 mg Oral BID    vitamin D3  400 Units Oral BID    sodium chloride flush  10 mL Intravenous 2 times per day    insulin lispro  0-6 Units Subcutaneous TID WC    insulin lispro  0-3 Units Subcutaneous Nightly    ipratropium-albuterol  1 ampule Inhalation Q4H WA    methylPREDNISolone  40 mg Intravenous Q6H    famotidine  20 mg Oral Daily    sodium bicarbonate  1,300 mg Oral BID    aspirin  81 mg Oral Daily    bumetanide  2 mg Intravenous Daily    hydrALAZINE  25 mg Oral 3 times per day    atorvastatin  40 mg Oral Nightly     Continuous Infusions:   nitroGLYCERIN 90 mcg/min (02/22/20 0569)    dextrose      heparin (porcine) 18 Units/kg/hr (02/22/20 0348)     CBC:   Recent Labs     02/19/20  2331 02/21/20  0439 02/22/20  0315   WBC 15.3* 22.0* 24.7*   HGB 10.4* 10.3* 8.5*    252 234     BMP: Recent Labs     02/19/20  2331 02/21/20 0439 02/22/20  0315    130* 132*   K 3.8 4.2 4.2    96* 94*   CO2 16* 15* 18*   BUN 24* 46* 68*   CREATININE 3.97* 4.36* 4.47*   GLUCOSE 267* 242* 246*     Hepatic:   Recent Labs     02/21/20 0439   AST 25   ALT 13   BILITOT 0.29*   ALKPHOS 103     Troponin: No results for input(s): TROPONINI in the last 72 hours. Recent Labs     02/20/20  1812 02/21/20 2018 02/22/20 0315   TROPONINT NOT REPORTED NOT REPORTED NOT REPORTED     BNP:   Recent Labs     02/19/20 2331   PROBNP 7,287*      No results for input(s): BNP in the last 72 hours. Lipids: No results for input(s): CHOL, HDL in the last 72 hours. Invalid input(s): LDLCALCU  INR:   Recent Labs     02/20/20  0401 02/21/20 0439   INR 1.0 1.0       Objective:   Vitals: BP (!) 153/67   Pulse 88   Temp 98 °F (36.7 °C) (Oral)   Resp 14   Wt 170 lb (77.1 kg)   SpO2 97%   BMI 29.18 kg/m²    General appearance: awake, alert, in no apparent distress. HEENT: Head: Normocephalic, atraumatic without any obvious abnormalities. Neck: JVD absent   Lungs:good bilateral equal air entry. No adventitious lung sounds auscultated. Heart: S1, S2, regular, + systolic murmur. Abdomen: soft, nontender with bowel sounds present in all 4 quadrants. Extremities: Lower extremity edema is absent / present + bilaterally. Peripheral pulses in b/l LE present  Integumentum:Intact with no rashes noted.       Diagnostic Studies:     EKG:   Sinus rhythm with possible old septal infarct and inferolateral ST depressions     ECHO:   12/2016  Mild aortic stenosis by doppler (mean gradient 16mmHg, MVA 1.4cm2) and by  planimeter (1.25cm2). No aortic insufficiency  Pulmonic stenosis: mild . Mild pulmonary hypertension. Normal right ventricular size and function. Normal left ventricle size, wall thickness and function (systolic &  diastolic) with an estimated EF > 55%.   No segmental wall motion abnormalities seen     Patient Active Problem List:     Gout     Hypovitaminosis D     Anemia in stage 4 chronic kidney disease (HCC)     Type 2 diabetes mellitus with diabetic chronic kidney disease (HCC)     Chronic kidney disease (CKD)     Essential hypertension     Hypercholesteremia     Hypokalemia     Acute infarct posterior limb internal capsule on the left     Left sided lacunar infarction ([facility]     Type 2 diabetes mellitus with stage 4 chronic kidney disease, with long-term current use of insulin ([facility])     Cerebral aneurysm     Anemia due to stage 4 chronic kidney disease treated with erythropoietin ([facility])     Tobacco abuse     Secondary hyperparathyroidism of renal origin ([facility]     Persistent proteinuria     Metabolic acidosis     HERNÁN (acute kidney injury) ([facility]     NSTEMI (non-ST elevated myocardial infarction) ([facility]     Acute CHF (congestive heart failure) ([facility])      Assessment / Acute Cardiac Problems:   1. Acute hypoxic resp failure -  has baseline JAY NYHA III  2. Recent possible viral infection   3. NSTEMI- likely type 1 in the setting of uptrending and significantly elevated troponins  4. Acute CHF - volume overload in the setting of CKD  5. CKD  6. H/O CVA  7. DM-2      Plan of Treatment:   1. Continue plavix, start aspirin  2. Continue heparin infusion and nitro infusion  3. ECHO awaiting read  4. Nephrology input appreciated for cardiac cath clearance  5. S/P Gerson placement yesterday for likely HD after cath  6. Plan for cardiac cath on Monday if patient remains stable  7. Continue medical management for NSTEMI at this time    8. Currently chest pain free  9. Repeat EKG    Electronically signed on 02/22/20 at 6:56 AM by:    Zack Esteban MD   Fellow, [address]  Attending Physician Statement  I have discussed the care of Alma Doll, including pertinent history and exam findings,  with the resident.  I have seen and examined the patient and the key elements of all parts of the encounter have been performed by me. I agree with the assessment, plan and orders as documented by the resident. Will add Norvasc for uncontrolled HTN.

## 2020-02-23 LAB
ANION GAP SERPL CALCULATED.3IONS-SCNC: 19 MMOL/L (ref 9–17)
BUN BLDV-MCNC: 50 MG/DL (ref 8–23)
BUN/CREAT BLD: ABNORMAL (ref 9–20)
CALCIUM SERPL-MCNC: 9.5 MG/DL (ref 8.6–10.4)
CHLORIDE BLD-SCNC: 97 MMOL/L (ref 98–107)
CO2: 20 MMOL/L (ref 20–31)
CREAT SERPL-MCNC: 3.45 MG/DL (ref 0.5–0.9)
EKG ATRIAL RATE: 103 BPM
EKG ATRIAL RATE: 94 BPM
EKG ATRIAL RATE: 98 BPM
EKG P AXIS: 51 DEGREES
EKG P AXIS: 58 DEGREES
EKG P AXIS: 62 DEGREES
EKG P-R INTERVAL: 124 MS
EKG P-R INTERVAL: 144 MS
EKG P-R INTERVAL: 150 MS
EKG Q-T INTERVAL: 352 MS
EKG Q-T INTERVAL: 360 MS
EKG Q-T INTERVAL: 370 MS
EKG QRS DURATION: 100 MS
EKG QRS DURATION: 102 MS
EKG QRS DURATION: 98 MS
EKG QTC CALCULATION (BAZETT): 459 MS
EKG QTC CALCULATION (BAZETT): 461 MS
EKG QTC CALCULATION (BAZETT): 462 MS
EKG R AXIS: 73 DEGREES
EKG R AXIS: 77 DEGREES
EKG R AXIS: 83 DEGREES
EKG T AXIS: -159 DEGREES
EKG T AXIS: -89 DEGREES
EKG T AXIS: -95 DEGREES
EKG VENTRICULAR RATE: 103 BPM
EKG VENTRICULAR RATE: 94 BPM
EKG VENTRICULAR RATE: 98 BPM
GFR AFRICAN AMERICAN: 16 ML/MIN
GFR NON-AFRICAN AMERICAN: 13 ML/MIN
GFR SERPL CREATININE-BSD FRML MDRD: ABNORMAL ML/MIN/{1.73_M2}
GFR SERPL CREATININE-BSD FRML MDRD: ABNORMAL ML/MIN/{1.73_M2}
GLUCOSE BLD-MCNC: 106 MG/DL (ref 65–105)
GLUCOSE BLD-MCNC: 164 MG/DL (ref 65–105)
GLUCOSE BLD-MCNC: 256 MG/DL (ref 65–105)
GLUCOSE BLD-MCNC: 64 MG/DL (ref 70–99)
GLUCOSE BLD-MCNC: 99 MG/DL (ref 65–105)
PARTIAL THROMBOPLASTIN TIME: 62.7 SEC (ref 20.5–30.5)
POTASSIUM SERPL-SCNC: 3.6 MMOL/L (ref 3.7–5.3)
SODIUM BLD-SCNC: 136 MMOL/L (ref 135–144)
TROPONIN INTERP: ABNORMAL
TROPONIN T: ABNORMAL NG/ML
TROPONIN, HIGH SENSITIVITY: 687 NG/L (ref 0–14)
TROPONIN, HIGH SENSITIVITY: 750 NG/L (ref 0–14)
TROPONIN, HIGH SENSITIVITY: 889 NG/L (ref 0–14)

## 2020-02-23 PROCEDURE — 2500000003 HC RX 250 WO HCPCS: Performed by: INTERNAL MEDICINE

## 2020-02-23 PROCEDURE — 2580000003 HC RX 258: Performed by: NURSE PRACTITIONER

## 2020-02-23 PROCEDURE — 6360000002 HC RX W HCPCS: Performed by: GENERAL PRACTICE

## 2020-02-23 PROCEDURE — 85730 THROMBOPLASTIN TIME PARTIAL: CPT

## 2020-02-23 PROCEDURE — 6370000000 HC RX 637 (ALT 250 FOR IP): Performed by: INTERNAL MEDICINE

## 2020-02-23 PROCEDURE — 82947 ASSAY GLUCOSE BLOOD QUANT: CPT

## 2020-02-23 PROCEDURE — 6370000000 HC RX 637 (ALT 250 FOR IP): Performed by: NURSE PRACTITIONER

## 2020-02-23 PROCEDURE — 80048 BASIC METABOLIC PNL TOTAL CA: CPT

## 2020-02-23 PROCEDURE — 6360000002 HC RX W HCPCS: Performed by: NURSE PRACTITIONER

## 2020-02-23 PROCEDURE — 36415 COLL VENOUS BLD VENIPUNCTURE: CPT

## 2020-02-23 PROCEDURE — 99232 SBSQ HOSP IP/OBS MODERATE 35: CPT | Performed by: INTERNAL MEDICINE

## 2020-02-23 PROCEDURE — 94761 N-INVAS EAR/PLS OXIMETRY MLT: CPT

## 2020-02-23 PROCEDURE — 84484 ASSAY OF TROPONIN QUANT: CPT

## 2020-02-23 PROCEDURE — 2060000000 HC ICU INTERMEDIATE R&B

## 2020-02-23 RX ORDER — LISINOPRIL 10 MG/1
10 TABLET ORAL 2 TIMES DAILY
Status: DISCONTINUED | OUTPATIENT
Start: 2020-02-23 | End: 2020-02-25

## 2020-02-23 RX ADMIN — CLOPIDOGREL 75 MG: 75 TABLET, FILM COATED ORAL at 09:15

## 2020-02-23 RX ADMIN — CARVEDILOL 25 MG: 25 TABLET, FILM COATED ORAL at 19:41

## 2020-02-23 RX ADMIN — DESMOPRESSIN ACETATE 40 MG: 0.2 TABLET ORAL at 19:40

## 2020-02-23 RX ADMIN — SODIUM CHLORIDE, PRESERVATIVE FREE 10 ML: 5 INJECTION INTRAVENOUS at 09:16

## 2020-02-23 RX ADMIN — INSULIN LISPRO 1 UNITS: 100 INJECTION, SOLUTION INTRAVENOUS; SUBCUTANEOUS at 13:30

## 2020-02-23 RX ADMIN — CARVEDILOL 25 MG: 25 TABLET, FILM COATED ORAL at 09:14

## 2020-02-23 RX ADMIN — LISINOPRIL 5 MG: 5 TABLET ORAL at 09:15

## 2020-02-23 RX ADMIN — LORAZEPAM 0.5 MG: 2 INJECTION INTRAMUSCULAR; INTRAVENOUS at 22:10

## 2020-02-23 RX ADMIN — BUMETANIDE 2 MG: 0.25 INJECTION INTRAMUSCULAR; INTRAVENOUS at 09:15

## 2020-02-23 RX ADMIN — ALLOPURINOL 300 MG: 300 TABLET ORAL at 09:15

## 2020-02-23 RX ADMIN — HEPARIN SODIUM 18 UNITS/KG/HR: 10000 INJECTION, SOLUTION INTRAVENOUS at 18:03

## 2020-02-23 RX ADMIN — CHOLECALCIFEROL TAB 10 MCG (400 UNIT) 400 UNITS: 10 TAB at 09:14

## 2020-02-23 RX ADMIN — FAMOTIDINE 20 MG: 20 TABLET, FILM COATED ORAL at 09:15

## 2020-02-23 RX ADMIN — FOLIC ACID 1 MG: 1 TABLET ORAL at 09:14

## 2020-02-23 RX ADMIN — CHOLECALCIFEROL TAB 10 MCG (400 UNIT) 400 UNITS: 10 TAB at 19:40

## 2020-02-23 RX ADMIN — SODIUM BICARBONATE 1300 MG: 650 TABLET ORAL at 09:14

## 2020-02-23 RX ADMIN — ASPIRIN 81 MG: 81 TABLET, CHEWABLE ORAL at 09:15

## 2020-02-23 RX ADMIN — FOLIC ACID 1 MG: 1 TABLET ORAL at 19:40

## 2020-02-23 RX ADMIN — LISINOPRIL 10 MG: 10 TABLET ORAL at 19:40

## 2020-02-23 RX ADMIN — SODIUM CHLORIDE, PRESERVATIVE FREE 10 ML: 5 INJECTION INTRAVENOUS at 19:41

## 2020-02-23 RX ADMIN — SODIUM BICARBONATE 1300 MG: 650 TABLET ORAL at 19:41

## 2020-02-23 ASSESSMENT — PAIN SCALES - GENERAL: PAINLEVEL_OUTOF10: 0

## 2020-02-23 NOTE — PROGRESS NOTES
Nephrology Progress Note      SUBJECTIVE       The patient is seen and examined. Chronic kidney disease stage IV-V patient with acute on chronic kidney injury in the setting of non-ST elevation myocardial infarction and decompensated CHF. Bumex has helped some with breathing and other symptoms of CHF including weakness. Dr Fernanda Teran discussed at length the patient will require dialysis if she proceeds with cardiac catheterization and any other subsequent intervention/surgery. She understands completely. She has consented to dialysis. She would eventually like to have peritoneal dialysis as an outpatient. Dr Fernanda Teran discussed at length the need to stop smoking. also the need for a dialysis catheter placement which was placed yesterday. Creat now upto 4.4, BP fluctuates intermittent CP. HD planned for today. Had HD yesterday 2 liters of fluid removed  For cath on Monday also needs tunnel cath placed which bhumi be tricky as the patient is on heparin for NSTEMI and poast cath of she ends with stents will need to start GP3B inhibitors.  Will need cardiology to talk to IR in that scenario to get a tunnel cath placed with anti plattlet agents on board    OBJECTIVE      CURRENT TEMPERATURE:  Temp: 98 °F (36.7 °C)  MAXIMUM TEMPERATURE OVER 24HRS:  Temp (24hrs), Av.1 °F (36.7 °C), Min:97.9 °F (36.6 °C), Max:98.4 °F (36.9 °C)    CURRENT RESPIRATORY RATE:  Resp: 20  CURRENT PULSE:  Pulse: 80  CURRENT BLOOD PRESSURE:  BP: (!) 152/71  24HR BLOOD PRESSURE RANGE:  Systolic (92GEL), IRB:731 , Min:130 , IES:607   ; Diastolic (57MLP), NXX:86, Min:61, Max:97    24HR INTAKE/OUTPUT:      Intake/Output Summary (Last 24 hours) at 2020 1620  Last data filed at 2020 1418  Gross per 24 hour   Intake 355 ml   Output 4690 ml   Net -4335 ml       PHYSICAL EXAM      GENERAL APPEARANCE:Awake, alert, in no acute distress  SKIN: warm and dry, no rash or erythema  EYES: conjunctivae normal and sclera anicteric  ENT: no thrush, 25,000 units in dextrose 5% 250 mL infusion, Continuous  famotidine (PEPCID) tablet 20 mg, Daily  sodium bicarbonate tablet 1,300 mg, BID  aspirin chewable tablet 81 mg, Daily  bumetanide (BUMEX) injection 2 mg, Daily  atorvastatin (LIPITOR) tablet 40 mg, Nightly          LABS      CBC:   Recent Labs     02/21/20 0439 02/22/20 0315   WBC 22.0* 24.7*   RBC 3.23* 2.69*   HGB 10.3* 8.5*   HCT 31.0* 25.5*   MCV 96.0 94.8   MCH 31.9 31.6   MCHC 33.2 33.3   RDW 13.5 13.3    234   MPV 12.0 12.2      BMP:   Recent Labs     02/21/20 0439 02/22/20 0315 02/23/20  0452   * 132* 136   K 4.2 4.2 3.6*   CL 96* 94* 97*   CO2 15* 18* 20   BUN 46* 68* 50*   CREATININE 4.36* 4.47* 3.45*   GLUCOSE 242* 246* 64*   CALCIUM 9.6 9.1 9.5      BNP:No results found for: BNP  PHOSPHORUS:    Recent Labs     02/22/20 0315   PHOS 5.5*     MAGNESIUM:   Recent Labs     02/21/20 0439 02/22/20  0315   MG 1.9 1.8     ALBUMIN:   Recent Labs     02/21/20 0439   LABALBU 4.2     IRON:    Lab Results   Component Value Date    IRON 103 09/03/2019     IRON SATURATION:    Lab Results   Component Value Date    LABIRON 47 09/03/2019     TIBC:    Lab Results   Component Value Date    TIBC 217 09/03/2019     FERRITIN:    Lab Results   Component Value Date    FERRITIN 355 08/06/2019     TOBIAS:   Lab Results   Component Value Date    TOBIAS NEGATIVE 03/11/2013       SPEP:   Lab Results   Component Value Date    PROT 7.2 02/21/2020    PATH ELECTRONICALLY SIGNED. Ric Smith M.D. 03/11/2013     UPEP:   Lab Results   Component Value Date    TPU 9 03/11/2013      HEPBSAG:  Lab Results   Component Value Date    HEPBSAG NONREACTIVE 02/22/2020     HEPCAB:  Lab Results   Component Value Date    HEPCAB NONREACTIVE 02/22/2020     C3:   Lab Results   Component Value Date    C3 162 03/11/2013     C4:   Lab Results   Component Value Date    C4 30 03/11/2013     MPO ANCA: No results found for: MPO .   PR3 ANCA:  No results found for: PR3  URINE SODIUM: Lab Results   Component Value Date    BRENT 51 02/20/2020      URINE POTASSIUM:  No results found for: KUR  URINE CHLORIDE:  No components found for: CLU  URINE PH:  No components found for: PO4U  URINE OSMOLARITY:  No results found for: OSMOU  URINE CREATININE:    Lab Results   Component Value Date    LABCREA 80.6 03/01/2019     URINE EOSINOPHILS: No components found for: EOSU  URINE PROTEIN:    Lab Results   Component Value Date    TPU 9 03/11/2013     URINALYSIS:  U/A:   Lab Results   Component Value Date    NITRU NEGATIVE 02/20/2020    COLORU YELLOW 02/20/2020    PHUR 6.0 02/20/2020    WBCUA 2 TO 5 02/20/2020    RBCUA 0 TO 2 02/20/2020    MUCUS NOT REPORTED 02/20/2020    TRICHOMONAS NOT REPORTED 02/20/2020    YEAST NOT REPORTED 02/20/2020    BACTERIA NOT REPORTED 02/20/2020    SPECGRAV 1.017 02/20/2020    LEUKOCYTESUR NEGATIVE 02/20/2020    UROBILINOGEN Normal 02/20/2020    BILIRUBINUR NEGATIVE 02/20/2020    GLUCOSEU 1+ 02/20/2020    KETUA TRACE 02/20/2020    AMORPHOUS NOT REPORTED 02/20/2020     ANTIGBM:No results found for: GBMABIGG      RADIOLOGY      Reviewed as available. ASSESSMENT      1. ESRD new start on HD, has temp cath. Will need tunnel cath soon  2. High anion gap metabolic acidosis, from CKD  3. Non-ST elevation myocardial infarction, cath monday  4. Decompensated congestive heart failure with echo pending with some improvement in symptoms with IV Bumex  6. Anemia of chronic disease    PLAN      1.HD in am after cath  2. Will need tunnel cath in the next 1-2 days, if pt ends up with stents will have cardiology talk to IR to facilitate tunnel cath placement with antiplatlet agents on board  3. Lisinopril 10 mg bid  4. Fluid restriction 1500 ml/day  5. Will arrange outpt dialysis spot  6. Wean off NTG  7. OK FOR CATH ON Monday         Please do not hesitate to call with questions.     Electronically signed by Ramy Valente MD on 2/23/2020 at 4:20 PM

## 2020-02-23 NOTE — PROGRESS NOTES
[facility]    Progress Note    2/22/2020      Name:   Santiago Duke  MRN:     [id number]     Acct:      [de-identified]   Room:   [address] [place] [address] Day:  2  Admit Date:  2/19/2020 11:01 PM    PCP:   Demetrice Tabares PA-C  Code Status:  Full Code    Subjective:     C/C:   Chief Complaint   Patient presents with    Shortness of Breath     Interval History Status: improved. Patient evaluated while on HD in her room. Patient very anxious, kept eyes closed for most of our conversation. Some intermittent chest pain overnight:5/10 severe, substernal per patient. No acute events overnight. Hemodynamically stable. HR in 90s , SBP 160s. On 2-3 lit NC o2.   Labs:CBC, BMP reviewed. K 4.2, BUN/Cr 68/4.47, HS trop 430,  U/o: 2500ml  Brief History:   77 y/o presented to ED with c/o SOB which started last evening, which was initially on exertion but then at rest as well. Denies asso chest pain. EMS noted hypoxia sats in 80s. ED w/u: CXR pulm edema, HStrops >400, cr 3.97. EKG with inferolateral ST depressions. Known prior h/o CKD4 baseline Cr 3.2-3.5, left sided CVA , HPL, gout, DM2, AOCD, COPD.   prior ECHO 12/2016: EF >55%, mild pulm HTN  U/a negative for UTI. Review of Systems:     Constitutional:  negative for chills, fevers, sweats  Respiratory:  negative for cough, +dyspnea on exertion, shortness of breath, no wheezing  Cardiovascular:  negative for chest pain, chest pressure/discomfort,+ lower extremity edema, no palpitations  Gastrointestinal:  negative for abdominal pain, constipation, diarrhea, nausea, vomiting  Neurological:  negative for dizziness, headache    Medications:      Allergies:  No Known Allergies    Current Meds:   Scheduled Meds:    carvedilol  25 mg Oral BID    lisinopril  5 mg Oral BID    allopurinol  300 mg Oral Daily    clopidogrel  75 mg Oral Daily    folic acid  1 mg Oral BID    insulin glargine  25 Units 02/22/20 0753 02/22/20 1129 02/22/20 1724 02/22/20 2144   POCGLU 231* 277* 212* 284*       I/O (24Hr): Intake/Output Summary (Last 24 hours) at 2/22/2020 2356  Last data filed at 2/22/2020 2050  Gross per 24 hour   Intake --   Output 4040 ml   Net -4040 ml       Labs:  Hematology:  Recent Labs     02/20/20  0401 02/21/20 0439 02/22/20 0315   WBC  --  22.0* 24.7*   RBC  --  3.23* 2.69*   HGB  --  10.3* 8.5*   HCT  --  31.0* 25.5*   MCV  --  96.0 94.8   MCH  --  31.9 31.6   MCHC  --  33.2 33.3   RDW  --  13.5 13.3   PLT  --  252 234   MPV  --  12.0 12.2   INR 1.0 1.0  --      Chemistry:  Recent Labs     02/21/20 0439 02/22/20 0315 02/22/20 1256 02/22/20 2051   *  --  132*  --   --    K 4.2  --  4.2  --   --    CL 96*  --  94*  --   --    CO2 15*  --  18*  --   --    GLUCOSE 242*  --  246*  --   --    BUN 46*  --  68*  --   --    CREATININE 4.36*  --  4.47*  --   --    MG 1.9  --  1.8  --   --    ANIONGAP 19*  --  20*  --   --    LABGLOM 10*  --  10*  --   --    GFRAA 12*  --  12*  --   --    CALCIUM 9.6  --  9.1  --   --    PHOS  --   --  5.5*  --   --    TROPHS  --    < > 430* 496* 600*    < > = values in this interval not displayed. Recent Labs     02/21/20 0439 02/21/20  1639 02/21/20 2028 02/22/20 0753 02/22/20  1129 02/22/20 1724 02/22/20 2144   PROT 7.2  --   --   --   --   --   --   --    LABALBU 4.2  --   --   --   --   --   --   --    LABA1C 6.8*  --   --   --   --   --   --   --    AST 25  --   --   --   --   --   --   --    ALT 13  --   --   --   --   --   --   --    ALKPHOS 103  --   --   --   --   --   --   --    BILITOT 0.29*  --   --   --   --   --   --   --    POCGLU  --    < > 198* 367* 231* 277* 212* 284*    < > = values in this interval not displayed.      ABG:  Lab Results   Component Value Date    FIO2 NOT REPORTED 02/19/2020     Lab Results   Component Value Date/Time    SPECIAL NOT REPORTED 12/19/2016 10:11 PM     Lab Results   Component Value Date/Time    CULTURE NO

## 2020-02-23 NOTE — PLAN OF CARE
Problem: Infection:  Goal: Will remain free from infection  Description  Will remain free from infection  Outcome: Ongoing     Problem: Safety:  Goal: Free from accidental physical injury  Description  Free from accidental physical injury  Outcome: Ongoing  Goal: Free from intentional harm  Description  Free from intentional harm  Outcome: Ongoing     Problem: Daily Care:  Goal: Daily care needs are met  Description  Daily care needs are met  Outcome: Ongoing     Problem: Pain:  Goal: Patient's pain/discomfort is manageable  Description  Patient's pain/discomfort is manageable  Outcome: Ongoing     Problem: Skin Integrity:  Goal: Skin integrity will stabilize  Description  Skin integrity will stabilize  Outcome: Ongoing     Problem: Discharge Planning:  Goal: Patients continuum of care needs are met  Description  Patients continuum of care needs are met  Outcome: Ongoing     Problem: Falls - Risk of:  Goal: Will remain free from falls  Description  Will remain free from falls  Outcome: Ongoing  Goal: Absence of physical injury  Description  Absence of physical injury  Outcome: Ongoing

## 2020-02-23 NOTE — PROGRESS NOTES
[facility]   Progress Note                    Date:   2/23/2020  Patient name:  Sujit Gallo  Date of admission:  2/19/2020 11:01 PM  MRN:   [id number]  YOB: 1950  PCP:    Daisy Nolen PA-C    Reason for Admission:  HERNÁN (acute kidney injury) ([facility] [N17.9]    Subjective:       Patient seen and examined. States feeling better. Denies any complaints. S/P HD session yesterday. Plan for left heart cath tomorrow. I/O last 3 completed shifts: In: 355 [P.O.:355]  Out: 3840 [Urine:1850]  No intake/output data recorded.       In: 355 [P.O.:355]  Out: 3240 [Urine:1250]      Intake/Output Summary (Last 24 hours) at 2/23/2020 0807  Last data filed at 2/23/2020 0659  Gross per 24 hour   Intake 355 ml   Output 3840 ml   Net -3485 ml       Medications:   Scheduled Meds:   carvedilol  25 mg Oral BID    lisinopril  5 mg Oral BID    allopurinol  300 mg Oral Daily    clopidogrel  75 mg Oral Daily    folic acid  1 mg Oral BID    insulin glargine  25 Units Subcutaneous Nightly    vitamin D3  400 Units Oral BID    sodium chloride flush  10 mL Intravenous 2 times per day    insulin lispro  0-6 Units Subcutaneous TID WC    insulin lispro  0-3 Units Subcutaneous Nightly    famotidine  20 mg Oral Daily    sodium bicarbonate  1,300 mg Oral BID    aspirin  81 mg Oral Daily    bumetanide  2 mg Intravenous Daily    atorvastatin  40 mg Oral Nightly     Continuous Infusions:   nitroGLYCERIN 55 mcg/min (02/22/20 1705)    dextrose      heparin (porcine) 18 Units/kg/hr (02/23/20 0700)     CBC:   Recent Labs     02/21/20 0439 02/22/20 0315   WBC 22.0* 24.7*   HGB 10.3* 8.5*    234     BMP:    Recent Labs     02/21/20 0439 02/22/20 0315 02/23/20 0452   * 132* 136   K 4.2 4.2 3.6*   CL 96* 94* 97*   CO2 15* 18* 20   BUN 46* 68* 50*   CREATININE 4.36* 4.47* 3.45*   GLUCOSE 242* 246* 64*     Hepatic:   Recent Labs     02/21/20  0439   AST 25   ALT 13   BILITOT 0.29*   ALKPHOS 103     Troponin: No results for input(s): TROPONINI in the last 72 hours. Recent Labs     02/22/20  1256 02/22/20 2051 02/23/20  0452   TROPONINT NOT REPORTED NOT REPORTED NOT REPORTED     BNP:   No results for input(s): PROBNP in the last 72 hours. No results for input(s): BNP in the last 72 hours. Lipids: No results for input(s): CHOL, HDL in the last 72 hours. Invalid input(s): LDLCALCU  INR:   Recent Labs     02/21/20  0439   INR 1.0       Objective:   Vitals: /71   Pulse 80   Temp 97.9 °F (36.6 °C) (Oral)   Resp 16   Wt 165 lb 2 oz (74.9 kg)   SpO2 98%   BMI 28.34 kg/m²    General appearance: awake, alert, in no apparent distress. HEENT: Head: Normocephalic, atraumatic without any obvious abnormalities. Neck: JVD absent   Lungs:good bilateral equal air entry. No adventitious lung sounds auscultated. Heart: S1, S2, regular, + systolic murmur. Abdomen: soft, nontender with bowel sounds present in all 4 quadrants. Extremities: Lower extremity edema is absent / present + bilaterally. Peripheral pulses in b/l LE present  Integumentum:Intact with no rashes noted.       Diagnostic Studies:     EKG:   Sinus rhythm with possible old septal infarct and inferolateral ST depressions     ECHO: 2/21/2020  Left ventricle is normal in size. Global left ventricular systolic function is moderately reduced. Calculated EF via heart model is 36%. Mild left ventricular hypertrophy. Grade II (moderate) left ventricular diastolic dysfunction. Left atrium is mildly dilated. The aortic valve is calcified with reduced cusp mobility. Moderate aortic valve stenosis with a mean gradient of 20 mmHg. Maybe underestimated due to poor LV function. Trivial aortic insufficiency. Thickened mitral valve leaflets. Mitral annular calcification is seen. At least Moderate mitral regurgitation. Mild tricuspid regurgitation.   Moderate pulmonary hypertension with an estimated right ventricular systolic pressure of 56 Yousif Estrada MD   Fellow, [address]  Attending Physician Statement  I have discussed the care of Alma Doll, including pertinent history and exam findings,  with the resident. I have seen and examined the patient and the key elements of all parts of the encounter have been performed by me. I agree with the assessment, plan and orders as documented by the resident. D/W patient cardiac cath, risks and benefits explained to patient in detail, patient agrees to precede.

## 2020-02-23 NOTE — PROGRESS NOTES
Sounds: Cl      · Bronchodilator assessment at level  1  · Hyperinflation assessment at level   · Secretion Management assessment at level    ·   · [x]    Bronchodilator Assessment  BRONCHODILATOR ASSESSMENT SCORE  Score 0 1 2 3 4 5   Breath Sounds   []  Patient Baseline [x]  No Wheeze good aeration []  Faint, scattered wheezing, good aeration []  Expiratory Wheezing and or moderately diminished []  Insp/Exp wheeze and/or very diminished []  Insp/Exp and/ or marked distress   Respiratory Rate   []  Patient Baseline [x]  Less than 20 [x]  Less than 20 []  20-25 []  Greater than 25 []  Greater than 25   Peak flow % of Pred or PB [x]  NA   []  Greater than 90%  []  81-90% []  71-80% []  Less than or equal to 70%  or unable to perform []  Unable due to Respiratory Distress   Dyspnea re []  Patient Baseline [x]  No SOB [x]  No SOB []  SOB on exertion []  SOB min activity []  At rest/acute   e FEV% Predicted       [x]  NA []  Above 69%  []  Unable []  Above 60-69%  []  Unable []  Above 50-59%  []  Unable []  Above 35-49%  []  Unable []  Less than 35%  []  Unable                 []  Hyperinflation Assessment  Score 1 2 3   CXR and Breath Sounds   []  Clear []  No atelectasis  Basilar aeration []  Atelectasis or absent basilar breath sounds   Incentive Spirometry Volume  (Per IBW)   []  Greater than or equal to 15ml/Kg []  less than 15ml/Kg []  less than 15ml/Kg   Surgery within last 2 weeks []  None or general   []  Abdominal or thoracic surgery  []  Abdominal or thoracic   Chronic Pulmonary Historyre []  No []  Yes []  Yes     []  Secretion Management Assessment  Score 1 2 3   Bilateral Breath Sounds   []  Occasional Rhonchi []  Scattered Rhonchi []  Course Rhonchi and/or poor aeration   Sputum    []  Small amount of thin secretions []  Moderate amount of viscous secretions []  Copius, Viscious Yellow/ Secretions   CXR as reported by physician []  clear  []  Unavailable []  Infiltrates and/or consolidation  []  Unavailable []  Mucus Plugging and or lobar consolidation  []  Unavailable   Cough []  Strong, productive cough []  Weak productive cough []  No cough or weak non-productive cough   Jocelynn LUGO Leonela  8:04 PM                            FEMALE                                  MALE                            FEV1 Predicted Normal Values                        FEV1 Predicted Normal Values          Age                                     Height in Feet and Inches       Age                                     Height in Feet and Inches       4' 11\" 5' 1\" 5' 3\" 5' 5\" 5' 7\" 5' 9\" 5' 11\" 6' 1\"  4' 11\" 5' 1\" 5' 3\" 5' 5\" 5' 7\" 5' 9\" 5' 11\" 6' 1\"   42 - 45 2.49 2.66 2.84 3.03 3.22 3.42 3.62 3.83 42 - 45 2.82 3.03 3.26 3.49 3.72 3.96 4.22 4.47   46 - 49 2.40 2.57 2.76 2.94 3.14 3.33 3.54 3.75 46 - 49 2.70 2.92 3.14 3.37 3.61 3.85 4.10 4.36   50 - 53 2.31 2.48 2.66 2.85 3.04 3.24 3.45 3.66 50 - 53 2.58 2.80 3.02 3.25 3.49 3.73 3.98 4.24   54 - 57 2.21 2.38 2.57 2.75 2.95 3.14 3.35 3.56 54 - 57 2.46 2.67 2.89 3.12 3.36 3.60 3.85 4.11   58 - 61 2.10 2.28 2.46 2.65 2.84 3.04 3.24 3.45 58 - 61 2.32 2.54 2.76 2.99 3.23 3.47 3.72 3.98   62 - 65 1.99 2.17 2.35 2.54 2.73 2.93 3.13 3.34 62 - 65 2.19 2.40 2.62 2.85 3.09 3.33 3.58 3.84   66 - 69 1.88 2.05 2.23 2.42 2.61 2.81 3.02 3.23 66 - 69 2.04 2.26 2.48 2.71 2.95 3.19 3.44 3.70   70+ 1.82 1.99 2.17 2.36 2.55 2.75 2.95 3.16 70+ 1.97 2.19 2.41 2.64 2.87 3.12 3.37 3.62             Predicted Peak Expiratory Flow Rate                                       Height (in)  Female       Height (in) Male           Age 58 63 61 63 56 77 78 74 Age            57 680 708 400 836 059 430 429 905  81 04 12 65 59 70 72 74 76   25 337 352 366 381 396 411 426 441 25 447 476 505 533 562 [phone]   30 329 344 359 374 389 404 419 434 30 437 466 494 523 552 580 [phone]   35 322 337 351 366 381 396 411 426 35 426 455 484 512 541 [phone]   40 314 329 344 359 374 [phone] 40

## 2020-02-24 ENCOUNTER — APPOINTMENT (OUTPATIENT)
Dept: CT IMAGING | Age: 70
DRG: 233 | End: 2020-02-24
Payer: MEDICARE

## 2020-02-24 ENCOUNTER — APPOINTMENT (OUTPATIENT)
Dept: DIALYSIS | Age: 70
DRG: 233 | End: 2020-02-24
Payer: MEDICARE

## 2020-02-24 ENCOUNTER — APPOINTMENT (OUTPATIENT)
Dept: CARDIAC CATH/INVASIVE PROCEDURES | Age: 70
DRG: 233 | End: 2020-02-24
Payer: MEDICARE

## 2020-02-24 LAB
ANION GAP SERPL CALCULATED.3IONS-SCNC: 18 MMOL/L (ref 9–17)
BUN BLDV-MCNC: 58 MG/DL (ref 8–23)
BUN/CREAT BLD: ABNORMAL (ref 9–20)
CALCIUM SERPL-MCNC: 9.2 MG/DL (ref 8.6–10.4)
CHLORIDE BLD-SCNC: 94 MMOL/L (ref 98–107)
CO2: 22 MMOL/L (ref 20–31)
CREAT SERPL-MCNC: 3.9 MG/DL (ref 0.5–0.9)
EKG ATRIAL RATE: 79 BPM
EKG P AXIS: 58 DEGREES
EKG P-R INTERVAL: 134 MS
EKG Q-T INTERVAL: 396 MS
EKG QRS DURATION: 88 MS
EKG QTC CALCULATION (BAZETT): 454 MS
EKG R AXIS: 72 DEGREES
EKG T AXIS: -158 DEGREES
EKG VENTRICULAR RATE: 79 BPM
GFR AFRICAN AMERICAN: 14 ML/MIN
GFR NON-AFRICAN AMERICAN: 11 ML/MIN
GFR SERPL CREATININE-BSD FRML MDRD: ABNORMAL ML/MIN/{1.73_M2}
GFR SERPL CREATININE-BSD FRML MDRD: ABNORMAL ML/MIN/{1.73_M2}
GLUCOSE BLD-MCNC: 150 MG/DL (ref 65–105)
GLUCOSE BLD-MCNC: 165 MG/DL (ref 65–105)
GLUCOSE BLD-MCNC: 180 MG/DL (ref 70–99)
GLUCOSE BLD-MCNC: 189 MG/DL (ref 65–105)
GLUCOSE BLD-MCNC: 205 MG/DL (ref 65–105)
PARTIAL THROMBOPLASTIN TIME: 66.7 SEC (ref 20.5–30.5)
POTASSIUM SERPL-SCNC: 3.6 MMOL/L (ref 3.7–5.3)
SODIUM BLD-SCNC: 134 MMOL/L (ref 135–144)
TROPONIN INTERP: ABNORMAL
TROPONIN T: ABNORMAL NG/ML
TROPONIN, HIGH SENSITIVITY: 1056 NG/L (ref 0–14)
TROPONIN, HIGH SENSITIVITY: 1173 NG/L (ref 0–14)
TROPONIN, HIGH SENSITIVITY: 1174 NG/L (ref 0–14)

## 2020-02-24 PROCEDURE — 82947 ASSAY GLUCOSE BLOOD QUANT: CPT

## 2020-02-24 PROCEDURE — 85730 THROMBOPLASTIN TIME PARTIAL: CPT

## 2020-02-24 PROCEDURE — B2111ZZ FLUOROSCOPY OF MULTIPLE CORONARY ARTERIES USING LOW OSMOLAR CONTRAST: ICD-10-PCS | Performed by: INTERNAL MEDICINE

## 2020-02-24 PROCEDURE — 6360000004 HC RX CONTRAST MEDICATION

## 2020-02-24 PROCEDURE — 2580000003 HC RX 258: Performed by: NURSE PRACTITIONER

## 2020-02-24 PROCEDURE — 2500000003 HC RX 250 WO HCPCS: Performed by: INTERNAL MEDICINE

## 2020-02-24 PROCEDURE — 90935 HEMODIALYSIS ONE EVALUATION: CPT

## 2020-02-24 PROCEDURE — 6370000000 HC RX 637 (ALT 250 FOR IP): Performed by: INTERNAL MEDICINE

## 2020-02-24 PROCEDURE — 97116 GAIT TRAINING THERAPY: CPT

## 2020-02-24 PROCEDURE — 6370000000 HC RX 637 (ALT 250 FOR IP): Performed by: NURSE PRACTITIONER

## 2020-02-24 PROCEDURE — 97110 THERAPEUTIC EXERCISES: CPT

## 2020-02-24 PROCEDURE — 93005 ELECTROCARDIOGRAM TRACING: CPT | Performed by: INTERNAL MEDICINE

## 2020-02-24 PROCEDURE — C1751 CATH, INF, PER/CENT/MIDLINE: HCPCS

## 2020-02-24 PROCEDURE — 6360000002 HC RX W HCPCS: Performed by: INTERNAL MEDICINE

## 2020-02-24 PROCEDURE — C1725 CATH, TRANSLUMIN NON-LASER: HCPCS

## 2020-02-24 PROCEDURE — 4A023N8 MEASUREMENT OF CARDIAC SAMPLING AND PRESSURE, BILATERAL, PERCUTANEOUS APPROACH: ICD-10-PCS | Performed by: INTERNAL MEDICINE

## 2020-02-24 PROCEDURE — 99232 SBSQ HOSP IP/OBS MODERATE 35: CPT | Performed by: INTERNAL MEDICINE

## 2020-02-24 PROCEDURE — B2151ZZ FLUOROSCOPY OF LEFT HEART USING LOW OSMOLAR CONTRAST: ICD-10-PCS | Performed by: INTERNAL MEDICINE

## 2020-02-24 PROCEDURE — 5A1D70Z PERFORMANCE OF URINARY FILTRATION, INTERMITTENT, LESS THAN 6 HOURS PER DAY: ICD-10-PCS | Performed by: INTERNAL MEDICINE

## 2020-02-24 PROCEDURE — 36415 COLL VENOUS BLD VENIPUNCTURE: CPT

## 2020-02-24 PROCEDURE — G0278 ILIAC ART ANGIO,CARDIAC CATH: HCPCS | Performed by: INTERNAL MEDICINE

## 2020-02-24 PROCEDURE — 80048 BASIC METABOLIC PNL TOTAL CA: CPT

## 2020-02-24 PROCEDURE — 93460 R&L HRT ART/VENTRICLE ANGIO: CPT | Performed by: INTERNAL MEDICINE

## 2020-02-24 PROCEDURE — C1769 GUIDE WIRE: HCPCS

## 2020-02-24 PROCEDURE — 2500000003 HC RX 250 WO HCPCS

## 2020-02-24 PROCEDURE — 6360000002 HC RX W HCPCS

## 2020-02-24 PROCEDURE — 93010 ELECTROCARDIOGRAM REPORT: CPT | Performed by: INTERNAL MEDICINE

## 2020-02-24 PROCEDURE — 99221 1ST HOSP IP/OBS SF/LOW 40: CPT | Performed by: NURSE PRACTITIONER

## 2020-02-24 PROCEDURE — B3101ZZ FLUOROSCOPY OF THORACIC AORTA USING LOW OSMOLAR CONTRAST: ICD-10-PCS | Performed by: INTERNAL MEDICINE

## 2020-02-24 PROCEDURE — 2709999900 HC NON-CHARGEABLE SUPPLY

## 2020-02-24 PROCEDURE — C1894 INTRO/SHEATH, NON-LASER: HCPCS

## 2020-02-24 PROCEDURE — 2060000000 HC ICU INTERMEDIATE R&B

## 2020-02-24 PROCEDURE — 84484 ASSAY OF TROPONIN QUANT: CPT

## 2020-02-24 RX ORDER — ACETAMINOPHEN 325 MG/1
650 TABLET ORAL EVERY 4 HOURS PRN
Status: DISCONTINUED | OUTPATIENT
Start: 2020-02-24 | End: 2020-03-02

## 2020-02-24 RX ORDER — HYDRALAZINE HYDROCHLORIDE 50 MG/1
50 TABLET, FILM COATED ORAL EVERY 8 HOURS SCHEDULED
Status: DISCONTINUED | OUTPATIENT
Start: 2020-02-24 | End: 2020-02-25

## 2020-02-24 RX ORDER — SODIUM CHLORIDE 0.9 % (FLUSH) 0.9 %
10 SYRINGE (ML) INJECTION PRN
Status: DISCONTINUED | OUTPATIENT
Start: 2020-02-24 | End: 2020-03-02

## 2020-02-24 RX ORDER — SODIUM CHLORIDE 0.9 % (FLUSH) 0.9 %
10 SYRINGE (ML) INJECTION EVERY 12 HOURS SCHEDULED
Status: DISCONTINUED | OUTPATIENT
Start: 2020-02-24 | End: 2020-03-02

## 2020-02-24 RX ORDER — LORAZEPAM 0.5 MG/1
0.5 TABLET ORAL EVERY 6 HOURS PRN
Status: DISCONTINUED | OUTPATIENT
Start: 2020-02-24 | End: 2020-03-12 | Stop reason: HOSPADM

## 2020-02-24 RX ADMIN — INSULIN LISPRO 1 UNITS: 100 INJECTION, SOLUTION INTRAVENOUS; SUBCUTANEOUS at 21:33

## 2020-02-24 RX ADMIN — HYDRALAZINE HYDROCHLORIDE 50 MG: 50 TABLET, FILM COATED ORAL at 13:19

## 2020-02-24 RX ADMIN — ALLOPURINOL 300 MG: 300 TABLET ORAL at 09:18

## 2020-02-24 RX ADMIN — FOLIC ACID 1 MG: 1 TABLET ORAL at 20:41

## 2020-02-24 RX ADMIN — HYDRALAZINE HYDROCHLORIDE 50 MG: 50 TABLET, FILM COATED ORAL at 21:33

## 2020-02-24 RX ADMIN — Medication 10 MG: at 06:01

## 2020-02-24 RX ADMIN — INSULIN LISPRO 1 UNITS: 100 INJECTION, SOLUTION INTRAVENOUS; SUBCUTANEOUS at 13:27

## 2020-02-24 RX ADMIN — FAMOTIDINE 20 MG: 20 TABLET, FILM COATED ORAL at 09:18

## 2020-02-24 RX ADMIN — CLOPIDOGREL 75 MG: 75 TABLET, FILM COATED ORAL at 09:18

## 2020-02-24 RX ADMIN — HEPARIN SODIUM 1200 UNITS: 1000 INJECTION INTRAVENOUS; SUBCUTANEOUS at 19:10

## 2020-02-24 RX ADMIN — CHOLECALCIFEROL TAB 10 MCG (400 UNIT) 400 UNITS: 10 TAB at 20:41

## 2020-02-24 RX ADMIN — DESMOPRESSIN ACETATE 40 MG: 0.2 TABLET ORAL at 20:41

## 2020-02-24 RX ADMIN — LISINOPRIL 10 MG: 10 TABLET ORAL at 09:18

## 2020-02-24 RX ADMIN — SODIUM CHLORIDE, PRESERVATIVE FREE 10 ML: 5 INJECTION INTRAVENOUS at 09:18

## 2020-02-24 RX ADMIN — SODIUM BICARBONATE 1300 MG: 650 TABLET ORAL at 20:41

## 2020-02-24 RX ADMIN — HEPARIN SODIUM 18 UNITS/KG/HR: 10000 INJECTION, SOLUTION INTRAVENOUS at 17:23

## 2020-02-24 RX ADMIN — CHOLECALCIFEROL TAB 10 MCG (400 UNIT) 400 UNITS: 10 TAB at 09:18

## 2020-02-24 RX ADMIN — CARVEDILOL 25 MG: 25 TABLET, FILM COATED ORAL at 20:41

## 2020-02-24 RX ADMIN — ASPIRIN 81 MG: 81 TABLET, CHEWABLE ORAL at 09:18

## 2020-02-24 RX ADMIN — SODIUM BICARBONATE 1300 MG: 650 TABLET ORAL at 09:19

## 2020-02-24 RX ADMIN — INSULIN GLARGINE 25 UNITS: 100 INJECTION, SOLUTION SUBCUTANEOUS at 21:33

## 2020-02-24 RX ADMIN — CARVEDILOL 25 MG: 25 TABLET, FILM COATED ORAL at 09:18

## 2020-02-24 RX ADMIN — LISINOPRIL 10 MG: 10 TABLET ORAL at 20:41

## 2020-02-24 RX ADMIN — Medication 10 MG: at 13:25

## 2020-02-24 RX ADMIN — FOLIC ACID 1 MG: 1 TABLET ORAL at 09:18

## 2020-02-24 RX ADMIN — INSULIN LISPRO 1 UNITS: 100 INJECTION, SOLUTION INTRAVENOUS; SUBCUTANEOUS at 17:36

## 2020-02-24 RX ADMIN — HEPARIN SODIUM 1300 UNITS: 1000 INJECTION INTRAVENOUS; SUBCUTANEOUS at 19:10

## 2020-02-24 RX ADMIN — LORAZEPAM 0.5 MG: 0.5 TABLET ORAL at 20:41

## 2020-02-24 RX ADMIN — BUMETANIDE 2 MG: 0.25 INJECTION INTRAMUSCULAR; INTRAVENOUS at 09:19

## 2020-02-24 ASSESSMENT — PAIN SCALES - GENERAL
PAINLEVEL_OUTOF10: 0

## 2020-02-24 NOTE — CONSULTS
acetaminophen (TYLENOL) suppository 650 mg  650 mg Rectal Q6H PRN Zina Check, MD        promethazine (PHENERGAN) tablet 12.5 mg  12.5 mg Oral Q6H PRN Zina Check, MD        ondansetron [facility]) injection 4 mg  4 mg Intravenous Q6H PRN Zina Check, MD        nicotine (NICODERM CQ) 21 MG/24HR 1 patch  1 patch Transdermal Daily PRN Zina Check, MD   1 patch at 02/22/20 0959    glucose (GLUTOSE) 40 % oral gel 15 g  15 g Oral PRN Zina Check, MD        dextrose 50 % IV solution  12.5 g Intravenous PRN Zina Check, MD        glucagon (rDNA) injection 1 mg  1 mg Intramuscular PRN Zina Check, MD        dextrose 5 % solution  100 mL/hr Intravenous PRN Zina Check, MD        insulin lispro (HUMALOG) injection vial 0-6 Units  0-6 Units Subcutaneous TID WC Zina Check, MD   1 Units at 02/24/20 1327    insulin lispro (HUMALOG) injection vial 0-3 Units  0-3 Units Subcutaneous Nightly Zina Check, MD   2 Units at 02/22/20 2154    polyethylene glycol (GLYCOLAX) packet 17 g  17 g Oral Daily PRN Zina Check, MD        heparin (porcine) injection 4,000 Units  4,000 Units Intravenous PRN Zina Check, MD        heparin (porcine) injection 2,000 Units  2,000 Units Intravenous PRN Zina Check, MD   2,000 Units at 02/21/20 1524    heparin 25,000 units in dextrose 5% 250 mL infusion  12 Units/kg/hr Intravenous Continuous Zina Check, MD 13.9 mL/hr at 02/23/20 1803 18 Units/kg/hr at 02/23/20 1803    famotidine (PEPCID) tablet 20 mg  20 mg Oral Daily Zina Check, MD   20 mg at 02/24/20 5231    sodium bicarbonate tablet 1,300 mg  1,300 mg Oral BID Zina Check, MD   1,300 mg at 02/24/20 0919    aspirin chewable tablet 81 mg  81 mg Oral Daily Zina Check, MD   81 mg at 02/24/20 0918    bumetanide (BUMEX) injection 2 mg  2 mg Intravenous Daily Zina Kam MD   2 mg at 02/24/20 0919    atorvastatin (LIPITOR) tablet 40 mg  40 mg Oral Nightly Zina Kma MD   40 mg at 02/23/20 1940       Physical Exam:  Vitals:    02/24/20 2591 BP: (!) 183/84   Pulse: 78   Resp: 16   Temp: 98.4 °F (36.9 °C)   SpO2: 97%     Weight: Weight: 163 lb 12.8 oz (74.3 kg)    Weight: 170 lb (77.1 kg)        General: Alert and Oriented x3. Sitting up in bed. No apparent distress. HEENT:  Normocephalic and atraumatic. PERRL. EOMI. Lips and oral mucosa moist and without lesions. Neck:  Supple. Trachea midline. Chest:  No abnormality. Equal and symmetric expansion with respiration. Lungs: diminished lower bases, rhonchi noted in mid bases. .   Cardiac:  Regular rate and rhythm without murmurs, rubs or gallops. Abdomen:  Soft, non-tender, normoactive bowel sounds. No masses or organomegaly. Extremities:  No cyanosis, clubbing, or edema. Intact pulses in all four extremities. Musculoskeletal:  Intact range of motion of peripheral joints. Normal muscular strength. Neurologic:  Cranial nerves are grossly intact. Non-focal sensory deficits on exam.  Psychiatric: Mood and affect are appropriate. Imaging Studies:    Cardiac Cath:  LMCA: Normal 0% stenosis.     LAD: Mid 99% in upper long branch (Reaching to apex)  80% in lower LAD branch.   D1: proximal 90% stenosis     LCx: Mid 80% stenosis  OM1: Ostial 80% stenosis  OM2: Proximal 80% stenosis     RCA: 100% proximal stenosis  Collateral from the left     Peripheral Arteries and Lesion Findings  Descending aorta: Severe iliac disease in both sides  80% in the left side and 60-70% in the right side      Echo: EF36%  Mild left ventricular hypertrophy  Moderate pulmonary hypertension    CT: Not obtained at this time    Assessment & Plan:  Patient Active Problem List   Diagnosis    Gout    Hypovitaminosis D    Anemia in stage 4 chronic kidney disease (HCC)    Type 2 diabetes mellitus with diabetic chronic kidney disease (HCC)    Chronic kidney disease (CKD)    Essential hypertension    Hypercholesteremia    Hypokalemia    Acute infarct posterior limb internal capsule on the left    Left sided lacunar infarction ([facility]    Type 2 diabetes mellitus with stage 4 chronic kidney disease, with long-term current use of insulin (HCC)    Cerebral aneurysm    Anemia due to stage 4 chronic kidney disease treated with erythropoietin (HCC)    Tobacco abuse    Secondary hyperparathyroidism of renal origin ([facility]    Persistent proteinuria    Metabolic acidosis    HERNÁN (acute kidney injury) ([facility]    [facility]STEMI [facility]non-ST elevated [facility] infarction) [facility]    Acute CHF (congestive heart failure) ([facility]       I will present the following findings to my attending to determine further care with cardiothoracic surgery    Calin Lamas CNP  Phone: [phone]

## 2020-02-24 NOTE — PROGRESS NOTES
Occupational Therapy Not Seen Note    DATE: 2020  Name: Zheng Almonte  : 1950  MRN: [id number]    Patient not available for Occupational Therapy due to:     pt leaving off the unit for a cardiac cath    Next Scheduled Treatment: 2020    Electronically signed by MITCHEL Ren on 2020 at 1:34 PM

## 2020-02-24 NOTE — OP NOTE
[facility]    CARDIAC CATHETERIZATION    Date:   2/24/2020  Patient name:  Carol Miller  Date of admission:  2/19/2020 11:01 PM  MRN:   [id number]  YOB: 1950    Operators:  Primary:   Irving Mensah MD (Attending Physician)    Assistant/CV fellow: Woody Steven MD      Procedure performed:   [x] Left Heart Catheterization. [] Graft Angiography. [x] Left Ventriculography. [x] Right Heart Catheterization. [x] Coronary Angiography. [x] Aortic Valve Studies. [] PCI:      [x] Other: Abdominal aortogram with runoff      Pre Procedure Conscious Sedation Data:  ASA Class:    [] I [] II [x] III [] IV    Mallampati Class:  [] I [x] II [] III [] IV      Indication:  [] STEMI      [] + Stress test  [] ACS      [x] + EKG Changes  [x] Non Q MI       [x] Significant Risk Factors  [] Recurrent Angina             [x] Diabetes Mellitus    [] New LBBB      [] Uncontrolled HTN. [] CHF / Low EF changes     [] Abnormal CTA / Ca Score      Procedure:  Arterial Access:  [x] Femoral  [] Radial  artery        [] Right  [x] Left    Procedure: After informed consent was obtained with explanation of the risks and benefits, patient was brought to the cath lab. The access area was prepped and draped in sterile fashion. 1% lidocaine was used for local block. The artery was cannulated with 6  Fr sheath with brisk arterial blood return. The side port was frequently flushed and aspirated with normal saline. Venous Access: Right Femoral Vein    Procedure: After informed consent was obtained with explanation of the risks and benefits, the patient was brought to the cath lab. The right groin was prepped and draped in sterile fashion. 1% lidocaine was used for local block. Under ultrasound guidance, the right femoral vein was cannulated with 7  Fr sheath that was exchanged over a guidewire with non-pulsatile dark venous blood return noted.  The side port was frequently flushed and aspirated with normal healthcare facility. [] Yes    [x] No     [] Cardiac Arrest at other Facility. [] Yes   [] No    Pre-Procedure Information. Heart Failure       [x] Yes    [] No        Class  [] I      [] II  [x] III    [] IV. New Diagnosis    [x] Yes  [] No    HF Type      [x] Systolic   [] Diastolic     Diagnostic Test:   EKG       [] Normal   [x] Abnormal    New antiarrhythmia medications:    [] Yes   [x] No   New onset atrial fibrillation / Flutter     [] Yes   [x] No   ECG Abnormalities:      [] V. Fib   [] Gissel V. Tach           [] NS V. T   [] New LBBB           [x] T. Inv  [x]  ST dev > 0.5 mm         [] PVC's freq  [] PVC's infrequent    Stress Test Performed:      [] Yes    [x] No     Cardiac CTA Performed:     [] Yes    [x] No     Pre Procedure Medications:   [] Yes    [] No         [x] ASA    [x] Beta Blockers      [] Nitrate   [x] Ca Channel Blockers      [] Ranolazine   [x] Statin       [x] Heparin drip      Electronically signed on 2/24/2020 at 11:42 AM by:    Elian Jiang MD  Fellow, [address]      I have reviewed the case / procedure with resident / fellow  I have examined the patient personally  Patient agree with treatment plan, correction innotes was made as appropriate, and discussed final arrangement based on  my evaluation and exam.    Risk and benefit of procedure if planned were explained in details. Procedure was performed by me, with all aspect of the procedure being done using standard protocol. Note was modified based on my own assessment and treatment.     Keagan Gallo MD  [facility]

## 2020-02-24 NOTE — PROGRESS NOTES
Nephrology Progress Note      SUBJECTIVE       Pt was seen and examined. No acute issues overnite. Stable hemodynamics . Patient has had a history of stage IV bordering stage V chronic kidney disease before she presented to the hospital.  Suffered an acute on chronic renal injury in setting of non-ST elevation MI and decompensated heart failure. Hemodialysis has now been initiated. She currently has a temporary dialysis catheter. Patient is scheduled to have a cardiac cath this morning and will perform dialysis later today. Patient will need PermCath prior to discharge, currently has a temporary dialysis catheter. She currently denies any chest pain. She denies orthopnea or PND. Still has a little bit of leg edema. This morning blood pressures are a little high but she is not taken any blood pressure meds thus far. OBJECTIVE      CURRENT TEMPERATURE:  Temp: 98.4 °F (36.9 °C)  MAXIMUM TEMPERATURE OVER 24HRS:  Temp (24hrs), Av.2 °F (36.8 °C), Min:98 °F (36.7 °C), Max:98.4 °F (36.9 °C)    CURRENT RESPIRATORY RATE:  Resp: 16  CURRENT PULSE:  Pulse: 78  CURRENT BLOOD PRESSURE:  BP: (!) 183/84  24HR BLOOD PRESSURE RANGE:  Systolic (34POC), BGT:842 , Min:137 , SRI:416   ; Diastolic (93LDH), GOB:23, Min:63, Max:84    24HR INTAKE/OUTPUT:      Intake/Output Summary (Last 24 hours) at 2020 0914  Last data filed at 2020 8597  Gross per 24 hour   Intake --   Output 2850 ml   Net -2850 ml     WEIGHT :  Patient Vitals for the past 96 hrs (Last 3 readings):   Weight   20 0634 163 lb 12.8 oz (74.3 kg)   20 0658 165 lb 2 oz (74.9 kg)   20 1645 165 lb 12.6 oz (75.2 kg)     PHYSICAL EXAM      GENERAL APPEARANCE:Awake, alert, in no acute distress  SKIN: warm and dry, no rash or erythema  EYES: conjunctivae normal and sclera anicteric  ENT: no thrush no pharyngeal congestion   NECK:   + JVD. No carotid bruits and no carotid lymphadenopathy . PULMONARY: lungs are clear to auscultation.  No Wheezing, no ronchi . CADRDIOVASCULAR: S1 and S2 normal NO S3 and NO S4 . + systolic murmur. ABDOMEN: soft nontender, bowel sounds present, no organomegaly, no ascites.      EXTREMITIES: no cyanosis, clubbing + slight lower extremity edema     CURRENT MEDICATIONS      lisinopril (PRINIVIL;ZESTRIL) tablet 10 mg, BID  LORazepam (ATIVAN) injection 0.5 mg, Q4H PRN  labetalol (NORMODYNE;TRANDATE) injection 10 mg, Q4H PRN  carvedilol (COREG) tablet 25 mg, BID  heparin (porcine) injection 1,200 Units, PRN  heparin (porcine) injection 1,300 Units, PRN  albuterol (PROVENTIL) nebulizer solution 2.5 mg, Q4H PRN  nitroGLYCERIN (NITROSTAT) SL tablet 0.4 mg, Q5 Min PRN  nitroGLYCERIN 50 mg in dextrose 5% 250 mL infusion, Continuous  allopurinol (ZYLOPRIM) tablet 300 mg, Daily  clopidogrel (PLAVIX) tablet 75 mg, Daily  folic acid (FOLVITE) tablet 1 mg, BID  insulin glargine (LANTUS) injection vial 25 Units, Nightly  vitamin D3 (CHOLECALCIFEROL) tablet 400 Units, BID  sodium chloride flush 0.9 % injection 10 mL, 2 times per day  sodium chloride flush 0.9 % injection 10 mL, PRN  acetaminophen (TYLENOL) tablet 650 mg, Q6H PRN  acetaminophen (TYLENOL) suppository 650 mg, Q6H PRN  promethazine (PHENERGAN) tablet 12.5 mg, Q6H PRN  ondansetron (ZOFRAN) injection 4 mg, Q6H PRN  nicotine (NICODERM CQ) 21 MG/24HR 1 patch, Daily PRN  glucose (GLUTOSE) 40 % oral gel 15 g, PRN  dextrose 50 % IV solution, PRN  glucagon (rDNA) injection 1 mg, PRN  dextrose 5 % solution, PRN  insulin lispro (HUMALOG) injection vial 0-6 Units, TID WC  insulin lispro (HUMALOG) injection vial 0-3 Units, Nightly  polyethylene glycol (GLYCOLAX) packet 17 g, Daily PRN  heparin (porcine) injection 4,000 Units, PRN  heparin (porcine) injection 2,000 Units, PRN  heparin 25,000 units in dextrose 5% 250 mL infusion, Continuous  famotidine (PEPCID) tablet 20 mg, Daily  sodium bicarbonate tablet 1,300 mg, BID  aspirin chewable tablet 81 mg, Daily  bumetanide (BUMEX) injection 2 mg, Daily  atorvastatin (LIPITOR) tablet 40 mg, Nightly          LABS      CBC:   Recent Labs     02/22/20  0315   WBC 24.7*   RBC 2.69*   HGB 8.5*   HCT 25.5*   MCV 94.8   MCH 31.6   MCHC 33.3   RDW 13.3      MPV 12.2      BMP:   Recent Labs     02/22/20  0315 02/23/20  0452 02/24/20  0352   * 136 134*   K 4.2 3.6* 3.6*   CL 94* 97* 94*   CO2 18* 20 22   BUN 68* 50* 58*   CREATININE 4.47* 3.45* 3.90*   GLUCOSE 246* 64* 180*   CALCIUM 9.1 9.5 9.2      PHOSPHORUS:    Recent Labs     02/22/20 0315   PHOS 5.5*     MAGNESIUM:   Recent Labs     02/22/20 0315   MG 1.8          C3:   Lab Results   Component Value Date    C3 162 03/11/2013     C4:   Lab Results   Component Value Date    C4 30 03/11/2013     URINE SODIUM:    Lab Results   Component Value Date    BRENT 51 02/20/2020      URINE CREATININE:    Lab Results   Component Value Date    LABCREA 80.6 03/01/2019       URINALYSIS:  U/A:   Lab Results   Component Value Date    NITRU NEGATIVE 02/20/2020    COLORU YELLOW 02/20/2020    PHUR 6.0 02/20/2020    WBCUA 2 TO 5 02/20/2020    RBCUA 0 TO 2 02/20/2020    MUCUS NOT REPORTED 02/20/2020    TRICHOMONAS NOT REPORTED 02/20/2020    YEAST NOT REPORTED 02/20/2020    BACTERIA NOT REPORTED 02/20/2020    SPECGRAV 1.017 02/20/2020    LEUKOCYTESUR NEGATIVE 02/20/2020    UROBILINOGEN Normal 02/20/2020    BILIRUBINUR NEGATIVE 02/20/2020    GLUCOSEU 1+ 02/20/2020    KETUA TRACE 02/20/2020    AMORPHOUS NOT REPORTED 02/20/2020         ASSESSMENT      1. ESRD new start on HD, has temp cath. Will need tunnel cath soon  2. High anion gap metabolic acidosis, from CKD : improved  3. Non-ST elevation myocardial infarction, cath today   4. Decompensated congestive heart failure with echo showing evidence of decreased EF of 35% along with grade 2 diastolic dysfunction. Also has moderate aortic stenosis and MR.  + Pulmonary hypertension, moderate  5. Anemia of chronic disease      PLAN      1.   Hemodialysis following cardiac cath today. 2.  Possibly tunnel catheter tomorrow  3. Continue lisinopril. 4.  2 g sodium diet with 1500 cc fluid restriction. 5.  Will repeat phosphate level and assess need for phosphate binder    Please do not hesitate to call with questions.     Electronically signed by Aggie Duane, MD on 2/24/2020 at 9:14 AM

## 2020-02-24 NOTE — PROGRESS NOTES
Smoking Cessation - topics covered   []  Health Risks  []  Benefits of Quitting   []  Smoking Cessation  []  Patient has no history of tobacco use per note in significant history. []  Patient is former smoker per note in significant history. Patient quit in   []  No need for tobacco cessation education. []  Booklet given  []  Patient verbalizes understanding. []  Patient denies need for tobacco cessation education. [x]  Unable to meet with patient today. Will follow up as able.   Sudarshan Doe  4:24 PM

## 2020-02-24 NOTE — PROGRESS NOTES
Co-treatment   Time In 0813         Time Out 0840         Minutes 27         Timed Code Treatment Minutes: [address] [place], [facility]

## 2020-02-24 NOTE — PROGRESS NOTES
[facility]   Progress Note                    Date:   2/24/2020  Patient name:  Debby James  Date of admission:  2/19/2020 11:01 PM  MRN:   [id number]  YOB: 1950  PCP:    Darell De Dios PA-C    Reason for Admission:  HERNÁN (acute kidney injury) ([facility] [N17.9]    Subjective:       Patient seen and examined. States feeling better. Denies any complaints. I/O last 3 completed shifts:  In: -   Out: 2850 [Urine:2850]  No intake/output data recorded. In: -   Out: 1400 [Urine:1400]      Intake/Output Summary (Last 24 hours) at 2/24/2020 0909  Last data filed at 2/24/2020 0469  Gross per 24 hour   Intake --   Output 2850 ml   Net -2850 ml       Medications:   Scheduled Meds:   lisinopril  10 mg Oral BID    carvedilol  25 mg Oral BID    allopurinol  300 mg Oral Daily    clopidogrel  75 mg Oral Daily    folic acid  1 mg Oral BID    insulin glargine  25 Units Subcutaneous Nightly    vitamin D3  400 Units Oral BID    sodium chloride flush  10 mL Intravenous 2 times per day    insulin lispro  0-6 Units Subcutaneous TID WC    insulin lispro  0-3 Units Subcutaneous Nightly    famotidine  20 mg Oral Daily    sodium bicarbonate  1,300 mg Oral BID    aspirin  81 mg Oral Daily    bumetanide  2 mg Intravenous Daily    atorvastatin  40 mg Oral Nightly     Continuous Infusions:   nitroGLYCERIN 55 mcg/min (02/22/20 1705)    dextrose      heparin (porcine) 18 Units/kg/hr (02/23/20 1803)     CBC:   Recent Labs     02/22/20  0315   WBC 24.7*   HGB 8.5*        BMP:    Recent Labs     02/22/20  0315 02/23/20  0452 02/24/20  0352   * 136 134*   K 4.2 3.6* 3.6*   CL 94* 97* 94*   CO2 18* 20 22   BUN 68* 50* 58*   CREATININE 4.47* 3.45* 3.90*   GLUCOSE 246* 64* 180*     Hepatic:   No results for input(s): AST, ALT, ALB, BILITOT, ALKPHOS in the last 72 hours. Troponin: No results for input(s): TROPONINI in the last 72 hours.       Recent Labs     02/23/20  3758 impaired or no inspiratory variation indicating elevated RA filling pressure (i.e. CVP) . Patient Active Problem List:     Gout     Hypovitaminosis D     Anemia in stage 4 chronic kidney disease ([facility])     Type 2 diabetes mellitus with diabetic chronic kidney disease ([facility])     Chronic kidney disease (CKD)     Essential hypertension     Hypercholesteremia     Hypokalemia     Acute infarct posterior limb internal capsule on the left     Left sided lacunar infarction ([facility]     Type 2 diabetes mellitus with stage 4 chronic kidney disease, with long-term current use of insulin ([facility])     Cerebral aneurysm     Anemia due to stage 4 chronic kidney disease treated with erythropoietin ([facility])     Tobacco abuse     Secondary hyperparathyroidism of renal origin ([facility]     Persistent proteinuria     Metabolic acidosis     [facility] (acute kidney [facility]) [facility]     NSTEMI (non-ST elevated myocardial infarction) ([facility]     Acute CHF (congestive heart failure) ([facility])      Assessment / Acute Cardiac Problems:   1. Acute hypoxic resp failure -  has baseline JAY NYHA III  2. Recent possible viral infection   3. NSTEMI- likely type 1 in the setting of uptrending and significantly elevated troponins  4. Acute CHF - volume overload in the setting of CKD  5. Cardiomyopathy, likely ischemic (EF 36%)  6. Moderate AS - Mean gradient 20, CHEIKH (continuity : 0.87)  7. Moderate MR  8. CKD  9. H/O CVA  8. DM-2      Plan of Treatment:   1. On ASA, Plavix, Lipitor 40, Bumex 2 IV qday, Lisinopril 5, Coreg 25 bid  2. Continue heparin infusion and nitro infusion  3. ECHO with moderate AS and moderate MR   4. Nephrology input appreciated for cardiac cath clearance  5. S/P Gerson placement on 2/21  6. S/P HD (1st session) on 2/22  7. Plan for cardiac cath today, will discuss the need for aortic valve study   8. Continue medical management for NSTEMI at this time    9.  Currently chest pain free    Electronically signed on 02/24/20 at 9:09 AM by:    Steffi Wesley Tre Marrero MD   Fellow, [address]    Attending Physician Statement  I have discussed the care of the patient, including pertinent history and exam findings, with the resident. I have seen and examined the patient and the key elements of all parts of the encounter have been performed by me. I agree with the assessment, plan and orders as documented by the resident.   Pt had Cath with Multi-vessel disease , PVD   Now going for dialysis   Will find final recommendation from CT surgery  Cony Rodrigues MD [facility]

## 2020-02-24 NOTE — PROGRESS NOTES
 insulin glargine  25 Units Subcutaneous Nightly    vitamin D3  400 Units Oral BID    sodium chloride flush  10 mL Intravenous 2 times per day    insulin lispro  0-6 Units Subcutaneous TID WC    insulin lispro  0-3 Units Subcutaneous Nightly    famotidine  20 mg Oral Daily    sodium bicarbonate  1,300 mg Oral BID    aspirin  81 mg Oral Daily    bumetanide  2 mg Intravenous Daily    atorvastatin  40 mg Oral Nightly     Continuous Infusions:    nitroGLYCERIN 55 mcg/min (20 1705)    dextrose      heparin (porcine) 18 Units/kg/hr (20 1803)     PRN Meds: LORazepam, labetalol, heparin (porcine), heparin (porcine), albuterol, nitroGLYCERIN, sodium chloride flush, acetaminophen, acetaminophen, promethazine, ondansetron, nicotine, glucose, dextrose, glucagon (rDNA), dextrose, polyethylene glycol, heparin (porcine), heparin (porcine)    Data:     Past Medical History:   has a past medical history of Acute CHF (congestive heart failure) ([facility] Anemia in chronic kidney disease, Anemia in stage 4 chronic kidney disease ([facility] Cerebral artery occlusion with cerebral infarction ([facility] Chronic kidney disease, Gout, arthritis, Hyperlipidemia, Hypertension, Left sided lacunar infarction [facility]), Peripheral vascular disease ([facility] and Type II or unspecified type diabetes mellitus without mention of complication, not stated as uncontrolled. Social History:   reports that she has been smoking cigarettes. She started smoking about 46 years ago. She has a 40.00 pack-year smoking history. She uses smokeless tobacco. She reports that she does not drink alcohol or use drugs.      Family History:   Family History   Problem Relation Age of Onset    Diabetes Mother     Heart Disease Father        Vitals:  BP (!) 152/64   Pulse 87   Temp 98.3 °F (36.8 °C) (Oral)   Resp 16   Wt 165 lb 2 oz (74.9 kg)   SpO2 98%   BMI 28.34 kg/m²   Temp (24hrs), Av.1 °F (36.7 °C), Min:97.9 °F (36.6 °C), Max:98.3 °F (36.8 °C)    Recent Labs     02/23/20  0748 02/23/20  1143 02/23/20  1637 02/23/20  2100   POCGLU 99 164* 106* 256*       I/O (24Hr): Intake/Output Summary (Last 24 hours) at 2/24/2020 0033  Last data filed at 2/23/2020 1853  Gross per 24 hour   Intake 355 ml   Output 2250 ml   Net -1895 ml       Labs:  Hematology:  Recent Labs     02/21/20 0439 02/22/20 0315   WBC 22.0* 24.7*   RBC 3.23* 2.69*   HGB 10.3* 8.5*   HCT 31.0* 25.5*   MCV 96.0 94.8   MCH 31.9 31.6   MCHC 33.2 33.3   RDW 13.5 13.3    234   MPV 12.0 12.2   INR 1.0  --      Chemistry:  Recent Labs     02/21/20 0439 02/22/20  0315  02/23/20  0452 02/23/20  1231 02/23/20  2051   *  --  132*  --  136  --   --    K 4.2  --  4.2  --  3.6*  --   --    CL 96*  --  94*  --  97*  --   --    CO2 15*  --  18*  --  20  --   --    GLUCOSE 242*  --  246*  --  64*  --   --    BUN 46*  --  68*  --  50*  --   --    CREATININE 4.36*  --  4.47*  --  3.45*  --   --    MG 1.9  --  1.8  --   --   --   --    ANIONGAP 19*  --  20*  --  19*  --   --    LABGLOM 10*  --  10*  --  13*  --   --    GFRAA 12*  --  12*  --  16*  --   --    CALCIUM 9.6  --  9.1  --  9.5  --   --    PHOS  --   --  5.5*  --   --   --   --    TROPHS  --    < > 430*   < > 687* 750* 889*    < > = values in this interval not displayed. Recent Labs     02/21/20 0439 02/22/20  1724 02/22/20 2144 02/23/20  0748 02/23/20  1143 02/23/20  1637 02/23/20  2100   PROT 7.2  --   --   --   --   --   --   --    LABALBU 4.2  --   --   --   --   --   --   --    LABA1C 6.8*  --   --   --   --   --   --   --    AST 25  --   --   --   --   --   --   --    ALT 13  --   --   --   --   --   --   --    ALKPHOS 103  --   --   --   --   --   --   --    BILITOT 0.29*  --   --   --   --   --   --   --    POCGLU  --    < > 212* 284* 99 164* 106* 256*    < > = values in this interval not displayed.      ABG:  Lab Results   Component Value Date    FIO2 NOT REPORTED 02/19/2020     Lab Results   Component Value Date/Time SPECIAL NOT REPORTED 12/19/2016 10:11 PM     Lab Results   Component Value Date/Time    CULTURE NO SIGNIFICANT GROWTH 12/19/2016 10:11 PM    CULTURE  12/19/2016 10:11 PM     [facility] [id number] [facility], [address] [place] [address] [phone]       Radiology:  Us Renal Complete    Result Date: 2/20/2020  Echogenic kidneys consistent with intrinsic renal parenchymal disease. Xr Chest Portable    Result Date: 2/21/2020  1. Interval decrease in now mild pulmonary interstitial edema. 2. Suspected right basilar multifocal ill-defined consolidation consistent with overlying alveolar edema versus pneumonia. Xr Chest Portable    Result Date: 2/19/2020  Moderate edema     Ir Nontunneled Vascular Catheter > 5 Years    Result Date: 2/21/2020  Successful ultrasound and fluoroscopy guided non-tunneled dialysis catheter placement. Okay to use dialysis catheter. Physical Examination:        General appearance:  alert, cooperative and no distress, obese body habitus. Mental Status:  oriented to person, place and time and anxious affect  Lungs:  Improved air entry. Basal b/l crackles.    Heart:  regular rate and rhythm, no murmur  Abdomen:  soft, nontender, nondistended, normal bowel sounds,  Extremities: +b/l pedal  Edema,no  redness, tenderness in the calves  Skin:  no gross lesions, rashes, induration    Assessment:        Hospital Problems           Last Modified POA    * (Principal) Acute CHF (congestive heart failure) ([address] 2/21/2020 Yes    Anemia in stage 4 chronic kidney disease ([address] 2/21/2020 Yes    Overview Signed 7/29/2017  8:09 PM by [facility]     Updating deleted diagnoses         Type 2 diabetes mellitus with diabetic chronic kidney disease ([address] 2/21/2020 Yes    Essential hypertension 2/21/2020 Yes    Hypercholesteremia 2/21/2020 Yes    HERNÁN (acute kidney injury) ([address] 2/20/2020 Yes    NSTEMI (non-ST elevated myocardial infarction) ([address] 2/21/2020 Yes          Plan:      - Acute CHF NSTEMI with HERNÁN on CKD4. Continue ASA, plavix, statin, heparin WBP. Patient started on HD. Appreciate consultants input. Plan for cardiac cath Monday. - Was started on steroids at admission as she has diminished air entry. Doubt COPD exacerbation. Clinically consistent with CHF. Stopped  Steroids 02/21. Leukocytosis sec to steroids. No clear infection apparent clinically. Continue to monitor. - some improvement in uncontrolled blood glucose from steroids. Continue ISS for now along with home dose of lantus. Continue to monitor, will adjust as needed. - GI prophylaxis.      Dinorah Patino MD

## 2020-02-24 NOTE — PROGRESS NOTES
Mary Briones 21    Progress Note    2/24/2020    6:26 PM    Name:   Sujit Gallo  MRN:     [id number]     [place]:      [de-identified]   Room:   [address] [place] [id number] Day:  4  Admit Date:  2/19/2020 11:01 PM    PCP:   Daisy Nolen PA-C  Code Status:  Full Code    Subjective:     C/C:   Chief Complaint   Patient presents with    Shortness of Breath     Interval History Status: not changed. Patient just finished her 2nd HD treatment. She is very anxious and upset about her cardiac cath results. She has multivessel disease is getting evaluated for CABG. She is scared and wants to go home. Her daughter is coming up to visit shortly. She is asking for an Ativan. No chest pain or SOB. Brief History:     Per my partner:  \"77 y/o presented to ED with c/o SOB which started last evening, which was initially on exertion but then at rest as well. Denies asso chest pain. EMS noted hypoxia sats in 80s. ED w/u: CXR pulm edema, HStrops >400, cr 3.97. EKG with inferolateral ST depressions. Known prior h/o CKD4 baseline Cr 3.2-3.5, left sided CVA , HPL, gout, DM2, AOCD, COPD.   prior ECHO 12/2016: EF >55%, mild pulm HTN  U/a negative for UTI. Had [facility] cath placed 02/21 and HD started 02/22. Cath planned for 02/24. \"    2/24/2020- Cardiac cath    Findings:  LMCA: Normal 0% stenosis.     LAD: Mid 99% in upper long branch (Reaching to apex) 80% in lower LAD branch. D1: proximal 90% stenosis     LCx: Mid 80% stenosis  OM1: Ostial 80% stenosis  OM2: Proximal 80% stenosis     RCA: 100% proximal stenosis  Collateral from the left        Peripheral Arteries and Lesion Findings  Descending aorta: Severe iliac disease in both sides  80% in the left side and 60-70% in the right side     The LV gram was performed in the BAUGH 30 position. LVEF: 35%.  LV Wall Motion: Severe basal anterior and basal inferior hypokinesis      Review of Systems: Recent Labs     02/23/20  1143 02/23/20  1637 02/23/20  2100 02/24/20  0649 02/24/20  1318 02/24/20  1601   POCGLU 164* 106* 256* 189* 165* 150*     ABG:  Lab Results   Component Value Date    FIO2 NOT REPORTED 02/19/2020     Lab Results   Component Value Date/Time    SPECIAL NOT REPORTED 12/19/2016 10:11 PM     Lab Results   Component Value Date/Time    CULTURE NO SIGNIFICANT GROWTH 12/19/2016 10:11 PM    CULTURE  12/19/2016 10:11 PM     [facility] [address] [facility], [address] [place] [address] [phone]       Radiology:  Us Renal Complete    Result Date: 2/20/2020  Echogenic kidneys consistent with intrinsic renal parenchymal disease. Xr Chest Portable    Result Date: 2/21/2020  1. Interval decrease in now mild pulmonary interstitial edema. 2. Suspected right basilar multifocal ill-defined consolidation consistent with overlying alveolar edema versus pneumonia. Xr Chest Portable    Result Date: 2/19/2020  Moderate edema     Ir Nontunneled Vascular Catheter > 5 Years    Result Date: 2/21/2020  Successful ultrasound and fluoroscopy guided non-tunneled dialysis catheter placement. Okay to use dialysis catheter.        Physical Examination:        General appearance:  alert, cooperative and upset, crying  Mental Status:  oriented to person, place and time and anxious  Lungs:  clear to auscultation bilaterally, normal effort  Heart:  regular rate and rhythm, no murmur  Abdomen:  soft, nontender, nondistended, normal bowel sounds, no masses, hepatomegaly, splenomegaly  Extremities:  no edema, redness, tenderness in the calves  Skin:  no gross lesions, rashes, induration    Assessment:        Hospital Problems           Last Modified POA    * (Principal) Acute CHF (congestive heart failure) ([facility] [address] 2/21/2020 Yes    Anemia in stage 4 chronic kidney disease ([facility] [address] 2/21/2020 Yes    Overview Signed 7/29/2017  8:09 PM by [facility]     Updating deleted diagnoses         Type 2 diabetes mellitus with diabetic

## 2020-02-25 ENCOUNTER — APPOINTMENT (OUTPATIENT)
Dept: CT IMAGING | Age: 70
DRG: 233 | End: 2020-02-25
Payer: MEDICARE

## 2020-02-25 LAB
-: NORMAL
ANION GAP SERPL CALCULATED.3IONS-SCNC: 17 MMOL/L (ref 9–17)
BUN BLDV-MCNC: 29 MG/DL (ref 8–23)
BUN/CREAT BLD: ABNORMAL (ref 9–20)
CALCIUM SERPL-MCNC: 10.1 MG/DL (ref 8.6–10.4)
CHLORIDE BLD-SCNC: 98 MMOL/L (ref 98–107)
CO2: 24 MMOL/L (ref 20–31)
CREAT SERPL-MCNC: 2.85 MG/DL (ref 0.5–0.9)
GFR AFRICAN AMERICAN: 20 ML/MIN
GFR NON-AFRICAN AMERICAN: 16 ML/MIN
GFR SERPL CREATININE-BSD FRML MDRD: ABNORMAL ML/MIN/{1.73_M2}
GFR SERPL CREATININE-BSD FRML MDRD: ABNORMAL ML/MIN/{1.73_M2}
GLUCOSE BLD-MCNC: 103 MG/DL (ref 65–105)
GLUCOSE BLD-MCNC: 114 MG/DL (ref 70–99)
GLUCOSE BLD-MCNC: 126 MG/DL (ref 65–105)
GLUCOSE BLD-MCNC: 170 MG/DL (ref 65–105)
GLUCOSE BLD-MCNC: 221 MG/DL (ref 65–105)
HCT VFR BLD CALC: 30.9 % (ref 36.3–47.1)
HEMOGLOBIN: 10.2 G/DL (ref 11.9–15.1)
INR BLD: 1
INR BLD: 1.2
MAGNESIUM: 1.7 MG/DL (ref 1.6–2.6)
MCH RBC QN AUTO: 31.5 PG (ref 25.2–33.5)
MCHC RBC AUTO-ENTMCNC: 33 G/DL (ref 28.4–34.8)
MCV RBC AUTO: 95.4 FL (ref 82.6–102.9)
NRBC AUTOMATED: 0 PER 100 WBC
PARTIAL THROMBOPLASTIN TIME: 64.7 SEC (ref 20.5–30.5)
PARTIAL THROMBOPLASTIN TIME: >120 SEC (ref 20.5–30.5)
PARTIAL THROMBOPLASTIN TIME: >120 SEC (ref 20.5–30.5)
PDW BLD-RTO: 13.2 % (ref 11.8–14.4)
PHOSPHORUS: 3.9 MG/DL (ref 2.6–4.5)
PLATELET # BLD: 217 K/UL (ref 138–453)
PMV BLD AUTO: 12 FL (ref 8.1–13.5)
POTASSIUM SERPL-SCNC: 3.3 MMOL/L (ref 3.7–5.3)
PROTHROMBIN TIME: 10.6 SEC (ref 9–12)
PROTHROMBIN TIME: 12.8 SEC (ref 9–12)
RBC # BLD: 3.24 M/UL (ref 3.95–5.11)
REASON FOR REJECTION: NORMAL
SODIUM BLD-SCNC: 139 MMOL/L (ref 135–144)
TROPONIN INTERP: ABNORMAL
TROPONIN T: ABNORMAL NG/ML
TROPONIN, HIGH SENSITIVITY: 1391 NG/L (ref 0–14)
TROPONIN, HIGH SENSITIVITY: 1409 NG/L (ref 0–14)
TROPONIN, HIGH SENSITIVITY: 1422 NG/L (ref 0–14)
WBC # BLD: 14.8 K/UL (ref 3.5–11.3)
ZZ NTE CLEAN UP: ORDERED TEST: NORMAL
ZZ NTE WITH NAME CLEAN UP: SPECIMEN SOURCE: NORMAL

## 2020-02-25 PROCEDURE — 94727 GAS DIL/WSHOT DETER LNG VOL: CPT

## 2020-02-25 PROCEDURE — 2500000003 HC RX 250 WO HCPCS: Performed by: INTERNAL MEDICINE

## 2020-02-25 PROCEDURE — 94761 N-INVAS EAR/PLS OXIMETRY MLT: CPT

## 2020-02-25 PROCEDURE — 85610 PROTHROMBIN TIME: CPT

## 2020-02-25 PROCEDURE — 85730 THROMBOPLASTIN TIME PARTIAL: CPT

## 2020-02-25 PROCEDURE — 6370000000 HC RX 637 (ALT 250 FOR IP): Performed by: INTERNAL MEDICINE

## 2020-02-25 PROCEDURE — 83735 ASSAY OF MAGNESIUM: CPT

## 2020-02-25 PROCEDURE — 71250 CT THORAX DX C-: CPT

## 2020-02-25 PROCEDURE — 94729 DIFFUSING CAPACITY: CPT

## 2020-02-25 PROCEDURE — 97535 SELF CARE MNGMENT TRAINING: CPT

## 2020-02-25 PROCEDURE — 2060000000 HC ICU INTERMEDIATE R&B

## 2020-02-25 PROCEDURE — 97530 THERAPEUTIC ACTIVITIES: CPT

## 2020-02-25 PROCEDURE — 82947 ASSAY GLUCOSE BLOOD QUANT: CPT

## 2020-02-25 PROCEDURE — 99232 SBSQ HOSP IP/OBS MODERATE 35: CPT | Performed by: HOSPITALIST

## 2020-02-25 PROCEDURE — 93005 ELECTROCARDIOGRAM TRACING: CPT | Performed by: INTERNAL MEDICINE

## 2020-02-25 PROCEDURE — 36415 COLL VENOUS BLD VENIPUNCTURE: CPT

## 2020-02-25 PROCEDURE — 84100 ASSAY OF PHOSPHORUS: CPT

## 2020-02-25 PROCEDURE — 97116 GAIT TRAINING THERAPY: CPT

## 2020-02-25 PROCEDURE — 94060 EVALUATION OF WHEEZING: CPT

## 2020-02-25 PROCEDURE — 93880 EXTRACRANIAL BILAT STUDY: CPT

## 2020-02-25 PROCEDURE — 93970 EXTREMITY STUDY: CPT

## 2020-02-25 PROCEDURE — 80048 BASIC METABOLIC PNL TOTAL CA: CPT

## 2020-02-25 PROCEDURE — 84484 ASSAY OF TROPONIN QUANT: CPT

## 2020-02-25 PROCEDURE — 85027 COMPLETE CBC AUTOMATED: CPT

## 2020-02-25 PROCEDURE — 94726 PLETHYSMOGRAPHY LUNG VOLUMES: CPT

## 2020-02-25 RX ORDER — POTASSIUM CHLORIDE 20 MEQ/1
20 TABLET, EXTENDED RELEASE ORAL ONCE
Status: COMPLETED | OUTPATIENT
Start: 2020-02-25 | End: 2020-02-25

## 2020-02-25 RX ORDER — POTASSIUM CHLORIDE 20 MEQ/1
TABLET, EXTENDED RELEASE ORAL
Status: DISPENSED
Start: 2020-02-25 | End: 2020-02-25

## 2020-02-25 RX ORDER — LISINOPRIL 20 MG/1
20 TABLET ORAL 2 TIMES DAILY
Status: DISCONTINUED | OUTPATIENT
Start: 2020-02-25 | End: 2020-03-02

## 2020-02-25 RX ADMIN — LISINOPRIL 20 MG: 20 TABLET ORAL at 20:30

## 2020-02-25 RX ADMIN — CHOLECALCIFEROL TAB 10 MCG (400 UNIT) 400 UNITS: 10 TAB at 20:30

## 2020-02-25 RX ADMIN — CARVEDILOL 25 MG: 25 TABLET, FILM COATED ORAL at 07:43

## 2020-02-25 RX ADMIN — FAMOTIDINE 20 MG: 20 TABLET, FILM COATED ORAL at 07:43

## 2020-02-25 RX ADMIN — ASPIRIN 81 MG: 81 TABLET, CHEWABLE ORAL at 07:43

## 2020-02-25 RX ADMIN — INSULIN GLARGINE 25 UNITS: 100 INJECTION, SOLUTION SUBCUTANEOUS at 20:31

## 2020-02-25 RX ADMIN — INSULIN LISPRO 1 UNITS: 100 INJECTION, SOLUTION INTRAVENOUS; SUBCUTANEOUS at 20:30

## 2020-02-25 RX ADMIN — ALLOPURINOL 300 MG: 300 TABLET ORAL at 07:43

## 2020-02-25 RX ADMIN — BUMETANIDE 2 MG: 0.25 INJECTION INTRAMUSCULAR; INTRAVENOUS at 07:43

## 2020-02-25 RX ADMIN — FOLIC ACID 1 MG: 1 TABLET ORAL at 07:42

## 2020-02-25 RX ADMIN — HYDRALAZINE HYDROCHLORIDE 50 MG: 50 TABLET, FILM COATED ORAL at 06:03

## 2020-02-25 RX ADMIN — LORAZEPAM 0.5 MG: 0.5 TABLET ORAL at 20:33

## 2020-02-25 RX ADMIN — DESMOPRESSIN ACETATE 40 MG: 0.2 TABLET ORAL at 20:30

## 2020-02-25 RX ADMIN — INSULIN LISPRO 1 UNITS: 100 INJECTION, SOLUTION INTRAVENOUS; SUBCUTANEOUS at 12:39

## 2020-02-25 RX ADMIN — CHOLECALCIFEROL TAB 10 MCG (400 UNIT) 400 UNITS: 10 TAB at 07:42

## 2020-02-25 RX ADMIN — SODIUM BICARBONATE 1300 MG: 650 TABLET ORAL at 07:42

## 2020-02-25 RX ADMIN — Medication 10 MG: at 03:56

## 2020-02-25 RX ADMIN — CARVEDILOL 25 MG: 25 TABLET, FILM COATED ORAL at 20:30

## 2020-02-25 RX ADMIN — LISINOPRIL 10 MG: 10 TABLET ORAL at 07:42

## 2020-02-25 RX ADMIN — FOLIC ACID 1 MG: 1 TABLET ORAL at 20:30

## 2020-02-25 RX ADMIN — POTASSIUM CHLORIDE 20 MEQ: 1500 TABLET, EXTENDED RELEASE ORAL at 07:29

## 2020-02-25 ASSESSMENT — PAIN SCALES - GENERAL
PAINLEVEL_OUTOF10: 0

## 2020-02-25 NOTE — PLAN OF CARE
Take 1 capsule by mouth daily.   Patient taking differently: Take 400 Units by mouth 2 times daily  12/11/13   Sheila Tobias MD   .      Assessment:     Pt states she does not take at home medication for breathing  Pt states she does not usually get SOB when at home  Pt does not wear a CPAP at night   Pt does not wear O2 at home      RR:18  Breath Sounds: Clear, diminished      Bronchodilator assessment at level  1  Hyperinflation assessment at level   Secretion Management assessment at level      []    Bronchodilator Assessment  BRONCHODILATOR ASSESSMENT SCORE  Score 0 1 2 3 4 5   Breath Sounds   []  Patient Baseline [x]  No Wheeze good aeration []  Faint, scattered wheezing, good aeration []  Expiratory Wheezing and or moderately diminished []  Insp/Exp wheeze and/or very diminished []  Insp/Exp and/ or marked distress   Respiratory Rate   []  Patient Baseline [x]  Less than 20 []  Less than 20 []  20-25 []  Greater than 25 []  Greater than 25   Peak flow % of Pred or PB [x]  NA   []  Greater than 90%  []  81-90% []  71-80% []  Less than or equal to 70%  or unable to perform []  Unable due to Respiratory Distress   Dyspnea re []  Patient Baseline [x]  No SOB []  No SOB []  SOB on exertion []  SOB min activity []  At rest/acute   e FEV% Predicted       [x]  NA []  Above 69%  []  Unable []  Above 60-69%  []  Unable []  Above 50-59%  []  Unable []  Above 35-49%  []  Unable []  Less than 35%  []  Unable                 []  Hyperinflation Assessment  Score 1 2 3   CXR and Breath Sounds   []  Clear []  No atelectasis  Basilar aeration []  Atelectasis or absent basilar breath sounds   Incentive Spirometry Volume  (Per IBW)   []  Greater than or equal to 15ml/Kg []  less than 15ml/Kg []  less than 15ml/Kg   Surgery within last 2 weeks []  None or general   []  Abdominal or thoracic surgery  []  Abdominal or thoracic   Chronic Pulmonary Historyre []  No []  Yes []  Yes     []  Secretion Management Assessment  Score 1 2 3 160 617 177 689 350 522 409  33 21 99 71 00 60 41 36 40   47 805 281 287 822 347 710 422 146 81 493 713 854 169 536 476 397 744 662   53 855 746 428 581 251 152 643 751 34 961 042 042 975 989 684 421 444 666   14 795 711 872 465 092 729 130 222 27 090 487 743 291 415 178 996 879 784   35 903 088 669 802 027 331 740 114 01 641 253 350 255 359 341 585 809 164   61 895 428 728 709 335 076 981 046 49 462 894 342 593 311 691 373 334 489   78 647 437 451 358 578 309 261 410 41 036 352 701 769 302 833 276 118 859   80 418 460 807 742 061 592 231 697 85 004 551 441 256 267 905 907 271 124   89 837 797 546 993 615 765 505 861 55 291 931 298 854 503 208 797 963 061   55 307 477 956 831 826 595 878 798 52 767 653 588 537 635 563 527 862 645   38 561 463 953 266 853 594 498 596 41 231 312 364 681 972 911 965 542 780   63 998 453 092 119 479 195 531 460 47 397 434 841 865 641 888 846 054 603   17 778 350 218 673 798 045 075 062 11 572 077 512 106 386 360 056 527 040

## 2020-02-25 NOTE — PROGRESS NOTES
Occupational Therapy  Facility/Department: [facility] CAR 1  Daily Treatment Note  NAME: Debby James  : 1950  MRN: [id number]    Date of Service: 2020    Discharge Recommendations: Further therapy recommended at discharge. Ed on OT services, transfer safety, EC/WS, DME/AE, bathroom safety, ADLs, orientation-good return     Assessment   Performance deficits / Impairments: Decreased functional mobility ; Decreased ADL status; Decreased strength;Decreased endurance;Decreased balance;Decreased high-level IADLs  Prognosis: Good  REQUIRES OT FOLLOW UP: Yes  Activity Tolerance  Activity Tolerance: Patient Tolerated treatment well  Safety Devices  Safety Devices in place: Yes  Type of devices: All fall risk precautions in place;Call light within reach; Left in chair;Nurse notified; Chair alarm in place         Patient Diagnosis(es): The primary encounter diagnosis was COPD exacerbation ([facility] [address] Diagnoses of Congestive heart failure, unspecified HF chronicity, unspecified heart failure type ([address] Dyspnea and respiratory abnormalities, and Stage 4 chronic kidney disease ([address] were also pertinent to this visit. has a past medical history of Acute CHF (congestive heart failure) ([address] Anemia in chronic kidney disease, Anemia in stage 4 chronic kidney disease ([address] Cerebral artery occlusion with cerebral infarction ([address] Chronic kidney disease, Gout, arthritis, Hyperlipidemia, Hypertension, Left sided lacunar infarction [facility]), Peripheral vascular disease ([address] and Type II or unspecified type diabetes mellitus without mention of complication, not stated as uncontrolled. has a past surgical history that includes Tonsillectomy; other surgical history (2017); and Colonoscopy.     Restrictions  Restrictions/Precautions  Restrictions/Precautions: General Precautions, Fall Risk  Required Braces or Orthoses?: No  Position Activity Restriction  Other position/activity restrictions: up with assist  Subjective minutes of activity       Therapy Time   Individual Concurrent Group Co-treatment   Time In  attempted at 8:45-8:53    Returned at 13:35-14:42         Time Out           Minutes  75          time code min: [facility]

## 2020-02-25 NOTE — PROGRESS NOTES
Physical Therapy  Facility/Department: [facility] CAR 1  Daily Treatment Note  NAME: Elio Black  : 1950  MRN: [id number]    Date of Service: 2020    Discharge Recommendations:  Patient would benefit from continued therapy after discharge   PT Equipment Recommendations  Equipment Needed: Yes  Mobility Devices: Julia Karrie: Rolling    Assessment   Body structures, Functions, Activity limitations: Decreased endurance;Decreased functional mobility ; Decreased strength;Decreased balance  Assessment: Pt ambulating functional distances with CGA. Displays mild balance deficits and would benefit from continued PT upon discharge. Prognosis: Good  PT Education: General Safety;Transfer Training;Gait Training  REQUIRES PT FOLLOW UP: Yes  Activity Tolerance  Activity Tolerance: Patient Tolerated treatment well     Patient Diagnosis(es): The primary encounter diagnosis was COPD exacerbation ([facility] [address] Diagnoses of Congestive heart failure, unspecified HF chronicity, unspecified heart failure type ([address] Dyspnea and respiratory abnormalities, and Stage 4 chronic kidney disease ([facility] [address] were also pertinent to this visit. has a past medical history of Acute CHF (congestive heart failure) ([address] Anemia in chronic kidney disease, Anemia in stage 4 chronic kidney disease ([address] Cerebral artery occlusion with cerebral infarction ([facility] [address] Chronic kidney disease, Gout, arthritis, Hyperlipidemia, Hypertension, Left sided lacunar infarction [facility]), Peripheral vascular disease ([facility] [address] and Type II or unspecified type diabetes mellitus without mention of complication, not stated as uncontrolled. has a past surgical history that includes Tonsillectomy; other surgical history (2017); and Colonoscopy.     Restrictions  Restrictions/Precautions  Restrictions/Precautions: General Precautions, Fall Risk  Required Braces or Orthoses?: No  Position Activity Restriction  Other position/activity restrictions: up with assist  Subjective General  Chart Reviewed: Yes  Response To Previous Treatment: Patient with no complaints from previous session. Family / Caregiver Present: Yes(son arrived during session)  Subjective  Subjective: Pt in chair; offers no c/o pain. States she is uncertain about continuing dialysis. General Comment  Comments: Pt assisted onto stretcher at end of session; going for CT  Pain Screening  Patient Currently in Pain: Denies  Vital Signs  Patient Currently in Pain: Denies       Orientation  Orientation  Overall Orientation Status: Within Normal Limits  Cognition      Objective   Bed mobility  Supine to Sit: Supervision  Scooting: Supervision  Transfers  Sit to Stand: Supervision(completed from bed, chair, and toilet)  Stand to sit: Contact guard assistance  Bed to Chair: Contact guard assistance  Ambulation  Ambulation?: Yes  Ambulation 1  Surface: level tile  Device: No Device(pushed IV pole)  Quality of Gait: decreased pace, shortened step length, staggering gait, difficulty avoiding obstacles  Distance: 300ft  Comments: on RA  Stairs/Curb  Stairs?: No     Balance  Posture: Good  Sitting - Static: Good  Sitting - Dynamic: Good  Standing - Static: Good;-  Standing - Dynamic: Fair;+       Exercises:  Seated hip flexion x 15 reps with 2# ankle weights.  Remainder of exercises deferred due to arrival of transport    Goals  Short term goals  Time Frame for Short term goals: 6 visits  Short term goal 1: prevent loss of strength/mobility/independence while pt is in the hospital  Short term goal 2: pt to ambulate 100' independently without device  Short term goal 3: stair ambulation x 1 flight with 1 HR independently  Patient Goals   Patient goals : return home    Plan    Plan  Times per week: 5 visits weekly  Times per day: Daily  Current Treatment Recommendations: Strengthening, ROM, Balance Training, Functional Mobility Training, Transfer Training, Endurance Training, Gait Training, Stair training  Safety Devices  Type of devices:  All fall risk precautions in place, Call light within reach, Gait belt, Patient at risk for falls, Nurse notified  Restraints  Initially in place: No     Therapy Time   Individual Concurrent Group Co-treatment   Time In 1023         Time Out 1100         Minutes 37         Timed Code Treatment Minutes: [address] [facility], [facility]

## 2020-02-25 NOTE — PROGRESS NOTES
Nephrology Progress Note      SUBJECTIVE       Pt was seen and examined. No acute issues overnite. Stable hemodynamics . Patient has had a history of stage IV bordering stage V chronic kidney disease before she presented to the hospital.  Suffered an acute on chronic renal injury in setting of non-ST elevation MI and decompensated heart failure. Hemodialysis has now been initiated. She currently has a temporary dialysis catheter. Cardiac cath done yesterday was found to have mutlivessel ds, CT surgery consult placed, pt ambivalent about OHS and also about continuing dialysis. Patient will need PermCath prior to discharge if she decides to continue dialysis long term, currently has a temporary dialysis catheter. She currently denies any chest pain. She denies orthopnea or PND. Still has a little bit of leg edema. BP flcutuate. Long discussion with her to get a sense of her goals and expecations. Unable to decide. Does not want to do dialsysis today. Labs reviewed.     OBJECTIVE      CURRENT TEMPERATURE:  Temp: 98.1 °F (36.7 °C)  MAXIMUM TEMPERATURE OVER 24HRS:  Temp (24hrs), Av.2 °F (36.8 °C), Min:98 °F (36.7 °C), Max:98.5 °F (36.9 °C)    CURRENT RESPIRATORY RATE:  Resp: 16  CURRENT PULSE:  Pulse: 75  CURRENT BLOOD PRESSURE:  BP: (!) 159/68  24HR BLOOD PRESSURE RANGE:  Systolic (24XGQ), UYK:070 , Min:90 , TIH:455   ; Diastolic (64UAO), NBM:98, Min:51, Max:127    24HR INTAKE/OUTPUT:      Intake/Output Summary (Last 24 hours) at 2020 1014  Last data filed at 2020 0653  Gross per 24 hour   Intake 840 ml   Output 3475 ml   Net -2635 ml     WEIGHT :  Patient Vitals for the past 96 hrs (Last 3 readings):   Weight   20 0600 166 lb 14.2 oz (75.7 kg)   20 1930 159 lb 9.8 oz (72.4 kg)   20 1600 163 lb 5.8 oz (74.1 kg)     PHYSICAL EXAM      GENERAL APPEARANCE:Awake, alert, in no acute distress  SKIN: warm and dry, no rash or erythema  EYES: conjunctivae normal and sclera anicteric  ENT: no thrush no pharyngeal congestion   NECK:   + JVD. No carotid bruits and no carotid lymphadenopathy . PULMONARY: lungs are clear to auscultation. No Wheezing, no ronchi . CADRDIOVASCULAR: S1 and S2 normal NO S3 and NO S4 . + systolic murmur. ABDOMEN: soft nontender, bowel sounds present, no organomegaly, no ascites.      EXTREMITIES: no cyanosis, clubbing no lower extremity edema     CURRENT MEDICATIONS      potassium chloride (KLOR-CON M) 20 MEQ extended release tablet,   lisinopril (PRINIVIL;ZESTRIL) tablet 20 mg, BID  sodium chloride flush 0.9 % injection 10 mL, 2 times per day  sodium chloride flush 0.9 % injection 10 mL, PRN  acetaminophen (TYLENOL) tablet 650 mg, Q4H PRN  magnesium hydroxide (MILK OF MAGNESIA) 400 MG/5ML suspension 30 mL, Daily PRN  LORazepam (ATIVAN) tablet 0.5 mg, Q6H PRN  labetalol (NORMODYNE;TRANDATE) injection 10 mg, Q4H PRN  carvedilol (COREG) tablet 25 mg, BID  heparin (porcine) injection 1,200 Units, PRN  heparin (porcine) injection 1,300 Units, PRN  albuterol (PROVENTIL) nebulizer solution 2.5 mg, Q4H PRN  nitroGLYCERIN (NITROSTAT) SL tablet 0.4 mg, Q5 Min PRN  nitroGLYCERIN 50 mg in dextrose 5% 250 mL infusion, Continuous  allopurinol (ZYLOPRIM) tablet 891 mg, Daily  folic acid (FOLVITE) tablet 1 mg, BID  insulin glargine (LANTUS) injection vial 25 Units, Nightly  vitamin D3 (CHOLECALCIFEROL) tablet 400 Units, BID  sodium chloride flush 0.9 % injection 10 mL, 2 times per day  sodium chloride flush 0.9 % injection 10 mL, PRN  acetaminophen (TYLENOL) tablet 650 mg, Q6H PRN  acetaminophen (TYLENOL) suppository 650 mg, Q6H PRN  promethazine (PHENERGAN) tablet 12.5 mg, Q6H PRN  ondansetron (ZOFRAN) injection 4 mg, Q6H PRN  nicotine (NICODERM CQ) 21 MG/24HR 1 patch, Daily PRN  glucose (GLUTOSE) 40 % oral gel 15 g, PRN  dextrose 50 % IV solution, PRN  glucagon (rDNA) injection 1 mg, PRN  dextrose 5 % solution, PRN  insulin lispro (HUMALOG) injection vial 0-6 Units, TID

## 2020-02-25 NOTE — PROGRESS NOTES
Smoking Cessation - topics covered   []  Health Risks  []  Benefits of Quitting   []  Smoking Cessation  []  Patient has no history of tobacco use per note in significant history. []  Patient is former smoker per note in significant history. Patient quit in   []  No need for tobacco cessation education. []  Booklet given  [x]  Patient verbalizes understanding. [x]  Patient denies need for tobacco cessation education. []  Unable to meet with patient today. Will follow up as able.   Isha Monroy  3:34 PM

## 2020-02-25 NOTE — PROGRESS NOTES
[facility]   Progress Note                    Date:   2/25/2020  Patient name:  Gab Flores  Date of admission:  2/19/2020 11:01 PM  MRN:   [id number]  YOB: 1950  PCP:    Yulissa Eden PA-C    Reason for Admission:  HERNÁN (acute kidney injury) ([facility] [N17.9]    Subjective:       Patient seen and examined. Denies chest pain, shortness of breath . Under HD yesterday       I/O last 3 completed shifts: In: 36 [P.O.:120]  Out: 4900 [Urine:2500]  No intake/output data recorded. In: 36 [P.O.:120]  Out: 3800 [Urine:1400]      Intake/Output Summary (Last 24 hours) at 2/25/2020 0644  Last data filed at 2/24/2020 2259  Gross per 24 hour   Intake 820 ml   Output 3800 ml   Net -2980 ml       Medications:   Scheduled Meds:   hydrALAZINE  50 mg Oral 3 times per day    sodium chloride flush  10 mL Intravenous 2 times per day    lisinopril  10 mg Oral BID    carvedilol  25 mg Oral BID    allopurinol  300 mg Oral Daily    folic acid  1 mg Oral BID    insulin glargine  25 Units Subcutaneous Nightly    vitamin D3  400 Units Oral BID    sodium chloride flush  10 mL Intravenous 2 times per day    insulin lispro  0-6 Units Subcutaneous TID WC    insulin lispro  0-3 Units Subcutaneous Nightly    famotidine  20 mg Oral Daily    sodium bicarbonate  1,300 mg Oral BID    aspirin  81 mg Oral Daily    bumetanide  2 mg Intravenous Daily    atorvastatin  40 mg Oral Nightly     Continuous Infusions:   nitroGLYCERIN Stopped (02/24/20 1713)    dextrose      heparin (porcine) 18 Units/kg/hr (02/24/20 1723)     CBC:   Recent Labs     02/25/20  0434   WBC 14.8*   HGB 10.2*        BMP:    Recent Labs     02/23/20  0452 02/24/20  0352    134*   K 3.6* 3.6*   CL 97* 94*   CO2 20 22   BUN 50* 58*   CREATININE 3.45* 3.90*   GLUCOSE 64* 180*     Hepatic:   No results for input(s): AST, ALT, ALB, BILITOT, ALKPHOS in the last 72 hours.   Troponin: No results for input(s): TROPONINI in impaired or no inspiratory variation indicating elevated RA filling pressure (i.e. CVP) . Cath 02/24/2020  LMCA: Normal 0% stenosis.     LAD: Mid 99% in upper long branch (Reaching to apex) 80% in lower LAD branch. D1: proximal 90% stenosis     LCx: Mid 80% stenosis  OM1: Ostial 80% stenosis  OM2: Proximal 80% stenosis     RCA: 100% proximal stenosis  Collateral from the left        Peripheral Arteries and Lesion Findings  Descending aorta: Severe iliac disease in both sides  80% in the left side and 60-70% in the right side     The LV gram was performed in the BAUGH 30 position. LVEF: 35%. LV Wall Motion: Severe basal anterior and basal inferior hypokinesis        · Hemodynamic Pressures                Site S/A (mmHg) D/V (mmHg) M/ED (mmHg)   Right Atrium 23 22 19   Right Ventricle 51 19 20   Pulmonary Artery 43 27 33   PCWP 26 26 25            · Oxygen Saturations     Site Oxygen Saturation (%)   Pulmonary Artery Main 54.5   Left Femoral Artery  89.5         · Cardiac Output     Calculation Method Cardiac Output (L/min) Cardiac Index (L/min/m2)   Christiano 4.25 2.4          Estimated Blood Loss: 10 mL     Conclusions:  1. Mildly elevated right heart pressures  2. Aortic valve gradient is not as bad as reported by echocardiogram: 9-11 mmHg  3. Severe multivessel CAD  4. Reduced LV systolic function with segmental wall motion abnormalities  5. Bilateral iliac disease        Recommendations:  1. Post-cath protocol  2. CT surgery consult  3. Post CABG, further evaluation by vascular  4. Continue optimal medical therapy  5.  Risk factor modification      Patient Active Problem List:     Gout     Hypovitaminosis D     Anemia in stage 4 chronic kidney disease (HCC)     Type 2 diabetes mellitus with diabetic chronic kidney disease (HCC)     Chronic kidney disease (CKD)     Essential hypertension     Hypercholesteremia     Hypokalemia     Acute infarct posterior limb internal capsule on the left     Left sided lacunar infarction [facility])     Type 2 diabetes mellitus with stage 4 chronic kidney disease, with long-term current use of insulin (HCC)     Cerebral aneurysm     Anemia due to stage 4 chronic kidney disease treated with erythropoietin ([facility])     Tobacco abuse     Secondary hyperparathyroidism of renal origin ([facility]     Persistent proteinuria     Metabolic acidosis     HERNÁN (acute kidney injury) ([facility]     NSTEMI (non-ST elevated myocardial infarction) ([facility]     Acute CHF (congestive heart failure) ([facility])      Assessment / Acute Cardiac Problems:   1. Acute hypoxic resp failure -  has baseline JAY NYHA III  2. Recent possible viral infection   3. NSTEMI- s/p cath with MVD CABG evaluation pending by CT surgery   4. Acute CHF - volume overload in the setting of CKD  5. Cardiomyopathy, likely ischemic (EF 36%)  6. Moderate AS - Mean gradient 20, CHEIKH (continuity : 0.87), no significant gradient noticed on cardia cath   7. Moderate MR  8. B/L significant iliac disease on angiogram   9. CKD  10. H/O CVA  6. DM-2      Plan of Treatment:   1. On ASA, Plavix, Lipitor 40, Bumex 2 IV qday, Lisinopril 10 mg, Coreg 25 bid and Hydralazine 50 mg TID  2. Continue heparin infusion  3. Hold changes in anti HTN medications due to low BP yesterday with HD  4. ECHO with moderate AS and moderate MR - no significant AS on cath   5. CT surgery evaluation pending for CABG  6. B/L Iliac disease - will discuss the need for vascular surgery evaluation   7. Nephrology input appreciated  8. Currently chest pain free    Electronically signed on 02/25/20 at 6:44 AM by:    Magdi Lee MD   Fellow, [address] [place] [address]  Attending Physician Statement  I have discussed the care of the patient, including pertinent history and exam findings, with the resident. I have seen and examined the patient and the key elements of all parts of the encounter have been performed by me.  I agree with the assessment, plan and orders as documented by the resident. I also  discussed in details with her son in room . Pt still not ready for dialysis and surgery   Pt understands can be lethal and can cause death .    Robbin Casillas MD

## 2020-02-25 NOTE — PROGRESS NOTES
 atorvastatin  40 mg Oral Nightly     Continuous Infusions:    nitroGLYCERIN Stopped (20 1713)    dextrose      heparin (porcine) 10 Units/kg/hr (20 1700)     PRN Meds: sodium chloride flush, acetaminophen, magnesium hydroxide, LORazepam, labetalol, heparin (porcine), heparin (porcine), albuterol, nitroGLYCERIN, sodium chloride flush, acetaminophen, acetaminophen, promethazine, ondansetron, nicotine, glucose, dextrose, glucagon (rDNA), dextrose, polyethylene glycol, heparin (porcine), heparin (porcine)    Data:     Past Medical History:   has a past medical history of Acute CHF (congestive heart failure) ([facility] Anemia in chronic kidney disease, Anemia in stage 4 chronic kidney disease ([facility] Cerebral artery occlusion with cerebral infarction ([facility] Chronic kidney disease, Gout, arthritis, Hyperlipidemia, Hypertension, Left sided lacunar infarction [facility]), Peripheral vascular disease ([facility] and Type II or unspecified type diabetes mellitus without mention of complication, not stated as uncontrolled. Social History:   reports that she has been smoking cigarettes. She started smoking about 46 years ago. She has a 40.00 pack-year smoking history. She uses smokeless tobacco. She reports that she does not drink alcohol or use drugs. Family History:   Family History   Problem Relation Age of Onset    Diabetes Mother     Heart Disease Father        Vitals:  BP (!) 151/68   Pulse 84   Temp 98.3 °F (36.8 °C) (Oral)   Resp 16   Wt 166 lb 14.2 oz (75.7 kg)   SpO2 96%   BMI 28.65 kg/m²   Temp (24hrs), Av.2 °F (36.8 °C), Min:98 °F (36.7 °C), Max:98.5 °F (36.9 °C)    Recent Labs     20  0628 20  1137 20  1705   POCGLU 205* 103 170* 126*       I/O (24Hr):     Intake/Output Summary (Last 24 hours) at 2020 1755  Last data filed at 2020 0653  Gross per 24 hour   Intake 840 ml   Output 3475 ml   Net -2635 ml       Labs:  Hematology:  Recent Labs consistent with intrinsic renal parenchymal disease. Xr Chest Portable    Result Date: 2/21/2020  1. Interval decrease in now mild pulmonary interstitial edema. 2. Suspected right basilar multifocal ill-defined consolidation consistent with overlying alveolar edema versus pneumonia. Xr Chest Portable    Result Date: 2/19/2020  Moderate edema     Ir Nontunneled Vascular Catheter > 5 Years    Result Date: 2/21/2020  Successful ultrasound and fluoroscopy guided non-tunneled dialysis catheter placement. Okay to use dialysis catheter. Physical Examination:        General appearance:  alert, cooperative and upset, crying  Mental Status:  oriented to person, place and time and anxious  Lungs:  clear to auscultation bilaterally, normal effort  Heart:  regular rate and rhythm, no murmur  Abdomen:  soft, nontender, nondistended, normal bowel sounds, no masses, hepatomegaly, splenomegaly  Extremities:  no edema, redness, tenderness in the calves  Skin:  no gross lesions, rashes, induration    Assessment:        Hospital Problems           Last Modified POA    * (Principal) Acute CHF (congestive heart failure) ([facility] 2/21/2020 Yes    Anemia in stage 4 chronic kidney disease ([facility] 2/21/2020 Yes    Overview Signed 7/29/2017  8:09 PM by [facility]     Updating deleted diagnoses         Type 2 diabetes mellitus with diabetic chronic kidney disease ([facility] 2/21/2020 Yes    Essential hypertension 2/21/2020 Yes    Hypercholesteremia 2/21/2020 Yes    HERNÁN (acute kidney injury) ([facility] 2/20/2020 Yes    NSTEMI (non-ST elevated myocardial infarction) ([facility] 2/21/2020 Yes          Plan:        1. Acute CHF- IV Bumex, monitor I/O's, HD. Symptoms improved. 2. CKD 4- just started on HD by Nephrology. Possible tunnel cath tomorrow patient agrees. 3. Multivessel CAD- CT surgery consult in progress, ASA, statin, BB  4. DM2- BS stable, Lantus, SSI  5. Nstemi- Heparin gtt, ASA, statin.   Patient has triple-vessel disease and CABG is recommended. Continue to discuss plan with patient. 6. Anxiety- Ativan prn, patient having trouble coping with her new diagnosis, she is agreeable to a Pastoral care consult  7.  Gi proph    Jared John DO  2/25/2020  5:55 PM

## 2020-02-25 NOTE — FLOWSHEET NOTE
Assessment: Patient is a 78 y.o. female who underwent a \"heart catheterization today,\" and was diagnosed to have \"blockages in all three arteries,\" per family. Patient was resting in hospital bed and her daughter was present at bedside, providing good support and comfort to patient. Patient indicated that her care team is suggesting that she undergo \"open heart surgery. \" Patient also had to recently begin \"dialysis,\" a treatment she did not want to have to go through. Patient expressed feelings of overwhelm and anxiety, as she visited with  at bedside. Patient's daughter shared good support to her mother, ensuring that her mother's wishes and requests will be followed, and that she will support her mother no matter what. Intervention:  visited patient per follow-up visit request from bedside nurse, indicating that patient is experiencing \"anxiety,\" especially after her recent diagnosis.  introduced herself to patient and her daughter at bedside.  sat in chair at bedside and provided comfort to patient and family in room.  provided an active listening presence as patient spoke about her diagnosis, her feelings of anxiety, and her uncertainty regarding surgery.  offered prayer for patient's comfort and peace. Outcome: Patient and family members were receptive of 's visit and support. Plan: Chaplains can make follow-up visit, per request. Chayito Malik can be reached 24/7 via [email or web address].     Milton Bermudez     02/24/20 9779   Encounter Summary   Services provided to: Patient and family together   Referral/Consult From: Multi-disciplinary team  ([address] [place] [address])   Support System Children   Continue Visiting   (2/24/2020)   Complexity of Encounter Moderate   Length of Encounter 30 minutes   Spiritual Assessment Completed Yes   Crisis   Type Emotional distress   Spiritual/Evangelical   Type Spiritual support   Assessment

## 2020-02-26 LAB
ANION GAP SERPL CALCULATED.3IONS-SCNC: 18 MMOL/L (ref 9–17)
BUN BLDV-MCNC: 43 MG/DL (ref 8–23)
BUN/CREAT BLD: ABNORMAL (ref 9–20)
CALCIUM SERPL-MCNC: 9.8 MG/DL (ref 8.6–10.4)
CHLORIDE BLD-SCNC: 95 MMOL/L (ref 98–107)
CO2: 22 MMOL/L (ref 20–31)
CREAT SERPL-MCNC: 4.1 MG/DL (ref 0.5–0.9)
EKG ATRIAL RATE: 80 BPM
EKG P AXIS: 57 DEGREES
EKG P-R INTERVAL: 142 MS
EKG Q-T INTERVAL: 394 MS
EKG QRS DURATION: 90 MS
EKG QTC CALCULATION (BAZETT): 454 MS
EKG R AXIS: 63 DEGREES
EKG T AXIS: -163 DEGREES
EKG VENTRICULAR RATE: 80 BPM
GFR AFRICAN AMERICAN: 13 ML/MIN
GFR NON-AFRICAN AMERICAN: 11 ML/MIN
GFR SERPL CREATININE-BSD FRML MDRD: ABNORMAL ML/MIN/{1.73_M2}
GFR SERPL CREATININE-BSD FRML MDRD: ABNORMAL ML/MIN/{1.73_M2}
GLUCOSE BLD-MCNC: 116 MG/DL (ref 65–105)
GLUCOSE BLD-MCNC: 124 MG/DL (ref 70–99)
GLUCOSE BLD-MCNC: 133 MG/DL (ref 65–105)
GLUCOSE BLD-MCNC: 205 MG/DL (ref 65–105)
GLUCOSE BLD-MCNC: 94 MG/DL (ref 65–105)
HCT VFR BLD CALC: 31.8 % (ref 36.3–47.1)
HEMOGLOBIN: 9.7 G/DL (ref 11.9–15.1)
MCH RBC QN AUTO: 31.2 PG (ref 25.2–33.5)
MCHC RBC AUTO-ENTMCNC: 30.5 G/DL (ref 28.4–34.8)
MCV RBC AUTO: 102.3 FL (ref 82.6–102.9)
NRBC AUTOMATED: 0 PER 100 WBC
PARTIAL THROMBOPLASTIN TIME: 25.8 SEC (ref 20.5–30.5)
PARTIAL THROMBOPLASTIN TIME: 32 SEC (ref 20.5–30.5)
PARTIAL THROMBOPLASTIN TIME: 36.1 SEC (ref 20.5–30.5)
PARTIAL THROMBOPLASTIN TIME: 41.6 SEC (ref 20.5–30.5)
PDW BLD-RTO: 13.2 % (ref 11.8–14.4)
PLATELET # BLD: 196 K/UL (ref 138–453)
PMV BLD AUTO: 11.9 FL (ref 8.1–13.5)
POTASSIUM SERPL-SCNC: 3.6 MMOL/L (ref 3.7–5.3)
RBC # BLD: 3.11 M/UL (ref 3.95–5.11)
SODIUM BLD-SCNC: 135 MMOL/L (ref 135–144)
TROPONIN INTERP: ABNORMAL
TROPONIN T: ABNORMAL NG/ML
TROPONIN, HIGH SENSITIVITY: 1475 NG/L (ref 0–14)
WBC # BLD: 16.7 K/UL (ref 3.5–11.3)

## 2020-02-26 PROCEDURE — 2060000000 HC ICU INTERMEDIATE R&B

## 2020-02-26 PROCEDURE — 6370000000 HC RX 637 (ALT 250 FOR IP): Performed by: INTERNAL MEDICINE

## 2020-02-26 PROCEDURE — 93010 ELECTROCARDIOGRAM REPORT: CPT | Performed by: INTERNAL MEDICINE

## 2020-02-26 PROCEDURE — 99232 SBSQ HOSP IP/OBS MODERATE 35: CPT | Performed by: HOSPITALIST

## 2020-02-26 PROCEDURE — 6360000002 HC RX W HCPCS: Performed by: INTERNAL MEDICINE

## 2020-02-26 PROCEDURE — 84484 ASSAY OF TROPONIN QUANT: CPT

## 2020-02-26 PROCEDURE — 80048 BASIC METABOLIC PNL TOTAL CA: CPT

## 2020-02-26 PROCEDURE — 97535 SELF CARE MNGMENT TRAINING: CPT

## 2020-02-26 PROCEDURE — 85027 COMPLETE CBC AUTOMATED: CPT

## 2020-02-26 PROCEDURE — 36415 COLL VENOUS BLD VENIPUNCTURE: CPT

## 2020-02-26 PROCEDURE — 2580000003 HC RX 258: Performed by: INTERNAL MEDICINE

## 2020-02-26 PROCEDURE — 85730 THROMBOPLASTIN TIME PARTIAL: CPT

## 2020-02-26 PROCEDURE — 82947 ASSAY GLUCOSE BLOOD QUANT: CPT

## 2020-02-26 PROCEDURE — 2500000003 HC RX 250 WO HCPCS: Performed by: INTERNAL MEDICINE

## 2020-02-26 RX ORDER — SODIUM CHLORIDE 9 MG/ML
INJECTION, SOLUTION INTRAVENOUS CONTINUOUS
Status: DISCONTINUED | OUTPATIENT
Start: 2020-02-27 | End: 2020-02-29

## 2020-02-26 RX ORDER — CEFAZOLIN SODIUM 1 G/50ML
1 INJECTION, SOLUTION INTRAVENOUS ONCE
Status: DISCONTINUED | OUTPATIENT
Start: 2020-02-27 | End: 2020-03-02

## 2020-02-26 RX ADMIN — CARVEDILOL 25 MG: 25 TABLET, FILM COATED ORAL at 09:51

## 2020-02-26 RX ADMIN — SODIUM CHLORIDE, PRESERVATIVE FREE 10 ML: 5 INJECTION INTRAVENOUS at 20:13

## 2020-02-26 RX ADMIN — FOLIC ACID 1 MG: 1 TABLET ORAL at 09:53

## 2020-02-26 RX ADMIN — FAMOTIDINE 20 MG: 20 TABLET, FILM COATED ORAL at 09:51

## 2020-02-26 RX ADMIN — LISINOPRIL 20 MG: 20 TABLET ORAL at 09:51

## 2020-02-26 RX ADMIN — HEPARIN SODIUM 2000 UNITS: 1000 INJECTION INTRAVENOUS; SUBCUTANEOUS at 00:48

## 2020-02-26 RX ADMIN — DESMOPRESSIN ACETATE 40 MG: 0.2 TABLET ORAL at 20:14

## 2020-02-26 RX ADMIN — LISINOPRIL 20 MG: 20 TABLET ORAL at 20:14

## 2020-02-26 RX ADMIN — ALLOPURINOL 300 MG: 300 TABLET ORAL at 09:51

## 2020-02-26 RX ADMIN — INSULIN LISPRO 2 UNITS: 100 INJECTION, SOLUTION INTRAVENOUS; SUBCUTANEOUS at 16:41

## 2020-02-26 RX ADMIN — BUMETANIDE 2 MG: 0.25 INJECTION INTRAMUSCULAR; INTRAVENOUS at 09:50

## 2020-02-26 RX ADMIN — HEPARIN SODIUM 16 UNITS/KG/HR: 10000 INJECTION, SOLUTION INTRAVENOUS at 23:43

## 2020-02-26 RX ADMIN — HEPARIN SODIUM 2000 UNITS: 1000 INJECTION INTRAVENOUS; SUBCUTANEOUS at 23:00

## 2020-02-26 RX ADMIN — SODIUM CHLORIDE, PRESERVATIVE FREE 10 ML: 5 INJECTION INTRAVENOUS at 09:52

## 2020-02-26 RX ADMIN — FOLIC ACID 1 MG: 1 TABLET ORAL at 20:33

## 2020-02-26 RX ADMIN — CARVEDILOL 25 MG: 25 TABLET, FILM COATED ORAL at 20:14

## 2020-02-26 RX ADMIN — CHOLECALCIFEROL TAB 10 MCG (400 UNIT) 400 UNITS: 10 TAB at 09:50

## 2020-02-26 RX ADMIN — HEPARIN SODIUM 2000 UNITS: 1000 INJECTION INTRAVENOUS; SUBCUTANEOUS at 08:09

## 2020-02-26 RX ADMIN — CHOLECALCIFEROL TAB 10 MCG (400 UNIT) 400 UNITS: 10 TAB at 20:14

## 2020-02-26 NOTE — PROGRESS NOTES
Occupational Therapy  Facility/Department: [facility] 1  Daily Treatment Note  NAME: Jerri Gonsalves  : 1950  MRN: [id number]    Date of Service: 2020    Discharge Recommendations: No therapy recommended at discharge            Assessment   Performance deficits / Impairments: Decreased functional mobility ; Decreased ADL status; Decreased strength;Decreased endurance;Decreased balance;Decreased high-level IADLs  Prognosis: Good  REQUIRES OT FOLLOW UP: Yes  Activity Tolerance  Activity Tolerance: Patient Tolerated treatment well  Safety Devices  Safety Devices in place: Yes  Type of devices: All fall risk precautions in place;Call light within reach; Left in chair;Nurse notified         Patient Diagnosis(es): The primary encounter diagnosis was COPD exacerbation ([facility] Diagnoses of Congestive heart failure, unspecified HF chronicity, unspecified heart failure type ([facility] Dyspnea and respiratory abnormalities, and Stage 4 chronic kidney disease ([facility] were also pertinent to this visit. has a past medical history of Acute CHF (congestive heart failure) ([facility] Anemia in chronic kidney disease, Anemia in stage 4 chronic kidney disease ([facility] Cerebral artery occlusion with cerebral infarction ([facility] Chronic kidney disease, Gout, arthritis, Hyperlipidemia, Hypertension, Left sided lacunar infarction [facility]), Peripheral vascular disease ([facility] and Type II or unspecified type diabetes mellitus without mention of complication, not stated as uncontrolled. has a past surgical history that includes Tonsillectomy; other surgical history (2017); and Colonoscopy.     Restrictions  Restrictions/Precautions  Restrictions/Precautions: General Precautions, Fall Risk, Up as Tolerated  Required Braces or Orthoses?: No  Position Activity Restriction  Other position/activity restrictions: up with assist  Subjective   General  Patient assessed for rehabilitation services?: Yes  Family / Caregiver Present: No  Pain minutes of activity     Therapy Time   Individual Concurrent Group Co-treatment   Time In  1425         Time Out  [address] Cov[address]ubias/YAMIL

## 2020-02-26 NOTE — PROGRESS NOTES
Nephrology Progress Note      SUBJECTIVE       Pt was seen and examined. No acute issues overnite. Stable hemodynamics . Patient has had a history of stage IV bordering stage V chronic kidney disease before she presented to the hospital.  Suffered an acute on chronic renal injury in setting of non-ST elevation MI and decompensated heart failure. Hemodialysis has now been initiated. She currently has a temporary dialysis catheter. Cardiac cath shows her to have mutlivessel ds, CT surgery consult noted . I believe patient was a little overwhelmed with all that has happened over the last few days and was somewhat ambivalent regarding continuing dialysis and consenting to upcoming cardiac surgery. This morning had a long discussion with her. She seems to understand things a little bit better she is little bit more calm and composed, has agreed to having a PermCath placed today. I believe she is happy  about the fact that following her cardiac surgery 1 could possibly transition her over to home dialysis/PD. She currently denies any chest pain. She denies orthopnea or PND. Still has a little bit of leg edema. BP flcutuate. Labs reviewed.     OBJECTIVE      CURRENT TEMPERATURE:  Temp: 99.1 °F (37.3 °C)  MAXIMUM TEMPERATURE OVER 24HRS:  Temp (24hrs), Av.7 °F (37.1 °C), Min:98 °F (36.7 °C), Max:99.2 °F (37.3 °C)    CURRENT RESPIRATORY RATE:  Resp: 16  CURRENT PULSE:  Pulse: 81  CURRENT BLOOD PRESSURE:  BP: (!) 159/63  24HR BLOOD PRESSURE RANGE:  Systolic (73QJR), ODP:740 , Min:141 , RNI:896   ; Diastolic (69FRK), KPB:74, Min:60, Max:89    24HR INTAKE/OUTPUT:      Intake/Output Summary (Last 24 hours) at 2020 0949  Last data filed at 2020 0703  Gross per 24 hour   Intake 222 ml   Output 700 ml   Net -478 ml     WEIGHT :  Patient Vitals for the past 96 hrs (Last 3 readings):   Weight   20 0600 166 lb 14.2 oz (75.7 kg)   20 1930 159 lb 9.8 oz (72.4 kg)   20 1600 163 lb 5.8 oz mg, PRN  dextrose 5 % solution, PRN  insulin lispro (HUMALOG) injection vial 0-6 Units, TID WC  insulin lispro (HUMALOG) injection vial 0-3 Units, Nightly  polyethylene glycol (GLYCOLAX) packet 17 g, Daily PRN  heparin (porcine) injection 4,000 Units, PRN  heparin (porcine) injection 2,000 Units, PRN  heparin 25,000 units in dextrose 5% 250 mL infusion, Continuous  famotidine (PEPCID) tablet 20 mg, Daily  aspirin chewable tablet 81 mg, Daily  bumetanide (BUMEX) injection 2 mg, Daily  atorvastatin (LIPITOR) tablet 40 mg, Nightly          LABS      CBC:   Recent Labs     02/25/20  0434 02/26/20  0600   WBC 14.8* 16.7*   RBC 3.24* 3.11*   HGB 10.2* 9.7*   HCT 30.9* 31.8*   MCV 95.4 102.3   MCH 31.5 31.2   MCHC 33.0 30.5   RDW 13.2 13.2    196   MPV 12.0 11.9      BMP:   Recent Labs     02/24/20  0352 02/25/20  0603   * 139   K 3.6* 3.3*   CL 94* 98   CO2 22 24   BUN 58* 29*   CREATININE 3.90* 2.85*   GLUCOSE 180* 114*   CALCIUM 9.2 10.1      PHOSPHORUS:    Recent Labs     02/25/20  0603   PHOS 3.9     MAGNESIUM:   Recent Labs     02/25/20  0603   MG 1.7          C3:   Lab Results   Component Value Date    C3 162 03/11/2013     C4:   Lab Results   Component Value Date    C4 30 03/11/2013     URINE SODIUM:    Lab Results   Component Value Date    BRENT 51 02/20/2020      URINE CREATININE:    Lab Results   Component Value Date    LABCREA 80.6 03/01/2019       URINALYSIS:  U/A:   Lab Results   Component Value Date    NITRU NEGATIVE 02/20/2020    COLORU YELLOW 02/20/2020    PHUR 6.0 02/20/2020    WBCUA 2 TO 5 02/20/2020    RBCUA 0 TO 2 02/20/2020    MUCUS NOT REPORTED 02/20/2020    TRICHOMONAS NOT REPORTED 02/20/2020    YEAST NOT REPORTED 02/20/2020    BACTERIA NOT REPORTED 02/20/2020    SPECGRAV 1.017 02/20/2020    LEUKOCYTESUR NEGATIVE 02/20/2020    UROBILINOGEN Normal 02/20/2020    BILIRUBINUR NEGATIVE 02/20/2020    GLUCOSEU 1+ 02/20/2020    KETUA TRACE 02/20/2020    AMORPHOUS NOT REPORTED 02/20/2020

## 2020-02-26 NOTE — PROGRESS NOTES
02/26/20  0600   WBC 14.8*  --  16.7*   RBC 3.24*  --  3.11*   HGB 10.2*  --  9.7*   HCT 30.9*  --  31.8*   MCV 95.4  --  102.3   MCH 31.5  --  31.2   MCHC 33.0  --  30.5   RDW 13.2  --  13.2     --  196   MPV 12.0  --  11.9   INR 1.2 1.0  --      Chemistry:  Recent Labs     02/24/20  0352  02/25/20  0603 02/25/20  1347 02/25/20  2135 02/26/20  0600   *  --  139  --   --   --    K 3.6*  --  3.3*  --   --   --    CL 94*  --  98  --   --   --    CO2 22  --  24  --   --   --    GLUCOSE 180*  --  114*  --   --   --    BUN 58*  --  29*  --   --   --    CREATININE 3.90*  --  2.85*  --   --   --    MG  --   --  1.7  --   --   --    ANIONGAP 18*  --  17  --   --   --    LABGLOM 11*  --  16*  --   --   --    GFRAA 14*  --  20*  --   --   --    CALCIUM 9.2  --  10.1  --   --   --    PHOS  --   --  3.9  --   --   --    TROPHS 1,056*   < >  --  1,422* 1,391* 1,475*    < > = values in this interval not displayed. Recent Labs     02/24/20 2011 02/25/20  0628 02/25/20  1137 02/25/20  1705 02/25/20 2015 02/26/20  0624   POCGLU 205* 103 170* 126* 221* 94     ABG:  Lab Results   Component Value Date    FIO2 NOT REPORTED 02/19/2020     Lab Results   Component Value Date/Time    SPECIAL NOT REPORTED 12/19/2016 10:11 PM     Lab Results   Component Value Date/Time    CULTURE NO SIGNIFICANT GROWTH 12/19/2016 10:11 PM    CULTURE  12/19/2016 10:11 PM     [employer] [address] [facility], [address] [place] [address] [phone]       Radiology:  Ct Chest Wo Contrast    Result Date: 2/25/2020  Scattered calcified and noncalcified plaques about the aorta, but few plaques along the ascending aorta. Three-vessel coronary artery disease. No evidence of mediastinal mass. Abnormal CT appearance of the right breast.  Consider correlation with diagnostic mammogram.     Us Renal Complete    Result Date: 2/20/2020  Echogenic kidneys consistent with intrinsic renal parenchymal disease. Xr Chest Portable    Result Date: 2/21/2020  1. Interval decrease in now mild pulmonary interstitial edema. 2. Suspected right basilar multifocal ill-defined consolidation consistent with overlying alveolar edema versus pneumonia. Xr Chest Portable    Result Date: 2/19/2020  Moderate edema     Ir Nontunneled Vascular Catheter > 5 Years    Result Date: 2/21/2020  Successful ultrasound and fluoroscopy guided non-tunneled dialysis catheter placement. Okay to use dialysis catheter. Physical Examination:        General appearance:  alert, cooperative and no distress  Mental Status:  oriented to person, place and time and normal affect  Lungs:  clear to auscultation bilaterally, normal effort  Heart:  regular rate and rhythm, no murmur  Abdomen:  soft, nontender, nondistended, normal bowel sounds, no masses, hepatomegaly, splenomegaly  Extremities:  no edema, redness, tenderness in the calves  Skin:  no gross lesions, rashes, induration    Assessment:        Hospital Problems           Last Modified POA    * (Principal) Acute CHF (congestive heart failure) ([facility] 2/21/2020 Yes    Anemia in stage 4 chronic kidney disease ([facility] 2/21/2020 Yes    Overview Signed 7/29/2017  8:09 PM by [facility]     Updating deleted diagnoses         Type 2 diabetes mellitus with diabetic chronic kidney disease ([facility] 2/21/2020 Yes    Essential hypertension 2/21/2020 Yes    Hypercholesteremia 2/21/2020 Yes    HERNÁN (acute kidney injury) ([facility] 2/20/2020 Yes    NSTEMI (non-ST elevated myocardial infarction) ([facility] 2/21/2020 Yes          Plan:        1. Acute CHF- IV Bumex, monitor I/O's, HD. Symptoms improved. 2. CKD 4- just started on HD by Nephrology. Permacath placement today. .  3. Multivessel CAD- CT surgery consult in progress, ASA, statin, BB. After permacath placement, cardiology and cardiothoracic surgery will discuss CABG plan. 4. DM2- BS stable, Lantus, SSI  5. Nstemi- Heparin gtt, ASA, statin. Patient has triple-vessel disease and CABG is recommended.   Continue to discuss plan with patient. 6. Anxiety- Ativan prn, patient having trouble coping with her new diagnosis, she is agreeable to a Pastoral care consult  7.  Gi proph    Wilfredo Sever, DO  2/26/2020  11:13 AM

## 2020-02-26 NOTE — PROGRESS NOTES
02/25/20  1347 02/25/20  2135 02/26/20  0600   TROPONINT NOT REPORTED NOT REPORTED NOT REPORTED     BNP:   No results for input(s): PROBNP in the last 72 hours. No results for input(s): BNP in the last 72 hours. Lipids: No results for input(s): CHOL, HDL in the last 72 hours. Invalid input(s): LDLCALCU  INR:   Recent Labs     02/25/20  0434 02/25/20  1203   INR 1.2 1.0       Objective:   Vitals: BP (!) 127/57   Pulse 79   Temp 98.4 °F (36.9 °C) (Oral)   Resp 16   Wt 166 lb 14.2 oz (75.7 kg)   SpO2 98%   BMI 28.65 kg/m²    General appearance: awake, alert, in no apparent distress. HEENT: Head: Normocephalic, atraumatic without any obvious abnormalities. Neck: JVD absent   Lungs:good bilateral equal air entry. No adventitious lung sounds auscultated. Heart: S1, S2, regular, + systolic murmur. Abdomen: soft, nontender with bowel sounds present in all 4 quadrants. Extremities: b/l femoral access site - no signs of hematoma   Integumentum:Intact with no rashes noted.       Diagnostic Studies:     EKG:   Sinus rhythm with possible old septal infarct and inferolateral ST depressions     ECHO: 2/21/2020  Left ventricle is normal in size. Global left ventricular systolic function is moderately reduced. Calculated EF via heart model is 36%. Mild left ventricular hypertrophy. Grade II (moderate) left ventricular diastolic dysfunction. Left atrium is mildly dilated. The aortic valve is calcified with reduced cusp mobility. Moderate aortic valve stenosis with a mean gradient of 20 mmHg. Maybe underestimated due to poor LV function. Trivial aortic insufficiency. Thickened mitral valve leaflets. Mitral annular calcification is seen. At least Moderate mitral regurgitation. Mild tricuspid regurgitation. Moderate pulmonary hypertension with an estimated right ventricular systolic pressure of 56 mmHg  Mild pulmonic insufficiency. Small pericardial effusion.   IVC Increased diameter and impaired or no Type 2 diabetes mellitus with stage 4 chronic kidney disease, with long-term current use of insulin (HCC)     Cerebral aneurysm     Anemia due to stage 4 chronic kidney disease treated with erythropoietin ([facility])     Tobacco abuse     Secondary hyperparathyroidism of renal origin ([facility]     Persistent proteinuria     Metabolic acidosis     HERNÁN (acute kidney injury) ([facility]     NSTEMI (non-ST elevated myocardial infarction) ([facility]     Acute CHF (congestive heart failure) ([facility])      Assessment / Acute Cardiac Problems:   1. Acute hypoxic resp failure -  has baseline JAY NYHA III  2. Recent possible viral infection   3. NSTEMI- s/p cath with MVD CABG evaluation pending by CT surgery   4. Acute CHF - volume overload in the setting of CKD  5. Cardiomyopathy, likely ischemic (EF 36%)  6. Moderate AS - Mean gradient 20, CHEIKH (continuity : 0.87), no significant gradient noticed on cardiac cath   7. Moderate MR  8. B/L significant iliac disease on angiogram   9. CKD  10. H/O CVA  6. DM-2      Plan of Treatment:   1. On ASA, Plavix, Lipitor 40, Bumex 2 IV qday, Lisinopril 10 mg, Coreg 25 bid and Hydralazine 50 mg TID  2. Continue heparin infusion  3. Hold changes in anti HTN medications due to low BP yesterday with HD  4. ECHO with moderate AS and moderate MR - no significant AS on cath   5. CT surgery evaluation- patient wants to take time to decide about CABG and ongoing HD, inclining towards surgery    6. B/L Iliac disease - will discuss the need for vascular surgery evaluation   7. Nephrology input appreciated  8. Currently chest pain free    Electronically signed on 02/26/20 at 12:04 PM by: Tod Horner MD   Fellow, [address]  Attending Physician Statement  I have discussed the care of the patient, including pertinent history and exam findings, with the resident.  I have seen and examined the patient and the key elements of all parts of the encounter have been performed by

## 2020-02-27 ENCOUNTER — APPOINTMENT (OUTPATIENT)
Dept: DIALYSIS | Age: 70
DRG: 233 | End: 2020-02-27
Payer: MEDICARE

## 2020-02-27 ENCOUNTER — APPOINTMENT (OUTPATIENT)
Dept: INTERVENTIONAL RADIOLOGY/VASCULAR | Age: 70
DRG: 233 | End: 2020-02-27
Payer: MEDICARE

## 2020-02-27 PROBLEM — I50.43 ACUTE ON CHRONIC COMBINED SYSTOLIC AND DIASTOLIC CHF (CONGESTIVE HEART FAILURE) (HCC): Status: ACTIVE | Noted: 2020-02-27

## 2020-02-27 LAB
ANION GAP SERPL CALCULATED.3IONS-SCNC: 17 MMOL/L (ref 9–17)
BUN BLDV-MCNC: 53 MG/DL (ref 8–23)
BUN/CREAT BLD: ABNORMAL (ref 9–20)
CALCIUM SERPL-MCNC: 9 MG/DL (ref 8.6–10.4)
CHLORIDE BLD-SCNC: 96 MMOL/L (ref 98–107)
CO2: 19 MMOL/L (ref 20–31)
CREAT SERPL-MCNC: 4.69 MG/DL (ref 0.5–0.9)
GFR AFRICAN AMERICAN: 11 ML/MIN
GFR NON-AFRICAN AMERICAN: 9 ML/MIN
GFR SERPL CREATININE-BSD FRML MDRD: ABNORMAL ML/MIN/{1.73_M2}
GFR SERPL CREATININE-BSD FRML MDRD: ABNORMAL ML/MIN/{1.73_M2}
GLUCOSE BLD-MCNC: 139 MG/DL (ref 65–105)
GLUCOSE BLD-MCNC: 143 MG/DL (ref 70–99)
GLUCOSE BLD-MCNC: 158 MG/DL (ref 65–105)
GLUCOSE BLD-MCNC: 164 MG/DL (ref 65–105)
GLUCOSE BLD-MCNC: 166 MG/DL (ref 65–105)
HCT VFR BLD CALC: 29.4 % (ref 36.3–47.1)
HEMOGLOBIN: 9.6 G/DL (ref 11.9–15.1)
MAGNESIUM: 1.8 MG/DL (ref 1.6–2.6)
MCH RBC QN AUTO: 31.3 PG (ref 25.2–33.5)
MCHC RBC AUTO-ENTMCNC: 32.7 G/DL (ref 28.4–34.8)
MCV RBC AUTO: 95.8 FL (ref 82.6–102.9)
NRBC AUTOMATED: 0 PER 100 WBC
PDW BLD-RTO: 13.2 % (ref 11.8–14.4)
PHOSPHORUS: 6 MG/DL (ref 2.6–4.5)
PLATELET # BLD: 184 K/UL (ref 138–453)
PMV BLD AUTO: 11.9 FL (ref 8.1–13.5)
POTASSIUM SERPL-SCNC: 3.4 MMOL/L (ref 3.7–5.3)
RBC # BLD: 3.07 M/UL (ref 3.95–5.11)
SODIUM BLD-SCNC: 132 MMOL/L (ref 135–144)
WBC # BLD: 18.2 K/UL (ref 3.5–11.3)

## 2020-02-27 PROCEDURE — C1750 CATH, HEMODIALYSIS,LONG-TERM: HCPCS

## 2020-02-27 PROCEDURE — 82947 ASSAY GLUCOSE BLOOD QUANT: CPT

## 2020-02-27 PROCEDURE — 90935 HEMODIALYSIS ONE EVALUATION: CPT

## 2020-02-27 PROCEDURE — 6370000000 HC RX 637 (ALT 250 FOR IP): Performed by: INTERNAL MEDICINE

## 2020-02-27 PROCEDURE — 6360000002 HC RX W HCPCS: Performed by: INTERNAL MEDICINE

## 2020-02-27 PROCEDURE — 76937 US GUIDE VASCULAR ACCESS: CPT | Performed by: RADIOLOGY

## 2020-02-27 PROCEDURE — 6360000002 HC RX W HCPCS: Performed by: RADIOLOGY

## 2020-02-27 PROCEDURE — 2500000003 HC RX 250 WO HCPCS: Performed by: INTERNAL MEDICINE

## 2020-02-27 PROCEDURE — 77001 FLUOROGUIDE FOR VEIN DEVICE: CPT | Performed by: RADIOLOGY

## 2020-02-27 PROCEDURE — 84100 ASSAY OF PHOSPHORUS: CPT

## 2020-02-27 PROCEDURE — 85027 COMPLETE CBC AUTOMATED: CPT

## 2020-02-27 PROCEDURE — B548ZZA ULTRASONOGRAPHY OF SUPERIOR VENA CAVA, GUIDANCE: ICD-10-PCS | Performed by: RADIOLOGY

## 2020-02-27 PROCEDURE — 99232 SBSQ HOSP IP/OBS MODERATE 35: CPT | Performed by: HOSPITALIST

## 2020-02-27 PROCEDURE — 83735 ASSAY OF MAGNESIUM: CPT

## 2020-02-27 PROCEDURE — C1769 GUIDE WIRE: HCPCS

## 2020-02-27 PROCEDURE — 0JH63XZ INSERTION OF TUNNELED VASCULAR ACCESS DEVICE INTO CHEST SUBCUTANEOUS TISSUE AND FASCIA, PERCUTANEOUS APPROACH: ICD-10-PCS | Performed by: RADIOLOGY

## 2020-02-27 PROCEDURE — C1894 INTRO/SHEATH, NON-LASER: HCPCS

## 2020-02-27 PROCEDURE — 94761 N-INVAS EAR/PLS OXIMETRY MLT: CPT

## 2020-02-27 PROCEDURE — 6370000000 HC RX 637 (ALT 250 FOR IP): Performed by: NURSE PRACTITIONER

## 2020-02-27 PROCEDURE — 80048 BASIC METABOLIC PNL TOTAL CA: CPT

## 2020-02-27 PROCEDURE — 2060000000 HC ICU INTERMEDIATE R&B

## 2020-02-27 PROCEDURE — 36415 COLL VENOUS BLD VENIPUNCTURE: CPT

## 2020-02-27 PROCEDURE — 02H633Z INSERTION OF INFUSION DEVICE INTO RIGHT ATRIUM, PERCUTANEOUS APPROACH: ICD-10-PCS | Performed by: RADIOLOGY

## 2020-02-27 PROCEDURE — 36558 INSERT TUNNELED CV CATH: CPT | Performed by: RADIOLOGY

## 2020-02-27 PROCEDURE — 2580000003 HC RX 258: Performed by: RADIOLOGY

## 2020-02-27 PROCEDURE — 2709999900 HC NON-CHARGEABLE SUPPLY

## 2020-02-27 RX ORDER — HEPARIN SODIUM (PORCINE) LOCK FLUSH IV SOLN 100 UNIT/ML 100 UNIT/ML
SOLUTION INTRAVENOUS
Status: COMPLETED | OUTPATIENT
Start: 2020-02-27 | End: 2020-02-27

## 2020-02-27 RX ORDER — POTASSIUM CHLORIDE 20 MEQ/1
40 TABLET, EXTENDED RELEASE ORAL ONCE
Status: COMPLETED | OUTPATIENT
Start: 2020-02-27 | End: 2020-02-27

## 2020-02-27 RX ORDER — FENTANYL CITRATE 50 UG/ML
INJECTION, SOLUTION INTRAMUSCULAR; INTRAVENOUS
Status: COMPLETED | OUTPATIENT
Start: 2020-02-27 | End: 2020-02-27

## 2020-02-27 RX ADMIN — FOLIC ACID 1 MG: 1 TABLET ORAL at 14:52

## 2020-02-27 RX ADMIN — ALLOPURINOL 300 MG: 300 TABLET ORAL at 14:56

## 2020-02-27 RX ADMIN — POTASSIUM CHLORIDE 40 MEQ: 1500 TABLET, EXTENDED RELEASE ORAL at 06:09

## 2020-02-27 RX ADMIN — INSULIN LISPRO 1 UNITS: 100 INJECTION, SOLUTION INTRAVENOUS; SUBCUTANEOUS at 21:33

## 2020-02-27 RX ADMIN — FENTANYL CITRATE 50 MCG: 50 INJECTION INTRAMUSCULAR; INTRAVENOUS at 08:52

## 2020-02-27 RX ADMIN — BUMETANIDE 2 MG: 0.25 INJECTION INTRAMUSCULAR; INTRAVENOUS at 14:57

## 2020-02-27 RX ADMIN — SODIUM CHLORIDE, PRESERVATIVE FREE 1.9 ML: 5 INJECTION INTRAVENOUS at 09:09

## 2020-02-27 RX ADMIN — FAMOTIDINE 20 MG: 20 TABLET, FILM COATED ORAL at 14:54

## 2020-02-27 RX ADMIN — LISINOPRIL 20 MG: 20 TABLET ORAL at 17:00

## 2020-02-27 RX ADMIN — HEPARIN SODIUM 16 UNITS/KG/HR: 10000 INJECTION, SOLUTION INTRAVENOUS at 19:03

## 2020-02-27 RX ADMIN — SODIUM CHLORIDE, PRESERVATIVE FREE 1.9 ML: 5 INJECTION INTRAVENOUS at 09:08

## 2020-02-27 RX ADMIN — LISINOPRIL 20 MG: 20 TABLET ORAL at 21:39

## 2020-02-27 RX ADMIN — INSULIN LISPRO 1 UNITS: 100 INJECTION, SOLUTION INTRAVENOUS; SUBCUTANEOUS at 15:02

## 2020-02-27 RX ADMIN — ASPIRIN 81 MG: 81 TABLET, CHEWABLE ORAL at 14:55

## 2020-02-27 RX ADMIN — DESMOPRESSIN ACETATE 40 MG: 0.2 TABLET ORAL at 21:39

## 2020-02-27 RX ADMIN — CHOLECALCIFEROL TAB 10 MCG (400 UNIT) 400 UNITS: 10 TAB at 14:52

## 2020-02-27 RX ADMIN — DEXTROSE MONOHYDRATE 1 G: 5 INJECTION INTRAVENOUS at 07:21

## 2020-02-27 RX ADMIN — SODIUM CHLORIDE: 9 INJECTION, SOLUTION INTRAVENOUS at 05:15

## 2020-02-27 RX ADMIN — CARVEDILOL 25 MG: 25 TABLET, FILM COATED ORAL at 21:39

## 2020-02-27 RX ADMIN — FOLIC ACID 1 MG: 1 TABLET ORAL at 21:39

## 2020-02-27 RX ADMIN — INSULIN GLARGINE 25 UNITS: 100 INJECTION, SOLUTION SUBCUTANEOUS at 21:42

## 2020-02-27 RX ADMIN — CHOLECALCIFEROL TAB 10 MCG (400 UNIT) 400 UNITS: 10 TAB at 21:39

## 2020-02-27 ASSESSMENT — PAIN SCALES - GENERAL
PAINLEVEL_OUTOF10: 0
PAINLEVEL_OUTOF10: 3

## 2020-02-27 ASSESSMENT — PAIN DESCRIPTION - PAIN TYPE: TYPE: ACUTE PAIN

## 2020-02-27 ASSESSMENT — PAIN DESCRIPTION - ORIENTATION: ORIENTATION: RIGHT

## 2020-02-27 ASSESSMENT — PAIN DESCRIPTION - LOCATION: LOCATION: CHEST

## 2020-02-27 NOTE — PROGRESS NOTES
Mary [facility]iones [facility]    Progress Note    2/27/2020    6:22 PM    Name:   Pato Girard  MRN:     [id number]     Acct:      [de-identified]   Room:   [address] [place] [address] Day:  9  Admit Date:  2/19/2020 11:01 PM    PCP:   Barry Coley PA-C  Code Status:  Full Code    Subjective:     C/C:   Chief Complaint   Patient presents with    Shortness of Breath     Patient Active Problem List   Diagnosis    Gout    Hypovitaminosis D    Anemia in stage 4 chronic kidney disease ([facility]    Type 2 diabetes mellitus with diabetic chronic kidney disease ([facility]    Chronic kidney disease (CKD)    Essential hypertension    Hypercholesteremia    Hypokalemia    Acute infarct posterior limb internal capsule on the left    Left sided lacunar infarction ([facility]    Type 2 diabetes mellitus with stage 4 chronic kidney disease, with long-term current use of insulin ([facility]    Cerebral aneurysm    Anemia due to stage 4 chronic kidney disease treated with erythropoietin ([facility]    Tobacco abuse    Secondary hyperparathyroidism of renal origin ([facility]    Persistent proteinuria    Metabolic acidosis    HERNÁN (acute kidney injury) ([facility]    NSTEMI (non-ST elevated myocardial infarction) ([facility]    Acute CHF (congestive heart failure) (HCC)    Acute on chronic combined systolic and diastolic CHF (congestive heart failure) (HCC)     Interval History Status: not changed. Patient was seen and examined at bedside. No acute overnight event. Patient has no acute medical complaint. Feels comfortable. Brief History:     Per my partner:  \"77 y/o presented to ED with c/o SOB which started last evening, which was initially on exertion but then at rest as well. Denies asso chest pain. EMS noted hypoxia sats in 80s. ED w/u: CXR pulm edema, HStrops >400, cr 3.97. EKG with inferolateral ST depressions.    Known prior h/o CKD4 baseline Cr 3.2-3.5, left sided CVA , HPL, gout, DM2, AOCD, COPD.   prior ECHO 12/2016: EF >55%, mild pulm HTN  U/a negative for UTI.   Had ernie cath placed 02/21 and HD started 02/22. Cath planned for 02/24. \"     2/24/2020- Cardiac cath     Findings:  LMCA: Normal 0% stenosis.     LAD: Mid 99% in upper long branch (Reaching to apex) 80% in lower LAD branch. D1: proximal 90% stenosis     LCx: Mid 80% stenosis  OM1: Ostial 80% stenosis  OM2: Proximal 80% stenosis     RCA: 100% proximal stenosis  Collateral from the left        Peripheral Arteries and Lesion Findings  Descending aorta: Severe iliac disease in both sides  80% in the left side and 60-70% in the right side     The LV gram was performed in the BAUGH 30 position. LVEF: 35%. LV Wall Motion: Severe basal anterior and basal inferior hypokinesis    Review of Systems:     Constitutional:  negative for chills, fevers, sweats  Respiratory:  negative for cough, dyspnea on exertion, shortness of breath, wheezing  Cardiovascular:  negative for chest pain, chest pressure/discomfort, lower extremity edema, palpitations  Gastrointestinal:  negative for abdominal pain, constipation, diarrhea, nausea, vomiting  Neurological:  negative for dizziness, headache    Medications:      Allergies:  No Known Allergies    Current Meds:   Scheduled Meds:    ceFAZolin  1 g Intravenous Once    lisinopril  20 mg Oral BID    sodium chloride flush  10 mL Intravenous 2 times per day    carvedilol  25 mg Oral BID    allopurinol  300 mg Oral Daily    folic acid  1 mg Oral BID    insulin glargine  25 Units Subcutaneous Nightly    vitamin D3  400 Units Oral BID    sodium chloride flush  10 mL Intravenous 2 times per day    insulin lispro  0-6 Units Subcutaneous TID     insulin lispro  0-3 Units Subcutaneous Nightly    famotidine  20 mg Oral Daily    aspirin  81 mg Oral Daily    bumetanide  2 mg Intravenous Daily    atorvastatin  40 mg Oral Nightly     Continuous Infusions:    sodium chloride 20 mL/hr at 02/27/20 0515    consistent with overlying alveolar edema versus pneumonia. Ir Tunneled Cvc Place Wo Sq Port/pump > 5 Years    Result Date: 2/27/2020  Successful placement of 14.5 Lao by 23 cm tip to cuff palindrome dialysis catheter and right IJ. Okay to use dialysis catheter. Ir Nontunneled Vascular Catheter > 5 Years    Result Date: 2/21/2020  Successful ultrasound and fluoroscopy guided non-tunneled dialysis catheter placement. Okay to use dialysis catheter. Physical Examination:        General appearance:  alert, cooperative and no distress  Mental Status:  oriented to person, place and time and normal affect  Lungs:  clear to auscultation bilaterally, normal effort  Heart:  regular rate and rhythm, no murmur  Abdomen:  soft, nontender, nondistended, normal bowel sounds, no masses, hepatomegaly, splenomegaly  Extremities:  no edema, redness, tenderness in the calves  Skin:  no gross lesions, rashes, induration    Assessment:        Hospital Problems           Last Modified POA    * (Principal) Acute on chronic combined systolic and diastolic CHF (congestive heart failure) ([facility] 2/27/2020 Yes    Anemia in stage 4 chronic kidney disease ([facility] 2/21/2020 Yes    Overview Signed 7/29/2017  8:09 PM by [facility]ki [facility]     Updating deleted diagnoses         Type 2 diabetes mellitus with diabetic chronic kidney disease ([facility] 2/21/2020 Yes    Essential hypertension 2/21/2020 Yes    Hypercholesteremia 2/21/2020 Yes    HERNÁN (acute kidney injury) ([facility] 2/20/2020 Yes    NSTEMI (non-ST elevated myocardial infarction) ([facility] 2/21/2020 Yes          Plan:        1. Acute CHF- IV Bumex, monitor I/O's, HD.  Symptoms improved. 2. CKD 4- just started on HD by Nephrology.    Permacath placement today. .  3. Multivessel CAD- CT surgery consult in progress, ASA, statin, BB. After permacath placement, cardiology and cardiothoracic surgery will discuss CABG plan. 4. DM2- BS stable, Lantus, SSI  5.  Nstemi- Heparin gtt, ASA, statin.  Patient has triple-vessel disease and CABG is recommended.  Continue to discuss plan with patient. 6. Anxiety- Ativan prn, patient having trouble coping with her new diagnosis, she is agreeable to a Pastoral care consult  7.  Gi proph    Nishant Hager,   2/27/2020  6:22 PM

## 2020-02-27 NOTE — PROGRESS NOTES
Nephrology Progress Note      SUBJECTIVE       Pt was seen and examined. No acute issues overnite. Stable hemodynamics . Patient has had a history of stage IV bordering stage V chronic kidney disease before she presented to the hospital.  Suffered an acute on chronic renal injury in setting of non-ST elevation MI and decompensated heart failure. Hemodialysis has now been initiated. Cardiac cath shows her to have mutlivessel ds, CT surgery consult noted . Patient is status post PermCath placement this morning. I have seen her on hemodialysis this morning. Multiple questions were addressed. Eventually it appears as though she would like to transition over to PD. Cussed with cardiothoracic surgery this morning as well briefly, they feel comfortable that she potentially can be discharged and they would like to see her back in the office in 2 weeks time and discuss CT options at that time. She currently denies any chest pain. She denies orthopnea or PND. Still has a little bit of leg edema. BP stable    Labs reviewed.     OBJECTIVE      CURRENT TEMPERATURE:  Temp: 98 °F (36.7 °C)  MAXIMUM TEMPERATURE OVER 24HRS:  Temp (24hrs), Av.5 °F (36.9 °C), Min:98 °F (36.7 °C), Max:99.1 °F (37.3 °C)    CURRENT RESPIRATORY RATE:  Resp: 17  CURRENT PULSE:  Pulse: 77  CURRENT BLOOD PRESSURE:  BP: (!) 175/72  24HR BLOOD PRESSURE RANGE:  Systolic (90AZJ), NBY:251 , Min:118 , JKS:993   ; Diastolic (06PRW), MCO:03, Min:48, Max:88    24HR INTAKE/OUTPUT:      Intake/Output Summary (Last 24 hours) at 2020 1039  Last data filed at 2020 1892  Gross per 24 hour   Intake 1178 ml   Output 900 ml   Net 278 ml     WEIGHT :  Patient Vitals for the past 96 hrs (Last 3 readings):   Weight   20 0600 166 lb 14.2 oz (75.7 kg)   20 1930 159 lb 9.8 oz (72.4 kg)   20 1600 163 lb 5.8 oz (74.1 kg)     PHYSICAL EXAM      GENERAL APPEARANCE:Awake, alert, in no acute distress  SKIN: warm and dry, no rash or erythema  EYES: conjunctivae normal and sclera anicteric  ENT: no thrush no pharyngeal congestion   NECK:  No carotid bruits and no carotid lymphadenopathy . PULMONARY: lungs are clear to auscultation. No Wheezing, no ronchi . CADRDIOVASCULAR: S1 and S2 normal NO S3 and NO S4 . + systolic murmur. ABDOMEN: soft nontender, bowel sounds present, no organomegaly, no ascites.      EXTREMITIES: no cyanosis, clubbing + slight  lower extremity edema     CURRENT MEDICATIONS      ceFAZolin (ANCEF) 1 g in dextrose 5 % 50 mL IVPB (premix), Once  0.9 % sodium chloride infusion, Continuous  lisinopril (PRINIVIL;ZESTRIL) tablet 20 mg, BID  sodium chloride flush 0.9 % injection 10 mL, 2 times per day  sodium chloride flush 0.9 % injection 10 mL, PRN  acetaminophen (TYLENOL) tablet 650 mg, Q4H PRN  magnesium hydroxide (MILK OF MAGNESIA) 400 MG/5ML suspension 30 mL, Daily PRN  LORazepam (ATIVAN) tablet 0.5 mg, Q6H PRN  labetalol (NORMODYNE;TRANDATE) injection 10 mg, Q4H PRN  carvedilol (COREG) tablet 25 mg, BID  heparin (porcine) injection 1,200 Units, PRN  heparin (porcine) injection 1,300 Units, PRN  nitroGLYCERIN (NITROSTAT) SL tablet 0.4 mg, Q5 Min PRN  nitroGLYCERIN 50 mg in dextrose 5% 250 mL infusion, Continuous  allopurinol (ZYLOPRIM) tablet 968 mg, Daily  folic acid (FOLVITE) tablet 1 mg, BID  insulin glargine (LANTUS) injection vial 25 Units, Nightly  vitamin D3 (CHOLECALCIFEROL) tablet 400 Units, BID  sodium chloride flush 0.9 % injection 10 mL, 2 times per day  sodium chloride flush 0.9 % injection 10 mL, PRN  acetaminophen (TYLENOL) tablet 650 mg, Q6H PRN  acetaminophen (TYLENOL) suppository 650 mg, Q6H PRN  promethazine (PHENERGAN) tablet 12.5 mg, Q6H PRN  ondansetron (ZOFRAN) injection 4 mg, Q6H PRN  nicotine (NICODERM CQ) 21 MG/24HR 1 patch, Daily PRN  glucose (GLUTOSE) 40 % oral gel 15 g, PRN  dextrose 50 % IV solution, PRN  glucagon (rDNA) injection 1 mg, PRN  dextrose 5 % solution, PRN  insulin lispro (HUMALOG) GLUCOSEU 1+ 02/20/2020    KETUA TRACE 02/20/2020    AMORPHOUS NOT REPORTED 02/20/2020         ASSESSMENT      1. ESRD new start on HD, has temp cath. Status post PermCath placement this morning. Dialysis orders reviewed with nurse at bedside. 2. High anion gap metabolic acidosis, from CKD : improved  3. Non-ST elevation myocardial infarction, cath results reviewed, CABG recommended   4. Decompensated congestive heart failure with echo showing evidence of decreased EF of 35% along with grade 2 diastolic dysfunction. Also has moderate aortic stenosis and MR.  + Pulmonary hypertension, moderate  5. Anemia of chronic disease      PLAN      1. permcath placed today, dialysis orders reviewed at bedside  2. Cont lisinopril as ordererd   3. Dialysis tomorrow   4. Check labs   5.  to look for an outpatient spot for dialysis of this patient. She should  see cardiothoracic surgery as an outpatient . 4.  2 g sodium diet with 1500 cc fluid restriction. 5.  Will follow    Please do not hesitate to call with questions.     Electronically signed by Kris Tariq MD on 2/27/2020 at 10:39 AM

## 2020-02-27 NOTE — PLAN OF CARE
Problem: Infection:  Goal: Will remain free from infection  Description  Will remain free from infection  2/27/2020 0241 by Orin Taylor RN  Outcome: Ongoing  2/26/2020 1746 by Darci Boles RN  Outcome: Ongoing     Problem: Safety:  Goal: Free from accidental physical injury  Description  Free from accidental physical injury  2/27/2020 0241 by Orin Taylor RN  Outcome: Ongoing  2/26/2020 1746 by Darci Boles RN  Outcome: Ongoing  Goal: Free from intentional harm  Description  Free from intentional harm  Outcome: Ongoing     Problem: Daily Care:  Goal: Daily care needs are met  Description  Daily care needs are met  2/27/2020 0241 by Orin Taylor RN  Outcome: Ongoing  2/26/2020 1746 by Darci Boles RN  Outcome: Ongoing     Problem: Pain:  Goal: Patient's pain/discomfort is manageable  Description  Patient's pain/discomfort is manageable  2/27/2020 0241 by Orin Taylor RN  Outcome: Ongoing  2/26/2020 1746 by Darci Boles RN  Outcome: Ongoing     Problem: Skin Integrity:  Goal: Skin integrity will stabilize  Description  Skin integrity will stabilize  Outcome: Ongoing     Problem: Discharge Planning:  Goal: Patients continuum of care needs are met  Description  Patients continuum of care needs are met  Outcome: Ongoing     Problem: Falls - Risk of:  Goal: Will remain free from falls  Description  Will remain free from falls  2/27/2020 0241 by Orin Taylor RN  Outcome: Ongoing  2/26/2020 1746 by Darci Boles RN  Outcome: Ongoing  Goal: Absence of physical injury  Description  Absence of physical injury  Outcome: Ongoing     Problem:  Activity:  Goal: Risk for activity intolerance will decrease  Description  Risk for activity intolerance will decrease  Outcome: Ongoing     Problem: Coping:  Goal: Ability to adjust to condition or change in health will improve  Description  Ability to adjust to condition or change in health will improve  Outcome: Ongoing     Problem: Fluid Volume:  Goal:

## 2020-02-27 NOTE — PROGRESS NOTES
The Respiratory Therapy Department completed the Respiratory Care evaluation. No therapy is indicated at this time. The reason therapy is not indicated is due to the following:     [x] Patient does not meet indications for therapy. [] Patient has returned to their home regimen.    [] Patient frequency has changed to prn, nursing to assess and call if needed  . Respiratory Care will not see this patient further unless otherwise requested. Please call the respiratory care charge therapist with questions or concerns at extension [phone].  Thank you for using the Respiratory Therapy Protocol Program.    KATHIA ALVAREZ   10:16 AM

## 2020-02-27 NOTE — PROGRESS NOTES
Nutrition Assessment    Type and Reason for Visit: Initial(LOS Day 7)    Nutrition Recommendations:   -Continue NPO status   -Restart diet as able   -Recommend nepro supplements BID  -Will monitor nutrition progression    Nutrition Assessment:  Pt admitted d/t SOB. Newly HD pt w/ ESRD. Pt currently NPO for permacath placement. Pt w/ hx of wt flux from 167-173 lbs over 11 mo per EMR. Will monitor for start of diet and add supplements as able d/t increased nutrient needs. Malnutrition Assessment:  · Malnutrition Status: Insufficient data  · Context: Acute illness or injury  · Findings of the 6 clinical characteristics of malnutrition (Minimum of 2 out of 6 clinical characteristics is required to make the diagnosis of moderate or severe Protein Calorie Malnutrition based on AND/ASPEN Guidelines):  1. Energy Intake-Unable to assess,      2. Weight Loss-No significant weight loss,    3. Fat Loss-Unable to assess,    4. Muscle Loss-Unable to assess,    5. Fluid Accumulation-No significant fluid accumulation,    6.  Strength-Not measured    Nutrition Risk Level:  Moderate    Nutrient Needs:  · Estimated Daily Total Kcal: 1.2-1.3 ~>8824-7627 kcals/d   · Estimated Daily Protein (g): 1.3-1.4 gm/kg ~>68-73 gms/d     Nutrition Diagnosis:   · Problem: Inadequate oral intake  · Etiology: related to (procedure today )     Signs and symptoms:  as evidenced by NPO status due to medical condition    Objective Information:  · Wound Type: None  · Current Nutrition Therapies:  · Oral Diet Orders: NPO   · Anthropometric Measures:  · Ht: 5' 3\" (160 cm)   · Current Body Wt: 166 lb (75.3 kg)  · Admission Body Wt: 169 lb (76.7 kg)  · % Weight Change:  ,  167-173 lbs over 11 mo per EMR   · Ideal Body Wt: 115 lb (52.2 kg), % Ideal Body 147% adm/ideal  · BMI Classification: BMI 30.0 - 34.9 Obese Class I(30.0)    Nutrition Interventions:   Continue NPO(Restart diet as able; recommend nepro supplements BID as able )  Continued

## 2020-02-27 NOTE — CARE COORDINATION
Transitional planning. Becca Sawant [facility] Luis Fernando, at [phone] and [facility] covering. I told her she is a new HD pt who needs outpt dialysis. She will come and see the patient. She states it may take until tomorrow for discharge.

## 2020-02-27 NOTE — PROGRESS NOTES
Dr Cherelle Herzog came to see patient at bedside while patient was in dialysis. He was updated on patient status and perm HD cath placement. Dr Cherelle Herzog was asked about patient discharge and he stated that he would place all further orders.

## 2020-02-27 NOTE — SEDATION DOCUMENTATION
14.5 fr x 23 cm Palindrome to right IJ. Brisk blood return noted. Sutured and flushed. Ordered heparin given. Lines capped and dressing on.

## 2020-02-27 NOTE — BRIEF OP NOTE
Brief Postoperative Note    Kvng Hill  YOB: 1950  [id number]    Pre-operative Diagnosis: HERNÁN      Post-operative Diagnosis: Same    Procedure: Tunneled Dialysis Catheter    Medication Given: fentanyl    Anesthesia: 2%Lidocaine Local Injection     Surgeons/Assistants: Claudene Sages Redfox, PA-C and Terra Joe MD    Estimated Blood Loss: Minimal    Complications: none    Right IJ temp dialysis catheter removed due to location. Hemostasis achieved with pressure. Derma bond applied to site. 14 Fr x 23 cm tip to cuff tunneled HD Catheter placed Site:  Right Internal Jugular Vein. Dressing applied. May use HD cath for dialysis. Dialysis to instill heparin 1000 units per ml to priming volume in each port after use. If dialysis treatment is done within 24 hours of insertion DO NOT USE HEPARIN in treatment, or contact nephrologist for decrease heparin dose. Vital signs were reviewed and were stable after the procedure.       Electronically signed by Annamarie Evans on 2/27/2020 at 9:17 AM

## 2020-02-27 NOTE — PROGRESS NOTES
Dr. Edita Kay, cardiology fellow perfect served regarding restarting heparin post perm cath placement.  Awaiting response and orders

## 2020-02-27 NOTE — PROGRESS NOTES
Occupational Therapy    Occupational Therapy Not Seen Note    DATE: 2020  Name: Cassy Borges  : 1950  MRN: [id number]    Patient not available for Occupational Therapy due to:    Hemodialysis @1020am    Next Scheduled Treatment: 20    Electronically signed by DG Beltran on 2020 at 10:52 AM

## 2020-02-28 ENCOUNTER — APPOINTMENT (OUTPATIENT)
Dept: DIALYSIS | Age: 70
DRG: 233 | End: 2020-02-28
Payer: MEDICARE

## 2020-02-28 LAB
ANION GAP SERPL CALCULATED.3IONS-SCNC: 18 MMOL/L (ref 9–17)
BUN BLDV-MCNC: 31 MG/DL (ref 8–23)
BUN/CREAT BLD: ABNORMAL (ref 9–20)
CALCIUM SERPL-MCNC: 9.4 MG/DL (ref 8.6–10.4)
CHLORIDE BLD-SCNC: 95 MMOL/L (ref 98–107)
CO2: 22 MMOL/L (ref 20–31)
CREAT SERPL-MCNC: 3.9 MG/DL (ref 0.5–0.9)
GFR AFRICAN AMERICAN: 14 ML/MIN
GFR NON-AFRICAN AMERICAN: 11 ML/MIN
GFR SERPL CREATININE-BSD FRML MDRD: ABNORMAL ML/MIN/{1.73_M2}
GFR SERPL CREATININE-BSD FRML MDRD: ABNORMAL ML/MIN/{1.73_M2}
GLUCOSE BLD-MCNC: 133 MG/DL (ref 65–105)
GLUCOSE BLD-MCNC: 135 MG/DL (ref 70–99)
GLUCOSE BLD-MCNC: 167 MG/DL (ref 65–105)
GLUCOSE BLD-MCNC: 176 MG/DL (ref 65–105)
GLUCOSE BLD-MCNC: 178 MG/DL (ref 65–105)
HCT VFR BLD CALC: 30.9 % (ref 36.3–47.1)
HEMOGLOBIN: 9.8 G/DL (ref 11.9–15.1)
MCH RBC QN AUTO: 31.1 PG (ref 25.2–33.5)
MCHC RBC AUTO-ENTMCNC: 31.7 G/DL (ref 28.4–34.8)
MCV RBC AUTO: 98.1 FL (ref 82.6–102.9)
NRBC AUTOMATED: 0 PER 100 WBC
PARTIAL THROMBOPLASTIN TIME: 51.3 SEC (ref 20.5–30.5)
PARTIAL THROMBOPLASTIN TIME: 54 SEC (ref 20.5–30.5)
PARTIAL THROMBOPLASTIN TIME: 61.4 SEC (ref 20.5–30.5)
PDW BLD-RTO: 13.2 % (ref 11.8–14.4)
PLATELET # BLD: 176 K/UL (ref 138–453)
PMV BLD AUTO: 12.2 FL (ref 8.1–13.5)
POTASSIUM SERPL-SCNC: 3.6 MMOL/L (ref 3.7–5.3)
RBC # BLD: 3.15 M/UL (ref 3.95–5.11)
SODIUM BLD-SCNC: 135 MMOL/L (ref 135–144)
WBC # BLD: 16.7 K/UL (ref 3.5–11.3)

## 2020-02-28 PROCEDURE — 6370000000 HC RX 637 (ALT 250 FOR IP): Performed by: INTERNAL MEDICINE

## 2020-02-28 PROCEDURE — 2580000003 HC RX 258: Performed by: INTERNAL MEDICINE

## 2020-02-28 PROCEDURE — 82947 ASSAY GLUCOSE BLOOD QUANT: CPT

## 2020-02-28 PROCEDURE — 85027 COMPLETE CBC AUTOMATED: CPT

## 2020-02-28 PROCEDURE — 36415 COLL VENOUS BLD VENIPUNCTURE: CPT

## 2020-02-28 PROCEDURE — 80048 BASIC METABOLIC PNL TOTAL CA: CPT

## 2020-02-28 PROCEDURE — 6360000002 HC RX W HCPCS: Performed by: INTERNAL MEDICINE

## 2020-02-28 PROCEDURE — 2500000003 HC RX 250 WO HCPCS: Performed by: INTERNAL MEDICINE

## 2020-02-28 PROCEDURE — 90935 HEMODIALYSIS ONE EVALUATION: CPT

## 2020-02-28 PROCEDURE — P9047 ALBUMIN (HUMAN), 25%, 50ML: HCPCS | Performed by: INTERNAL MEDICINE

## 2020-02-28 PROCEDURE — 99232 SBSQ HOSP IP/OBS MODERATE 35: CPT | Performed by: HOSPITALIST

## 2020-02-28 PROCEDURE — 5A1D70Z PERFORMANCE OF URINARY FILTRATION, INTERMITTENT, LESS THAN 6 HOURS PER DAY: ICD-10-PCS | Performed by: INTERNAL MEDICINE

## 2020-02-28 PROCEDURE — 85730 THROMBOPLASTIN TIME PARTIAL: CPT

## 2020-02-28 PROCEDURE — 2060000000 HC ICU INTERMEDIATE R&B

## 2020-02-28 RX ORDER — ALBUMIN (HUMAN) 12.5 G/50ML
SOLUTION INTRAVENOUS
Status: DISPENSED
Start: 2020-02-28 | End: 2020-02-28

## 2020-02-28 RX ORDER — HEPARIN SODIUM 1000 [USP'U]/ML
1900 INJECTION, SOLUTION INTRAVENOUS; SUBCUTANEOUS PRN
Status: DISCONTINUED | OUTPATIENT
Start: 2020-02-28 | End: 2020-03-12 | Stop reason: HOSPADM

## 2020-02-28 RX ORDER — ALBUMIN (HUMAN) 12.5 G/50ML
25 SOLUTION INTRAVENOUS ONCE
Status: COMPLETED | OUTPATIENT
Start: 2020-02-28 | End: 2020-02-28

## 2020-02-28 RX ORDER — MIDODRINE HYDROCHLORIDE 5 MG/1
5 TABLET ORAL PRN
Status: DISPENSED | OUTPATIENT
Start: 2020-02-28 | End: 2020-02-28

## 2020-02-28 RX ADMIN — HEPARIN SODIUM 1900 UNITS: 1000 INJECTION INTRAVENOUS; SUBCUTANEOUS at 12:56

## 2020-02-28 RX ADMIN — CHOLECALCIFEROL TAB 10 MCG (400 UNIT) 400 UNITS: 10 TAB at 20:06

## 2020-02-28 RX ADMIN — MAGNESIUM HYDROXIDE 30 ML: 400 SUSPENSION ORAL at 05:58

## 2020-02-28 RX ADMIN — ASPIRIN 81 MG: 81 TABLET, CHEWABLE ORAL at 13:51

## 2020-02-28 RX ADMIN — INSULIN GLARGINE 25 UNITS: 100 INJECTION, SOLUTION SUBCUTANEOUS at 20:18

## 2020-02-28 RX ADMIN — DESMOPRESSIN ACETATE 40 MG: 0.2 TABLET ORAL at 20:07

## 2020-02-28 RX ADMIN — INSULIN LISPRO 1 UNITS: 100 INJECTION, SOLUTION INTRAVENOUS; SUBCUTANEOUS at 18:24

## 2020-02-28 RX ADMIN — ALLOPURINOL 300 MG: 300 TABLET ORAL at 13:51

## 2020-02-28 RX ADMIN — ONDANSETRON 4 MG: 2 INJECTION INTRAMUSCULAR; INTRAVENOUS at 11:26

## 2020-02-28 RX ADMIN — SODIUM CHLORIDE, PRESERVATIVE FREE 10 ML: 5 INJECTION INTRAVENOUS at 09:00

## 2020-02-28 RX ADMIN — CHOLECALCIFEROL TAB 10 MCG (400 UNIT) 400 UNITS: 10 TAB at 13:52

## 2020-02-28 RX ADMIN — HEPARIN SODIUM 1900 UNITS: 1000 INJECTION INTRAVENOUS; SUBCUTANEOUS at 12:55

## 2020-02-28 RX ADMIN — INSULIN LISPRO 1 UNITS: 100 INJECTION, SOLUTION INTRAVENOUS; SUBCUTANEOUS at 20:18

## 2020-02-28 RX ADMIN — LISINOPRIL 20 MG: 20 TABLET ORAL at 13:52

## 2020-02-28 RX ADMIN — FAMOTIDINE 20 MG: 20 TABLET, FILM COATED ORAL at 13:51

## 2020-02-28 RX ADMIN — INSULIN LISPRO 1 UNITS: 100 INJECTION, SOLUTION INTRAVENOUS; SUBCUTANEOUS at 13:55

## 2020-02-28 RX ADMIN — ALBUMIN (HUMAN) 25 G: 0.25 INJECTION, SOLUTION INTRAVENOUS at 10:43

## 2020-02-28 RX ADMIN — FOLIC ACID 1 MG: 1 TABLET ORAL at 20:07

## 2020-02-28 RX ADMIN — FOLIC ACID 1 MG: 1 TABLET ORAL at 13:51

## 2020-02-28 RX ADMIN — LISINOPRIL 20 MG: 20 TABLET ORAL at 20:07

## 2020-02-28 RX ADMIN — CARVEDILOL 25 MG: 25 TABLET, FILM COATED ORAL at 20:06

## 2020-02-28 RX ADMIN — BUMETANIDE 2 MG: 0.25 INJECTION INTRAMUSCULAR; INTRAVENOUS at 13:50

## 2020-02-28 ASSESSMENT — PAIN SCALES - GENERAL
PAINLEVEL_OUTOF10: 0

## 2020-02-28 NOTE — PLAN OF CARE
GARFIELD Shahid  Outcome: Ongoing     Problem: Discharge Planning:  Goal: Patients continuum of care needs are met  Description  Patients continuum of care needs are met  2/28/2020 0436 by Karin Anthony RN  Outcome: Ongoing  2/27/2020 1834 by Priscilla Sharma RN  Outcome: Ongoing     Problem: Falls - Risk of:  Goal: Will remain free from falls  Description  Will remain free from falls  2/28/2020 0436 by Karin Anthony RN  Outcome: Ongoing  Note:   Pt wearing non skid slippers, call light within reach, bed in lowest position and wheel locked, pt knows to call prior to getting up, immediate area free of cords that may cause a fall hazard. 2/27/2020 1834 by Priscilla Sharma RN  Outcome: Ongoing  Goal: Absence of physical injury  Description  Absence of physical injury  2/28/2020 0436 by Karin Anthony RN  Outcome: Ongoing  2/27/2020 1834 by Priscilla Sharma RN  Outcome: Ongoing     Problem:  Activity:  Goal: Risk for activity intolerance will decrease  Description  Risk for activity intolerance will decrease  2/28/2020 0436 by Karin Anthony RN  Outcome: Ongoing  2/27/2020 1834 by Priscilla Sharma RN  Outcome: Ongoing     Problem: Coping:  Goal: Ability to adjust to condition or change in health will improve  Description  Ability to adjust to condition or change in health will improve  2/28/2020 0436 by Karin Anthony RN  Outcome: Ongoing  2/27/2020 1834 by Priscilla Sharma RN  Outcome: Ongoing     Problem: Fluid Volume:  Goal: Ability to maintain a balanced intake and output will improve  Description  Ability to maintain a balanced intake and output will improve  2/28/2020 0436 by Karin Anthony RN  Outcome: Ongoing  2/27/2020 1834 by Priscilla Sharma RN  Outcome: Ongoing     Problem: Health Behavior:  Goal: Ability to identify and utilize available resources and services will improve  Description  Ability to identify and utilize available resources and services will improve  2/28/2020 0436 by Sujata Viveros RN  Outcome: Ongoing  2/27/2020 1834 by Peter Wagner RN  Outcome: Ongoing  Goal: Ability to manage health-related needs will improve  Description  Ability to manage health-related needs will improve  2/28/2020 0436 by Sujata Viveros RN  Outcome: Ongoing  2/27/2020 1834 by Peter Wagner RN  Outcome: Ongoing     Problem: Metabolic:  Goal: Ability to maintain appropriate glucose levels will improve  Description  Ability to maintain appropriate glucose levels will improve  2/28/2020 0436 by Sujata Viveros RN  Outcome: Ongoing  2/27/2020 1834 by Peter Wagner RN  Outcome: Ongoing     Problem: Nutritional:  Goal: Maintenance of adequate nutrition will improve  Description  Maintenance of adequate nutrition will improve  2/28/2020 0436 by Sujata Viveros RN  Outcome: Ongoing  2/27/2020 1834 by Peter Wagner RN  Outcome: Ongoing  Goal: Progress toward achieving an optimal weight will improve  Description  Progress toward achieving an optimal weight will improve  2/28/2020 0436 by Sujata Viveros RN  Outcome: Ongoing  2/27/2020 1834 by Peter Wagner RN  Outcome: Ongoing     Problem: Physical Regulation:  Goal: Complications related to the disease process, condition or treatment will be avoided or minimized  Description  Complications related to the disease process, condition or treatment will be avoided or minimized  2/28/2020 0436 by Sujata Viveros RN  Outcome: Ongoing  2/27/2020 1834 by Peter Wagner RN  Outcome: Ongoing  Goal: Diagnostic test results will improve  Description  Diagnostic test results will improve  2/28/2020 0436 by Sujata Viveros RN  Outcome: Ongoing  2/27/2020 1834 by Peter Wagner RN  Outcome: Ongoing     Problem: Skin Integrity:  Goal: Risk for impaired skin integrity will decrease  Description  Risk for impaired skin integrity will decrease  2/28/2020 0436 by Sujata Viveros RN  Outcome: Ongoing  2/27/2020 1834 by Peter Wagner RN  Outcome:

## 2020-02-28 NOTE — CARE COORDINATION
SW met with pt on dialysis unit to discuss OP dialysis locations. Pt previously wanted [facility] but wanted [facility] since it is closer to daughter's home. Spoke with patient, she states she is not sure. She asked that I contact daughter Tilmon Bosworth to see if she had a preference. SW contacted daughter Tilmon Bosworth. Tilmon Bosworth states she does not have a preference and it is up to her mom where she wants to go since she is getting the treatment. Daughter informed SW that address is [address] [place] [address] in [facility] for discharge purposes. Met with pt, pt states she will go to [address] with [employer] [facility]. He will reach out to clinic to see if chair time is available. 11:30amSW received call from daughter Tilmon Bosworth. She states she looked up reviews and did not find any for [facility] but found good reviews for [facility]. She asked that SW pass this on to pt. She then states she thought the doctor did not want pt going to [place] [address] with RN charge who states it was [facility]. Met with pt again, gave her the options and relayed information to pt. She states doctor stated to go to [address] [place] [address] and this is where she would like to go. Updated pt that we are waiting for insurance to approve.     2:28pm Received call from [employer] at [facility]. Pt has TTS chair time at [facility] at 10:30am. Spoke with RN charge, pt will run tomorrow Saturday 2/29/2020 to get on TTS schedule and can start at [facility] on Tuesday 3/3/2020. Updated CM and pt. Updated AVS    3:20pmMet with pt in room. Updated pt on chair time. Pt in room tearful. Pt states she does not want to run tomorrow because it was too hard on her today. Provided pt with options of TTS 10:30am [facility] or MWF 3rd shift [facility]. Pt requesting writer contact daughter.  Contacted daughter while in room, she is requesting

## 2020-02-28 NOTE — PROGRESS NOTES
Mary Briones 21    Progress Note    2/28/2020    3:36 PM    Name:   Zheng Almonte  MRN:     [id number]     Acct:      [de-identified]   Room:   [address] [place] [address] Day:  6  Admit Date:  2/19/2020 11:01 PM    PCP:   Harinder Nunez PA-C  Code Status:  Full Code    Subjective:     C/C:   Chief Complaint   Patient presents with    Shortness of Breath     Patient Active Problem List   Diagnosis    Gout    Hypovitaminosis D    Anemia in stage 4 chronic [facility] disease [facility]    Type 2 diabetes mellitus with diabetic chronic kidney disease ([facility]    Chronic kidney disease (CKD)    Essential hypertension    Hypercholesteremia    Hypokalemia    Acute infarct posterior limb internal capsule on the left    Left sided lacunar infarction ([facility]    Type 2 diabetes mellitus with stage 4 chronic kidney disease, with long-term current use of insulin ([facility]    Cerebral aneurysm    Anemia due to stage 4 chronic kidney disease treated with erythropoietin ([facility]    Tobacco abuse    Secondary hyperparathyroidism of renal origin ([facility]    Persistent proteinuria    Metabolic acidosis    HERNÁN (acute kidney injury) ([facility]    NSTEMI (non-ST elevated myocardial infarction) ([facility]    Acute CHF (congestive heart failure) (HCC)    Acute on chronic combined systolic and diastolic CHF (congestive heart failure) (HCC)     Interval History Status: improved. Patient was seen and examined at bedside. No acute overnight event. Patient had another dialysis today. Patient is scheduled for another dialysis tomorrow morning. Currently arranging discharge planning with cardiothoracic surgery and cardiology. Patient is somewhat undecided    Brief History:     Per my partner:  \"75 y/o presented to ED with c/o SOB which started last evening, which was initially on exertion but then at rest as well. Denies asso chest pain. EMS noted hypoxia sats in 80s.    ED w/u: CXR pulm edema, HStrops >400, cr 3.97. EKG with inferolateral ST depressions. Known prior h/o CKD4 baseline Cr 3.2-3.5, left sided CVA , HPL, gout, DM2, AOCD, COPD.   prior ECHO 12/2016: EF >55%, mild pulm HTN  U/a negative for UTI.   Had ernie cath placed 02/21 and HD started 02/22. Cath planned for 02/24. \"     2/24/2020- Cardiac cath     Findings:  LMCA: Normal 0% stenosis.     LAD: Mid 99% in upper long branch (Reaching to apex) 80% in lower LAD branch. D1: proximal 90% stenosis     LCx: Mid 80% stenosis  OM1: Ostial 80% stenosis  OM2: Proximal 80% stenosis     RCA: 100% proximal stenosis  Collateral from the left        Peripheral Arteries and Lesion Findings  Descending aorta: Severe iliac disease in both sides  80% in the left side and 60-70% in the right side     The LV gram was performed in the BAUGH 30 position. LVEF: 35%. LV Wall Motion: Severe basal anterior and basal inferior hypokinesis    Review of Systems:     Constitutional:  negative for chills, fevers, sweats  Respiratory:  negative for cough, dyspnea on exertion, shortness of breath, wheezing  Cardiovascular:  negative for chest pain, chest pressure/discomfort, lower extremity edema, palpitations  Gastrointestinal:  negative for abdominal pain, constipation, diarrhea, nausea, vomiting  Neurological:  negative for dizziness, headache    Medications:      Allergies:  No Known Allergies    Current Meds:   Scheduled Meds:    albumin human        ceFAZolin  1 g Intravenous Once    lisinopril  20 mg Oral BID    sodium chloride flush  10 mL Intravenous 2 times per day    carvedilol  25 mg Oral BID    allopurinol  300 mg Oral Daily    folic acid  1 mg Oral BID    insulin glargine  25 Units Subcutaneous Nightly    vitamin D3  400 Units Oral BID    sodium chloride flush  10 mL Intravenous 2 times per day    insulin lispro  0-6 Units Subcutaneous TID WC    insulin lispro  0-3 Units Subcutaneous Nightly    famotidine  20 mg Oral Daily    aspirin  81 mg data filed at 2/28/2020 1250  Gross per 24 hour   Intake 370 ml   Output 1280 ml   Net -910 ml       Labs:  Hematology:  Recent Labs     02/26/20  0600 02/27/20  0339 02/28/20  0653   WBC 16.7* 18.2* 16.7*   RBC 3.11* 3.07* 3.15*   HGB 9.7* 9.6* 9.8*   HCT 31.8* 29.4* 30.9*   .3 95.8 98.1   MCH 31.2 31.3 31.1   MCHC 30.5 32.7 31.7   RDW 13.2 13.2 13.2    184 176   MPV 11.9 11.9 12.2     Chemistry:  Recent Labs     02/25/20  2135 02/26/20  0600 02/26/20  1139 02/27/20  0339 02/28/20  0653   NA  --   --  135 132* 135   K  --   --  3.6* 3.4* 3.6*   CL  --   --  95* 96* 95*   CO2  --   --  22 19* 22   GLUCOSE  --   --  124* 143* 135*   BUN  --   --  43* 53* 31*   CREATININE  --   --  4.10* 4.69* 3.90*   MG  --   --   --  1.8  --    ANIONGAP  --   --  18* 17 18*   LABGLOM  --   --  11* 9* 11*   GFRAA  --   --  13* 11* 14*   CALCIUM  --   --  9.8 9.0 9.4   PHOS  --   --   --  6.0*  --    TROPHS 1,391* 1,475*  --   --   --      Recent Labs     02/27/20  0640 02/27/20  1455 02/27/20  1653 02/27/20  2045 02/28/20  0639 02/28/20  1322   POCGLU 139* 166* 158* 164* 133* 178*     ABG:  Lab Results   Component Value Date    FIO2 NOT REPORTED 02/19/2020     Lab Results   Component Value Date/Time    SPECIAL NOT REPORTED 12/19/2016 10:11 PM     Lab Results   Component Value Date/Time    CULTURE NO SIGNIFICANT GROWTH 12/19/2016 10:11 PM    CULTURE  12/19/2016 10:11 PM     [facility] [id number] [facility], [address] [place] [address] [phone]       Radiology:  Ct Chest Wo Contrast    Result Date: 2/25/2020  Scattered calcified and noncalcified plaques about the aorta, but few plaques along the ascending aorta. Three-vessel coronary artery disease. No evidence of mediastinal mass.  Abnormal CT appearance of the right breast.  Consider correlation with diagnostic mammogram.     Ir Tunneled Cvc Place Wo Sq Port/pump > 5 Years    Result Date: 2/27/2020  Successful placement of 14.5 Welsh by 23 cm tip to cuff palindrome dialysis

## 2020-02-28 NOTE — PROGRESS NOTES
Nephrology Progress Note      SUBJECTIVE       Pt was seen and examined. No acute issues overnite. Stable hemodynamics . Patient has had a history of stage IV bordering stage V chronic kidney disease before she presented to the hospital.  Suffered an acute on chronic renal injury in setting of non-ST elevation MI and decompensated heart failure. Hemodialysis has now been initiated. Cardiac cath shows her to have mutlivessel ds, CT surgery consult noted . Patient is status post PermCath placement . I have seen her on hemodialysis this morning. Multiple questions were addressed. Eventually it appears as though she would like to transition over to PD. She currently denies any chest pain. She denies orthopnea or PND. Still has a little bit of leg edema. BP stable    Labs reviewed. OBJECTIVE      CURRENT TEMPERATURE:  Temp: 97.3 °F (36.3 °C)  MAXIMUM TEMPERATURE OVER 24HRS:  Temp (24hrs), Av.3 °F (36.8 °C), Min:97.2 °F (36.2 °C), Max:99.1 °F (37.3 °C)    CURRENT RESPIRATORY RATE:  Resp: 17  CURRENT PULSE:  Pulse: 72  CURRENT BLOOD PRESSURE:  BP: (!) 120/98  24HR BLOOD PRESSURE RANGE:  Systolic (76QBC), CZL:846 , Min:105 , OJB:033   ; Diastolic (17PPP), VJT:57, Min:48, Max:98    24HR INTAKE/OUTPUT:      Intake/Output Summary (Last 24 hours) at 2020 0919  Last data filed at 2020 1401  Gross per 24 hour   Intake 270 ml   Output 2020 ml   Net -1750 ml     WEIGHT :  Patient Vitals for the past 96 hrs (Last 3 readings):   Weight   20 0600 154 lb 15.7 oz (70.3 kg)   20 1401 152 lb 12.5 oz (69.3 kg)   20 1005 156 lb 8.4 oz (71 kg)     PHYSICAL EXAM      GENERAL APPEARANCE:Awake, alert, in no acute distress  SKIN: warm and dry, no rash or erythema  EYES: conjunctivae normal and sclera anicteric  ENT: no thrush no pharyngeal congestion   NECK:  No carotid bruits and no carotid lymphadenopathy . PULMONARY: lungs are clear to auscultation. No Wheezing, no ronchi . CADRDIOVASCULAR: S1 and S2 normal NO S3 and NO S4 . + systolic murmur. ABDOMEN: soft nontender, bowel sounds present, no organomegaly, no ascites.      EXTREMITIES: no cyanosis, clubbing + slight  lower extremity edema     CURRENT MEDICATIONS      ceFAZolin (ANCEF) 1 g in dextrose 5 % 50 mL IVPB (premix), Once  0.9 % sodium chloride infusion, Continuous  lisinopril (PRINIVIL;ZESTRIL) tablet 20 mg, BID  sodium chloride flush 0.9 % injection 10 mL, 2 times per day  sodium chloride flush 0.9 % injection 10 mL, PRN  acetaminophen (TYLENOL) tablet 650 mg, Q4H PRN  magnesium hydroxide (MILK OF MAGNESIA) 400 MG/5ML suspension 30 mL, Daily PRN  LORazepam (ATIVAN) tablet 0.5 mg, Q6H PRN  labetalol (NORMODYNE;TRANDATE) injection 10 mg, Q4H PRN  carvedilol (COREG) tablet 25 mg, BID  heparin (porcine) injection 1,200 Units, PRN  heparin (porcine) injection 1,300 Units, PRN  nitroGLYCERIN (NITROSTAT) SL tablet 0.4 mg, Q5 Min PRN  nitroGLYCERIN 50 mg in dextrose 5% 250 mL infusion, Continuous  allopurinol (ZYLOPRIM) tablet 423 mg, Daily  folic acid (FOLVITE) tablet 1 mg, BID  insulin glargine (LANTUS) injection vial 25 Units, Nightly  vitamin D3 (CHOLECALCIFEROL) tablet 400 Units, BID  sodium chloride flush 0.9 % injection 10 mL, 2 times per day  sodium chloride flush 0.9 % injection 10 mL, PRN  acetaminophen (TYLENOL) tablet 650 mg, Q6H PRN  acetaminophen (TYLENOL) suppository 650 mg, Q6H PRN  promethazine (PHENERGAN) tablet 12.5 mg, Q6H PRN  ondansetron (ZOFRAN) injection 4 mg, Q6H PRN  nicotine (NICODERM CQ) 21 MG/24HR 1 patch, Daily PRN  glucose (GLUTOSE) 40 % oral gel 15 g, PRN  dextrose 50 % IV solution, PRN  glucagon (rDNA) injection 1 mg, PRN  dextrose 5 % solution, PRN  insulin lispro (HUMALOG) injection vial 0-6 Units, TID WC  insulin lispro (HUMALOG) injection vial 0-3 Units, Nightly  polyethylene glycol (GLYCOLAX) packet 17 g, Daily PRN  heparin (porcine) injection 4,000 Units, PRN  heparin (porcine) injection 2,000 Units, PRN  heparin 25,000 units in dextrose 5% 250 mL infusion, Continuous  famotidine (PEPCID) tablet 20 mg, Daily  aspirin chewable tablet 81 mg, Daily  bumetanide (BUMEX) injection 2 mg, Daily  atorvastatin (LIPITOR) tablet 40 mg, Nightly          LABS      CBC:   Recent Labs     02/26/20  0600 02/27/20  0339 02/28/20  0653   WBC 16.7* 18.2* 16.7*   RBC 3.11* 3.07* 3.15*   HGB 9.7* 9.6* 9.8*   HCT 31.8* 29.4* 30.9*   .3 95.8 98.1   MCH 31.2 31.3 31.1   MCHC 30.5 32.7 31.7   RDW 13.2 13.2 13.2    184 176   MPV 11.9 11.9 12.2      BMP:   Recent Labs     02/26/20  1139 02/27/20  0339 02/28/20  0653    132* 135   K 3.6* 3.4* 3.6*   CL 95* 96* 95*   CO2 22 19* 22   BUN 43* 53* 31*   CREATININE 4.10* 4.69* 3.90*   GLUCOSE 124* 143* 135*   CALCIUM 9.8 9.0 9.4      PHOSPHORUS:    Recent Labs     02/27/20  0339   PHOS 6.0*     MAGNESIUM:   Recent Labs     02/27/20  0339   MG 1.8          C3:   Lab Results   Component Value Date    C3 162 03/11/2013     C4:   Lab Results   Component Value Date    C4 30 03/11/2013     URINE SODIUM:    Lab Results   Component Value Date    BRENT 51 02/20/2020      URINE CREATININE:    Lab Results   Component Value Date    LABCREA 80.6 03/01/2019       URINALYSIS:  U/A:   Lab Results   Component Value Date    NITRU NEGATIVE 02/20/2020    COLORU YELLOW 02/20/2020    PHUR 6.0 02/20/2020    WBCUA 2 TO 5 02/20/2020    RBCUA 0 TO 2 02/20/2020    MUCUS NOT REPORTED 02/20/2020    TRICHOMONAS NOT REPORTED 02/20/2020    YEAST NOT REPORTED 02/20/2020    BACTERIA NOT REPORTED 02/20/2020    SPECGRAV 1.017 02/20/2020    LEUKOCYTESUR NEGATIVE 02/20/2020    UROBILINOGEN Normal 02/20/2020    BILIRUBINUR NEGATIVE 02/20/2020    GLUCOSEU 1+ 02/20/2020    KETUA TRACE 02/20/2020    AMORPHOUS NOT REPORTED 02/20/2020         ASSESSMENT      1. ESRD new start on HD, has temp cath. Status post PermCath placement this morning. Dialysis orders reviewed with nurse at bedside.   2. High anion

## 2020-02-29 LAB
ALBUMIN SERPL-MCNC: 4 G/DL (ref 3.5–5.2)
ALBUMIN/GLOBULIN RATIO: 1.3 (ref 1–2.5)
ALP BLD-CCNC: 77 U/L (ref 35–104)
ALT SERPL-CCNC: 8 U/L (ref 5–33)
ANION GAP SERPL CALCULATED.3IONS-SCNC: 16 MMOL/L (ref 9–17)
AST SERPL-CCNC: 12 U/L
BILIRUB SERPL-MCNC: 0.4 MG/DL (ref 0.3–1.2)
BILIRUBIN DIRECT: 0.12 MG/DL
BILIRUBIN, INDIRECT: 0.28 MG/DL (ref 0–1)
BUN BLDV-MCNC: 22 MG/DL (ref 8–23)
BUN/CREAT BLD: ABNORMAL (ref 9–20)
CALCIUM SERPL-MCNC: 9.5 MG/DL (ref 8.6–10.4)
CHLORIDE BLD-SCNC: 96 MMOL/L (ref 98–107)
CO2: 22 MMOL/L (ref 20–31)
CREAT SERPL-MCNC: 3.44 MG/DL (ref 0.5–0.9)
GFR AFRICAN AMERICAN: 16 ML/MIN
GFR NON-AFRICAN AMERICAN: 13 ML/MIN
GFR SERPL CREATININE-BSD FRML MDRD: ABNORMAL ML/MIN/{1.73_M2}
GFR SERPL CREATININE-BSD FRML MDRD: ABNORMAL ML/MIN/{1.73_M2}
GLOBULIN: NORMAL G/DL (ref 1.5–3.8)
GLUCOSE BLD-MCNC: 110 MG/DL (ref 65–105)
GLUCOSE BLD-MCNC: 115 MG/DL (ref 70–99)
GLUCOSE BLD-MCNC: 128 MG/DL (ref 65–105)
GLUCOSE BLD-MCNC: 136 MG/DL (ref 65–105)
GLUCOSE BLD-MCNC: 152 MG/DL (ref 65–105)
GLUCOSE BLD-MCNC: 158 MG/DL (ref 65–105)
GLUCOSE BLD-MCNC: 202 MG/DL (ref 65–105)
HCT VFR BLD CALC: 28.4 % (ref 36.3–47.1)
HEMOGLOBIN: 9.3 G/DL (ref 11.9–15.1)
MCH RBC QN AUTO: 32.1 PG (ref 25.2–33.5)
MCHC RBC AUTO-ENTMCNC: 32.7 G/DL (ref 28.4–34.8)
MCV RBC AUTO: 97.9 FL (ref 82.6–102.9)
NRBC AUTOMATED: 0 PER 100 WBC
PARTIAL THROMBOPLASTIN TIME: 52.4 SEC (ref 20.5–30.5)
PDW BLD-RTO: 13.3 % (ref 11.8–14.4)
PLATELET # BLD: 177 K/UL (ref 138–453)
PMV BLD AUTO: 12.4 FL (ref 8.1–13.5)
POTASSIUM SERPL-SCNC: 3.8 MMOL/L (ref 3.7–5.3)
RBC # BLD: 2.9 M/UL (ref 3.95–5.11)
SODIUM BLD-SCNC: 134 MMOL/L (ref 135–144)
TOTAL CK: 44 U/L (ref 26–192)
TOTAL PROTEIN: 7.1 G/DL (ref 6.4–8.3)
WBC # BLD: 16.6 K/UL (ref 3.5–11.3)

## 2020-02-29 PROCEDURE — 6370000000 HC RX 637 (ALT 250 FOR IP): Performed by: INTERNAL MEDICINE

## 2020-02-29 PROCEDURE — 82550 ASSAY OF CK (CPK): CPT

## 2020-02-29 PROCEDURE — 2580000003 HC RX 258: Performed by: INTERNAL MEDICINE

## 2020-02-29 PROCEDURE — 36415 COLL VENOUS BLD VENIPUNCTURE: CPT

## 2020-02-29 PROCEDURE — 80076 HEPATIC FUNCTION PANEL: CPT

## 2020-02-29 PROCEDURE — 85027 COMPLETE CBC AUTOMATED: CPT

## 2020-02-29 PROCEDURE — 82947 ASSAY GLUCOSE BLOOD QUANT: CPT

## 2020-02-29 PROCEDURE — 85730 THROMBOPLASTIN TIME PARTIAL: CPT

## 2020-02-29 PROCEDURE — 6360000002 HC RX W HCPCS: Performed by: INTERNAL MEDICINE

## 2020-02-29 PROCEDURE — 97530 THERAPEUTIC ACTIVITIES: CPT

## 2020-02-29 PROCEDURE — 99232 SBSQ HOSP IP/OBS MODERATE 35: CPT | Performed by: HOSPITALIST

## 2020-02-29 PROCEDURE — 97116 GAIT TRAINING THERAPY: CPT

## 2020-02-29 PROCEDURE — 2500000003 HC RX 250 WO HCPCS: Performed by: INTERNAL MEDICINE

## 2020-02-29 PROCEDURE — 2060000000 HC ICU INTERMEDIATE R&B

## 2020-02-29 PROCEDURE — 97110 THERAPEUTIC EXERCISES: CPT

## 2020-02-29 PROCEDURE — 80048 BASIC METABOLIC PNL TOTAL CA: CPT

## 2020-02-29 RX ADMIN — FAMOTIDINE 20 MG: 20 TABLET, FILM COATED ORAL at 09:16

## 2020-02-29 RX ADMIN — FOLIC ACID 1 MG: 1 TABLET ORAL at 20:07

## 2020-02-29 RX ADMIN — SODIUM CHLORIDE, PRESERVATIVE FREE 10 ML: 5 INJECTION INTRAVENOUS at 09:15

## 2020-02-29 RX ADMIN — LISINOPRIL 20 MG: 20 TABLET ORAL at 20:06

## 2020-02-29 RX ADMIN — CHOLECALCIFEROL TAB 10 MCG (400 UNIT) 400 UNITS: 10 TAB at 20:07

## 2020-02-29 RX ADMIN — ASPIRIN 81 MG: 81 TABLET, CHEWABLE ORAL at 09:16

## 2020-02-29 RX ADMIN — FOLIC ACID 1 MG: 1 TABLET ORAL at 09:16

## 2020-02-29 RX ADMIN — INSULIN LISPRO 1 UNITS: 100 INJECTION, SOLUTION INTRAVENOUS; SUBCUTANEOUS at 16:37

## 2020-02-29 RX ADMIN — INSULIN LISPRO 1 UNITS: 100 INJECTION, SOLUTION INTRAVENOUS; SUBCUTANEOUS at 12:55

## 2020-02-29 RX ADMIN — CARVEDILOL 25 MG: 25 TABLET, FILM COATED ORAL at 20:07

## 2020-02-29 RX ADMIN — LISINOPRIL 20 MG: 20 TABLET ORAL at 09:28

## 2020-02-29 RX ADMIN — SODIUM CHLORIDE, PRESERVATIVE FREE 10 ML: 5 INJECTION INTRAVENOUS at 09:14

## 2020-02-29 RX ADMIN — ALLOPURINOL 300 MG: 300 TABLET ORAL at 09:17

## 2020-02-29 RX ADMIN — INSULIN LISPRO 2 UNITS: 100 INJECTION, SOLUTION INTRAVENOUS; SUBCUTANEOUS at 10:17

## 2020-02-29 RX ADMIN — CHOLECALCIFEROL TAB 10 MCG (400 UNIT) 400 UNITS: 10 TAB at 09:16

## 2020-02-29 RX ADMIN — CARVEDILOL 25 MG: 25 TABLET, FILM COATED ORAL at 09:28

## 2020-02-29 RX ADMIN — HEPARIN SODIUM 16 UNITS/KG/HR: 10000 INJECTION, SOLUTION INTRAVENOUS at 09:26

## 2020-02-29 RX ADMIN — DESMOPRESSIN ACETATE 40 MG: 0.2 TABLET ORAL at 20:07

## 2020-02-29 RX ADMIN — BUMETANIDE 2 MG: 0.25 INJECTION INTRAMUSCULAR; INTRAVENOUS at 09:16

## 2020-02-29 ASSESSMENT — PAIN SCALES - GENERAL
PAINLEVEL_OUTOF10: 0

## 2020-02-29 NOTE — PROGRESS NOTES
MR  6. B/L significant iliac disease on angiogram   7. CKD stage V-initiated on dialysis this admission  8. H/O CVA  9. DM-2  10. Hx smoking    Plan of Treatment:   1. On ASA, Plavix, Lipitor 40, Bumex 2 IV qday, Lisinopril 10 mg, Coreg 25 bid and Hydralazine 50 mg TID  2. Continue heparin infusion, has high risk lesions, cannot be discharged prior to CABG  3. CT surgery to evaluate the patient and decide on a time for surgery  4. B/L Iliac disease - will discuss the need for vascular surgery evaluation   5. Nephrology on board for dialysis management    Electronically signed on 02/29/20 at 1:15 PM by:    Chase Strauss MD   Fellow, [address]    Attending Cardiologist Addendum: I have reviewed and performed the history, physical, subjective, objective, assessment, and plan with the resident/fellow and agree with the note. I performed the history and physical personally. I have made changes to the note above as needed. Given 99% LAD, it would be prudent for the patient to stay here for CABG. Continue heparin drip. Thank you for allowing me to participate in the care of this patient, please do not hesitate to call if you have any questions. Rakel oJe DO, [facility], [address] [facility]  [facility]  [phone]

## 2020-02-29 NOTE — PLAN OF CARE
Problem: Infection:  Goal: Will remain free from infection  Description  Will remain free from infection  Outcome: Ongoing     Problem: Safety:  Goal: Free from accidental physical injury  Description  Free from accidental physical injury  Outcome: Ongoing  Goal: Free from intentional harm  Description  Free from intentional harm  Outcome: Ongoing     Problem: Daily Care:  Goal: Daily care needs are met  Description  Daily care needs are met  Outcome: Ongoing     Problem: Pain:  Goal: Patient's pain/discomfort is manageable  Description  Patient's pain/discomfort is manageable  Outcome: Ongoing  Note:   Pt understand pain scale, pt expresses pain appropriately, distraction and repositioned to help with pain   Goal: Pain level will decrease  Description  Pain level will decrease  Outcome: Ongoing  Goal: Control of acute pain  Description  Control of acute pain  Outcome: Ongoing  Goal: Control of chronic pain  Description  Control of chronic pain  Outcome: Ongoing     Problem: Skin Integrity:  Goal: Skin integrity will stabilize  Description  Skin integrity will stabilize  Outcome: Ongoing     Problem: Discharge Planning:  Goal: Patients continuum of care needs are met  Description  Patients continuum of care needs are met  Outcome: Ongoing     Problem: Falls - Risk of:  Goal: Will remain free from falls  Description  Will remain free from falls  Outcome: Ongoing  Note:   Pt wearing non skid slippers, call light within reach, bed in lowest position and wheel locked, pt knows to call prior to getting up, immediate area free of cords that may cause a fall hazard. Goal: Absence of physical injury  Description  Absence of physical injury  Outcome: Ongoing     Problem:  Activity:  Goal: Risk for activity intolerance will decrease  Description  Risk for activity intolerance will decrease  Outcome: Ongoing     Problem: Coping:  Goal: Ability to adjust to condition or change in health will improve  Description  Ability to

## 2020-02-29 NOTE — PROGRESS NOTES
Mary Briones 21    Progress Note    2/29/2020    11:37 AM    Name:   Alma Doll  MRN:     [id number]     Acct:      [de-identified]   Room:   [address] [place] [address] Day:  5  Admit Date:  2/19/2020 11:01 PM    PCP:   Citlali Pop PA-C  Code Status:  Full Code    Subjective:     C/C:   Chief Complaint   Patient presents with    Shortness of Breath     Patient Active Problem List   Diagnosis    Gout    Hypovitaminosis D    [facility]emia in stage 4 [facility] disease [facility]    Type 2 diabetes mellitus with diabetic chronic kidney disease ([facility]    Chronic kidney disease (CKD)    Essential hypertension    Hypercholesteremia    Hypokalemia    Acute infarct posterior limb internal capsule on the left    Left sided lacunar infarction ([facility]    Type 2 diabetes mellitus with stage 4 chronic kidney disease, with long-term current use of insulin ([facility]    Cerebral aneurysm    Anemia due to stage 4 chronic kidney disease treated with erythropoietin ([facility]    Tobacco abuse    Secondary hyperparathyroidism of renal origin ([facility]    Persistent proteinuria    Metabolic acidosis    HERNÁN (acute kidney injury) ([facility]    NSTEMI (non-ST elevated myocardial infarction) ([facility]    Acute CHF (congestive heart failure) (HCC)    Acute on chronic combined systolic and diastolic CHF (congestive heart failure) (HCC)     Interval History Status: not changed. Patient was seen and examined at bedside. No acute overnight event. Patient is scheduled for another dialysis today. Only arranging outpatient dialysis. CT surgery and cardiology currently discussing surgery plan. No fever or chills. No chest pain or palpitation. Brief History:     Per my partner:  \"75 y/o presented to ED with c/o SOB which started last evening, which was initially on exertion but then at rest as well. Denies asso chest pain. EMS noted hypoxia sats in 80s.    ED w/u: CXR pulm edema, HStrops >400, cr 3.97. EKG with inferolateral ST depressions. Known prior h/o CKD4 baseline Cr 3.2-3.5, left sided CVA , HPL, gout, DM2, AOCD, COPD.   prior ECHO 12/2016: EF >55%, mild pulm HTN  U/a negative for UTI.   Had ernie cath placed 02/21 and HD started 02/22. Cath planned for 02/24. \"     2/24/2020- Cardiac cath     Findings:  LMCA: Normal 0% stenosis.     LAD: Mid 99% in upper long branch (Reaching to apex) 80% in lower LAD branch. D1: proximal 90% stenosis     LCx: Mid 80% stenosis  OM1: Ostial 80% stenosis  OM2: Proximal 80% stenosis     RCA: 100% proximal stenosis  Collateral from the left        Peripheral Arteries and Lesion Findings  Descending aorta: Severe iliac disease in both sides  80% in the left side and 60-70% in the right side     The LV gram was performed in the BAUGH 30 position. LVEF: 35%. LV Wall Motion: Severe basal anterior and basal inferior hypokinesis    Review of Systems:     Constitutional:  negative for chills, fevers, sweats  Respiratory:  negative for cough, dyspnea on exertion, shortness of breath, wheezing  Cardiovascular:  negative for chest pain, chest pressure/discomfort, lower extremity edema, palpitations  Gastrointestinal:  negative for abdominal pain, constipation, diarrhea, nausea, vomiting  Neurological:  negative for dizziness, headache    Medications:      Allergies:  No Known Allergies    Current Meds:   Scheduled Meds:    ceFAZolin  1 g Intravenous Once    lisinopril  20 mg Oral BID    sodium chloride flush  10 mL Intravenous 2 times per day    carvedilol  25 mg Oral BID    allopurinol  300 mg Oral Daily    folic acid  1 mg Oral BID    insulin glargine  25 Units Subcutaneous Nightly    vitamin D3  400 Units Oral BID    sodium chloride flush  10 mL Intravenous 2 times per day    insulin lispro  0-6 Units Subcutaneous TID     insulin lispro  0-3 Units Subcutaneous Nightly    famotidine  20 mg Oral Daily    aspirin  81 mg Oral Daily    bumetanide  2 mg 5 Years    Result Date: 2/27/2020  Successful placement of 14.5 Turkmen by 23 cm tip to cuff palindrome dialysis catheter and right IJ. Okay to use dialysis catheter. Physical Examination:        General appearance:  alert, cooperative and no distress  Mental Status:  oriented to person, place and time and normal affect  Lungs:  clear to auscultation bilaterally, normal effort  Heart:  regular rate and rhythm, no murmur  Abdomen:  soft, nontender, nondistended, normal bowel sounds, no masses, hepatomegaly, splenomegaly  Extremities:  no edema, redness, tenderness in the calves  Skin:  no gross lesions, rashes, induration    Assessment:        Hospital Problems           Last Modified POA    * (Principal) Acute on chronic combined systolic and diastolic CHF (congestive heart failure) ([facility] 2/27/2020 Yes    Anemia in stage 4 chronic kidney disease ([facility] 2/21/2020 Yes    Overview Signed 7/29/2017  8:09 PM by [facility]     Updating deleted diagnoses         Type 2 diabetes mellitus with diabetic chronic kidney disease ([facility] 2/21/2020 Yes    Essential hypertension 2/21/2020 Yes    Hypercholesteremia 2/21/2020 Yes    HERNÁN (acute kidney injury) ([address] 2/20/2020 Yes    NSTEMI (non-ST elevated myocardial infarction) ([facility] 2/21/2020 Yes          Plan:        1. Acute CHF- monitor I/O's, HD.  Symptoms improved. 2. ESRD on HD -continue dialysis as per nephrology. 3. Multivessel CAD- CT surgery consult in progress, ASA, statin, BB.  After permacath placement, cardiology and cardiothoracic surgery will discuss CABG plan. 4. DM2- BS stable, Lantus, SSI  5. Nstemi- Heparin gtt, ASA, statin.  Patient has triple-vessel disease and CABG is recommended.  Continue to discuss plan with patient. 6. Anxiety- Ativan prn, patient having trouble coping with her new diagnosis, she is agreeable to a Pastoral care consult  7. Gi proph  8. Disposition -cardiology and CT surgery is currently discussing appropriate surgical plan. Patient VS outpatient arrangement.     Nishant Hager DO  2/29/2020  11:37 AM

## 2020-02-29 NOTE — PROGRESS NOTES
Physical Therapy  Facility/Department: [facility]  Daily Treatment Note  NAME: Mila Meyer  : 1950  MRN: [id number]    Date of Service: 2020    Discharge Recommendations:  Patient would benefit from continued therapy after discharge        Assessment   Body structures, Functions, Activity limitations: Decreased functional mobility ; Decreased balance;Decreased strength;Decreased endurance  Assessment: Pt displays mild balance deficits. Would benefit from continued PT to improve functional outcomes. Prognosis: Good  PT Education: Gait Training;General Safety;Home Exercise Program  Activity Tolerance  Activity Tolerance: Patient Tolerated treatment well     Patient Diagnosis(es): The primary encounter diagnosis was COPD exacerbation ([facility] Diagnoses of Congestive heart failure, unspecified HF chronicity, unspecified heart failure type ([address] Dyspnea and respiratory abnormalities, and Stage 4 chronic kidney disease ([address] were also pertinent to this visit. has a past medical history of Acute CHF (congestive heart failure) ([facility] Anemia in chronic kidney disease, Anemia in stage 4 chronic kidney disease ([address] Cerebral artery occlusion with cerebral infarction ([facility] Chronic kidney disease, Gout, arthritis, Hyperlipidemia, Hypertension, Left sided lacunar infarction [facility]), Peripheral vascular disease ([facility] and Type II or unspecified type diabetes mellitus without mention of complication, not stated as uncontrolled. has a past surgical history that includes Tonsillectomy; other surgical history (2017); and Colonoscopy. Restrictions  Restrictions/Precautions  Restrictions/Precautions: General Precautions, Fall Risk, Up as Tolerated  Required Braces or Orthoses?: No  Position Activity Restriction  Other position/activity restrictions: up with assist  Subjective   General  Chart Reviewed: Yes  Response To Previous Treatment: Patient with no complaints from previous session.   Family / Caregiver Present: No  Subjective  Subjective: Pt alert in chair; in good spirits. Denies pain and agreeable to PT  General Comment  Comments: Pt returned to chair after session; call light in reach  Pain Screening  Patient Currently in Pain: Denies  Vital Signs  Patient Currently in Pain: Denies       Orientation  Orientation  Overall Orientation Status: Within Normal Limits  Cognition      Objective      Transfers  Sit to Stand: Supervision(completed from chair and toilet)  Stand to sit: Supervision  Ambulation  Ambulation?: Yes  Ambulation 1  Surface: level tile  Device: No Device(pushed IV pole)  Assistance: Contact guard assistance;Stand by assistance  Quality of Gait: mild unsteadiness; occasionally reaching for hand rail; refusing to utilize RW, very decreased pace, shortened step length  Distance: 400ft  Comments: on RA; SPO2 WNL     Balance  Posture: Good  Sitting - Static: Good  Sitting - Dynamic: Good(pt on toilet for 2 minutes with supervision; able to perform pericare independently)  Standing - Static: Good;-  Standing - Dynamic: +(pt stood at sink for 5 minutes performing ADLs, no LOB, SBA for safety)       Exercises:  Seated LE exercise program: Long Arc Quads, hip abduction/adduction, heel/toe raises, and marches.  Reps: 20x each with 2# ankle weights, cues to stay on task as pt very talkative    Goals  Short term goals  Time Frame for Short term goals: 6 visits  Short term goal 1: prevent loss of strength/mobility/independence while pt is in the hospital  Short term goal 2: pt to ambulate 100' independently without device  Short term goal 3: stair ambulation x 1 flight with 1 HR independently  Patient Goals   Patient goals : return home    Plan    Plan  Times per week: 5 visits weekly  Times per day: Daily  Current Treatment Recommendations: Strengthening, ROM, Balance Training, Functional Mobility Training, Transfer Training, Endurance Training, Gait Training, Stair training  Safety Devices  Type of devices: All fall risk precautions in place, Left in chair, Call light within reach, Gait belt, Patient at risk for falls, Nurse notified  Restraints  Initially in place: No     Therapy Time   Individual Concurrent Group Co-treatment   Time In 0859         Time Out 0943         Minutes 44         Timed Code Treatment Minutes: [address] [address], PTA

## 2020-03-01 ENCOUNTER — ANESTHESIA EVENT (OUTPATIENT)
Dept: OPERATING ROOM | Age: 70
DRG: 233 | End: 2020-03-01
Payer: MEDICARE

## 2020-03-01 LAB
ANION GAP SERPL CALCULATED.3IONS-SCNC: 21 MMOL/L (ref 9–17)
BLOOD BANK SPECIMEN: NORMAL
BUN BLDV-MCNC: 35 MG/DL (ref 8–23)
BUN/CREAT BLD: ABNORMAL (ref 9–20)
CALCIUM SERPL-MCNC: 9.7 MG/DL (ref 8.6–10.4)
CHLORIDE BLD-SCNC: 98 MMOL/L (ref 98–107)
CO2: 18 MMOL/L (ref 20–31)
CREAT SERPL-MCNC: 5.24 MG/DL (ref 0.5–0.9)
GFR AFRICAN AMERICAN: 10 ML/MIN
GFR NON-AFRICAN AMERICAN: 8 ML/MIN
GFR SERPL CREATININE-BSD FRML MDRD: ABNORMAL ML/MIN/{1.73_M2}
GFR SERPL CREATININE-BSD FRML MDRD: ABNORMAL ML/MIN/{1.73_M2}
GLUCOSE BLD-MCNC: 107 MG/DL (ref 70–99)
GLUCOSE BLD-MCNC: 140 MG/DL (ref 65–105)
GLUCOSE BLD-MCNC: 146 MG/DL (ref 65–105)
GLUCOSE BLD-MCNC: 186 MG/DL (ref 65–105)
GLUCOSE BLD-MCNC: 192 MG/DL (ref 65–105)
HCT VFR BLD CALC: 27.9 % (ref 36.3–47.1)
HEMOGLOBIN: 8.9 G/DL (ref 11.9–15.1)
MCH RBC QN AUTO: 31.2 PG (ref 25.2–33.5)
MCHC RBC AUTO-ENTMCNC: 31.9 G/DL (ref 28.4–34.8)
MCV RBC AUTO: 97.9 FL (ref 82.6–102.9)
NRBC AUTOMATED: 0 PER 100 WBC
PARTIAL THROMBOPLASTIN TIME: 70.4 SEC (ref 20.5–30.5)
PDW BLD-RTO: 13.1 % (ref 11.8–14.4)
PLATELET # BLD: 178 K/UL (ref 138–453)
PMV BLD AUTO: 12.3 FL (ref 8.1–13.5)
POTASSIUM SERPL-SCNC: 3.6 MMOL/L (ref 3.7–5.3)
RBC # BLD: 2.85 M/UL (ref 3.95–5.11)
SODIUM BLD-SCNC: 137 MMOL/L (ref 135–144)
WBC # BLD: 14.2 K/UL (ref 3.5–11.3)

## 2020-03-01 PROCEDURE — 2580000003 HC RX 258: Performed by: INTERNAL MEDICINE

## 2020-03-01 PROCEDURE — 6370000000 HC RX 637 (ALT 250 FOR IP): Performed by: INTERNAL MEDICINE

## 2020-03-01 PROCEDURE — 6360000002 HC RX W HCPCS: Performed by: INTERNAL MEDICINE

## 2020-03-01 PROCEDURE — 36415 COLL VENOUS BLD VENIPUNCTURE: CPT

## 2020-03-01 PROCEDURE — 86850 RBC ANTIBODY SCREEN: CPT

## 2020-03-01 PROCEDURE — 99024 POSTOP FOLLOW-UP VISIT: CPT | Performed by: PHYSICIAN ASSISTANT

## 2020-03-01 PROCEDURE — 86920 COMPATIBILITY TEST SPIN: CPT

## 2020-03-01 PROCEDURE — 86900 BLOOD TYPING SEROLOGIC ABO: CPT

## 2020-03-01 PROCEDURE — 85027 COMPLETE CBC AUTOMATED: CPT

## 2020-03-01 PROCEDURE — 82947 ASSAY GLUCOSE BLOOD QUANT: CPT

## 2020-03-01 PROCEDURE — 85730 THROMBOPLASTIN TIME PARTIAL: CPT

## 2020-03-01 PROCEDURE — 2060000000 HC ICU INTERMEDIATE R&B

## 2020-03-01 PROCEDURE — 6370000000 HC RX 637 (ALT 250 FOR IP): Performed by: STUDENT IN AN ORGANIZED HEALTH CARE EDUCATION/TRAINING PROGRAM

## 2020-03-01 PROCEDURE — 86901 BLOOD TYPING SEROLOGIC RH(D): CPT

## 2020-03-01 PROCEDURE — 99232 SBSQ HOSP IP/OBS MODERATE 35: CPT | Performed by: HOSPITALIST

## 2020-03-01 PROCEDURE — 2500000003 HC RX 250 WO HCPCS: Performed by: INTERNAL MEDICINE

## 2020-03-01 PROCEDURE — 80048 BASIC METABOLIC PNL TOTAL CA: CPT

## 2020-03-01 RX ORDER — POTASSIUM CHLORIDE 20 MEQ/1
40 TABLET, EXTENDED RELEASE ORAL ONCE
Status: COMPLETED | OUTPATIENT
Start: 2020-03-01 | End: 2020-03-01

## 2020-03-01 RX ADMIN — LISINOPRIL 20 MG: 20 TABLET ORAL at 08:41

## 2020-03-01 RX ADMIN — SODIUM CHLORIDE, PRESERVATIVE FREE 10 ML: 5 INJECTION INTRAVENOUS at 08:42

## 2020-03-01 RX ADMIN — INSULIN LISPRO 1 UNITS: 100 INJECTION, SOLUTION INTRAVENOUS; SUBCUTANEOUS at 13:44

## 2020-03-01 RX ADMIN — FAMOTIDINE 20 MG: 20 TABLET, FILM COATED ORAL at 08:41

## 2020-03-01 RX ADMIN — CARVEDILOL 37.5 MG: 25 TABLET, FILM COATED ORAL at 08:39

## 2020-03-01 RX ADMIN — INSULIN GLARGINE 25 UNITS: 100 INJECTION, SOLUTION SUBCUTANEOUS at 21:10

## 2020-03-01 RX ADMIN — BUMETANIDE 2 MG: 0.25 INJECTION INTRAMUSCULAR; INTRAVENOUS at 08:41

## 2020-03-01 RX ADMIN — CHOLECALCIFEROL TAB 10 MCG (400 UNIT) 400 UNITS: 10 TAB at 21:09

## 2020-03-01 RX ADMIN — POTASSIUM CHLORIDE 40 MEQ: 1500 TABLET, EXTENDED RELEASE ORAL at 08:38

## 2020-03-01 RX ADMIN — CARVEDILOL 37.5 MG: 25 TABLET, FILM COATED ORAL at 21:09

## 2020-03-01 RX ADMIN — ASPIRIN 81 MG: 81 TABLET, CHEWABLE ORAL at 08:41

## 2020-03-01 RX ADMIN — CHOLECALCIFEROL TAB 10 MCG (400 UNIT) 400 UNITS: 10 TAB at 08:41

## 2020-03-01 RX ADMIN — ALLOPURINOL 300 MG: 300 TABLET ORAL at 08:41

## 2020-03-01 RX ADMIN — FOLIC ACID 1 MG: 1 TABLET ORAL at 21:09

## 2020-03-01 RX ADMIN — FOLIC ACID 1 MG: 1 TABLET ORAL at 08:41

## 2020-03-01 RX ADMIN — HEPARIN SODIUM 16 UNITS/KG/HR: 10000 INJECTION, SOLUTION INTRAVENOUS at 08:32

## 2020-03-01 RX ADMIN — INSULIN LISPRO 1 UNITS: 100 INJECTION, SOLUTION INTRAVENOUS; SUBCUTANEOUS at 21:10

## 2020-03-01 RX ADMIN — DESMOPRESSIN ACETATE 40 MG: 0.2 TABLET ORAL at 21:09

## 2020-03-01 RX ADMIN — LISINOPRIL 20 MG: 20 TABLET ORAL at 21:09

## 2020-03-01 NOTE — PROGRESS NOTES
muscle use, no JVD. PULMONARY: good bilateral air entry and clear to auscultation bilaterally with no wheezes or rales or rhonchi  CARDIOVASCULAR:  Regular rate and rhythm with positive S1 and S2 and no rubs, positive systolic murmur  ABDOMEN: soft not tender, not distended, active bowel sounds, no ascites.      EXTREMITIES: no cyanosis, minimal lower extremity edema appreciated, 2+ DP pulses bilaterally    CURRENT MEDICATIONS      carvedilol (COREG) tablet 37.5 mg, BID  heparin (porcine) injection 1,900 Units, PRN  heparin (porcine) injection 1,900 Units, PRN  ceFAZolin (ANCEF) 1 g in dextrose 5 % 50 mL IVPB (premix), Once  lisinopril (PRINIVIL;ZESTRIL) tablet 20 mg, BID  sodium chloride flush 0.9 % injection 10 mL, 2 times per day  sodium chloride flush 0.9 % injection 10 mL, PRN  acetaminophen (TYLENOL) tablet 650 mg, Q4H PRN  magnesium hydroxide (MILK OF MAGNESIA) 400 MG/5ML suspension 30 mL, Daily PRN  LORazepam (ATIVAN) tablet 0.5 mg, Q6H PRN  labetalol (NORMODYNE;TRANDATE) injection 10 mg, Q4H PRN  heparin (porcine) injection 1,200 Units, PRN  heparin (porcine) injection 1,300 Units, PRN  nitroGLYCERIN (NITROSTAT) SL tablet 0.4 mg, Q5 Min PRN  allopurinol (ZYLOPRIM) tablet 105 mg, Daily  folic acid (FOLVITE) tablet 1 mg, BID  insulin glargine (LANTUS) injection vial 25 Units, Nightly  vitamin D3 (CHOLECALCIFEROL) tablet 400 Units, BID  sodium chloride flush 0.9 % injection 10 mL, 2 times per day  sodium chloride flush 0.9 % injection 10 mL, PRN  acetaminophen (TYLENOL) tablet 650 mg, Q6H PRN  acetaminophen (TYLENOL) suppository 650 mg, Q6H PRN  promethazine (PHENERGAN) tablet 12.5 mg, Q6H PRN  ondansetron (ZOFRAN) injection 4 mg, Q6H PRN  nicotine (NICODERM CQ) 21 MG/24HR 1 patch, Daily PRN  glucose (GLUTOSE) 40 % oral gel 15 g, PRN  dextrose 50 % IV solution, PRN  glucagon (rDNA) injection 1 mg, PRN  dextrose 5 % solution, PRN  insulin lispro (HUMALOG) injection vial 0-6 Units, TID WC  insulin lispro (HUMALOG) injection vial 0-3 Units, Nightly  polyethylene glycol (GLYCOLAX) packet 17 g, Daily PRN  heparin (porcine) injection 4,000 Units, PRN  heparin (porcine) injection 2,000 Units, PRN  heparin 25,000 units in dextrose 5% 250 mL infusion, Continuous  famotidine (PEPCID) tablet 20 mg, Daily  aspirin chewable tablet 81 mg, Daily  bumetanide (BUMEX) injection 2 mg, Daily  atorvastatin (LIPITOR) tablet 40 mg, Nightly          LABS      CBC:   Recent Labs     02/28/20  0653 02/29/20 0325 03/01/20  0403   WBC 16.7* 16.6* 14.2*   RBC 3.15* 2.90* 2.85*   HGB 9.8* 9.3* 8.9*   HCT 30.9* 28.4* 27.9*   MCV 98.1 97.9 97.9   MCH 31.1 32.1 31.2   MCHC 31.7 32.7 31.9   RDW 13.2 13.3 13.1    177 178   MPV 12.2 12.4 12.3      BMP:   Recent Labs     02/28/20  0653 02/29/20 0325 03/01/20  0403    134* 137   K 3.6* 3.8 3.6*   CL 95* 96* 98   CO2 22 22 18*   BUN 31* 22 35*   CREATININE 3.90* 3.44* 5.24*   GLUCOSE 135* 115* 107*   CALCIUM 9.4 9.5 9.7      PHOSPHORUS:    No results for input(s): PHOS in the last 72 hours. MAGNESIUM:   No results for input(s): MG in the last 72 hours.        C3:   Lab Results   Component Value Date    C3 162 03/11/2013     C4:   Lab Results   Component Value Date    C4 30 03/11/2013     URINE SODIUM:    Lab Results   Component Value Date    BRENT 51 02/20/2020      URINE CREATININE:    Lab Results   Component Value Date    LABCREA 80.6 03/01/2019       URINALYSIS:  U/A:   Lab Results   Component Value Date    NITRU NEGATIVE 02/20/2020    COLORU YELLOW 02/20/2020    PHUR 6.0 02/20/2020    WBCUA 2 TO 5 02/20/2020    RBCUA 0 TO 2 02/20/2020    MUCUS NOT REPORTED 02/20/2020    TRICHOMONAS NOT REPORTED 02/20/2020    YEAST NOT REPORTED 02/20/2020    BACTERIA NOT REPORTED 02/20/2020    SPECGRAV 1.017 02/20/2020    LEUKOCYTESUR NEGATIVE 02/20/2020    UROBILINOGEN Normal 02/20/2020    BILIRUBINUR NEGATIVE 02/20/2020    GLUCOSEU 1+ 02/20/2020    KETUA TRACE 02/20/2020    AMORPHOUS NOT REPORTED

## 2020-03-01 NOTE — PROGRESS NOTES
Mary Briones 19    Progress Note    3/1/2020    1:12 PM    Name:   Isaura Soto  MRN:     [id number]     Acct:      [de-identified]   Room:   [address] [place] [address] Day:  8  Admit Date:  2/19/2020 11:01 PM    PCP:   Phoenix Brown PA-C  Code Status:  Full Code    Subjective:     C/C:   Chief Complaint   Patient presents with    Shortness of Breath     Patient Active Problem List   Diagnosis    Gout    Hypovitaminosis D    Anemia in stage 4 chronic kidney disease ([address]    Type 2 diabetes mellitus with diabetic chronic kidney disease ([facility]    Chronic kidney disease (CKD)    Essential hypertension    Hypercholesteremia    Hypokalemia    Acute infarct posterior limb internal capsule on the left    Left sided lacunar infarction ([facility]    Type 2 diabetes mellitus with stage 4 chronic kidney disease, with long-term current use of insulin ([facility]    Cerebral aneurysm    Anemia due to stage 4 chronic kidney disease treated with erythropoietin ([facility]    Tobacco abuse    Secondary hyperparathyroidism of renal origin ([facility]    Persistent proteinuria    Metabolic acidosis    HERNÁN (acute kidney injury) ([address]    NSTEMI (non-ST elevated myocardial infarction) ([address]    Acute CHF (congestive heart failure) ([facility]    Acute on chronic combined systolic and diastolic CHF (congestive heart failure) (HCC)     Interval History Status: improved. Patient was seen and examined at bedside. Patient's renal function continues to improve. Patient has no acute medical complaint. No new issues. Patient is scheduled for bypass surgery on 3/3/2020. Brief History:     Per my partner:  \"75 y/o presented to ED with c/o SOB which started last evening, which was initially on exertion but then at rest as well. Denies asso chest pain. EMS noted hypoxia sats in 80s. ED w/u: CXR pulm edema, HStrops >400, cr 3.97. EKG with inferolateral ST depressions.    Known prior h/o CKD4 baseline Cr 3.2-3.5, left sided CVA , HPL, gout, DM2, AOCD, COPD.   prior ECHO 12/2016: EF >55%, mild pulm HTN  U/a negative for UTI.   Had ernie cath placed 02/21 and HD started 02/22. Cath planned for 02/24. \"     2/24/2020- Cardiac cath     Findings:  LMCA: Normal 0% stenosis.     LAD: Mid 99% in upper long branch (Reaching to apex) 80% in lower LAD branch. D1: proximal 90% stenosis     LCx: Mid 80% stenosis  OM1: Ostial 80% stenosis  OM2: Proximal 80% stenosis     RCA: 100% proximal stenosis  Collateral from the left        Peripheral Arteries and Lesion Findings  Descending aorta: Severe iliac disease in both sides  80% in the left side and 60-70% in the right side     The LV gram was performed in the BAUGH 30 position. LVEF: 35%. LV Wall Motion: Severe basal anterior and basal inferior hypokinesis    Review of Systems:     Constitutional:  negative for chills, fevers, sweats  Respiratory:  negative for cough, dyspnea on exertion, shortness of breath, wheezing  Cardiovascular:  negative for chest pain, chest pressure/discomfort, lower extremity edema, palpitations  Gastrointestinal:  negative for abdominal pain, constipation, diarrhea, nausea, vomiting  Neurological:  negative for dizziness, headache    Medications:      Allergies:  No Known Allergies    Current Meds:   Scheduled Meds:    carvedilol  37.5 mg Oral BID    ceFAZolin  1 g Intravenous Once    lisinopril  20 mg Oral BID    sodium chloride flush  10 mL Intravenous 2 times per day    allopurinol  300 mg Oral Daily    folic acid  1 mg Oral BID    insulin glargine  25 Units Subcutaneous Nightly    vitamin D3  400 Units Oral BID    sodium chloride flush  10 mL Intravenous 2 times per day    insulin lispro  0-6 Units Subcutaneous TID     insulin lispro  0-3 Units Subcutaneous Nightly    famotidine  20 mg Oral Daily    aspirin  81 mg Oral Daily    bumetanide  2 mg Intravenous Daily    atorvastatin  40 mg Oral Nightly Continuous Infusions:    dextrose      heparin (porcine) 16 Units/kg/hr (20 0832)     PRN Meds: heparin (porcine), heparin (porcine), sodium chloride flush, acetaminophen, magnesium hydroxide, LORazepam, labetalol, heparin (porcine), heparin (porcine), nitroGLYCERIN, sodium chloride flush, acetaminophen, acetaminophen, promethazine, ondansetron, nicotine, glucose, dextrose, glucagon (rDNA), dextrose, polyethylene glycol, heparin (porcine), heparin (porcine)    Data:     Past Medical History:   has a past medical history of Acute CHF (congestive heart failure) ([facility] Anemia in chronic kidney disease, Anemia in stage 4 chronic [facility] disease [facility] Cerebral artery occlusion with cerebral infarction ([facility] Chronic kidney disease, Gout, arthritis, Hyperlipidemia, Hypertension, Left sided lacunar infarction [facility]), Peripheral vascular disease ([facility] and Type II or unspecified type diabetes mellitus without mention of complication, not stated as uncontrolled. Social History:   reports that she has been smoking cigarettes. She started smoking about 46 years ago. She has a 40.00 pack-year smoking history. She uses smokeless tobacco. She reports that she does not drink alcohol or use drugs. Family History:   Family History   Problem Relation Age of Onset    Diabetes Mother     Heart Disease Father        Vitals:  /71   Pulse 67   Temp 98.2 °F (36.8 °C) (Oral)   Resp 18   Ht 5' 3\" (1.6 m)   Wt 153 lb 10.6 oz (69.7 kg)   SpO2 95%   BMI 27.22 kg/m²   Temp (24hrs), Av.4 °F (36.9 °C), Min:98.2 °F (36.8 °C), Max:98.5 °F (36.9 °C)    Recent Labs     20  1619 20  0745 20  1152   POCGLU 152* 128* 140* 192*       I/O (24Hr):     Intake/Output Summary (Last 24 hours) at 3/1/2020 1312  Last data filed at 3/1/2020 0200  Gross per 24 hour   Intake 85.9 ml   Output 375 ml   Net -289.1 ml       Labs:  Hematology:  Recent Labs     20  7811 20  0325 palindrome dialysis catheter and right IJ. Okay to use dialysis catheter. Physical Examination:        General appearance:  alert, cooperative and no distress  Mental Status:  oriented to person, place and time and normal affect  Lungs:  clear to auscultation bilaterally, normal effort  Heart:  regular rate and rhythm, no murmur  Abdomen:  soft, nontender, nondistended, normal bowel sounds, no masses, hepatomegaly, splenomegaly  Extremities:  no edema, redness, tenderness in the calves  Skin:  no gross lesions, rashes, induration    Assessment:        Hospital Problems           Last Modified POA    * (Principal) Acute on chronic combined systolic and diastolic CHF (congestive heart failure) ([facility] 2/27/2020 Yes    Anemia in stage 4 chronic kidney disease ([facility] 2/21/2020 Yes    Overview Signed 7/29/2017  8:09 PM by [facility]     Updating deleted diagnoses         Type 2 diabetes mellitus with diabetic chronic kidney disease ([facility] 2/21/2020 Yes    Essential hypertension 2/21/2020 Yes    Hypercholesteremia 2/21/2020 Yes    HERNÁN (acute kidney injury) ([facility] 2/20/2020 Yes    NSTEMI (non-ST elevated myocardial infarction) ([facility] 2/21/2020 Yes          Plan:        1. Acute CHF- monitor I/O's, HD.  Symptoms improved. 2. ESRD on HD -continue dialysis as per nephrology. 3. Multivessel CAD- CT surgery consult in progress, ASA, statin, BB.  Patient is scheduled for CABG on 3/3/2020.  4. DM2- BS stable, Lantus, SSI  5. Nstemi- Heparin gtt, ASA, statin.  Patient has triple-vessel disease and CABG is recommended.  Continue to discuss plan with patient. 6. Anxiety- Ativan prn, patient having trouble coping with her new diagnosis, she is agreeable to a Pastoral care consult  7. Gi proph  8. Disposition -patient will be getting bypass surgery on 3/3/2020. Probable acute rehab after surgery.     Anna Bullard,   3/1/2020  1:12 PM

## 2020-03-01 NOTE — PROGRESS NOTES
[facility]   Progress Note                    Date:   3/1/2020  Patient name:  Jaswant Solitario  Date of admission:  2/19/2020 11:01 PM  MRN:   [id number]  YOB: 1950  PCP:    Leopoldo Goring, PA-C    Reason for Admission:  HERNÁN (acute kidney injury) ([facility] [address] [N17.9]    Subjective:       Patient seen and examined. Denies chest pain, shortness of breath. Remains on heparin drip. I/O last 3 completed shifts: In: 85.9 [I.V.:85.9]  Out: 525 [Urine:525]  No intake/output data recorded.       In: 85.9 [I.V.:85.9]  Out: 375 [Urine:375]      Intake/Output Summary (Last 24 hours) at 3/1/2020 0734  Last data filed at 3/1/2020 0200  Gross per 24 hour   Intake 85.9 ml   Output 525 ml   Net -439.1 ml       Medications:   Scheduled Meds:   potassium chloride  40 mEq Oral Once    carvedilol  37.5 mg Oral BID    ceFAZolin  1 g Intravenous Once    lisinopril  20 mg Oral BID    sodium chloride flush  10 mL Intravenous 2 times per day    allopurinol  300 mg Oral Daily    folic acid  1 mg Oral BID    insulin glargine  25 Units Subcutaneous Nightly    vitamin D3  400 Units Oral BID    sodium chloride flush  10 mL Intravenous 2 times per day    insulin lispro  0-6 Units Subcutaneous TID WC    insulin lispro  0-3 Units Subcutaneous Nightly    famotidine  20 mg Oral Daily    aspirin  81 mg Oral Daily    bumetanide  2 mg Intravenous Daily    atorvastatin  40 mg Oral Nightly     Continuous Infusions:   dextrose      heparin (porcine) 16 Units/kg/hr (02/29/20 0926)     CBC:   Recent Labs     02/28/20  0653 02/29/20 0325 03/01/20  0403   WBC 16.7* 16.6* 14.2*   HGB 9.8* 9.3* 8.9*    177 178     BMP:    Recent Labs     02/28/20  0653 02/29/20 0325 03/01/20  0403    134* 137   K 3.6* 3.8 3.6*   CL 95* 96* 98   CO2 22 22 18*   BUN 31* 22 35*   CREATININE 3.90* 3.44* 5.24*   GLUCOSE 135* 115* 107*     Hepatic:   Recent Labs     02/29/20  0325   AST 12   ALT 8   BILITOT 0.40 ALKPHOS 77     Troponin: No results for input(s): TROPONINI in the last 72 hours. No results for input(s): TROPONINT in the last 72 hours. BNP:   No results for input(s): PROBNP in the last 72 hours. No results for input(s): BNP in the last 72 hours. Lipids: No results for input(s): CHOL, HDL in the last 72 hours. Invalid input(s): LDLCALCU  INR:   No results for input(s): INR in the last 72 hours. Objective:   Vitals: BP (!) 148/65   Pulse 67   Temp 98.5 °F (36.9 °C) (Oral)   Resp 16   Ht 5' 3\" (1.6 m)   Wt 153 lb 10.6 oz (69.7 kg)   SpO2 99%   BMI 27.22 kg/m²    General appearance: awake, alert, in no apparent distress. HEENT: Head: Normocephalic, atraumatic without any obvious abnormalities. Neck: JVD absent   Lungs:good bilateral equal air entry. No adventitious lung sounds auscultated. Heart: S1, S2, regular, + systolic murmur. Abdomen: soft, nontender with bowel sounds present in all 4 quadrants. Extremities: b/l femoral access site - no signs of hematoma   Integumentum:Intact with no rashes noted.       Diagnostic Studies:     EKG:   Sinus rhythm with possible old septal infarct and inferolateral ST depressions     ECHO: 2/21/2020  Left ventricle is normal in size. Global left ventricular systolic function is moderately reduced. Calculated EF via heart model is 36%. Mild left ventricular hypertrophy. Grade II (moderate) left ventricular diastolic dysfunction. Left atrium is mildly dilated. The aortic valve is calcified with reduced cusp mobility. Moderate aortic valve stenosis with a mean gradient of 20 mmHg. Maybe underestimated due to poor LV function. Trivial aortic insufficiency. Thickened mitral valve leaflets. Mitral annular calcification is seen. At least Moderate mitral regurgitation. Mild tricuspid regurgitation. Moderate pulmonary hypertension with an estimated right ventricular systolic pressure of 56 mmHg  Mild pulmonic insufficiency.   Small pericardial

## 2020-03-01 NOTE — ANESTHESIA PRE PROCEDURE
Units by mouth 2 times daily  12/11/13   Sia Gustafson MD       Current medications:    Current Facility-Administered Medications   Medication Dose Route Frequency Provider Last Rate Last Dose    carvedilol (COREG) tablet 37.5 mg  37.5 mg Oral BID Jael Ruvalcaba MD   37.5 mg at 03/01/20 0839    heparin (porcine) injection 1,900 Units  1,900 Units Intracatheter PRN Mariana Mena MD   1,900 Units at 02/28/20 1256    heparin (porcine) injection 1,900 Units  1,900 Units Intracatheter PRN Mariana Mena MD   1,900 Units at 02/28/20 1255    ceFAZolin (ANCEF) 1 g in dextrose 5 % 50 mL IVPB (premix)  1 g Intravenous Once Mary Agudelo MD        lisinopril (PRINIVIL;ZESTRIL) tablet 20 mg  20 mg Oral BID Navjot Wilkins MD   20 mg at 03/01/20 0841    sodium chloride flush 0.9 % injection 10 mL  10 mL Intravenous 2 times per day Batool Taveras MD   10 mL at 03/01/20 0842    sodium chloride flush 0.9 % injection 10 mL  10 mL Intravenous PRN Batool Taveras MD        acetaminophen (TYLENOL) tablet 650 mg  650 mg Oral Q4H PRN Batool Taveras MD        magnesium hydroxide (MILK OF MAGNESIA) 400 MG/5ML suspension 30 mL  30 mL Oral Daily PRN Batool Taveras MD   30 mL at 02/28/20 0558    LORazepam (ATIVAN) tablet 0.5 mg  0.5 mg Oral Q6H PRN Queenie Lara MD   0.5 mg at 02/25/20 2033    labetalol (NORMODYNE;TRANDATE) injection 10 mg  10 mg Intravenous Q4H PRN Batool Taveras MD   10 mg at 02/25/20 0356    heparin (porcine) injection 1,200 Units  1,200 Units Intracatheter PRN Batool Taveras MD   1,200 Units at 02/24/20 1910    heparin (porcine) injection 1,300 Units  1,300 Units Intracatheter PRN Batool Taveras MD   1,300 Units at 02/24/20 1910    nitroGLYCERIN (NITROSTAT) SL tablet 0.4 mg  0.4 mg Sublingual Q5 Min PRN Batool Taveras MD   0.4 mg at 02/21/20 2021    allopurinol (ZYLOPRIM) tablet 300 mg  300 mg Oral Daily Batool Taveras MD   300 mg at 47/69/43 7339    folic acid (FOLVITE) tablet 1 mg  1 mg Oral BID Batool Taveras MD   1 mg at 03/01/20 0841    insulin glargine (LANTUS) injection vial 25 Units  25 Units Subcutaneous Nightly James Salcedo MD   25 Units at 02/28/20 2018    vitamin D3 (CHOLECALCIFEROL) tablet 400 Units  400 Units Oral BID James Salcedo MD   400 Units at 03/01/20 0841    sodium chloride flush 0.9 % injection 10 mL  10 mL Intravenous 2 times per day James Salcedo MD   10 mL at 02/29/20 0915    sodium chloride flush 0.9 % injection 10 mL  10 mL Intravenous PRN James Salcedo MD        acetaminophen (TYLENOL) tablet 650 mg  650 mg Oral Q6H PRN James Salcedo MD        acetaminophen (TYLENOL) suppository 650 mg  650 mg Rectal Q6H PRN James Salcedo MD        promethazine (PHENERGAN) tablet 12.5 mg  12.5 mg Oral Q6H PRN James Salcedo MD        ondansetron [facility]) injection 4 mg  4 mg Intravenous Q6H PRN James Salcedo MD   4 mg at 02/28/20 1126    nicotine (NICODERM CQ) 21 MG/24HR 1 patch  1 patch Transdermal Daily PRN James Salcedo MD   1 patch at 02/22/20 0959    glucose (GLUTOSE) 40 % oral gel 15 g  15 g Oral PRN James Salcedo MD        dextrose 50 % IV solution  12.5 g Intravenous PRN James Salcedo MD        glucagon (rDNA) injection 1 mg  1 mg Intramuscular PRN James Salcedo MD        dextrose 5 % solution  100 mL/hr Intravenous PRN James Salcedo MD        insulin lispro (HUMALOG) injection vial 0-6 Units  0-6 Units Subcutaneous TID WC James Salcedo MD   1 Units at 03/01/20 1344    insulin lispro (HUMALOG) injection vial 0-3 Units  0-3 Units Subcutaneous Nightly James Salcedo MD   1 Units at 02/28/20 2018    polyethylene glycol (GLYCOLAX) packet 17 g  17 g Oral Daily PRN James Salcedo MD        heparin (porcine) injection 4,000 Units  4,000 Units Intravenous PRN James Salcedo MD        heparin (porcine) injection 2,000 Units  2,000 Units Intravenous PRN James Salcedo MD   2,000 Units at 02/26/20 2300    heparin 25,000 units in dextrose 5% 250 mL infusion  12 Units/kg/hr Intravenous Continuous James Salcedo MD 12.3 mL/hr at 03/01/20 0832 16 Units/kg/hr at 03/01/20 0832    famotidine (PEPCID) tablet 20 mg  20 mg Oral Daily Ashli Palm MD   20 mg at 03/01/20 8086    aspirin chewable tablet 81 mg  81 mg Oral Daily Ashli Palm MD   81 mg at 03/01/20 0841    bumetanide (BUMEX) injection 2 mg  2 mg Intravenous Daily Ashli Palm MD   2 mg at 03/01/20 0841    atorvastatin (LIPITOR) tablet 40 mg  40 mg Oral Nightly Ashli Palm MD   40 mg at 02/29/20 2007       Allergies:  No Known Allergies    Problem List:    Patient Active Problem List   Diagnosis Code    Gout M10.9    Hypovitaminosis D E55.9    Anemia in stage 4 chronic kidney disease (HCC) N18.4, D63.1    Type 2 diabetes mellitus with diabetic chronic kidney disease ([facility]) E11.22    Chronic kidney disease (CKD) N18.9    Essential hypertension I10    Hypercholesteremia E78.00    Hypokalemia E87.6    Acute infarct posterior limb internal capsule on the left I63.9    Left sided lacunar infarction ([facility]) I63.81    Type 2 diabetes mellitus with stage 4 chronic kidney disease, with long-term current use of insulin ([facility]) E11.22, N18.4, Z79.4    Cerebral aneurysm I67.1    Anemia due to stage 4 chronic kidney disease treated with erythropoietin ([facility]) N18.4, D63.1    Tobacco abuse Z72.0    Secondary hyperparathyroidism of renal origin [facility] N25.81    Persistent proteinuria E56.9    Metabolic acidosis A74.8    HERNÁN (acute kidney injury) ([facility] [address] N17.9    NSTEMI (non-ST elevated myocardial infarction) ([facility]) I21.4    Acute CHF (congestive heart failure) ([facility]) I50.9    Acute on chronic combined systolic and diastolic CHF (congestive heart failure) ([facility]) I50.43       Past Medical History:        Diagnosis Date    Acute CH[facility]congestive heart failure) [facility] 2/21/2020    Anemia in chronic kidney disease     Anemia in stage 4 chronic kidney disease ([facility] [address] 10/17/2014     Updating deleted diagnoses    Cerebral artery occlusion with cerebral infarction ([facility] [address] 12/2016    LEFT SIDED LACUNAL INFARCTION    Chronic kidney disease     STAGE 4 / FOLLOWS WITH DR RODRIGUEZ    Gout, arthritis     Hyperlipidemia     Hypertension     Left sided lacunar infarction ([facility] [address] 2016    Peripheral vascular disease (HCC)     Type II or unspecified type diabetes mellitus without mention of complication, not stated as uncontrolled        Past Surgical History:        Procedure Laterality Date    COLONOSCOPY      X2    OTHER SURGICAL HISTORY  2017    CEREBRAL ANGIOGRAM / DIAGNOSTIC / DR NOVA    TONSILLECTOMY         Social History:    Social History     Tobacco Use    Smoking status: Current Every Day Smoker     Packs/day: 1.00     Years: 40.00     Pack years: 40.00     Types: Cigarettes     Start date: 3/15/1973     Last attempt to quit: 2014     Years since quittin.4    Smokeless tobacco: Current User    Tobacco comment: 5-10 cigs daily as of 21   Substance Use Topics    Alcohol use: No                                Ready to quit: Not Answered  Counseling given: Not Answered  Comment: 5-10 cigs daily as of 19      Vital Signs (Current):   Vitals:    20 0000 20 0741   BP:   (!) 148/65 132/71   Pulse: 73 71 67    Resp:   16 18   Temp:   36.9 °C (98.5 °F) 36.8 °C (98.2 °F)   TempSrc:   Oral Oral   SpO2: 98% 99%  95%   Weight:       Height:                                                  BP Readings from Last 3 Encounters:   20 132/71   19 121/64   19 124/68       NPO Status:                                                                                 BMI:   Wt Readings from Last 3 Encounters:   20 153 lb 10.6 oz (69.7 kg)   19 170 lb (77.1 kg)   19 169 lb 3.2 oz (76.7 kg)     Body mass index is 27.22 kg/m².     CBC:   Lab Results   Component Value Date    WBC 14.2 2020    RBC 2.85 2020    HGB 8.9 2020    HCT 27.9 2020    MCV 97.9 2020    RDW 13.1 2020    PLT 178 03/01/2020       CMP:   Lab Results   Component Value Date     03/01/2020    K 3.6 03/01/2020    CL 98 03/01/2020    CO2 18 03/01/2020    BUN 35 03/01/2020    CREATININE 5.24 03/01/2020    GFRAA 10 03/01/2020    LABGLOM 8 03/01/2020    GLUCOSE 107 03/01/2020    PROT 7.1 02/29/2020    CALCIUM 9.7 03/01/2020    BILITOT 0.40 02/29/2020    ALKPHOS 77 02/29/2020    AST 12 02/29/2020    ALT 8 02/29/2020       POC Tests:   Recent Labs     03/01/20  1152   POCGLU 192*       Coags:   Lab Results   Component Value Date    PROTIME 10.6 02/25/2020    INR 1.0 02/25/2020    APTT 70.4 03/01/2020       HCG (If Applicable): No results found for: PREGTESTUR, PREGSERUM, HCG, HCGQUANT     ABGs: No results found for: PHART, PO2ART, XHT8MMS, DGM6RDU, BEART, S5STINDQ     Type & Screen (If Applicable):  No results found for: LABABO, [address]    Anesthesia Evaluation  Patient summary reviewed  Airway: Mallampati: I  TM distance: >3 FB   Neck ROM: full  Mouth opening: > = 3 FB Dental:    (+) edentulous      Pulmonary:Negative Pulmonary ROS                             ROS comment: 40 pack year smoker   Cardiovascular:    (+) hypertension:, valvular problems/murmurs: MR, past MI: < 1 month, CHF:, orthopnea,       ECG reviewed      Echocardiogram reviewed    Cleared by cardiology              Neuro/Psych:   (+) CVA (2016 no residual symtoms): no interval change,             GI/Hepatic/Renal:   (+) renal disease (scheduled for Dialysis prior to CABG 3/2): ARF and dialysis,           Endo/Other:    (+) DiabetesType II DM, , .                 Abdominal:           Vascular:   + PVD, aortic or cerebral (Severe iliac disease in both sides), .                             EKG 2-  Normal sinus rhythm  Possible Left atrial enlargement  ST & T wave abnormality, consider inferolateral ischemia  Abnormal ECG  When compared with ECG of 20-FEB-2020 04:24,  T wave inversion now evident in Inferior leads    02-  ECHO  Summary  Left ventricle no

## 2020-03-02 ENCOUNTER — ANESTHESIA (OUTPATIENT)
Dept: OPERATING ROOM | Age: 70
DRG: 233 | End: 2020-03-02
Payer: MEDICARE

## 2020-03-02 ENCOUNTER — APPOINTMENT (OUTPATIENT)
Dept: GENERAL RADIOLOGY | Age: 70
DRG: 233 | End: 2020-03-02
Payer: MEDICARE

## 2020-03-02 VITALS — SYSTOLIC BLOOD PRESSURE: 86 MMHG | OXYGEN SATURATION: 97 % | TEMPERATURE: 98 F | DIASTOLIC BLOOD PRESSURE: 50 MMHG

## 2020-03-02 LAB
ABSOLUTE EOS #: 0.83 K/UL (ref 0–0.44)
ABSOLUTE IMMATURE GRANULOCYTE: 0.13 K/UL (ref 0–0.3)
ABSOLUTE LYMPH #: 2.26 K/UL (ref 1.1–3.7)
ABSOLUTE MONO #: 1.14 K/UL (ref 0.1–1.2)
ALLEN TEST: ABNORMAL
ANION GAP SERPL CALCULATED.3IONS-SCNC: 13 MMOL/L (ref 9–17)
ANION GAP SERPL CALCULATED.3IONS-SCNC: 19 MMOL/L (ref 9–17)
ANION GAP SERPL CALCULATED.3IONS-SCNC: 21 MMOL/L (ref 9–17)
ANION GAP: 15 MMOL/L (ref 7–16)
BASOPHILS # BLD: 0 % (ref 0–2)
BASOPHILS ABSOLUTE: <0.03 K/UL (ref 0–0.2)
BUN BLDV-MCNC: 22 MG/DL (ref 8–23)
BUN BLDV-MCNC: 52 MG/DL (ref 8–23)
BUN BLDV-MCNC: 54 MG/DL (ref 8–23)
BUN/CREAT BLD: ABNORMAL (ref 9–20)
CALCIUM SERPL-MCNC: 8.6 MG/DL (ref 8.6–10.4)
CALCIUM SERPL-MCNC: 9.7 MG/DL (ref 8.6–10.4)
CALCIUM SERPL-MCNC: 9.7 MG/DL (ref 8.6–10.4)
CHLORIDE BLD-SCNC: 102 MMOL/L (ref 98–107)
CHLORIDE BLD-SCNC: 98 MMOL/L (ref 98–107)
CHLORIDE BLD-SCNC: 99 MMOL/L (ref 98–107)
CO2: 17 MMOL/L (ref 20–31)
CO2: 18 MMOL/L (ref 20–31)
CO2: 19 MMOL/L (ref 20–31)
CREAT SERPL-MCNC: 3.39 MG/DL (ref 0.5–0.9)
CREAT SERPL-MCNC: 5.92 MG/DL (ref 0.5–0.9)
CREAT SERPL-MCNC: 6.06 MG/DL (ref 0.5–0.9)
DIFFERENTIAL TYPE: ABNORMAL
EOSINOPHILS RELATIVE PERCENT: 6 % (ref 1–4)
FIO2: 60
FIO2: ABNORMAL
GFR AFRICAN AMERICAN: 16 ML/MIN
GFR AFRICAN AMERICAN: 8 ML/MIN
GFR AFRICAN AMERICAN: 9 ML/MIN
GFR NON-AFRICAN AMERICAN: 13 ML/MIN
GFR NON-AFRICAN AMERICAN: 14 ML/MIN
GFR NON-AFRICAN AMERICAN: 7 ML/MIN
GFR NON-AFRICAN AMERICAN: 7 ML/MIN
GFR SERPL CREATININE-BSD FRML MDRD: 16 ML/MIN
GFR SERPL CREATININE-BSD FRML MDRD: ABNORMAL ML/MIN/{1.73_M2}
GLUCOSE BLD-MCNC: 106 MG/DL (ref 74–100)
GLUCOSE BLD-MCNC: 107 MG/DL (ref 74–100)
GLUCOSE BLD-MCNC: 111 MG/DL (ref 70–99)
GLUCOSE BLD-MCNC: 112 MG/DL (ref 70–99)
GLUCOSE BLD-MCNC: 112 MG/DL (ref 74–100)
GLUCOSE BLD-MCNC: 114 MG/DL (ref 74–100)
GLUCOSE BLD-MCNC: 115 MG/DL (ref 70–99)
GLUCOSE BLD-MCNC: 118 MG/DL (ref 65–105)
GLUCOSE BLD-MCNC: 159 MG/DL (ref 74–100)
HCT VFR BLD CALC: 27 % (ref 36.3–47.1)
HCT VFR BLD CALC: 27.1 % (ref 36.3–47.1)
HCT VFR BLD CALC: 29.5 % (ref 36.3–47.1)
HEMOGLOBIN: 8.9 G/DL (ref 11.9–15.1)
HEMOGLOBIN: 9 G/DL (ref 11.9–15.1)
HEMOGLOBIN: 9.9 G/DL (ref 11.9–15.1)
IMMATURE GRANULOCYTES: 1 %
INR BLD: 1.2
LYMPHOCYTES # BLD: 16 % (ref 24–43)
MAGNESIUM: 2.9 MG/DL (ref 1.6–2.6)
MCH RBC QN AUTO: 30.6 PG (ref 25.2–33.5)
MCH RBC QN AUTO: 32 PG (ref 25.2–33.5)
MCH RBC QN AUTO: 32.3 PG (ref 25.2–33.5)
MCHC RBC AUTO-ENTMCNC: 33 G/DL (ref 28.4–34.8)
MCHC RBC AUTO-ENTMCNC: 33.2 G/DL (ref 28.4–34.8)
MCHC RBC AUTO-ENTMCNC: 33.6 G/DL (ref 28.4–34.8)
MCV RBC AUTO: 91 FL (ref 82.6–102.9)
MCV RBC AUTO: 97.1 FL (ref 82.6–102.9)
MCV RBC AUTO: 97.1 FL (ref 82.6–102.9)
MODE: ABNORMAL
MONOCYTES # BLD: 8 % (ref 3–12)
NEGATIVE BASE EXCESS, ART: 1 (ref 0–2)
NEGATIVE BASE EXCESS, ART: 2 (ref 0–2)
NEGATIVE BASE EXCESS, ART: 3 (ref 0–2)
NEGATIVE BASE EXCESS, ART: ABNORMAL (ref 0–2)
NEGATIVE BASE EXCESS, ART: ABNORMAL (ref 0–2)
NRBC AUTOMATED: 0 PER 100 WBC
O2 DEVICE/FLOW/%: ABNORMAL
PARTIAL THROMBOPLASTIN TIME: 30.4 SEC (ref 20.5–30.5)
PARTIAL THROMBOPLASTIN TIME: 82.3 SEC (ref 20.5–30.5)
PATIENT TEMP: ABNORMAL
PDW BLD-RTO: 12.9 % (ref 11.8–14.4)
PDW BLD-RTO: 13 % (ref 11.8–14.4)
PDW BLD-RTO: 14.1 % (ref 11.8–14.4)
PLATELET # BLD: 190 K/UL (ref 138–453)
PLATELET # BLD: 197 K/UL (ref 138–453)
PLATELET # BLD: 79 K/UL (ref 138–453)
PLATELET ESTIMATE: ABNORMAL
PMV BLD AUTO: 11.8 FL (ref 8.1–13.5)
PMV BLD AUTO: 12 FL (ref 8.1–13.5)
PMV BLD AUTO: 12.3 FL (ref 8.1–13.5)
POC CHLORIDE: 102 MMOL/L (ref 98–107)
POC CREATININE: 3.38 MG/DL (ref 0.51–1.19)
POC HCO3: 21.2 MMOL/L (ref 21–28)
POC HCO3: 23.2 MMOL/L (ref 21–28)
POC HCO3: 24.3 MMOL/L (ref 21–28)
POC HCO3: 25 MMOL/L (ref 21–28)
POC HCO3: 26.8 MMOL/L (ref 21–28)
POC HEMATOCRIT: 19 % (ref 36–46)
POC HEMATOCRIT: 23 % (ref 36–46)
POC HEMATOCRIT: 24 % (ref 36–46)
POC HEMATOCRIT: 26 % (ref 36–46)
POC HEMATOCRIT: 30 % (ref 36–46)
POC HEMOGLOBIN: 10.1 G/DL (ref 12–16)
POC HEMOGLOBIN: 6.6 G/DL (ref 12–16)
POC HEMOGLOBIN: 7.8 G/DL (ref 12–16)
POC HEMOGLOBIN: 8 G/DL (ref 12–16)
POC HEMOGLOBIN: 8.9 G/DL (ref 12–16)
POC IONIZED CALCIUM: 0.97 MMOL/L (ref 1.15–1.33)
POC IONIZED CALCIUM: 1.14 MMOL/L (ref 1.15–1.33)
POC IONIZED CALCIUM: 1.17 MMOL/L (ref 1.15–1.33)
POC IONIZED CALCIUM: 1.29 MMOL/L (ref 1.15–1.33)
POC IONIZED CALCIUM: 1.44 MMOL/L (ref 1.15–1.33)
POC LACTIC ACID: 0.88 MMOL/L (ref 0.56–1.39)
POC O2 SATURATION: 100 % (ref 94–98)
POC O2 SATURATION: 95 % (ref 94–98)
POC PCO2 TEMP: ABNORMAL MM HG
POC PCO2: 34.5 MM HG (ref 35–48)
POC PCO2: 36 MM HG (ref 35–48)
POC PCO2: 40 MM HG (ref 35–48)
POC PCO2: 41.4 MM HG (ref 35–48)
POC PCO2: 44 MM HG (ref 35–48)
POC PH TEMP: ABNORMAL
POC PH: 7.35 (ref 7.35–7.45)
POC PH: 7.36 (ref 7.35–7.45)
POC PH: 7.4 (ref 7.35–7.45)
POC PH: 7.43 (ref 7.35–7.45)
POC PH: 7.45 (ref 7.35–7.45)
POC PO2 TEMP: ABNORMAL MM HG
POC PO2: 380 MM HG (ref 83–108)
POC PO2: 441.9 MM HG (ref 83–108)
POC PO2: 464.9 MM HG (ref 83–108)
POC PO2: 521.4 MM HG (ref 83–108)
POC PO2: 74.5 MM HG (ref 83–108)
POC POTASSIUM: 3.7 MMOL/L (ref 3.5–4.5)
POC POTASSIUM: 3.9 MMOL/L (ref 3.5–4.5)
POC POTASSIUM: 4.1 MMOL/L (ref 3.5–4.5)
POC POTASSIUM: 4.7 MMOL/L (ref 3.5–4.5)
POC POTASSIUM: 5.2 MMOL/L (ref 3.5–4.5)
POC SODIUM: 138 MMOL/L (ref 138–146)
POSITIVE BASE EXCESS, ART: 1 (ref 0–3)
POSITIVE BASE EXCESS, ART: 2 (ref 0–3)
POSITIVE BASE EXCESS, ART: ABNORMAL (ref 0–3)
POTASSIUM SERPL-SCNC: 4 MMOL/L (ref 3.7–5.3)
POTASSIUM SERPL-SCNC: 4.1 MMOL/L (ref 3.7–5.3)
POTASSIUM SERPL-SCNC: 4.2 MMOL/L (ref 3.7–5.3)
POTASSIUM SERPL-SCNC: 4.3 MMOL/L (ref 3.7–5.3)
PROTHROMBIN TIME: 12.3 SEC (ref 9–12)
RBC # BLD: 2.78 M/UL (ref 3.95–5.11)
RBC # BLD: 2.79 M/UL (ref 3.95–5.11)
RBC # BLD: 3.24 M/UL (ref 3.95–5.11)
RBC # BLD: ABNORMAL 10*6/UL
SAMPLE SITE: ABNORMAL
SEG NEUTROPHILS: 69 % (ref 36–65)
SEGMENTED NEUTROPHILS ABSOLUTE COUNT: 9.5 K/UL (ref 1.5–8.1)
SODIUM BLD-SCNC: 134 MMOL/L (ref 135–144)
SODIUM BLD-SCNC: 135 MMOL/L (ref 135–144)
SODIUM BLD-SCNC: 137 MMOL/L (ref 135–144)
TCO2 (CALC), ART: 22 MMOL/L (ref 22–29)
TCO2 (CALC), ART: 25 MMOL/L (ref 22–29)
TCO2 (CALC), ART: 26 MMOL/L (ref 22–29)
TCO2 (CALC), ART: 26 MMOL/L (ref 22–29)
TCO2 (CALC), ART: 28 MMOL/L (ref 22–29)
WBC # BLD: 13.7 K/UL (ref 3.5–11.3)
WBC # BLD: 13.9 K/UL (ref 3.5–11.3)
WBC # BLD: 24.2 K/UL (ref 3.5–11.3)
WBC # BLD: ABNORMAL 10*3/UL

## 2020-03-02 PROCEDURE — 5A1D70Z PERFORMANCE OF URINARY FILTRATION, INTERMITTENT, LESS THAN 6 HOURS PER DAY: ICD-10-PCS | Performed by: INTERNAL MEDICINE

## 2020-03-02 PROCEDURE — 6360000002 HC RX W HCPCS: Performed by: NURSE ANESTHETIST, CERTIFIED REGISTERED

## 2020-03-02 PROCEDURE — 2500000003 HC RX 250 WO HCPCS

## 2020-03-02 PROCEDURE — 3700000001 HC ADD 15 MINUTES (ANESTHESIA): Performed by: THORACIC SURGERY (CARDIOTHORACIC VASCULAR SURGERY)

## 2020-03-02 PROCEDURE — 85576 BLOOD PLATELET AGGREGATION: CPT

## 2020-03-02 PROCEDURE — 5A1955Z RESPIRATORY VENTILATION, GREATER THAN 96 CONSECUTIVE HOURS: ICD-10-PCS | Performed by: INTERNAL MEDICINE

## 2020-03-02 PROCEDURE — 6360000002 HC RX W HCPCS: Performed by: THORACIC SURGERY (CARDIOTHORACIC VASCULAR SURGERY)

## 2020-03-02 PROCEDURE — 82947 ASSAY GLUCOSE BLOOD QUANT: CPT

## 2020-03-02 PROCEDURE — 7100000000 HC PACU RECOVERY - FIRST 15 MIN

## 2020-03-02 PROCEDURE — 94761 N-INVAS EAR/PLS OXIMETRY MLT: CPT

## 2020-03-02 PROCEDURE — 3600000018 HC SURGERY OHS ADDTL 15MIN: Performed by: THORACIC SURGERY (CARDIOTHORACIC VASCULAR SURGERY)

## 2020-03-02 PROCEDURE — 85390 FIBRINOLYSINS SCREEN I&R: CPT

## 2020-03-02 PROCEDURE — 93005 ELECTROCARDIOGRAM TRACING: CPT | Performed by: NURSE PRACTITIONER

## 2020-03-02 PROCEDURE — 99232 SBSQ HOSP IP/OBS MODERATE 35: CPT | Performed by: HOSPITALIST

## 2020-03-02 PROCEDURE — 5A1221Z PERFORMANCE OF CARDIAC OUTPUT, CONTINUOUS: ICD-10-PCS | Performed by: THORACIC SURGERY (CARDIOTHORACIC VASCULAR SURGERY)

## 2020-03-02 PROCEDURE — 2500000003 HC RX 250 WO HCPCS: Performed by: NURSE ANESTHETIST, CERTIFIED REGISTERED

## 2020-03-02 PROCEDURE — 85610 PROTHROMBIN TIME: CPT

## 2020-03-02 PROCEDURE — 6360000002 HC RX W HCPCS: Performed by: INTERNAL MEDICINE

## 2020-03-02 PROCEDURE — 85027 COMPLETE CBC AUTOMATED: CPT

## 2020-03-02 PROCEDURE — 02100Z9 BYPASS CORONARY ARTERY, ONE ARTERY FROM LEFT INTERNAL MAMMARY, OPEN APPROACH: ICD-10-PCS | Performed by: THORACIC SURGERY (CARDIOTHORACIC VASCULAR SURGERY)

## 2020-03-02 PROCEDURE — 37799 UNLISTED PX VASCULAR SURGERY: CPT

## 2020-03-02 PROCEDURE — 85730 THROMBOPLASTIN TIME PARTIAL: CPT

## 2020-03-02 PROCEDURE — 85014 HEMATOCRIT: CPT

## 2020-03-02 PROCEDURE — 85384 FIBRINOGEN ACTIVITY: CPT

## 2020-03-02 PROCEDURE — 2720000010 HC SURG SUPPLY STERILE: Performed by: THORACIC SURGERY (CARDIOTHORACIC VASCULAR SURGERY)

## 2020-03-02 PROCEDURE — C1713 ANCHOR/SCREW BN/BN,TIS/BN: HCPCS | Performed by: THORACIC SURGERY (CARDIOTHORACIC VASCULAR SURGERY)

## 2020-03-02 PROCEDURE — 82803 BLOOD GASES ANY COMBINATION: CPT

## 2020-03-02 PROCEDURE — 94002 VENT MGMT INPAT INIT DAY: CPT

## 2020-03-02 PROCEDURE — 6360000002 HC RX W HCPCS

## 2020-03-02 PROCEDURE — 84132 ASSAY OF SERUM POTASSIUM: CPT

## 2020-03-02 PROCEDURE — 6370000000 HC RX 637 (ALT 250 FOR IP): Performed by: STUDENT IN AN ORGANIZED HEALTH CARE EDUCATION/TRAINING PROGRAM

## 2020-03-02 PROCEDURE — 2500000003 HC RX 250 WO HCPCS: Performed by: THORACIC SURGERY (CARDIOTHORACIC VASCULAR SURGERY)

## 2020-03-02 PROCEDURE — 94770 HC ETCO2 MONITOR DAILY: CPT

## 2020-03-02 PROCEDURE — 36415 COLL VENOUS BLD VENIPUNCTURE: CPT

## 2020-03-02 PROCEDURE — 2100000001 HC CVICU R&B

## 2020-03-02 PROCEDURE — 2580000003 HC RX 258: Performed by: NURSE PRACTITIONER

## 2020-03-02 PROCEDURE — 2580000003 HC RX 258: Performed by: PHYSICIAN ASSISTANT

## 2020-03-02 PROCEDURE — P9047 ALBUMIN (HUMAN), 25%, 50ML: HCPCS

## 2020-03-02 PROCEDURE — 33533 CABG ARTERIAL SINGLE: CPT | Performed by: THORACIC SURGERY (CARDIOTHORACIC VASCULAR SURGERY)

## 2020-03-02 PROCEDURE — 06BP4ZZ EXCISION OF RIGHT SAPHENOUS VEIN, PERCUTANEOUS ENDOSCOPIC APPROACH: ICD-10-PCS | Performed by: THORACIC SURGERY (CARDIOTHORACIC VASCULAR SURGERY)

## 2020-03-02 PROCEDURE — 2700000000 HC OXYGEN THERAPY PER DAY

## 2020-03-02 PROCEDURE — 6370000000 HC RX 637 (ALT 250 FOR IP): Performed by: NURSE ANESTHETIST, CERTIFIED REGISTERED

## 2020-03-02 PROCEDURE — 6370000000 HC RX 637 (ALT 250 FOR IP): Performed by: NURSE PRACTITIONER

## 2020-03-02 PROCEDURE — 85347 COAGULATION TIME ACTIVATED: CPT

## 2020-03-02 PROCEDURE — 6370000000 HC RX 637 (ALT 250 FOR IP)

## 2020-03-02 PROCEDURE — 83735 ASSAY OF MAGNESIUM: CPT

## 2020-03-02 PROCEDURE — 021109W BYPASS CORONARY ARTERY, TWO ARTERIES FROM AORTA WITH AUTOLOGOUS VENOUS TISSUE, OPEN APPROACH: ICD-10-PCS | Performed by: THORACIC SURGERY (CARDIOTHORACIC VASCULAR SURGERY)

## 2020-03-02 PROCEDURE — 82435 ASSAY OF BLOOD CHLORIDE: CPT

## 2020-03-02 PROCEDURE — 3600000008 HC SURGERY OHS BASE: Performed by: THORACIC SURGERY (CARDIOTHORACIC VASCULAR SURGERY)

## 2020-03-02 PROCEDURE — 86900 BLOOD TYPING SEROLOGIC ABO: CPT

## 2020-03-02 PROCEDURE — 2709999900 HC NON-CHARGEABLE SUPPLY: Performed by: THORACIC SURGERY (CARDIOTHORACIC VASCULAR SURGERY)

## 2020-03-02 PROCEDURE — 6370000000 HC RX 637 (ALT 250 FOR IP): Performed by: INTERNAL MEDICINE

## 2020-03-02 PROCEDURE — 90935 HEMODIALYSIS ONE EVALUATION: CPT

## 2020-03-02 PROCEDURE — 82330 ASSAY OF CALCIUM: CPT

## 2020-03-02 PROCEDURE — 71045 X-RAY EXAM CHEST 1 VIEW: CPT

## 2020-03-02 PROCEDURE — 7100000001 HC PACU RECOVERY - ADDTL 15 MIN

## 2020-03-02 PROCEDURE — 82565 ASSAY OF CREATININE: CPT

## 2020-03-02 PROCEDURE — 33508 ENDOSCOPIC VEIN HARVEST: CPT | Performed by: THORACIC SURGERY (CARDIOTHORACIC VASCULAR SURGERY)

## 2020-03-02 PROCEDURE — 2580000003 HC RX 258: Performed by: NURSE ANESTHETIST, CERTIFIED REGISTERED

## 2020-03-02 PROCEDURE — 85025 COMPLETE CBC W/AUTO DIFF WBC: CPT

## 2020-03-02 PROCEDURE — P9016 RBC LEUKOCYTES REDUCED: HCPCS

## 2020-03-02 PROCEDURE — 80048 BASIC METABOLIC PNL TOTAL CA: CPT

## 2020-03-02 PROCEDURE — 33518 CABG ARTERY-VEIN TWO: CPT | Performed by: THORACIC SURGERY (CARDIOTHORACIC VASCULAR SURGERY)

## 2020-03-02 PROCEDURE — 6360000002 HC RX W HCPCS: Performed by: NURSE PRACTITIONER

## 2020-03-02 PROCEDURE — 84295 ASSAY OF SERUM SODIUM: CPT

## 2020-03-02 PROCEDURE — P9041 ALBUMIN (HUMAN),5%, 50ML: HCPCS | Performed by: NURSE PRACTITIONER

## 2020-03-02 PROCEDURE — 87086 URINE CULTURE/COLONY COUNT: CPT

## 2020-03-02 PROCEDURE — 6370000000 HC RX 637 (ALT 250 FOR IP): Performed by: PHYSICIAN ASSISTANT

## 2020-03-02 PROCEDURE — 83605 ASSAY OF LACTIC ACID: CPT

## 2020-03-02 PROCEDURE — 3700000000 HC ANESTHESIA ATTENDED CARE: Performed by: THORACIC SURGERY (CARDIOTHORACIC VASCULAR SURGERY)

## 2020-03-02 DEVICE — Z DUP USE 2403038 MB: Type: IMPLANTABLE DEVICE | Site: STERNUM | Status: FUNCTIONAL

## 2020-03-02 DEVICE — IMPLANTABLE DEVICE: Type: IMPLANTABLE DEVICE | Site: STERNUM | Status: FUNCTIONAL

## 2020-03-02 RX ORDER — DIPHENHYDRAMINE HCL 25 MG
25 TABLET ORAL NIGHTLY PRN
Status: DISCONTINUED | OUTPATIENT
Start: 2020-03-03 | End: 2020-03-12 | Stop reason: HOSPADM

## 2020-03-02 RX ORDER — SODIUM CHLORIDE 0.9 % (FLUSH) 0.9 %
10 SYRINGE (ML) INJECTION PRN
Status: DISCONTINUED | OUTPATIENT
Start: 2020-03-02 | End: 2020-03-09

## 2020-03-02 RX ORDER — OXYCODONE HYDROCHLORIDE AND ACETAMINOPHEN 5; 325 MG/1; MG/1
1 TABLET ORAL EVERY 4 HOURS PRN
Status: DISCONTINUED | OUTPATIENT
Start: 2020-03-02 | End: 2020-03-12 | Stop reason: HOSPADM

## 2020-03-02 RX ORDER — BACITRACIN 50000 [USP'U]/1
INJECTION, POWDER, LYOPHILIZED, FOR SOLUTION INTRAMUSCULAR PRN
Status: DISCONTINUED | OUTPATIENT
Start: 2020-03-02 | End: 2020-03-02 | Stop reason: ALTCHOICE

## 2020-03-02 RX ORDER — ACETAMINOPHEN 650 MG/1
650 SUPPOSITORY RECTAL EVERY 4 HOURS PRN
Status: DISCONTINUED | OUTPATIENT
Start: 2020-03-02 | End: 2020-03-12 | Stop reason: HOSPADM

## 2020-03-02 RX ORDER — SODIUM CHLORIDE 0.9 % (FLUSH) 0.9 %
10 SYRINGE (ML) INJECTION PRN
Status: DISCONTINUED | OUTPATIENT
Start: 2020-03-02 | End: 2020-03-02

## 2020-03-02 RX ORDER — ALLOPURINOL 300 MG/1
300 TABLET ORAL DAILY
Status: DISCONTINUED | OUTPATIENT
Start: 2020-03-03 | End: 2020-03-12 | Stop reason: HOSPADM

## 2020-03-02 RX ORDER — DOPAMINE HYDROCHLORIDE 160 MG/100ML
2 INJECTION, SOLUTION INTRAVENOUS CONTINUOUS PRN
Status: DISCONTINUED | OUTPATIENT
Start: 2020-03-02 | End: 2020-03-02

## 2020-03-02 RX ORDER — MIDAZOLAM HYDROCHLORIDE 1 MG/ML
INJECTION INTRAMUSCULAR; INTRAVENOUS PRN
Status: DISCONTINUED | OUTPATIENT
Start: 2020-03-02 | End: 2020-03-02 | Stop reason: SDUPTHER

## 2020-03-02 RX ORDER — SODIUM CHLORIDE 9 MG/ML
INJECTION, SOLUTION INTRAVENOUS CONTINUOUS
Status: DISCONTINUED | OUTPATIENT
Start: 2020-03-03 | End: 2020-03-02

## 2020-03-02 RX ORDER — SODIUM CHLORIDE 0.9 % (FLUSH) 0.9 %
10 SYRINGE (ML) INJECTION EVERY 12 HOURS SCHEDULED
Status: DISCONTINUED | OUTPATIENT
Start: 2020-03-02 | End: 2020-03-02

## 2020-03-02 RX ORDER — HEPARIN SODIUM 1000 [USP'U]/ML
INJECTION, SOLUTION INTRAVENOUS; SUBCUTANEOUS PRN
Status: DISCONTINUED | OUTPATIENT
Start: 2020-03-02 | End: 2020-03-02 | Stop reason: SDUPTHER

## 2020-03-02 RX ORDER — HYDRALAZINE HYDROCHLORIDE 20 MG/ML
5 INJECTION INTRAMUSCULAR; INTRAVENOUS EVERY 5 MIN PRN
Status: DISCONTINUED | OUTPATIENT
Start: 2020-03-02 | End: 2020-03-12 | Stop reason: HOSPADM

## 2020-03-02 RX ORDER — CHLORHEXIDINE GLUCONATE 0.12 MG/ML
15 RINSE ORAL ONCE
Status: COMPLETED | OUTPATIENT
Start: 2020-03-02 | End: 2020-03-02

## 2020-03-02 RX ORDER — SENNA AND DOCUSATE SODIUM 50; 8.6 MG/1; MG/1
1 TABLET, FILM COATED ORAL 2 TIMES DAILY
Status: DISCONTINUED | OUTPATIENT
Start: 2020-03-02 | End: 2020-03-12 | Stop reason: HOSPADM

## 2020-03-02 RX ORDER — SODIUM CHLORIDE, SODIUM LACTATE, POTASSIUM CHLORIDE, CALCIUM CHLORIDE 600; 310; 30; 20 MG/100ML; MG/100ML; MG/100ML; MG/100ML
INJECTION, SOLUTION INTRAVENOUS CONTINUOUS PRN
Status: DISCONTINUED | OUTPATIENT
Start: 2020-03-02 | End: 2020-03-02 | Stop reason: SDUPTHER

## 2020-03-02 RX ORDER — FAMOTIDINE 20 MG/1
20 TABLET, FILM COATED ORAL DAILY
Status: DISCONTINUED | OUTPATIENT
Start: 2020-03-02 | End: 2020-03-08

## 2020-03-02 RX ORDER — NICOTINE POLACRILEX 4 MG
15 LOZENGE BUCCAL PRN
Status: DISCONTINUED | OUTPATIENT
Start: 2020-03-02 | End: 2020-03-12 | Stop reason: HOSPADM

## 2020-03-02 RX ORDER — ONDANSETRON 2 MG/ML
4 INJECTION INTRAMUSCULAR; INTRAVENOUS EVERY 6 HOURS PRN
Status: DISCONTINUED | OUTPATIENT
Start: 2020-03-02 | End: 2020-03-12 | Stop reason: HOSPADM

## 2020-03-02 RX ORDER — PROTAMINE SULFATE 10 MG/ML
INJECTION, SOLUTION INTRAVENOUS PRN
Status: DISCONTINUED | OUTPATIENT
Start: 2020-03-02 | End: 2020-03-02 | Stop reason: SDUPTHER

## 2020-03-02 RX ORDER — VANCOMYCIN HYDROCHLORIDE 1 G/200ML
INJECTION, SOLUTION INTRAVENOUS PRN
Status: DISCONTINUED | OUTPATIENT
Start: 2020-03-02 | End: 2020-03-02 | Stop reason: SDUPTHER

## 2020-03-02 RX ORDER — ACETAMINOPHEN 325 MG/1
650 TABLET ORAL EVERY 4 HOURS PRN
Status: DISCONTINUED | OUTPATIENT
Start: 2020-03-02 | End: 2020-03-12 | Stop reason: HOSPADM

## 2020-03-02 RX ORDER — ROCURONIUM BROMIDE 10 MG/ML
INJECTION, SOLUTION INTRAVENOUS PRN
Status: DISCONTINUED | OUTPATIENT
Start: 2020-03-02 | End: 2020-03-02 | Stop reason: SDUPTHER

## 2020-03-02 RX ORDER — PROPOFOL 10 MG/ML
25 INJECTION, EMULSION INTRAVENOUS
Status: DISCONTINUED | OUTPATIENT
Start: 2020-03-02 | End: 2020-03-04

## 2020-03-02 RX ORDER — LIDOCAINE HYDROCHLORIDE 10 MG/ML
INJECTION, SOLUTION EPIDURAL; INFILTRATION; INTRACAUDAL; PERINEURAL PRN
Status: DISCONTINUED | OUTPATIENT
Start: 2020-03-02 | End: 2020-03-02 | Stop reason: SDUPTHER

## 2020-03-02 RX ORDER — PROPOFOL 10 MG/ML
INJECTION, EMULSION INTRAVENOUS PRN
Status: DISCONTINUED | OUTPATIENT
Start: 2020-03-02 | End: 2020-03-02 | Stop reason: SDUPTHER

## 2020-03-02 RX ORDER — BISACODYL 10 MG
10 SUPPOSITORY, RECTAL RECTAL DAILY PRN
Status: DISCONTINUED | OUTPATIENT
Start: 2020-03-02 | End: 2020-03-12 | Stop reason: HOSPADM

## 2020-03-02 RX ORDER — CALCIUM CHLORIDE 100 MG/ML
INJECTION INTRAVENOUS; INTRAVENTRICULAR PRN
Status: DISCONTINUED | OUTPATIENT
Start: 2020-03-02 | End: 2020-03-02 | Stop reason: SDUPTHER

## 2020-03-02 RX ORDER — M-VIT,TX,IRON,MINS/CALC/FOLIC 27MG-0.4MG
1 TABLET ORAL
Status: DISCONTINUED | OUTPATIENT
Start: 2020-03-03 | End: 2020-03-12 | Stop reason: HOSPADM

## 2020-03-02 RX ORDER — OXYCODONE HYDROCHLORIDE AND ACETAMINOPHEN 5; 325 MG/1; MG/1
2 TABLET ORAL EVERY 4 HOURS PRN
Status: DISCONTINUED | OUTPATIENT
Start: 2020-03-02 | End: 2020-03-12 | Stop reason: HOSPADM

## 2020-03-02 RX ORDER — FENTANYL CITRATE 50 UG/ML
50 INJECTION, SOLUTION INTRAMUSCULAR; INTRAVENOUS
Status: DISCONTINUED | OUTPATIENT
Start: 2020-03-02 | End: 2020-03-12 | Stop reason: HOSPADM

## 2020-03-02 RX ORDER — DEXTROSE MONOHYDRATE 50 MG/ML
100 INJECTION, SOLUTION INTRAVENOUS PRN
Status: DISCONTINUED | OUTPATIENT
Start: 2020-03-02 | End: 2020-03-12 | Stop reason: HOSPADM

## 2020-03-02 RX ORDER — MILRINONE LACTATE 0.2 MG/ML
0.38 INJECTION, SOLUTION INTRAVENOUS CONTINUOUS PRN
Status: DISCONTINUED | OUTPATIENT
Start: 2020-03-02 | End: 2020-03-12 | Stop reason: HOSPADM

## 2020-03-02 RX ORDER — PROPOFOL 10 MG/ML
INJECTION, EMULSION INTRAVENOUS CONTINUOUS PRN
Status: DISCONTINUED | OUTPATIENT
Start: 2020-03-02 | End: 2020-03-02 | Stop reason: SDUPTHER

## 2020-03-02 RX ORDER — CHLORHEXIDINE GLUCONATE 0.12 MG/ML
15 RINSE ORAL 2 TIMES DAILY
Status: DISCONTINUED | OUTPATIENT
Start: 2020-03-02 | End: 2020-03-11

## 2020-03-02 RX ORDER — AMIODARONE HYDROCHLORIDE 200 MG/1
200 TABLET ORAL 2 TIMES DAILY
Status: DISCONTINUED | OUTPATIENT
Start: 2020-03-02 | End: 2020-03-06

## 2020-03-02 RX ORDER — NITROGLYCERIN 20 MG/100ML
10 INJECTION INTRAVENOUS CONTINUOUS PRN
Status: DISCONTINUED | OUTPATIENT
Start: 2020-03-02 | End: 2020-03-12 | Stop reason: HOSPADM

## 2020-03-02 RX ORDER — ASPIRIN 81 MG/1
81 TABLET ORAL
Status: DISCONTINUED | OUTPATIENT
Start: 2020-03-02 | End: 2020-03-02

## 2020-03-02 RX ORDER — DEXTROSE MONOHYDRATE 25 G/50ML
12.5 INJECTION, SOLUTION INTRAVENOUS PRN
Status: DISCONTINUED | OUTPATIENT
Start: 2020-03-02 | End: 2020-03-12 | Stop reason: HOSPADM

## 2020-03-02 RX ORDER — MAGNESIUM SULFATE 1 G/100ML
1 INJECTION INTRAVENOUS PRN
Status: DISCONTINUED | OUTPATIENT
Start: 2020-03-02 | End: 2020-03-12 | Stop reason: HOSPADM

## 2020-03-02 RX ORDER — CLOPIDOGREL BISULFATE 75 MG/1
75 TABLET ORAL DAILY
Status: DISCONTINUED | OUTPATIENT
Start: 2020-03-02 | End: 2020-03-11

## 2020-03-02 RX ORDER — SODIUM CHLORIDE 9 MG/ML
INJECTION, SOLUTION INTRAVENOUS CONTINUOUS PRN
Status: DISCONTINUED | OUTPATIENT
Start: 2020-03-02 | End: 2020-03-02 | Stop reason: SDUPTHER

## 2020-03-02 RX ORDER — MILRINONE LACTATE 0.2 MG/ML
INJECTION, SOLUTION INTRAVENOUS CONTINUOUS PRN
Status: DISCONTINUED | OUTPATIENT
Start: 2020-03-02 | End: 2020-03-02 | Stop reason: SDUPTHER

## 2020-03-02 RX ORDER — POTASSIUM CHLORIDE 29.8 MG/ML
20 INJECTION INTRAVENOUS PRN
Status: DISCONTINUED | OUTPATIENT
Start: 2020-03-02 | End: 2020-03-12 | Stop reason: HOSPADM

## 2020-03-02 RX ORDER — ASPIRIN 81 MG/1
81 TABLET ORAL DAILY
Status: DISCONTINUED | OUTPATIENT
Start: 2020-03-02 | End: 2020-03-07

## 2020-03-02 RX ORDER — SODIUM CHLORIDE 0.9 % (FLUSH) 0.9 %
10 SYRINGE (ML) INJECTION EVERY 12 HOURS SCHEDULED
Status: DISCONTINUED | OUTPATIENT
Start: 2020-03-02 | End: 2020-03-09

## 2020-03-02 RX ORDER — FENTANYL CITRATE 50 UG/ML
25 INJECTION, SOLUTION INTRAMUSCULAR; INTRAVENOUS
Status: DISCONTINUED | OUTPATIENT
Start: 2020-03-02 | End: 2020-03-12 | Stop reason: HOSPADM

## 2020-03-02 RX ORDER — ASPIRIN 81 MG/1
TABLET, CHEWABLE ORAL
Status: COMPLETED
Start: 2020-03-02 | End: 2020-03-02

## 2020-03-02 RX ORDER — AMIODARONE HYDROCHLORIDE 200 MG/1
TABLET ORAL
Status: COMPLETED
Start: 2020-03-02 | End: 2020-03-02

## 2020-03-02 RX ORDER — ALBUMIN, HUMAN INJ 5% 5 %
25 SOLUTION INTRAVENOUS PRN
Status: DISCONTINUED | OUTPATIENT
Start: 2020-03-02 | End: 2020-03-12 | Stop reason: HOSPADM

## 2020-03-02 RX ORDER — FENTANYL CITRATE 50 UG/ML
INJECTION, SOLUTION INTRAMUSCULAR; INTRAVENOUS
Status: COMPLETED
Start: 2020-03-02 | End: 2020-03-02

## 2020-03-02 RX ORDER — FOLIC ACID 1 MG/1
1 TABLET ORAL DAILY
Status: DISCONTINUED | OUTPATIENT
Start: 2020-03-02 | End: 2020-03-11

## 2020-03-02 RX ORDER — CHLORHEXIDINE GLUCONATE 4 G/100ML
SOLUTION TOPICAL SEE ADMIN INSTRUCTIONS
Status: DISCONTINUED | OUTPATIENT
Start: 2020-03-02 | End: 2020-03-02 | Stop reason: HOSPADM

## 2020-03-02 RX ORDER — FENTANYL CITRATE 0.05 MG/ML
INJECTION, SOLUTION INTRAMUSCULAR; INTRAVENOUS PRN
Status: DISCONTINUED | OUTPATIENT
Start: 2020-03-02 | End: 2020-03-02 | Stop reason: SDUPTHER

## 2020-03-02 RX ORDER — ALBUMIN (HUMAN) 12.5 G/50ML
SOLUTION INTRAVENOUS
Status: DISPENSED
Start: 2020-03-02 | End: 2020-03-03

## 2020-03-02 RX ORDER — ATORVASTATIN CALCIUM 40 MG/1
40 TABLET, FILM COATED ORAL NIGHTLY
Status: DISCONTINUED | OUTPATIENT
Start: 2020-03-02 | End: 2020-03-11

## 2020-03-02 RX ORDER — DOBUTAMINE HYDROCHLORIDE 400 MG/100ML
2.5 INJECTION INTRAVENOUS CONTINUOUS PRN
Status: DISCONTINUED | OUTPATIENT
Start: 2020-03-02 | End: 2020-03-02

## 2020-03-02 RX ADMIN — FENTANYL CITRATE 50 MCG: 50 INJECTION, SOLUTION INTRAMUSCULAR; INTRAVENOUS at 17:20

## 2020-03-02 RX ADMIN — MILRINONE LACTATE 0.19 MCG/KG/MIN: 0.2 INJECTION, SOLUTION INTRAVENOUS at 15:19

## 2020-03-02 RX ADMIN — FENTANYL CITRATE 500 MCG: 50 INJECTION, SOLUTION INTRAMUSCULAR; INTRAVENOUS at 11:10

## 2020-03-02 RX ADMIN — ROCURONIUM BROMIDE 50 MG: 10 INJECTION INTRAVENOUS at 15:06

## 2020-03-02 RX ADMIN — HEPARIN SODIUM 1900 UNITS: 1000 INJECTION INTRAVENOUS; SUBCUTANEOUS at 09:50

## 2020-03-02 RX ADMIN — PHENYLEPHRINE HYDROCHLORIDE 200 MCG: 10 INJECTION INTRAVENOUS at 12:13

## 2020-03-02 RX ADMIN — FENTANYL CITRATE 250 MCG: 50 INJECTION, SOLUTION INTRAMUSCULAR; INTRAVENOUS at 12:38

## 2020-03-02 RX ADMIN — PHENYLEPHRINE HYDROCHLORIDE 200 MCG: 10 INJECTION INTRAVENOUS at 13:33

## 2020-03-02 RX ADMIN — LIDOCAINE HYDROCHLORIDE 50 MG: 10 INJECTION, SOLUTION EPIDURAL; INFILTRATION; INTRACAUDAL; PERINEURAL at 11:10

## 2020-03-02 RX ADMIN — HYDRALAZINE HYDROCHLORIDE 5 MG: 20 INJECTION INTRAMUSCULAR; INTRAVENOUS at 18:35

## 2020-03-02 RX ADMIN — HYDRALAZINE HYDROCHLORIDE 5 MG: 20 INJECTION INTRAMUSCULAR; INTRAVENOUS at 18:40

## 2020-03-02 RX ADMIN — PROTAMINE SULFATE 50 MG: 10 INJECTION, SOLUTION INTRAVENOUS at 16:15

## 2020-03-02 RX ADMIN — ROCURONIUM BROMIDE 50 MG: 10 INJECTION INTRAVENOUS at 12:05

## 2020-03-02 RX ADMIN — PHENYLEPHRINE HYDROCHLORIDE 200 MCG: 10 INJECTION INTRAVENOUS at 14:08

## 2020-03-02 RX ADMIN — PROTAMINE SULFATE 250 MG: 10 INJECTION, SOLUTION INTRAVENOUS at 15:57

## 2020-03-02 RX ADMIN — PHENYLEPHRINE HYDROCHLORIDE 200 MCG: 10 INJECTION INTRAVENOUS at 14:03

## 2020-03-02 RX ADMIN — MUPIROCIN: 20 OINTMENT TOPICAL at 10:39

## 2020-03-02 RX ADMIN — PHENYLEPHRINE HYDROCHLORIDE 25 MCG/MIN: 10 INJECTION INTRAVENOUS at 13:37

## 2020-03-02 RX ADMIN — PHENYLEPHRINE HYDROCHLORIDE 300 MCG: 10 INJECTION INTRAVENOUS at 13:49

## 2020-03-02 RX ADMIN — ASPIRIN 81 MG 81 MG: 81 TABLET ORAL at 18:28

## 2020-03-02 RX ADMIN — FENTANYL CITRATE 100 MCG: 50 INJECTION, SOLUTION INTRAMUSCULAR; INTRAVENOUS at 16:47

## 2020-03-02 RX ADMIN — DEXTROSE MONOHYDRATE 2 G: 50 INJECTION, SOLUTION INTRAVENOUS at 20:30

## 2020-03-02 RX ADMIN — PROPOFOL 40 MG: 10 INJECTION, EMULSION INTRAVENOUS at 11:10

## 2020-03-02 RX ADMIN — SODIUM CHLORIDE: 900 INJECTION, SOLUTION INTRAVENOUS at 12:46

## 2020-03-02 RX ADMIN — SODIUM CHLORIDE, POTASSIUM CHLORIDE, SODIUM LACTATE AND CALCIUM CHLORIDE: 600; 310; 30; 20 INJECTION, SOLUTION INTRAVENOUS at 11:09

## 2020-03-02 RX ADMIN — CALCIUM CHLORIDE 0.5 G: 100 INJECTION INTRAVENOUS; INTRAVENTRICULAR at 15:56

## 2020-03-02 RX ADMIN — PHENYLEPHRINE HYDROCHLORIDE 200 MCG: 10 INJECTION INTRAVENOUS at 13:24

## 2020-03-02 RX ADMIN — FENTANYL CITRATE 50 MCG: 50 INJECTION, SOLUTION INTRAMUSCULAR; INTRAVENOUS at 18:35

## 2020-03-02 RX ADMIN — MUPIROCIN: 20 OINTMENT TOPICAL at 21:28

## 2020-03-02 RX ADMIN — PROPOFOL 25 MCG/KG/MIN: 10 INJECTION, EMULSION INTRAVENOUS at 16:43

## 2020-03-02 RX ADMIN — ROCURONIUM BROMIDE 50 MG: 10 INJECTION INTRAVENOUS at 13:51

## 2020-03-02 RX ADMIN — ALBUMIN (HUMAN) 25 G: 12.5 INJECTION, SOLUTION INTRAVENOUS at 18:24

## 2020-03-02 RX ADMIN — MIDAZOLAM HYDROCHLORIDE 2 MG: 1 INJECTION, SOLUTION INTRAMUSCULAR; INTRAVENOUS at 15:06

## 2020-03-02 RX ADMIN — EPINEPHRINE 0.02 MCG/KG/MIN: 1 INJECTION PARENTERAL at 15:19

## 2020-03-02 RX ADMIN — MIDAZOLAM HYDROCHLORIDE 2 MG: 1 INJECTION, SOLUTION INTRAMUSCULAR; INTRAVENOUS at 11:10

## 2020-03-02 RX ADMIN — PHENYLEPHRINE HYDROCHLORIDE 100 MCG: 10 INJECTION INTRAVENOUS at 11:16

## 2020-03-02 RX ADMIN — PROPOFOL 50 MG: 10 INJECTION, EMULSION INTRAVENOUS at 16:55

## 2020-03-02 RX ADMIN — SODIUM CHLORIDE, POTASSIUM CHLORIDE, SODIUM LACTATE AND CALCIUM CHLORIDE: 600; 310; 30; 20 INJECTION, SOLUTION INTRAVENOUS at 13:30

## 2020-03-02 RX ADMIN — ROCURONIUM BROMIDE 50 MG: 10 INJECTION INTRAVENOUS at 16:05

## 2020-03-02 RX ADMIN — SODIUM CHLORIDE: 9 INJECTION, SOLUTION INTRAVENOUS at 17:09

## 2020-03-02 RX ADMIN — AMIODARONE HYDROCHLORIDE 200 MG: 200 TABLET ORAL at 10:43

## 2020-03-02 RX ADMIN — DESMOPRESSIN ACETATE 40 MG: 0.2 TABLET ORAL at 21:27

## 2020-03-02 RX ADMIN — Medication 0.02 MCG/KG/MIN: at 15:19

## 2020-03-02 RX ADMIN — PHENYLEPHRINE HYDROCHLORIDE 200 MCG: 10 INJECTION INTRAVENOUS at 13:14

## 2020-03-02 RX ADMIN — AMINOCAPROIC ACID 5 G/HR: 250 INJECTION, SOLUTION INTRAVENOUS at 11:47

## 2020-03-02 RX ADMIN — PHENYLEPHRINE HYDROCHLORIDE 100 MCG: 10 INJECTION INTRAVENOUS at 11:18

## 2020-03-02 RX ADMIN — CARVEDILOL 12.5 MG: 25 TABLET, FILM COATED ORAL at 10:39

## 2020-03-02 RX ADMIN — FAMOTIDINE 20 MG: 20 TABLET, FILM COATED ORAL at 18:30

## 2020-03-02 RX ADMIN — ROCURONIUM BROMIDE 50 MG: 10 INJECTION INTRAVENOUS at 13:04

## 2020-03-02 RX ADMIN — PHENYLEPHRINE HYDROCHLORIDE 200 MCG: 10 INJECTION INTRAVENOUS at 13:48

## 2020-03-02 RX ADMIN — ASPIRIN 81 MG: 81 TABLET, CHEWABLE ORAL at 10:39

## 2020-03-02 RX ADMIN — PHENYLEPHRINE HYDROCHLORIDE 200 MCG: 10 INJECTION INTRAVENOUS at 13:07

## 2020-03-02 RX ADMIN — CHLORHEXIDINE GLUCONATE 0.12% ORAL RINSE 15 ML: 1.2 LIQUID ORAL at 10:39

## 2020-03-02 RX ADMIN — HEPARIN SODIUM 25000 UNITS: 1000 INJECTION INTRAVENOUS; SUBCUTANEOUS at 13:47

## 2020-03-02 RX ADMIN — FENTANYL CITRATE 250 MCG: 50 INJECTION, SOLUTION INTRAMUSCULAR; INTRAVENOUS at 12:32

## 2020-03-02 RX ADMIN — OXYCODONE HYDROCHLORIDE AND ACETAMINOPHEN 2 TABLET: 5; 325 TABLET ORAL at 18:30

## 2020-03-02 RX ADMIN — SODIUM CHLORIDE 2 UNITS/HR: 9 INJECTION, SOLUTION INTRAVENOUS at 14:03

## 2020-03-02 RX ADMIN — FOLIC ACID 1 MG: 1 TABLET ORAL at 21:27

## 2020-03-02 RX ADMIN — PHENYLEPHRINE HYDROCHLORIDE 200 MCG: 10 INJECTION INTRAVENOUS at 11:20

## 2020-03-02 RX ADMIN — HYDRALAZINE HYDROCHLORIDE 5 MG: 20 INJECTION INTRAMUSCULAR; INTRAVENOUS at 18:45

## 2020-03-02 RX ADMIN — ROCURONIUM BROMIDE 50 MG: 10 INJECTION INTRAVENOUS at 11:10

## 2020-03-02 RX ADMIN — PHENYLEPHRINE HYDROCHLORIDE 200 MCG: 10 INJECTION INTRAVENOUS at 13:45

## 2020-03-02 RX ADMIN — PROPOFOL 100 MG: 10 INJECTION, EMULSION INTRAVENOUS at 13:53

## 2020-03-02 RX ADMIN — VANCOMYCIN HYDROCHLORIDE 1500 MG: 1 INJECTION, SOLUTION INTRAVENOUS at 12:27

## 2020-03-02 RX ADMIN — FENTANYL CITRATE 100 MCG: 50 INJECTION, SOLUTION INTRAMUSCULAR; INTRAVENOUS at 16:55

## 2020-03-02 ASSESSMENT — PULMONARY FUNCTION TESTS
PIF_VALUE: 4
PIF_VALUE: 17
PIF_VALUE: 18
PIF_VALUE: 20
PIF_VALUE: 1
PIF_VALUE: 18
PIF_VALUE: 18
PIF_VALUE: 1
PIF_VALUE: 22
PIF_VALUE: 18
PIF_VALUE: 21
PIF_VALUE: 18
PIF_VALUE: 21
PIF_VALUE: 1
PIF_VALUE: 18
PIF_VALUE: 20
PIF_VALUE: 18
PIF_VALUE: 21
PIF_VALUE: 17
PIF_VALUE: 16
PIF_VALUE: 1
PIF_VALUE: 18
PIF_VALUE: 18
PIF_VALUE: 1
PIF_VALUE: 1
PIF_VALUE: 18
PIF_VALUE: 20
PIF_VALUE: 20
PIF_VALUE: 18
PIF_VALUE: 19
PIF_VALUE: 21
PIF_VALUE: 19
PIF_VALUE: 17
PIF_VALUE: 1
PIF_VALUE: 18
PIF_VALUE: 21
PIF_VALUE: 18
PIF_VALUE: 1
PIF_VALUE: 20
PIF_VALUE: 17
PIF_VALUE: 1
PIF_VALUE: 1
PIF_VALUE: 18
PIF_VALUE: 1
PIF_VALUE: 1
PIF_VALUE: 19
PIF_VALUE: 18
PIF_VALUE: 21
PIF_VALUE: 1
PIF_VALUE: 20
PIF_VALUE: 1
PIF_VALUE: 18
PIF_VALUE: 21
PIF_VALUE: 1
PIF_VALUE: 18
PIF_VALUE: 1
PIF_VALUE: 18
PIF_VALUE: 0
PIF_VALUE: 1
PIF_VALUE: 21
PIF_VALUE: 16
PIF_VALUE: 17
PIF_VALUE: 18
PIF_VALUE: 1
PIF_VALUE: 18
PIF_VALUE: 19
PIF_VALUE: 18
PIF_VALUE: 21
PIF_VALUE: 17
PIF_VALUE: 19
PIF_VALUE: 1
PIF_VALUE: 18
PIF_VALUE: 1
PIF_VALUE: 18
PIF_VALUE: 2
PIF_VALUE: 17
PIF_VALUE: 16
PIF_VALUE: 1
PIF_VALUE: 18
PIF_VALUE: 18
PIF_VALUE: 17
PIF_VALUE: 19
PIF_VALUE: 18
PIF_VALUE: 19
PIF_VALUE: 18
PIF_VALUE: 20
PIF_VALUE: 1
PIF_VALUE: 18
PIF_VALUE: 1
PIF_VALUE: 0
PIF_VALUE: 20
PIF_VALUE: 19
PIF_VALUE: 1
PIF_VALUE: 19
PIF_VALUE: 17
PIF_VALUE: 18
PIF_VALUE: 1
PIF_VALUE: 0
PIF_VALUE: 18
PIF_VALUE: 18
PIF_VALUE: 1
PIF_VALUE: 16
PIF_VALUE: 1
PIF_VALUE: 20
PIF_VALUE: 18
PIF_VALUE: 1
PIF_VALUE: 20
PIF_VALUE: 20
PIF_VALUE: 18
PIF_VALUE: 1
PIF_VALUE: 20
PIF_VALUE: 1
PIF_VALUE: 18
PIF_VALUE: 18
PIF_VALUE: 1
PIF_VALUE: 18
PIF_VALUE: 18
PIF_VALUE: 1
PIF_VALUE: 19
PIF_VALUE: 18
PIF_VALUE: 18
PIF_VALUE: 1
PIF_VALUE: 18
PIF_VALUE: 20
PIF_VALUE: 19
PIF_VALUE: 18
PIF_VALUE: 24
PIF_VALUE: 23
PIF_VALUE: 1
PIF_VALUE: 1
PIF_VALUE: 17
PIF_VALUE: 16
PIF_VALUE: 18
PIF_VALUE: 16
PIF_VALUE: 19
PIF_VALUE: 21
PIF_VALUE: 17
PIF_VALUE: 2
PIF_VALUE: 18
PIF_VALUE: 20
PIF_VALUE: 18
PIF_VALUE: 18
PIF_VALUE: 1
PIF_VALUE: 1
PIF_VALUE: 18
PIF_VALUE: 18
PIF_VALUE: 20
PIF_VALUE: 1
PIF_VALUE: 20
PIF_VALUE: 18
PIF_VALUE: 24
PIF_VALUE: 25
PIF_VALUE: 18
PIF_VALUE: 20
PIF_VALUE: 19
PIF_VALUE: 18
PIF_VALUE: 1
PIF_VALUE: 21
PIF_VALUE: 18
PIF_VALUE: 18
PIF_VALUE: 19
PIF_VALUE: 21
PIF_VALUE: 18
PIF_VALUE: 1
PIF_VALUE: 18
PIF_VALUE: 1
PIF_VALUE: 1
PIF_VALUE: 19
PIF_VALUE: 17
PIF_VALUE: 2
PIF_VALUE: 19
PIF_VALUE: 18
PIF_VALUE: 18
PIF_VALUE: 17
PIF_VALUE: 3
PIF_VALUE: 19
PIF_VALUE: 8
PIF_VALUE: 18
PIF_VALUE: 18
PIF_VALUE: 1
PIF_VALUE: 18
PIF_VALUE: 1
PIF_VALUE: 18
PIF_VALUE: 18
PIF_VALUE: 1
PIF_VALUE: 23
PIF_VALUE: 18
PIF_VALUE: 20
PIF_VALUE: 19
PIF_VALUE: 21
PIF_VALUE: 19
PIF_VALUE: 20
PIF_VALUE: 17
PIF_VALUE: 0
PIF_VALUE: 17
PIF_VALUE: 1
PIF_VALUE: 18
PIF_VALUE: 18
PIF_VALUE: 22
PIF_VALUE: 1
PIF_VALUE: 17
PIF_VALUE: 1
PIF_VALUE: 19
PIF_VALUE: 18
PIF_VALUE: 18
PIF_VALUE: 22
PIF_VALUE: 18
PIF_VALUE: 19
PIF_VALUE: 1
PIF_VALUE: 18
PIF_VALUE: 18
PIF_VALUE: 20
PIF_VALUE: 19
PIF_VALUE: 18
PIF_VALUE: 18
PIF_VALUE: 10
PIF_VALUE: 1
PIF_VALUE: 18
PIF_VALUE: 18
PIF_VALUE: 17
PIF_VALUE: 21
PIF_VALUE: 17
PIF_VALUE: 1
PIF_VALUE: 1
PIF_VALUE: 16
PIF_VALUE: 20
PIF_VALUE: 19
PIF_VALUE: 1
PIF_VALUE: 18
PIF_VALUE: 19
PIF_VALUE: 18
PIF_VALUE: 21
PIF_VALUE: 18
PIF_VALUE: 20
PIF_VALUE: 18
PIF_VALUE: 19
PIF_VALUE: 1
PIF_VALUE: 19
PIF_VALUE: 1
PIF_VALUE: 21
PIF_VALUE: 20
PIF_VALUE: 18
PIF_VALUE: 1
PIF_VALUE: 17
PIF_VALUE: 18
PIF_VALUE: 20
PIF_VALUE: 1
PIF_VALUE: 21
PIF_VALUE: 1
PIF_VALUE: 19
PIF_VALUE: 20
PIF_VALUE: 19
PIF_VALUE: 15
PIF_VALUE: 1
PIF_VALUE: 18
PIF_VALUE: 1
PIF_VALUE: 21
PIF_VALUE: 19
PIF_VALUE: 1
PIF_VALUE: 19
PIF_VALUE: 17
PIF_VALUE: 19
PIF_VALUE: 19
PIF_VALUE: 18
PIF_VALUE: 21
PIF_VALUE: 20
PIF_VALUE: 20
PIF_VALUE: 18
PIF_VALUE: 1
PIF_VALUE: 1
PIF_VALUE: 18
PIF_VALUE: 18
PIF_VALUE: 1
PIF_VALUE: 18
PIF_VALUE: 1
PIF_VALUE: 0
PIF_VALUE: 18
PIF_VALUE: 18
PIF_VALUE: 19
PIF_VALUE: 21
PIF_VALUE: 1
PIF_VALUE: 20
PIF_VALUE: 1
PIF_VALUE: 20
PIF_VALUE: 1
PIF_VALUE: 20
PIF_VALUE: 18
PIF_VALUE: 1
PIF_VALUE: 18
PIF_VALUE: 20
PIF_VALUE: 17
PIF_VALUE: 18
PIF_VALUE: 1
PIF_VALUE: 18
PIF_VALUE: 21
PIF_VALUE: 26
PIF_VALUE: 0
PIF_VALUE: 18
PIF_VALUE: 19
PIF_VALUE: 1
PIF_VALUE: 18
PIF_VALUE: 18
PIF_VALUE: 20
PIF_VALUE: 18
PIF_VALUE: 1
PIF_VALUE: 18
PIF_VALUE: 19
PIF_VALUE: 20
PIF_VALUE: 23
PIF_VALUE: 1
PIF_VALUE: 18
PIF_VALUE: 19
PIF_VALUE: 1
PIF_VALUE: 1
PIF_VALUE: 0
PIF_VALUE: 18
PIF_VALUE: 20
PIF_VALUE: 20
PIF_VALUE: 1
PIF_VALUE: 18
PIF_VALUE: 18
PIF_VALUE: 1
PIF_VALUE: 20
PIF_VALUE: 18
PIF_VALUE: 20
PIF_VALUE: 18
PIF_VALUE: 1
PIF_VALUE: 19
PIF_VALUE: 1
PIF_VALUE: 20
PIF_VALUE: 19
PIF_VALUE: 20
PIF_VALUE: 20
PIF_VALUE: 1
PIF_VALUE: 22
PIF_VALUE: 19
PIF_VALUE: 1
PIF_VALUE: 19
PIF_VALUE: 18
PIF_VALUE: 18
PIF_VALUE: 1
PIF_VALUE: 1
PIF_VALUE: 2
PIF_VALUE: 1
PIF_VALUE: 18
PIF_VALUE: 19
PIF_VALUE: 20
PIF_VALUE: 1
PIF_VALUE: 19
PIF_VALUE: 20
PIF_VALUE: 19
PIF_VALUE: 18
PIF_VALUE: 18
PIF_VALUE: 19
PIF_VALUE: 17

## 2020-03-02 ASSESSMENT — PAIN SCALES - GENERAL
PAINLEVEL_OUTOF10: 0
PAINLEVEL_OUTOF10: 0

## 2020-03-02 NOTE — ANESTHESIA POSTPROCEDURE EVALUATION
Department of Anesthesiology  Postprocedure Note    Patient: Lucia Dias  MRN: [id number]  YOB: 1950  Date of evaluation: 3/2/2020  Time:  5:51 PM     Procedure Summary     Date:  03/02/20 Room / Location:  [address] [place] [address] 20 / [address]    Anesthesia Start:  1102 Anesthesia Stop:  1718    Procedure:  CABG CORONARY ARTERY BYPASS X3,STERNALOCK 360 STERNAL CLOSURE SYSTEM, PREVENA DRESSING APPLIED (N/A ) Diagnosis:  (CAD)    Surgeon:  Maren Ramírez MD Responsible Provider:  Frank Rehman MD    Anesthesia Type:  general ASA Status:  4          Anesthesia Type: general    Pablo Phase I:      Pablo Phase II:      Last vitals: Reviewed and per EMR flowsheets. Anesthesia Post Evaluation    Patient location during evaluation: ICU  Patient participation: complete - patient cannot participate  Level of consciousness: sedated and ventilated  Pain scale: Sedated.   Airway patency: patent  Nausea & Vomiting: no nausea and no vomiting  Complications: no  Cardiovascular status: hemodynamically stable and blood pressure returned to baseline  Respiratory status: acceptable, ventilator and intubated  Hydration status: euvolemic

## 2020-03-02 NOTE — ANESTHESIA PROCEDURE NOTES
normal.  Diameter 2.7 cm. Aortic Arch:  Size normal.    Descending Aorta:  Size normal.    Other Aortic Findings: Aortic root 25 mm  LVOT 16 mm    Atria    Right Atrium:  Size normal.    Left Atrium:  Size normal.  Left atrial appendage normal.      Septa    Atrial Septum:  Intra-atrial septal morphology normal.      Ventricular Septum:  Intra-ventricular septum morphology normal.      Diastolic Function Measurements:  Diastolic Dysfunction Grade= II (Pseudonormal)  E= 64 ms  A= 48 ms  E/A Ratio= 1.3  DT=  ms  S/D=   IVRT=    Other Findings  Pericardium:  normal  Pleural Effusion:  none  Pulmonary Arteries:  normal      Anesthesia Information  Performed Personally  Anesthesiologist:  Lisa oLja MD      Echocardiogram Comments:       TDI lateral E' 6.8, consistent with diastolic dysfunction    Summary:  S/P CABG    1. A postop DONNA was performed without complications. 2. LVEF 40 % postop. 3. Post-op valvular abnormalities unchanged  4. No Aortic dissection  5. Significant findings were communicated to CTS.     Electronically signed by Lisa Loja MD on 3/2/2020 at 4:46 PM

## 2020-03-02 NOTE — PROGRESS NOTES
Occupational Therapy    Occupational Therapy Not Seen Note    DATE: 3/2/2020  Name: Sujit Gallo  : 1950  MRN: [id number]    Patient not available for Occupational Therapy due to:    Surgery/Procedure: CABG X3 & AVR 20    Next Scheduled Treatment: 3/3/20 Possible Re-eval by OT    Electronically signed by MITCHEL Pedraza on 3/2/2020 at 2:41 PM

## 2020-03-02 NOTE — FLOWSHEET NOTE
visited patient per pre-surgery visit, as patient will be undergoing \"CABG,\" in the morning. Patient was being assisted to restroom, at time of 's arrival. Patient was being tended to by her daughter, who was present to assist patient in preparing for surgery. Patient expressed feelings of anxiety over her upcoming \"open heart surgery. \" Darnell Medel provided words of comfort and encouragement.  left room, as patient was heading into restroom for pre-surgery wash. Darnell Medel will refer patient to next shift  to visit with patient and offer prayer prior to her 10 am surgery.     Sathish Epps     03/01/20 0163   Encounter Summary   Services provided to: Patient and family together   Continue Visiting   (3/01/2020)   Routine   Type Pre-procedure   Spiritual/Quaker   Type Spiritual support   Assessment Anxious   Intervention Sustaining presence/ Ministry of presence   Outcome Receptive

## 2020-03-02 NOTE — CARE COORDINATION
Received call from Carley Mann at [facility]. Pt has a chair time for MWF 3:15pm at [facility]. Met with pt on the unit and updated her on chair time and that unit was switched at her request to [facility]. Will update unit when pt is close to discharge.  Updated AVS

## 2020-03-02 NOTE — PROGRESS NOTES
rate and rhythm with positive S1 and S2 and no rubs, positive systolic murmur  ABDOMEN: soft not tender, not distended, active bowel sounds, no ascites.      EXTREMITIES: no cyanosis, minimal lower extremity edema appreciated, 2+ DP pulses bilaterally    CURRENT MEDICATIONS      [START ON 3/3/2020] 0.9 % sodium chloride infusion, Continuous  sodium chloride flush 0.9 % injection 10 mL, 2 times per day  sodium chloride flush 0.9 % injection 10 mL, PRN  ceFAZolin (ANCEF) 2 g in dextrose 5 % 50 mL IVPB, On Call to OR  aspirin EC tablet 81 mg, On Call to OR  mupirocin (BACTROBAN) 2 % ointment, BID  chlorhexidine (PERIDEX) 0.12 % solution 15 mL, Once  chlorhexidine (HIBICLENS) 4 % liquid, See Admin Instructions  carvedilol (COREG) tablet 37.5 mg, BID  heparin (porcine) injection 1,900 Units, PRN  heparin (porcine) injection 1,900 Units, PRN  ceFAZolin (ANCEF) 1 g in dextrose 5 % 50 mL IVPB (premix), Once  sodium chloride flush 0.9 % injection 10 mL, 2 times per day  sodium chloride flush 0.9 % injection 10 mL, PRN  acetaminophen (TYLENOL) tablet 650 mg, Q4H PRN  magnesium hydroxide (MILK OF MAGNESIA) 400 MG/5ML suspension 30 mL, Daily PRN  LORazepam (ATIVAN) tablet 0.5 mg, Q6H PRN  labetalol (NORMODYNE;TRANDATE) injection 10 mg, Q4H PRN  heparin (porcine) injection 1,200 Units, PRN  heparin (porcine) injection 1,300 Units, PRN  nitroGLYCERIN (NITROSTAT) SL tablet 0.4 mg, Q5 Min PRN  allopurinol (ZYLOPRIM) tablet 273 mg, Daily  folic acid (FOLVITE) tablet 1 mg, BID  insulin glargine (LANTUS) injection vial 25 Units, Nightly  vitamin D3 (CHOLECALCIFEROL) tablet 400 Units, BID  sodium chloride flush 0.9 % injection 10 mL, 2 times per day  sodium chloride flush 0.9 % injection 10 mL, PRN  acetaminophen (TYLENOL) tablet 650 mg, Q6H PRN  acetaminophen (TYLENOL) suppository 650 mg, Q6H PRN  promethazine (PHENERGAN) tablet 12.5 mg, Q6H PRN  ondansetron (ZOFRAN) injection 4 mg, Q6H PRN  nicotine (NICODERM CQ) 21 MG/24HR 1 patch, Daily PRN  glucose (GLUTOSE) 40 % oral gel 15 g, PRN  dextrose 50 % IV solution, PRN  glucagon (rDNA) injection 1 mg, PRN  dextrose 5 % solution, PRN  insulin lispro (HUMALOG) injection vial 0-6 Units, TID WC  insulin lispro (HUMALOG) injection vial 0-3 Units, Nightly  polyethylene glycol (GLYCOLAX) packet 17 g, Daily PRN  heparin (porcine) injection 4,000 Units, PRN  heparin (porcine) injection 2,000 Units, PRN  heparin 25,000 units in dextrose 5% 250 mL infusion, Continuous  famotidine (PEPCID) tablet 20 mg, Daily  aspirin chewable tablet 81 mg, Daily  bumetanide (BUMEX) injection 2 mg, Daily  atorvastatin (LIPITOR) tablet 40 mg, Nightly          LABS      CBC:   Recent Labs     03/01/20  0403 03/02/20  0634 03/02/20  0721   WBC 14.2* 13.7* 13.9*   RBC 2.85* 2.79* 2.78*   HGB 8.9* 9.0* 8.9*   HCT 27.9* 27.1* 27.0*   MCV 97.9 97.1 97.1   MCH 31.2 32.3 32.0   MCHC 31.9 33.2 33.0   RDW 13.1 13.0 12.9    190 197   MPV 12.3 12.3 12.0      BMP:   Recent Labs     03/01/20  0403 03/02/20  0634 03/02/20  0721    137 135   K 3.6* 4.0 4.1   CL 98 98 99   CO2 18* 18* 17*   BUN 35* 52* 54*   CREATININE 5.24* 5.92* 6.06*   GLUCOSE 107* 111* 115*   CALCIUM 9.7 9.7 9.7          URINALYSIS:  U/A:   Lab Results   Component Value Date    NITRU NEGATIVE 02/20/2020    COLORU YELLOW 02/20/2020    PHUR 6.0 02/20/2020    WBCUA 2 TO 5 02/20/2020    RBCUA 0 TO 2 02/20/2020    MUCUS NOT REPORTED 02/20/2020    TRICHOMONAS NOT REPORTED 02/20/2020    YEAST NOT REPORTED 02/20/2020    BACTERIA NOT REPORTED 02/20/2020    SPECGRAV 1.017 02/20/2020    LEUKOCYTESUR NEGATIVE 02/20/2020    UROBILINOGEN Normal 02/20/2020    BILIRUBINUR NEGATIVE 02/20/2020    GLUCOSEU 1+ 02/20/2020    KETUA TRACE 02/20/2020    AMORPHOUS NOT REPORTED 02/20/2020         ASSESSMENT      1. ESRD new start on hemodialysis during this admission, now with a tunneled hemodialysis catheter  2. High anion gap metabolic acidosis, from renal failure : improved  3. Non-ST elevation myocardial infarction, cath results reviewed, CABG recommended and now scheduled for 3/3/2020   4. Decompensated congestive heart failure with echo showing evidence of decreased EF of 35% along with grade 2 diastolic dysfunction. Also has moderate aortic stenosis and mitral valve regurgitation.  + Pulmonary hypertension, moderate  5. Anemia of chronic disease, hemoglobin 8.9 today  6. Chronic tobacco abuse      PLAN      1. Hemodialysis orders were reviewed with nurse. She is going to get a shorter treatment as she is scheduled for open heart surgery somewhere around 10 or 10:30 today. 2. Labs daily  3. Meds reviewed . 4. Ok for surgery from renal standpoint   5. Following        Please do not hesitate to call with questions.     Electronically signed by Raisa Hernandez MD on 3/2/2020 at 9:54 AM

## 2020-03-02 NOTE — PROGRESS NOTES
DATE: 3/2/2020    NAME: Rayo Lee  MRN: [id number]   : 1950    Patient not seen this date for Physical Therapy due to:  [] Blood transfusion in progress  [x] Hemodialysis Pt out of her room for dialysis  []  Patient Declined  [] Spine Precautions   [] Strict Bedrest  [] Surgery/ Procedure  [] Testing      [] Other        [] PT being discontinued at this time. Patient independent. No further needs. [] PT being discontinued at this time as the patient has been transferred to palliative care. No further needs.     America Vanegas, PTA

## 2020-03-03 ENCOUNTER — APPOINTMENT (OUTPATIENT)
Dept: GENERAL RADIOLOGY | Age: 70
DRG: 233 | End: 2020-03-03
Payer: MEDICARE

## 2020-03-03 PROBLEM — E44.0 MODERATE MALNUTRITION (HCC): Status: ACTIVE | Noted: 2020-03-03

## 2020-03-03 LAB
ABO/RH: NORMAL
ALLEN TEST: ABNORMAL
ANION GAP SERPL CALCULATED.3IONS-SCNC: 16 MMOL/L (ref 9–17)
ANION GAP SERPL CALCULATED.3IONS-SCNC: 17 MMOL/L (ref 9–17)
ANION GAP SERPL CALCULATED.3IONS-SCNC: 21 MMOL/L (ref 9–17)
ANION GAP: 12 MMOL/L (ref 7–16)
ANION GAP: NORMAL MMOL/L (ref 7–16)
ANTIBODY SCREEN: NEGATIVE
ARM BAND NUMBER: NORMAL
BLD PROD TYP BPU: NORMAL
BUN BLDV-MCNC: 26 MG/DL (ref 8–23)
BUN BLDV-MCNC: 30 MG/DL (ref 8–23)
BUN BLDV-MCNC: 30 MG/DL (ref 8–23)
BUN/CREAT BLD: ABNORMAL (ref 9–20)
CALCIUM SERPL-MCNC: 9 MG/DL (ref 8.6–10.4)
CALCIUM SERPL-MCNC: 9.5 MG/DL (ref 8.6–10.4)
CALCIUM SERPL-MCNC: 9.8 MG/DL (ref 8.6–10.4)
CHLORIDE BLD-SCNC: 104 MMOL/L (ref 98–107)
CHLORIDE BLD-SCNC: 106 MMOL/L (ref 98–107)
CHLORIDE BLD-SCNC: 110 MMOL/L (ref 98–107)
CO2: 14 MMOL/L (ref 20–31)
CO2: 17 MMOL/L (ref 20–31)
CO2: 17 MMOL/L (ref 20–31)
CREAT SERPL-MCNC: 3.97 MG/DL (ref 0.5–0.9)
CREAT SERPL-MCNC: 4.71 MG/DL (ref 0.5–0.9)
CREAT SERPL-MCNC: 4.74 MG/DL (ref 0.5–0.9)
CROSSMATCH RESULT: NORMAL
CULTURE: NO GROWTH
DISPENSE STATUS BLOOD BANK: NORMAL
EKG ATRIAL RATE: 0 BPM
EKG P AXIS: -22 DEGREES
EKG Q-T INTERVAL: 394 MS
EKG QRS DURATION: 88 MS
EKG QTC CALCULATION (BAZETT): 428 MS
EKG R AXIS: 77 DEGREES
EKG T AXIS: 160 DEGREES
EKG VENTRICULAR RATE: 71 BPM
EXPIRATION DATE: NORMAL
FIO2: 40
FIO2: 60
FIO2: 70
FIO2: ABNORMAL
GFR AFRICAN AMERICAN: 11 ML/MIN
GFR AFRICAN AMERICAN: 11 ML/MIN
GFR AFRICAN AMERICAN: 14 ML/MIN
GFR NON-AFRICAN AMERICAN: 10 ML/MIN
GFR NON-AFRICAN AMERICAN: 11 ML/MIN
GFR NON-AFRICAN AMERICAN: 9 ML/MIN
GFR NON-AFRICAN AMERICAN: 9 ML/MIN
GFR SERPL CREATININE-BSD FRML MDRD: 12 ML/MIN
GFR SERPL CREATININE-BSD FRML MDRD: ABNORMAL ML/MIN/{1.73_M2}
GLUCOSE BLD-MCNC: 102 MG/DL (ref 65–105)
GLUCOSE BLD-MCNC: 103 MG/DL (ref 70–99)
GLUCOSE BLD-MCNC: 105 MG/DL (ref 74–100)
GLUCOSE BLD-MCNC: 107 MG/DL (ref 65–105)
GLUCOSE BLD-MCNC: 107 MG/DL (ref 70–99)
GLUCOSE BLD-MCNC: 110 MG/DL (ref 65–105)
GLUCOSE BLD-MCNC: 110 MG/DL (ref 65–105)
GLUCOSE BLD-MCNC: 118 MG/DL (ref 65–105)
GLUCOSE BLD-MCNC: 118 MG/DL (ref 70–99)
GLUCOSE BLD-MCNC: 120 MG/DL (ref 65–105)
GLUCOSE BLD-MCNC: 120 MG/DL (ref 65–105)
GLUCOSE BLD-MCNC: 124 MG/DL (ref 74–100)
GLUCOSE BLD-MCNC: 124 MG/DL (ref 74–100)
GLUCOSE BLD-MCNC: 126 MG/DL (ref 65–105)
GLUCOSE BLD-MCNC: 127 MG/DL (ref 65–105)
GLUCOSE BLD-MCNC: 127 MG/DL (ref 74–100)
GLUCOSE BLD-MCNC: 91 MG/DL (ref 65–105)
HBCO, MIXED, EXTENDED: 0.5 % (ref 0–5)
HCT VFR BLD CALC: 27.1 % (ref 36.3–47.1)
HEMOGLOBIN, MIXED, EXTENDED: 8.6 G/DL (ref 12–18)
HEMOGLOBIN: 9.1 G/DL (ref 11.9–15.1)
INR BLD: 1.1
Lab: NORMAL
MAGNESIUM: 2.5 MG/DL (ref 1.6–2.6)
MCH RBC QN AUTO: 31.1 PG (ref 25.2–33.5)
MCHC RBC AUTO-ENTMCNC: 33.6 G/DL (ref 28.4–34.8)
MCV RBC AUTO: 92.5 FL (ref 82.6–102.9)
METHB, MIXED, EXTENDED: 0.7 % (ref 0–1.5)
MODE: ABNORMAL
NEGATIVE BASE EXCESS, ART: 3 (ref 0–2)
NEGATIVE BASE EXCESS, ART: 4 (ref 0–2)
NEGATIVE BASE EXCESS, ART: 5 (ref 0–2)
NEGATIVE BASE EXCESS, ART: ABNORMAL (ref 0–2)
NRBC AUTOMATED: 0 PER 100 WBC
O2 CONTENT, MIXED, EXTENDED: 8 VOL % (ref 6–15)
O2 DEVICE/FLOW/%: ABNORMAL
O2 SAT, MIXED, EXTENDED: 65.5 % (ref 60–80)
OXYGEN STATUS: ABNORMAL
PARTIAL THROMBOPLASTIN TIME: 37.9 SEC (ref 20.5–30.5)
PATIENT TEMP: ABNORMAL
PDW BLD-RTO: 15.3 % (ref 11.8–14.4)
PLATELET # BLD: 89 K/UL (ref 138–453)
PMV BLD AUTO: 12.4 FL (ref 8.1–13.5)
POC ANGLE TEG W HEP: 60.4 DEG (ref 59–74)
POC ANGLE TEG W HEP: 73.7 DEG (ref 59–74)
POC ANGLE TEG: 62 DEG (ref 59–74)
POC CHLORIDE: 106 MMOL/L (ref 98–107)
POC CHLORIDE: NORMAL MMOL/L (ref 98–107)
POC CREATININE: 4.33 MG/DL (ref 0.51–1.19)
POC EPL TEG W/HEP: ABNORMAL % (ref 0–15)
POC EPL TEG W/HEP: ABNORMAL % (ref 0–15)
POC EPL TEG: ABNORMAL % (ref 0–15)
POC HCO3: 20.4 MMOL/L (ref 21–28)
POC HCO3: 20.8 MMOL/L (ref 21–28)
POC HCO3: 22.1 MMOL/L (ref 21–28)
POC HCO3: 26.4 MMOL/L (ref 21–28)
POC HEMATOCRIT: 18 % (ref 36–46)
POC HEMATOCRIT: 27 % (ref 36–46)
POC HEMOGLOBIN: 6.1 G/DL (ref 12–16)
POC HEMOGLOBIN: 9.1 G/DL (ref 12–16)
POC IONIZED CALCIUM: 0.99 MMOL/L (ref 1.15–1.33)
POC IONIZED CALCIUM: 1.34 MMOL/L (ref 1.15–1.33)
POC KINETICS TEG W HEP: 1.4 MIN (ref 1–3)
POC KINETICS TEG W HEP: 2.2 MIN (ref 1–3)
POC KINETICS TEG: 2.1 MIN (ref 1–3)
POC LACTIC ACID: <0.3 MMOL/L (ref 0.56–1.39)
POC LY30(LYSIS) TEG W HEP: ABNORMAL % (ref 0–8)
POC LY30(LYSIS) TEG W HEP: ABNORMAL % (ref 0–8)
POC LY30(LYSIS) TEG: ABNORMAL % (ref 0–8)
POC MA(MAX CLOT) TEG: 63.2 MM (ref 55–74)
POC MAX CLOT TEG W HEP: 61.3 MM (ref 55–74)
POC MAX CLOT TEG W HEP: 78.1 MM (ref 55–74)
POC O2 SATURATION: 100 % (ref 94–98)
POC O2 SATURATION: 96 % (ref 94–98)
POC O2 SATURATION: 97 % (ref 94–98)
POC O2 SATURATION: 97 % (ref 94–98)
POC PCO2 TEMP: ABNORMAL MM HG
POC PCO2: 33.5 MM HG (ref 35–48)
POC PCO2: 40.9 MM HG (ref 35–48)
POC PCO2: 42.5 MM HG (ref 35–48)
POC PCO2: 45.5 MM HG (ref 35–48)
POC PH TEMP: ABNORMAL
POC PH: 7.3 (ref 7.35–7.45)
POC PH: 7.34 (ref 7.35–7.45)
POC PH: 7.37 (ref 7.35–7.45)
POC PH: 7.39 (ref 7.35–7.45)
POC PO2 TEMP: ABNORMAL MM HG
POC PO2: 100.4 MM HG (ref 83–108)
POC PO2: 448.9 MM HG (ref 83–108)
POC PO2: 80.4 MM HG (ref 83–108)
POC PO2: 96.3 MM HG (ref 83–108)
POC POTASSIUM: 4.7 MMOL/L (ref 3.5–4.5)
POC POTASSIUM: 4.8 MMOL/L (ref 3.5–4.5)
POC REACTION TIME TEG W HEP: 8.5 MIN (ref 4–9)
POC REACTION TIME TEG W HEP: 9.3 MIN (ref 4–9)
POC REACTION TIME TEG: 9.1 MIN (ref 4–9)
POC SODIUM: 132 MMOL/L (ref 138–146)
POC SODIUM: 139 MMOL/L (ref 138–146)
POSITIVE BASE EXCESS, ART: 1 (ref 0–3)
POSITIVE BASE EXCESS, ART: ABNORMAL (ref 0–3)
POTASSIUM SERPL-SCNC: 4.3 MMOL/L (ref 3.7–5.3)
POTASSIUM SERPL-SCNC: 4.7 MMOL/L (ref 3.7–5.3)
POTASSIUM SERPL-SCNC: 4.8 MMOL/L (ref 3.7–5.3)
PROTHROMBIN TIME: 11.3 SEC (ref 9–12)
RBC # BLD: 2.93 M/UL (ref 3.95–5.11)
SAMPLE SITE: ABNORMAL
SODIUM BLD-SCNC: 137 MMOL/L (ref 135–144)
SODIUM BLD-SCNC: 140 MMOL/L (ref 135–144)
SODIUM BLD-SCNC: 145 MMOL/L (ref 135–144)
SPECIMEN DESCRIPTION: NORMAL
TCO2 (CALC), ART: 21 MMOL/L (ref 22–29)
TCO2 (CALC), ART: 22 MMOL/L (ref 22–29)
TCO2 (CALC), ART: 23 MMOL/L (ref 22–29)
TCO2 (CALC), ART: 28 MMOL/L (ref 22–29)
TEG COMMENT: ABNORMAL
TRANSFUSION STATUS: NORMAL
UNIT DIVISION: 0
UNIT NUMBER: NORMAL
WBC # BLD: 18.4 K/UL (ref 3.5–11.3)

## 2020-03-03 PROCEDURE — 83605 ASSAY OF LACTIC ACID: CPT

## 2020-03-03 PROCEDURE — 94761 N-INVAS EAR/PLS OXIMETRY MLT: CPT

## 2020-03-03 PROCEDURE — 94660 CPAP INITIATION&MGMT: CPT

## 2020-03-03 PROCEDURE — 2500000003 HC RX 250 WO HCPCS: Performed by: STUDENT IN AN ORGANIZED HEALTH CARE EDUCATION/TRAINING PROGRAM

## 2020-03-03 PROCEDURE — 71045 X-RAY EXAM CHEST 1 VIEW: CPT

## 2020-03-03 PROCEDURE — 97110 THERAPEUTIC EXERCISES: CPT

## 2020-03-03 PROCEDURE — 85027 COMPLETE CBC AUTOMATED: CPT

## 2020-03-03 PROCEDURE — 82375 ASSAY CARBOXYHB QUANT: CPT

## 2020-03-03 PROCEDURE — 80048 BASIC METABOLIC PNL TOTAL CA: CPT

## 2020-03-03 PROCEDURE — 83735 ASSAY OF MAGNESIUM: CPT

## 2020-03-03 PROCEDURE — 82803 BLOOD GASES ANY COMBINATION: CPT

## 2020-03-03 PROCEDURE — 82565 ASSAY OF CREATININE: CPT

## 2020-03-03 PROCEDURE — 85610 PROTHROMBIN TIME: CPT

## 2020-03-03 PROCEDURE — 94003 VENT MGMT INPAT SUBQ DAY: CPT

## 2020-03-03 PROCEDURE — 82805 BLOOD GASES W/O2 SATURATION: CPT

## 2020-03-03 PROCEDURE — 85730 THROMBOPLASTIN TIME PARTIAL: CPT

## 2020-03-03 PROCEDURE — 2500000003 HC RX 250 WO HCPCS: Performed by: NURSE PRACTITIONER

## 2020-03-03 PROCEDURE — 2580000003 HC RX 258: Performed by: THORACIC SURGERY (CARDIOTHORACIC VASCULAR SURGERY)

## 2020-03-03 PROCEDURE — 2700000000 HC OXYGEN THERAPY PER DAY

## 2020-03-03 PROCEDURE — 99232 SBSQ HOSP IP/OBS MODERATE 35: CPT | Performed by: HOSPITALIST

## 2020-03-03 PROCEDURE — 2500000003 HC RX 250 WO HCPCS: Performed by: THORACIC SURGERY (CARDIOTHORACIC VASCULAR SURGERY)

## 2020-03-03 PROCEDURE — 36415 COLL VENOUS BLD VENIPUNCTURE: CPT

## 2020-03-03 PROCEDURE — 6370000000 HC RX 637 (ALT 250 FOR IP): Performed by: NURSE PRACTITIONER

## 2020-03-03 PROCEDURE — 6370000000 HC RX 637 (ALT 250 FOR IP): Performed by: INTERNAL MEDICINE

## 2020-03-03 PROCEDURE — 94640 AIRWAY INHALATION TREATMENT: CPT

## 2020-03-03 PROCEDURE — 94770 HC ETCO2 MONITOR DAILY: CPT

## 2020-03-03 PROCEDURE — 2580000003 HC RX 258: Performed by: NURSE PRACTITIONER

## 2020-03-03 PROCEDURE — 84295 ASSAY OF SERUM SODIUM: CPT

## 2020-03-03 PROCEDURE — 37799 UNLISTED PX VASCULAR SURGERY: CPT

## 2020-03-03 PROCEDURE — 82330 ASSAY OF CALCIUM: CPT

## 2020-03-03 PROCEDURE — 99291 CRITICAL CARE FIRST HOUR: CPT | Performed by: INTERNAL MEDICINE

## 2020-03-03 PROCEDURE — 2500000003 HC RX 250 WO HCPCS

## 2020-03-03 PROCEDURE — 2100000001 HC CVICU R&B

## 2020-03-03 PROCEDURE — 82435 ASSAY OF BLOOD CHLORIDE: CPT

## 2020-03-03 PROCEDURE — 6360000002 HC RX W HCPCS: Performed by: NURSE PRACTITIONER

## 2020-03-03 PROCEDURE — 83050 HGB METHEMOGLOBIN QUAN: CPT

## 2020-03-03 RX ORDER — ALBUTEROL SULFATE 2.5 MG/3ML
2.5 SOLUTION RESPIRATORY (INHALATION)
Status: DISCONTINUED | OUTPATIENT
Start: 2020-03-03 | End: 2020-03-05

## 2020-03-03 RX ORDER — IPRATROPIUM BROMIDE AND ALBUTEROL SULFATE 2.5; .5 MG/3ML; MG/3ML
1 SOLUTION RESPIRATORY (INHALATION) EVERY 6 HOURS
Status: DISCONTINUED | OUTPATIENT
Start: 2020-03-03 | End: 2020-03-05

## 2020-03-03 RX ORDER — ROCURONIUM BROMIDE 10 MG/ML
40 INJECTION, SOLUTION INTRAVENOUS ONCE
Status: COMPLETED | OUTPATIENT
Start: 2020-03-03 | End: 2020-03-03

## 2020-03-03 RX ORDER — ETOMIDATE 2 MG/ML
20 INJECTION INTRAVENOUS ONCE
Status: COMPLETED | OUTPATIENT
Start: 2020-03-03 | End: 2020-03-03

## 2020-03-03 RX ADMIN — SODIUM CHLORIDE, PRESERVATIVE FREE 10 ML: 5 INJECTION INTRAVENOUS at 21:05

## 2020-03-03 RX ADMIN — FENTANYL CITRATE 25 MCG: 50 INJECTION, SOLUTION INTRAMUSCULAR; INTRAVENOUS at 19:03

## 2020-03-03 RX ADMIN — SENNOSIDES AND DOCUSATE SODIUM 1 TABLET: 8.6; 5 TABLET ORAL at 21:10

## 2020-03-03 RX ADMIN — SODIUM BICARBONATE 50 MEQ: 84 INJECTION, SOLUTION INTRAVENOUS at 09:40

## 2020-03-03 RX ADMIN — FENTANYL CITRATE 50 MCG: 50 INJECTION, SOLUTION INTRAMUSCULAR; INTRAVENOUS at 22:06

## 2020-03-03 RX ADMIN — IPRATROPIUM BROMIDE AND ALBUTEROL SULFATE 1 AMPULE: .5; 3 SOLUTION RESPIRATORY (INHALATION) at 20:10

## 2020-03-03 RX ADMIN — AMIODARONE HYDROCHLORIDE 200 MG: 200 TABLET ORAL at 21:10

## 2020-03-03 RX ADMIN — CLOPIDOGREL 75 MG: 75 TABLET, FILM COATED ORAL at 08:09

## 2020-03-03 RX ADMIN — MUPIROCIN: 20 OINTMENT TOPICAL at 21:10

## 2020-03-03 RX ADMIN — Medication 15 ML: at 22:08

## 2020-03-03 RX ADMIN — OXYCODONE HYDROCHLORIDE AND ACETAMINOPHEN 2 TABLET: 5; 325 TABLET ORAL at 14:40

## 2020-03-03 RX ADMIN — ETOMIDATE 20 MG: 2 INJECTION INTRAVENOUS at 10:07

## 2020-03-03 RX ADMIN — MUPIROCIN: 20 OINTMENT TOPICAL at 08:14

## 2020-03-03 RX ADMIN — MULTIPLE VITAMINS W/ MINERALS TAB 1 TABLET: TAB at 08:08

## 2020-03-03 RX ADMIN — Medication 50 MEQ: at 09:40

## 2020-03-03 RX ADMIN — DEXTROSE MONOHYDRATE 2 G: 50 INJECTION, SOLUTION INTRAVENOUS at 20:20

## 2020-03-03 RX ADMIN — DEXMEDETOMIDINE HYDROCHLORIDE 0.2 MCG/KG/HR: 100 INJECTION, SOLUTION INTRAVENOUS at 11:20

## 2020-03-03 RX ADMIN — ALLOPURINOL 300 MG: 300 TABLET ORAL at 08:09

## 2020-03-03 RX ADMIN — FAMOTIDINE 20 MG: 10 INJECTION INTRAVENOUS at 08:09

## 2020-03-03 RX ADMIN — IPRATROPIUM BROMIDE AND ALBUTEROL SULFATE 1 AMPULE: .5; 3 SOLUTION RESPIRATORY (INHALATION) at 15:53

## 2020-03-03 RX ADMIN — MILRINONE LACTATE 0.5 MCG/KG/MIN: 200 INJECTION, SOLUTION INTRAVENOUS at 18:45

## 2020-03-03 RX ADMIN — OXYCODONE HYDROCHLORIDE AND ACETAMINOPHEN 2 TABLET: 5; 325 TABLET ORAL at 07:51

## 2020-03-03 RX ADMIN — OXYCODONE HYDROCHLORIDE AND ACETAMINOPHEN 2 TABLET: 5; 325 TABLET ORAL at 18:41

## 2020-03-03 RX ADMIN — DEXTROSE MONOHYDRATE 2 G: 50 INJECTION, SOLUTION INTRAVENOUS at 04:01

## 2020-03-03 RX ADMIN — OXYCODONE HYDROCHLORIDE AND ACETAMINOPHEN 2 TABLET: 5; 325 TABLET ORAL at 23:10

## 2020-03-03 RX ADMIN — DESMOPRESSIN ACETATE 40 MG: 0.2 TABLET ORAL at 21:05

## 2020-03-03 RX ADMIN — SENNOSIDES AND DOCUSATE SODIUM 1 TABLET: 8.6; 5 TABLET ORAL at 08:09

## 2020-03-03 RX ADMIN — DEXMEDETOMIDINE HYDROCHLORIDE 0.6 MCG/KG/HR: 100 INJECTION, SOLUTION INTRAVENOUS at 21:35

## 2020-03-03 RX ADMIN — ROCURONIUM BROMIDE 40 MG: 10 INJECTION INTRAVENOUS at 10:07

## 2020-03-03 RX ADMIN — Medication 15 ML: at 08:14

## 2020-03-03 RX ADMIN — ASPIRIN 81 MG: 81 TABLET, COATED ORAL at 08:09

## 2020-03-03 RX ADMIN — FENTANYL CITRATE 25 MCG: 50 INJECTION, SOLUTION INTRAMUSCULAR; INTRAVENOUS at 08:18

## 2020-03-03 RX ADMIN — DEXTROSE MONOHYDRATE 2 G: 50 INJECTION, SOLUTION INTRAVENOUS at 11:42

## 2020-03-03 RX ADMIN — FOLIC ACID 1 MG: 1 TABLET ORAL at 08:09

## 2020-03-03 ASSESSMENT — PULMONARY FUNCTION TESTS
PIF_VALUE: 15
PIF_VALUE: 15
PIF_VALUE: 16
PIF_VALUE: 26
PIF_VALUE: 29
PIF_VALUE: 22
PIF_VALUE: 23

## 2020-03-03 NOTE — PROGRESS NOTES
Wound  · Current Nutrition Therapies:  · Oral Diet Orders: NPO   · Anthropometric Measures:  · Ht: 5' 3\" (160 cm)   · Current Body Wt: 153 lb (69.4 kg)  · Admission Body Wt: 169 lb (76.7 kg)  · % Weight Change:  ,  9.5% wt reduction x 2 wks   · Ideal Body Wt: 115 lb (52.2 kg), % Ideal Body 147% adm/ideal  · BMI Classification: BMI 30.0 - 34.9 Obese Class I(30.0) - using admit wt     Nutrition Interventions:   Continue NPO(Start diet vs nutrition support as able )  Continued Inpatient Monitoring, Education not appropriate at this time    Nutrition Evaluation:   · Evaluation: No progress toward goals   · Goals: Retstart diet within 24-72 hrs     · Monitoring: Nutrition Progression, I&O, Wound Healing, Skin Integrity, Weight, Pertinent Labs, Monitor Bowel Function      Electronically signed by Piedad Sapp RD, LD on 3/3/20 at 1:54 PM    Contact Number:[phone]

## 2020-03-03 NOTE — CONSULTS
4/41/100/22, her hemodynamic parameters from PA catheter showed cardiac output of 4.7 CO2 of 74 and cardiac index of 2.3. She has history of smoking for more than 40 years more than a pack a day although she was never diagnosed with COPD, she had a pulmonary function test done during this admission before CABG which shows FEV1 of 84% mild airway trapping and normal total lung capacity with moderate reduction diffusion capacity to 52%. Past Medical History:        Diagnosis Date    Acute CHF (congestive heart failure) ([facility] 2/21/2020    Anemia in chronic kidney disease     Anemia in stage 4 chronic kidney disease ([facility] 10/17/2014     Updating deleted diagnoses    Cerebral artery occlusion with cerebral infarction ([facility] 12/2016    LEFT SIDED LACUNAL INFARCTION    Chronic kidney disease     STAGE 4 / FOLLOWS WITH DR RODRIGUEZ    Gout, arthritis     Hyperlipidemia     Hypertension     Left sided lacunar infarction ([facility] 12/2016    Peripheral vascular disease (HCC)     Type II or unspecified type diabetes mellitus without mention of complication, not stated as uncontrolled        Past Surgical History:        Procedure Laterality Date    COLONOSCOPY      X2    CORONARY ARTERY BYPASS GRAFT N/A 3/2/2020    CABG CORONARY ARTERY BYPASS X3,STERNALOCK 360 STERNAL CLOSURE SYSTEM, PREVENA DRESSING APPLIED performed by Tran Francois MD at [address]  01/27/2017    [address] / DIAGNOSTIC / DR Ngoc Jhaveri   [facility] TONSILLECTOMY         Allergies:    No Known Allergies      Home Meds:   Prior to Admission medications    Medication Sig Start Date End Date Taking?  Authorizing Provider   clopidogrel (PLAVIX) 75 MG tablet Take 1 tablet by mouth daily 2/20/20   Calin Abraham PA-C   sodium bicarbonate 650 MG tablet TAKE 1 TABLET BY MOUTH TWICE DAILY 2/17/20   Dudley Valenzuela MD   folic acid (FOLVITE) 1 MG tablet Take 1 tablet by mouth 2 times daily 11/21/19   Calin Abraham PA-C (69.6 kg)   SpO2 100%   BMI 27.18 kg/m²     Physical Examination:   General appearance - acyanotic, in no respiratory distress and she is intubated sedated on ventilator  Mental status -she is intubated sedated and on ventilator not following commands  Eyes -pupils reactive and positive corneal reflex  Ears - right ear normal, left ear normal  Nose - normal and patent, no erythema, discharge or polyps  Mouth -oral intubated, endotracheal tube present  Neck - supple, no significant adenopathy  Chest - no tachypnea, retractions or cyanosis, she is comfortable while she is on ventilator, air entry is present bilaterally with bilateral prolonged expiration and expiratory wheezing and rhonchi  Heart - normal rate and regular rhythm, systolic murmur 2/6 at 2nd right intercostal space  Abdomen - soft, nontender, nondistended, no masses or organomegaly  Neurological -she is intubated on ventilator and sedated but did not follow commands, pupillary and corneal reflexes present  Extremities - peripheral pulses normal, no pedal edema, no clubbing or cyanosis  Skin - normal coloration and turgor, no rashes, no suspicious skin lesions noted     Medications:Current Inpatient    Scheduled Meds:   sodium chloride flush  10 mL Intravenous 2 times per day    ceFAZolin (ANCEF) IVPB  2 g Intravenous Q8H    amiodarone  200 mg Oral BID    chlorhexidine  15 mL Mouth/Throat BID    mupirocin   Nasal BID    therapeutic multivitamin-minerals  1 tablet Oral Daily with breakfast    aspirin  81 mg Oral Daily    sennosides-docusate sodium  1 tablet Oral BID    clopidogrel  75 mg Oral Daily    famotidine  20 mg Oral Daily    Or    famotidine (PEPCID) injection  20 mg Intravenous Daily    allopurinol  300 mg Oral Daily    atorvastatin  40 mg Oral Nightly    folic acid  1 mg Oral Daily    [START ON 3/4/2020] vancomycin (VANCOCIN) 1500mg in 250ml D5W IVPB  750 mg Intravenous Once per day on Mon Wed Fri    stiven Patricia intermittent dosing (placeholder)   Other RX Placeholder    [MAR Hold] carvedilol  37.5 mg Oral BID    [MAR Hold] insulin glargine  25 Units Subcutaneous Nightly    [MAR Hold] vitamin D3  400 Units Oral BID    [MAR Hold] bumetanide  2 mg Intravenous Daily     Continuous Infusions:   insulin 1.8 Units/hr (03/03/20 1205)    dextrose      phenylephrine (CLAUDETTE-SYNEPHRINE) 50mg/250mL infusion Stopped (03/03/20 0215)    milrinone 0.375 mcg/kg/min (03/03/20 1141)    norepinephrine Stopped (03/03/20 0353)    EPINEPHrine infusion 0.03 mcg/kg/min (03/03/20 1130)    nitroGLYCERIN      propofol Stopped (03/02/20 2215)    dexmedetomidine (PRECEDEX) IV infusion 0.2 mcg/kg/hr (03/03/20 1120)    [MAR Hold] heparin (porcine) Stopped (03/02/20 1051)     PRN Meds:sodium chloride flush, calcium chloride IVPB, magnesium sulfate, potassium chloride, oxyCODONE-acetaminophen **OR** oxyCODONE-acetaminophen, fentanNYL **OR** fentanNYL, diphenhydrAMINE, hydrALAZINE, albumin human, glucose, dextrose, glucagon (rDNA), dextrose, bisacodyl, bisacodyl, phenylephrine (CLAUDETTE-SYNEPHRINE) 50mg/250mL infusion, milrinone, norepinephrine, EPINEPHrine infusion, nitroGLYCERIN, acetaminophen **OR** acetaminophen, ondansetron, [MAR Hold] heparin (porcine), [MAR Hold] heparin (porcine), [MAR Hold] LORazepam, [MAR Hold] heparin (porcine), [MAR Hold] heparin (porcine), [MAR Hold] nicotine    LABS:-    CBC:   Recent Labs     03/02/20  0721 03/02/20  1735 03/03/20  0433   WBC 13.9* 24.2* 18.4*   HGB 8.9* 9.9* 9.1*    79* 89*     BMP:    Recent Labs     03/02/20  0721  03/02/20  1735 03/02/20  2048 03/03/20  0433 03/03/20  0930     --  134*  --  137  --    K 4.1  --  4.2 4.3 4.7  --    CL 99  --  102  --  104  --    CO2 17*  --  19*  --  17*  --    BUN 54*  --  22  --  26*  --    CREATININE 6.06*   < > 3.39*  --  3.97* 4.33*   GLUCOSE 115*  --  112*  --  107*  --     < > = values in this interval not displayed.      Hepatic: No results for input(s): AST, ALT, ALB, BILITOT, ALKPHOS in the last 72 hours. Amylase: No results found for: AMYLASE  Lipase: No results found for: LIPASE  CARDIAC ENZYMES:No results for input(s): CKTOTAL, CKMB, CKMBINDEX, TROPONINI in the last 72 hours. BNP: No results for input(s): BNP in the last 72 hours. Lipids: No results for input(s): CHOL, HDL in the last 72 hours. Invalid input(s): LDLCALCU  ABGs: No results found for: PHART, PO2ART, [id number]  INR:   Recent Labs     03/02/20  1735 03/03/20  0433   INR 1.2 1.1     Thyroid:   Lab Results   Component Value Date    TSH 4.66 12/19/2016     Urinalysis: No results for input(s): BACTERIA, BLOODU, CLARITYU, COLORU, PHUR, PROTEINU, RBCUA, SPECGRAV, BILIRUBINUR, NITRU, WBCUA, LEUKOCYTESUR, GLUCOSEU in the last 72 hours. Cultures:-  -----------------------------------------------------------------    ABG  03/03/2020  7.3 4/41/100/22    CXR  03/03/2020  Chest x-ray post intubation showed mild pulmonary venous congestion right IJ dialysis catheter [place] catheter endotracheal tube is about 4 cm above nora. Chest x-ray this morning shows pulmonary congestion and pulmonary edema. CT Scans  CT scan chest 02/25/2020  Chest Wall/Axilla/Supraclavicular fossa: No enlarged axillary or   supraclavicular lymph nodes.  Retroareolar abnormality in the right breast is   incompletely characterized on CT.  Correlation with prior mammogram shows   subtle retroareolar glandular tissue.  Consider correlation with diagnostic   mammogram.       Bones: No aggressive osseous lesion or acute osseous abnormality.  Multilevel   degenerative change.       Mediastinum: Cardiomegaly.  No pericardial effusion.  Three-vessel coronary   artery disease.  Prominence of the intima of the aorta implies underlying   anemia.  Calcifications of the proximal aorta.  A few calcified plaques of   the ascending aorta superiorly on the left and anteriorly.  Scattered   calcified plaques of the descending aorta.     Lungs/pleura: No consolidation, effusion, or pneumothorax.  Respiratory   motion.  Mild interstitial edema and vague ground-glass opacities diffusely.       Upper Abdomen: Left renal cyst.  Visualized upper abdominal viscera are   normal appearing.             ECHO:  2/21/2020  Left ventricle is normal in size. Global left ventricular systolic function is moderately reduced. Calculated EF via heart model is 36%. Mild left ventricular hypertrophy. Grade II (moderate) left ventricular diastolic dysfunction. Left atrium is mildly dilated. The aortic valve is calcified with reduced cusp mobility. Moderate aortic valve stenosis with a mean gradient of 20 mmHg. Maybe underestimated due to poor LV function. Trivial aortic insufficiency. Thickened mitral valve leaflets. Mitral annular calcification is seen. At least Moderate mitral regurgitation. Mild tricuspid regurgitation. Moderate pulmonary hypertension with an estimated right ventricular systolic pressure of 56 mmHg  Mild pulmonic insufficiency. Small pericardial effusion. IVC Increased diameter and impaired or no inspiratory variation indicating elevated RA filling pressure (i.e. CVP) .    Cardiac catheterization 02/24/2020  LMCA: Normal 0% stenosis.     LAD: Mid 99% in upper long branch (Reaching to apex) 80% in lower LAD branch. D1: proximal 90% stenosis     LCx: Mid 80% stenosis  OM1: Ostial 80% stenosis  OM2: Proximal 80% stenosis     RCA: 100% proximal stenosis  Collateral from the left        Peripheral Arteries and Lesion Findings  Descending aorta: Severe iliac disease in both sides  80% in the left side and 60-70% in the right side     The LV gram was performed in the BAUGH 30 position. LVEF: 35%.  LV Wall Motion: Severe basal anterior and basal inferior hypokinesis        · Hemodynamic Pressures     Site S/A (mmHg) D/V (mmHg) M/ED (mmHg)   Right Atrium 23 22 19   Right Ventricle 51 19 20   Pulmonary Artery 43 27 33   PCWP 26 26 25

## 2020-03-03 NOTE — PROGRESS NOTES
Physical Therapy  DATE: 3/3/2020  NAME: Jaiden Nixon  MRN: [id number]   : 1950    Discharge Recommendations: Continue to Assess (pending progress)     Subjective: RN agreeable to ROM  Pain: JEFF  Patient follows: NO Commands  Is patient on ventilator: Yes  Is patient on sedation:Yes  Precautions: General,    Therapeutic exercises:  PROM to BUE and BLEs x15 reps all planes. Bilateral gastrocnemius stretching3 reps x 20 seconds    Goals  Short Term Goals  Short term goal 1: prevent loss of strength/mobility/independence while pt is in the hospital  Short term goal 2: pt to ambulate 100' independently without device  Short term goal 3: stair ambulation x 1 flight with 1 HR independently          Plan: Progress functional mobility as medically appropriate.    Time In:1040  Time Out: 1100  Time Coded Minutes (treatment minutes):20  Rehab Potential: Good  Treatments/week: 5x/wk    Celesta Rajesh, PTA

## 2020-03-03 NOTE — PROGRESS NOTES
[facility]    Progress Note    3/3/2020    10:55 AM    Name:   Sivakumar Clancy  MRN:     [id number]     Acct:      [de-identified]   Room:   [address] [place] [address] Day:  15  Admit Date:  2/19/2020 11:01 PM    PCP:   [address] [place], [place] [address]APOLINAR  Code Status:  Full Code    Subjective:     C/C:   Chief Complaint   Patient presents with    Shortness of Breath     Patient Active Problem List   Diagnosis    Gout    Hypovitaminosis D    [facility]emia in stage 4 chronic [facility] disease [facility]    Type 2 diabetes mellitus with diabetic chronic kidney disease ([facility] [address] [facility]    Chronic kidney disease (CKD)    Essential hypertension    Hypercholesteremia    Hypokalemia    Acute infarct posterior limb internal capsule on the left    Left sided lacunar infarction ([facility] [address]    Type 2 diabetes mellitus with stage 4 chronic kidney disease, with long-term current use of insulin ([facility] [address] [facility]    Cerebral aneurysm    Anemia due to stage 4 chronic kidney disease treated with erythropoietin ([facility] [address] [facility]    Tobacco abuse    Secondary hyperparathyroidism of renal origin ([facility] [address] [facility]    Persistent proteinuria    Metabolic acidosis    HERNÁN (acute kidney injury) ([facility] [address]    NSTEMI (non-ST elevated myocardial infarction) ([facility] [address] [facility]    Acute CHF (congestive heart failure) (HCC)    Acute on chronic combined systolic and diastolic CHF (congestive heart failure) (HCC)     Interval History Status: not changed. Patient was seen and examined at bedside. Patient extubated but then re-intubated this morning due to hypoxia. Status post CABG on 3/2/2020. Currently vital signs are stable on ventilator. Brief History:     Per my partner:  \"77 y/o presented to ED with c/o SOB which started last evening, which was initially on exertion but then at rest as well. Denies asso chest pain. EMS noted hypoxia sats in 80s. ED w/u: CXR pulm edema, HStrops >400, cr 3.97. EKG with inferolateral ST depressions.    Known prior h/o CKD4 SPECIAL NOT REPORTED 03/02/2020 11:14 AM     Lab Results   Component Value Date/Time    CULTURE CULTURE IN PROGRESS 03/02/2020 11:14 AM       Radiology:  Ct Chest Wo Contrast    Result Date: 2/25/2020  Scattered calcified and noncalcified plaques about the aorta, but few plaques along the ascending aorta. Three-vessel coronary artery disease. No evidence of mediastinal mass. Abnormal CT appearance of the right breast.  Consider correlation with diagnostic mammogram.     Xr Chest Portable    Result Date: 3/3/2020  Unchanged chest radiograph. Xr Chest Portable    Result Date: 3/2/2020  Interval sternotomy and new life support apparatus. Mild edema or CHF unchanged. Ir Tunneled Cvc Place Wo Sq Port/pump > 5 Years    Result Date: 2/27/2020  Successful placement of 14.5 [place] by 23 cm tip to cuff palindrome dialysis catheter and right IJ. Okay to use dialysis catheter. Physical Examination:        General appearance: Intubated and sedated. Mental Status: Intubated and sedated  Lungs: Coarse breath sound bilateral.  Bilateral chest tube.   Heart:  regular rate and rhythm, no murmur  Abdomen:  soft, nondistended, normal bowel sounds, no masses, hepatomegaly, splenomegaly  Extremities:  no edema, redness, tenderness in the calves  Skin:  no gross lesions, rashes, induration    Assessment:        Hospital Problems           Last Modified POA    * (Principal) Acute on chronic combined systolic and diastolic CHF (congestive heart failure) ([address] 2/27/2020 Yes    Anemia in stage 4 chronic kidney disease ([address] 2/21/2020 Yes    Overview Signed 7/29/2017  8:09 PM by Maureen Walker [facility]     Updating deleted diagnoses         Type 2 diabetes mellitus with diabetic chronic kidney disease ([address] 2/21/2020 Yes    Essential hypertension 2/21/2020 Yes    Hypercholesteremia 2/21/2020 Yes    HERNÁN (acute kidney injury) ([address] 2/20/2020 Yes    NSTEMI (non-ST elevated myocardial infarction) ([address] 2/21/2020 Yes          Plan: 1. Acute CHF- monitor I/O's, HD.  Symptoms improved. 2. ESRD on HD -continue dialysis as per nephrology. 3. Multivessel CAD-patient had CABG today. Continue management per cardiology and CT surgery. 4. DM2- BS stable, Lantus, SSI  5. Nstemi- Heparin gtt, ASA, statin.  Patient is status post CABG. 6. Anxiety- Ativan prn, patient having trouble coping with her new diagnosis, she is agreeable to a Pastoral care consult  7. Gi proph  8. Disposition -patient is status post CABG. When patient recovers from the surgery patient will be discharged to rehab.     Danielle Zheng DO  3/3/2020  10:55 AM

## 2020-03-03 NOTE — PROGRESS NOTES
Call from [facility] NP to check on patient status. Orders from [facility] NP to be called if UO is less that 25 ml an hour. Updated that UO is adequate so far at 50ml a hour. Will continue to monitor.

## 2020-03-03 NOTE — PROGRESS NOTES
Diminished BS bilaterally at the bases. CT's in place to [facility]. No air leak noted   Abdomen: soft, non tender, non distended, Hypoactive BS x 4   Extremities: Trace edema noted bilateral LE. EVH sites: CDI         Assessment & Plan:     Ms. Uziel Reid is a 78y.o. year-old female status post CABG x 3 on 3/2/20 POD# 1. Newly diagnosed CKD Stage V - HD initiated pre-op on admission. Currently intubated on mild sedation following commands loosely. V-Paced at [de-identified] - underlying SR in the 45s. No issues or events noted with the patient overnight. 1. Continue current care and meds  2. Follow-up labs, CXR and ABG  3. DVT prophylaxis with SCD's  4. GI Prophylaxis  5. Cardiac/Diabetic/Renal diet  6. OOB to chair - Ambulation with PT 3x daily when extubated and appropriate   7. Incentive Spirometry / Pulmonary hygiene post extubation   8. Continue clement for strict I&O's  9. Continue CT's to LWS  10. Pain management   11. Wean off Primacor gtt. Currently at 0.1. Monitor CO and CI. Maintain CO >4.0 and CI > 2.0  12. Wean to extubate this morning   13. Nephrology following. HD as per their discretion   14. BMP ordered q6 hrs to monitor K levels. 4.7 this a.m.   15. Monitor V-pacer - check underlying HR     The above recommendations including medications and orders were discussed and agreed upon with Dr. Felix Shankar, the attending on service for the cardiothoracic surgery group today.        Barbara Rivas MBA, PA-C

## 2020-03-03 NOTE — PROGRESS NOTES
Mary Briones 21    Progress Note    3/2/2020    7:03 PM    Name:   Cristopher Balbuena  MRN:     [id number]     Acct:      [de-identified]   Room:   [address] [place] [address] Day:  6  Admit Date:  2/19/2020 11:01 PM    PCP:   Néstor Jarvis PA-C  Code Status:  Full Code    Subjective:     C/C:   Chief Complaint   Patient presents with    Shortness of Breath     Patient Active Problem List   Diagnosis    Gout    Hypovitaminosis D    Anemia in stage 4 chronic kidney disease [facility]    Type 2 diabetes mellitus with diabetic chronic kidney disease ([facility]    Chronic kidney disease (CKD)    Essential hypertension    Hypercholesteremia    Hypokalemia    Acute infarct posterior limb internal capsule on the left    Left sided lacunar infarction ([facility]    Type 2 diabetes mellitus with stage 4 chronic kidney disease, with long-term current use of insulin ([facility]    Cerebral aneurysm    Anemia due to stage 4 chronic kidney disease treated with erythropoietin ([facility]    Tobacco abuse    Secondary hyperparathyroidism of renal origin ([facility]    Persistent proteinuria    Metabolic acidosis    HERNÁN (acute kidney injury) ([facility]    NSTEMI (non-ST elevated myocardial infarction) ([facility]    Acute CHF (congestive heart failure) ([facility]    Acute on chronic combined systolic and diastolic CHF (congestive heart failure) (HCC)     Interval History Status: Status post CABG today. .     Patient was seen and examined at bedside. Patient is status post CABG and was intubated the time of my exam.  Patient was with dialysis today. Patient is hemodynamically stable. Brief History:     Per my partner:  \"77 y/o presented to ED with c/o SOB which started last evening, which was initially on exertion but then at rest as well. Denies asso chest pain. EMS noted hypoxia sats in 80s. ED w/u: CXR pulm edema, HStrops >400, cr 3.97. EKG with inferolateral ST depressions.    Known prior h/o CKD4 baseline Cr 3.2-3.5, left sided CVA , HPL, gout, DM2, AOCD, COPD.   prior ECHO 12/2016: EF >55%, mild pulm HTN  U/a negative for UTI.   Had ernie cath placed 02/21 and HD started 02/22. Cath planned for 02/24. \"     2/24/2020- Cardiac cath     Findings:  LMCA: Normal 0% stenosis.     LAD: Mid 99% in upper long branch (Reaching to apex) 80% in lower LAD branch. D1: proximal 90% stenosis     LCx: Mid 80% stenosis  OM1: Ostial 80% stenosis  OM2: Proximal 80% stenosis     RCA: 100% proximal stenosis  Collateral from the left        Peripheral Arteries and Lesion Findings  Descending aorta: Severe iliac disease in both sides  80% in the left side and 60-70% in the right side     The LV gram was performed in the BAUGH 30 position. LVEF: 35%. LV Wall Motion: Severe basal anterior and basal inferior hypokinesis    Review of Systems:     Unable to get review of systems due to intubation and sedation. Patient had CABG on 3/2/2020. Medications:      Allergies:  No Known Allergies    Current Meds:   Scheduled Meds:    [MAR Hold] sodium chloride flush  10 mL Intravenous 2 times per day    sodium chloride flush  10 mL Intravenous 2 times per day    ceFAZolin (ANCEF) IVPB  2 g Intravenous Q8H    amiodarone  200 mg Oral BID    chlorhexidine  15 mL Mouth/Throat BID    mupirocin   Nasal BID    [START ON 3/3/2020] therapeutic multivitamin-minerals  1 tablet Oral Daily with breakfast    aspirin  81 mg Oral Daily    sennosides-docusate sodium  1 tablet Oral BID    clopidogrel  75 mg Oral Daily    albumin human        [MAR Hold] carvedilol  37.5 mg Oral BID    [MAR Hold] ceFAZolin  1 g Intravenous Once    [facility] Hold] sodium chloride flush  10 mL Intravenous 2 times per day    [MAR Hold] allopurinol  300 mg Oral Daily    [MAR Hold] folic acid  1 mg Oral BID    [MAR Hold] insulin glargine  25 Units Subcutaneous Nightly    [MAR Hold] vitamin D3  400 Units Oral BID    [MAR Hold] sodium chloride flush  10 mL past medical history of Acute CHF (congestive heart failure) ([facility] Anemia in chronic kidney disease, Anemia in stage 4 chronic kidney disease ([facility] Cerebral artery occlusion with cerebral infarction ([facility] Chronic kidney disease, Gout, arthritis, Hyperlipidemia, Hypertension, Left sided lacunar infarction [facility]), Peripheral vascular disease ([facility] and Type II or unspecified type diabetes mellitus without mention of complication, not stated as uncontrolled. Social History:   reports that she has been smoking cigarettes. She started smoking about 46 years ago. She has a 40.00 pack-year smoking history. She uses smokeless tobacco. She reports that she does not drink alcohol or use drugs. Family History:   Family History   Problem Relation Age of Onset    Diabetes Mother     Heart Disease Father        Vitals:  /62   Pulse 87   Temp 97.8 °F (36.6 °C)   Resp 16   Ht 5' 3\" (1.6 m)   Wt 153 lb 7 oz (69.6 kg)   SpO2 100%   BMI 27.18 kg/m²   Temp (24hrs), Av.9 °F (36.1 °C), Min:90.1 °F (32.3 °C), Max:100 °F (37.8 °C)    Recent Labs     20  1352 20  1517 20  1612 20  1724   POCGLU 159* 107* 106* 112*       I/O (24Hr):     Intake/Output Summary (Last 24 hours) at 3/2/2020 1903  Last data filed at 3/2/2020 1637  Gross per 24 hour   Intake 3060 ml   Output 1510 ml   Net 1550 ml       Labs:  Hematology:  Recent Labs     20  0634 20  0721 20  1735   WBC 13.7* 13.9* 24.2*   RBC 2.79* 2.78* 3.24*   HGB 9.0* 8.9* 9.9*   HCT 27.1* 27.0* 29.5*   MCV 97.1 97.1 91.0   MCH 32.3 32.0 30.6   MCHC 33.2 33.0 33.6   RDW 13.0 12.9 14.1    197 79*   MPV 12.3 12.0 11.8   INR  --   --  1.2     Chemistry:  Recent Labs     20  0325  20  0634 20  0721 20  1724 20  1735   *   < > 137 135  --  134*   K 3.8   < > 4.0 4.1  --  4.2   CL 96*   < > 98 99  --  102   CO2 22   < > 18* 17*  --  19*   GLUCOSE 115*   < > 111* 115*  --  112*   BUN 22   < > 52* 54*  --  22   CREATININE 3.44*   < > 5.92* 6.06* 3.38* 3.39*   MG  --   --   --   --   --  2.9*   ANIONGAP 16   < > 21* 19*  --  13   LABGLOM 13*   < > 7* 7* 14* 13*   GFRAA 16*   < > 9* 8*  --  16*   CALCIUM 9.5   < > 9.7 9.7  --  8.6   CKTOTAL 44  --   --   --   --   --     < > = values in this interval not displayed. Recent Labs     02/29/20  0325  03/02/20  0650 03/02/20  1157 03/02/20  1352 03/02/20  1517 03/02/20  1612 03/02/20  1724   PROT 7.1  --   --   --   --   --   --   --    LABALBU 4.0  --   --   --   --   --   --   --    AST 12  --   --   --   --   --   --   --    ALT 8  --   --   --   --   --   --   --    ALKPHOS 77  --   --   --   --   --   --   --    BILITOT 0.40  --   --   --   --   --   --   --    BILIDIR 0.12  --   --   --   --   --   --   --    POCGLU  --    < > 118* 114* 159* 107* 106* 112*    < > = values in this interval not displayed. ABG:  Lab Results   Component Value Date    POCPH 7.396 03/02/2020    POCPCO2 34.5 03/02/2020    POCPO2 74.5 03/02/2020    POCHCO3 21.2 03/02/2020    NBEA 3 03/02/2020    PBEA NOT REPORTED 03/02/2020    QQW9XAN 22 03/02/2020    UKOY2DYO 95 03/02/2020    FIO2 60.0 03/02/2020     Lab Results   Component Value Date/Time    SPECIAL NOT REPORTED 12/19/2016 10:11 PM     Lab Results   Component Value Date/Time    CULTURE NO SIGNIFICANT GROWTH 12/19/2016 10:11 PM    CULTURE  12/19/2016 10:11 PM     [employer] [address] [facility], [address] [place] [address] [phone]       Radiology:  Ct Chest Wo Contrast    Result Date: 2/25/2020  Scattered calcified and noncalcified plaques about the aorta, but few plaques along the ascending aorta. Three-vessel coronary artery disease. No evidence of mediastinal mass. Abnormal CT appearance of the right breast.  Consider correlation with diagnostic mammogram.     Xr Chest Portable    Result Date: 3/2/2020  Interval sternotomy and new life support apparatus. Mild edema or CHF unchanged.      Ir Tunneled Cvc Place Wo Sq

## 2020-03-03 NOTE — PROGRESS NOTES
This note also relates to the following rows which could not be included:  Pressure Support - Cannot attach notes to unvalidated device data       03/03/20 0606   Vent Information   Vent Mode CPAP   Placed patient on CPAP with PS of 10, Patients VT = low 200's.  Pt did not tolerate wean at this time placed back of SIMV/PS

## 2020-03-03 NOTE — PROGRESS NOTES
vancomycin 750 mg IV with dialysis treatments. Timing of trough level will be determined based on culture results, renal function, and clinical response. Thank you for the consult. Will continue to follow.      Rosalba Antoine D.  3/2/2020  8:58 PM

## 2020-03-03 NOTE — PROGRESS NOTES
03/03/20 1039   Vent Information   Vent Mode SIMV/PRVC   Vt Ordered 520 mL   Rate Set 14 bmp   Pressure Support 6 cmH20   FiO2  70 %   Sensitivity 5   PEEP/CPAP 5   I Time/ I Time % 0.9 s   Vent Patient Data   Peak Inspiratory Pressure 26 cmH2O   Mean Airway Pressure 9.3 cmH20

## 2020-03-03 NOTE — CARE COORDINATION
Transitional planning. Pt was trying to wean off ventilator and now needs re-intubated. Follow needs.

## 2020-03-03 NOTE — PLAN OF CARE
Problem: Infection:  Goal: Will remain free from infection  Description  Will remain free from infection  3/3/2020 0226 by Luna Eden RN  Outcome: Ongoing  3/2/2020 1738 by Paramjit Barkley RCP  Outcome: Ongoing     Problem: Safety:  Goal: Free from accidental physical injury  Description  Free from accidental physical injury  3/3/2020 0226 by Luna Eden RN  Outcome: Ongoing  3/2/2020 1738 by Paramjit Barkley RCP  Outcome: Ongoing  Goal: Free from intentional harm  Description  Free from intentional harm  3/3/2020 0226 by Luna Eden RN  Outcome: Ongoing  3/2/2020 1738 by Paramjit Barkley RCP  Outcome: Ongoing     Problem: Daily Care:  Goal: Daily care needs are met  Description  Daily care needs are met  3/3/2020 0226 by Luna Eden RN  Outcome: Ongoing  3/2/2020 1738 by Paramjit Barkley RCP  Outcome: Ongoing     Problem: Pain:  Goal: Patient's pain/discomfort is manageable  Description  Patient's pain/discomfort is manageable  3/3/2020 0226 by Luna Eden RN  Outcome: Ongoing  3/2/2020 1738 by Paramjit Barkley RCP  Outcome: Ongoing  Goal: Pain level will decrease  Description  Pain level will decrease  3/3/2020 0226 by Luna Eden RN  Outcome: Ongoing  3/2/2020 1738 by Paramjit Barkley RCP  Outcome: Ongoing  Goal: Control of acute pain  Description  Control of acute pain  3/3/2020 0226 by Luna Eden RN  Outcome: Ongoing  3/2/2020 1738 by Paramjit Barkley RCP  Outcome: Ongoing  Goal: Control of chronic pain  Description  Control of chronic pain  3/3/2020 0226 by Luna Eden RN  Outcome: Ongoing  3/2/2020 1738 by Paramjit Barkley RCP  Outcome: Ongoing     Problem: Skin Integrity:  Goal: Skin integrity will stabilize  Description  Skin integrity will stabilize  3/3/2020 0226 by Luna Eden RN  Outcome: Ongoing  3/2/2020 1738 by Paramjit Barkley RCP  Outcome: Ongoing     Problem: Discharge Planning:  Goal: Patients continuum of care needs are met  Description  Patients continuum of care needs are met  3/3/2020 0226 by Janet York RN  Outcome: Ongoing  3/2/2020 1738 by Bienvenido Antonio RCP  Outcome: Ongoing     Problem: Nutrition  Goal: Optimal nutrition therapy  3/3/2020 0226 by Janet York RN  Outcome: Ongoing  3/2/2020 1738 by Bienvenido Antonio RCP  Outcome: Ongoing

## 2020-03-03 NOTE — PROGRESS NOTES
Renal Progress Note    Patient :  Jawsant Solitario; 78 y.o. MRN# [id number]  Location:  1011/1011-01  Attending:  Sanjay Jo DO  Admit Date:  2/19/2020   Hospital Day: 12      Subjective:     Patient is seen and examined. Currently on mechanical intubation. She is off pressor now. Still has chest tubes in place. She had hemodialysis done yesterday; got about 1.2 L off. Outpatient Medications:     Medications Prior to Admission: sodium bicarbonate 650 MG tablet, TAKE 1 TABLET BY MOUTH TWICE DAILY  folic acid (FOLVITE) 1 MG tablet, Take 1 tablet by mouth 2 times daily  labetalol (NORMODYNE) 300 MG tablet, TAKE 1 TABLET BY MOUTH TWICE DAILY  allopurinol (ZYLOPRIM) 300 MG tablet, Take 1 tablet by mouth daily  Insulin Pen Needle 31G X 4 MM MISC, 4 times daily  insulin glargine (LANTUS SOLOSTAR) 100 UNIT/ML injection pen, 25 units in skin every night  magnesium hydroxide (MILK OF MAGNESIA) 400 MG/5ML suspension, Take 30 mLs by mouth daily as needed for Constipation  glucose monitoring kit (FREESTYLE) monitoring kit, 1  Alcohol Swabs (ALCOHOL PREP) 70 % PADS, 4 time daily  FREESTYLE LANCETS MISC, 1 each by Does not apply route daily  Glucose Blood (BLOOD GLUCOSE TEST STRIPS) STRP, Test 4 times daily Diagnosis  Lancet Device MISC, 1 Device by Does not apply route once for 1 dose  b complex vitamins capsule, Take 1 capsule by mouth daily  vitamin D (ERGOCALCIFEROL) 400 UNITS CAPS, Take 1 capsule by mouth daily.  (Patient taking differently: Take 400 Units by mouth 2 times daily )    Current Medications:     Scheduled Meds:    ipratropium-albuterol  1 ampule Inhalation Q6H    sodium chloride flush  10 mL Intravenous 2 times per day    ceFAZolin (ANCEF) IVPB  2 g Intravenous Q8H    amiodarone  200 mg Oral BID    chlorhexidine  15 mL Mouth/Throat BID    mupirocin   Nasal BID    therapeutic multivitamin-minerals  1 tablet Oral Daily with breakfast    aspirin  81 mg Oral Daily    sennosides-docusate sodium  1 tablet 96 hrs (Last 3 readings):   Weight   20 0950 153 lb 7 oz (69.6 kg)   20 0740 156 lb 1.4 oz (70.8 kg)   20 0600 153 lb 10.6 oz (69.7 kg)       Vital Signs:   Temperature:  Temp: 97.8 °F (36.6 °C)  TMax:   Temp (24hrs), Av °F (36.1 °C), Min:92.1 °F (33.4 °C), Max:100 °F (37.8 °C)    Respirations:  Resp: 14  Pulse:   Pulse: 83  BP:    BP: 119/62  BP Range: No data recorded. No data recorded. Physical Examination:     General:  On mechanical intubation, Sedated  HEENT: ET tube positive  Eyes:   Pupils equal, round and reactive to light. Neck:   Supple  Chest:   Bilateral vesicular breath sounds, no rales or wheezes. Cardiac:  S1 S2 RR, no murmurs, gallops or rubs. Abdomen: Soft, non-tender, no masses or organomegaly, BS audible. :   No suprapubic or flank tenderness. Neuro: On mechanical intubation sedated  SKIN:  No rashes, good skin turgor. Extremities:  +ve edema. Labs:       Recent Labs     20  0720  0433   WBC 13.9* 24.2* 18.4*   RBC 2.78* 3.24* 2.93*   HGB 8.9* 9.9* 9.1*   HCT 27.0* 29.5* 27.1*   MCV 97.1 91.0 92.5   MCH 32.0 30.6 31.1   MCHC 33.0 33.6 33.6   RDW 12.9 14.1 15.3*    79* 89*   MPV 12.0 11.8 12.4      BMP:   Recent Labs     20  07208 20  0433 20  0930     --  134*  --  137  --    K 4.1  --  4.2 4.3 4.7  --    CL 99  --  102  --  104  --    CO2 17*  --  19*  --  17*  --    BUN 54*  --  22  --  26*  --    CREATININE 6.06*   < > 3.39*  --  3.97* 4.33*   GLUCOSE 115*  --  112*  --  107*  --    CALCIUM 9.7  --  8.6  --  9.0  --     < > = values in this interval not displayed. Magnesium:    Recent Labs     20  1735 20  0433   MG 2.9* 2.5     TOBIAS:      Lab Results   Component Value Date    TOBIAS NEGATIVE 2013     SPEP:  Lab Results   Component Value Date    PROT 7.1 2020    PATH ELECTRONICALLY SIGNED.  Ric Smith M.D. 2013 was updated. Nutrition   Please ensure that patient is on a renal diet/TF. Avoid nephrotoxic drugs/contrast exposure. We will continue to follow along with you. Denys Hampton MD  [facility]     This note is created with the assistance of a speech-recognition program. While intending to generate a document that actually reflects the content of the visit, no guarantees can be provided that every mistake has been identified and corrected by editing.

## 2020-03-03 NOTE — PROGRESS NOTES
Units/hr (03/03/20 0615)    dextrose      phenylephrine (CLAUDETTE-SYNEPHRINE) 50mg/250mL infusion Stopped (03/03/20 0215)    milrinone 0.375 mcg/kg/min (03/02/20 1824)    norepinephrine Stopped (03/03/20 0353)    EPINEPHrine infusion Stopped (03/02/20 1738)    nitroGLYCERIN      propofol Stopped (03/02/20 2215)    dexmedetomidine (PRECEDEX) IV infusion      [MAR Hold] heparin (porcine) Stopped (03/02/20 1051)     CBC:   Recent Labs     03/02/20 0721 03/02/20 1735 03/03/20 0433   WBC 13.9* 24.2* 18.4*   HGB 8.9* 9.9* 9.1*    79* 89*     BMP:    Recent Labs     03/02/20 0721 03/02/20 1724 03/02/20 1735 03/02/20 2048 03/03/20 0433     --  134*  --  137   K 4.1  --  4.2 4.3 4.7   CL 99  --  102  --  104   CO2 17*  --  19*  --  17*   BUN 54*  --  22  --  26*   CREATININE 6.06* 3.38* 3.39*  --  3.97*   GLUCOSE 115*  --  112*  --  107*     Hepatic:   No results for input(s): AST, ALT, ALB, BILITOT, ALKPHOS in the last 72 hours. Troponin: No results for input(s): TROPONINI in the last 72 hours. No results for input(s): TROPONINT in the last 72 hours. BNP:   No results for input(s): PROBNP in the last 72 hours. No results for input(s): BNP in the last 72 hours. Lipids: No results for input(s): CHOL, HDL in the last 72 hours. Invalid input(s): LDLCALCU  INR:   Recent Labs     03/02/20 1735 03/03/20 0433   INR 1.2 1.1       Objective:   Vitals: /62   Pulse 88   Temp 97.8 °F (36.6 °C)   Resp 14   Ht 5' 3\" (1.6 m)   Wt 153 lb 7 oz (69.6 kg)   SpO2 99%   BMI 27.18 kg/m²    General appearance: awake, alert, in no apparent distress. HEENT: Head: Normocephalic, atraumatic without any obvious abnormalities. Neck: JVD absent   Lungs:good bilateral equal air entry. No adventitious lung sounds auscultated. Heart: S1, S2, regular, + systolic murmur. Abdomen: soft, nontender with bowel sounds present in all 4 quadrants.   Extremities: b/l femoral access site - no signs of hematoma Integumentum:Intact with no rashes noted.       Diagnostic Studies:     EKG:   Sinus rhythm with possible old septal infarct and inferolateral ST depressions     ECHO: 2/21/2020  Left ventricle is normal in size. Global left ventricular systolic function is moderately reduced. Calculated EF via heart model is 36%. Mild left ventricular hypertrophy. Grade II (moderate) left ventricular diastolic dysfunction. Left atrium is mildly dilated. The aortic valve is calcified with reduced cusp mobility. Moderate aortic valve stenosis with a mean gradient of 20 mmHg. Maybe underestimated due to poor LV function. Trivial aortic insufficiency. Thickened mitral valve leaflets. Mitral annular calcification is seen. At least Moderate mitral regurgitation. Mild tricuspid regurgitation. Moderate pulmonary hypertension with an estimated right ventricular systolic pressure of 56 mmHg  Mild pulmonic insufficiency. Small pericardial effusion. IVC Increased diameter and impaired or no inspiratory variation indicating elevated RA filling pressure (i.e. CVP) . Cath 02/24/2020  LMCA: Normal 0% stenosis.     LAD: Mid 99% in upper long branch (Reaching to apex) 80% in lower LAD branch. D1: proximal 90% stenosis     LCx: Mid 80% stenosis  OM1: Ostial 80% stenosis  OM2: Proximal 80% stenosis     RCA: 100% proximal stenosis  Collateral from the left        Peripheral Arteries and Lesion Findings  Descending aorta: Severe iliac disease in both sides  80% in the left side and 60-70% in the right side     The LV gram was performed in the BAUGH 30 position. LVEF: 35%.  LV Wall Motion: Severe basal anterior and basal inferior hypokinesis        · Hemodynamic Pressures                Site S/A (mmHg) D/V (mmHg) M/ED (mmHg)   Right Atrium 23 22 19   Right Ventricle 51 19 20   Pulmonary Artery 43 27 33   PCWP 26 26 25            · Oxygen Saturations     Site Oxygen Saturation (%)   Pulmonary Artery Main 54.5   Left Femoral Artery  89.5 rhythm.

## 2020-03-03 NOTE — PROGRESS NOTES
Occupational Therapy    Occupational Therapy Not Seen Note    DATE: 3/3/2020  Name: Gab Flores  : 1950  MRN: [id number]    Patient not available for Occupational Therapy due to: Other: Pt had CABG X3 3/2/20 Needs OT Re-eval when pt medically appropriate.     Next Scheduled Treatment: 3/3/20    Electronically signed by MITCHEL See on 3/3/2020 at 9:30 AM

## 2020-03-03 NOTE — BRIEF OP NOTE
Brief Postoperative Note  ______________________________________________________________    Patient: Amberly Castorena  YOB: 1950  MRN: [id number]  Date of Procedure: 3/2/2020    Pre-Op Diagnosis: CAD    Post-Op Diagnosis: Same       Procedure(s):  CABG CORONARY ARTERY BYPASS X3,STERNALOCK 360 STERNAL CLOSURE SYSTEM, [address] [employer] DRESSING APPLIED    Anesthesia: General    Surgeon(s):  Jeromy Drake MD    Assistant: Jeanne Rivera CSA    Estimated Blood Loss (mL): N/A    Complications: None    Specimens:   ID Type Source Tests Collected by Time Destination   1 :  Urine Urine, indwelling catheter CULTURE, URINE Jeromy Drake MD 3/2/2020 1114        Implants:  Implant Name Type Inv. Item Serial No.  Lot No. LRB No. Used   SYSTEM STERNALOCK 360 PLATES W/BANDS Screw/Plate/Nail/Zackery SYSTEM STERNALOCK 360 PLATES W/BANDS  [employer]  N/A 1   SCREW STERNO-LK CANC 2.4X10MM Screw/Plate/Nail/Zackery SCREW STERNO-LK CANC 2.4X10MM  [employer]  N/A 12   SCREW STERNAL LOCK 2.4 X 12MM Screw/Plate/Nail/Zackery SCREW STERNAL LOCK 2.4 X 12MM  [employer]  N/A 4         Drains:   Chest Tube 1 Anterior Mediastinal 24 [place] (Active)   Suction -20 cm H2O 3/3/2020  4:00 AM   Chest Tube Airleak No 3/3/2020  4:00 AM   Drainage Description Sanguinous 3/3/2020  4:00 AM   Dressing Status Clean;Dry; Intact; Changed 3/3/2020  4:00 AM   Dressing Type Dry dressing 3/3/2020  4:00 AM   Dressing Change Due 03/04/20 3/3/2020  4:00 AM   Site Assessment Clean;Dry; Intact 3/3/2020  4:00 AM   Surrounding Skin Dry; Intact 3/3/2020  4:00 AM   Patency Intervention Stripped 3/3/2020  4:00 AM   Output (ml) 10 ml 3/3/2020  6:09 AM       Chest Tube 2 Anterior Pericardial 24 [employer] (Active)   Suction -20 cm H2O 3/3/2020  4:00 AM   Chest Tube Airleak No 3/3/2020  4:00 AM   Drainage Description Sanguinous 3/3/2020  4:00 AM   Dressing Status Clean;Dry; Intact; Changed 3/3/2020  4:00 AM   Dressing Type Dry dressing 3/3/2020  4:00 AM   Dressing Change Due 03/04/20 3/3/2020  4:00 AM   Site Assessment Clean;Dry; Intact 3/3/2020  4:00 AM   Surrounding Skin Dry; Intact 3/3/2020  4:00 AM   Patency Intervention Stripped 3/3/2020  4:00 AM   Output (ml) 10 ml 3/3/2020  6:09 AM       Negative Pressure Wound Therapy Sternum Mid (Active)   Wound Type Surgical 3/3/2020  4:00 AM   Unit Type 1 3/2/2020  5:09 PM   Dressing Type Other (Comment) 3/3/2020  4:00 AM   Cycle Continuous 3/3/2020  4:00 AM   Target Pressure (mmHg) 125 3/3/2020  4:00 AM   Dressing Status Clean;Dry; Intact 3/3/2020  4:00 AM   Dressing Change Due 03/09/20 3/3/2020  4:00 AM   Wound Assessment Other (Comment) 3/3/2020  4:00 AM   Mariel-wound Assessment Other (Comment) 3/3/2020  4:00 AM       NG/OG/NJ/NE Tube Orogastric Center mouth (Active)   Surrounding Skin Dry; Intact 3/3/2020  4:00 AM   Securement device Yes 3/3/2020  4:00 AM   Status Suction-low intermittent 3/3/2020  4:00 AM   Placement Verified by External Catheter Length 3/3/2020  4:00 AM   NG/OG/NJ/NE External Measurement (cm) 59 cm 3/3/2020  4:00 AM   Drainage Appearance Clear; White 3/3/2020  4:00 AM   Free Water Flush (mL) 60 mL 3/2/2020  6:00 PM   Residual Volume (ml) 100 ml 3/3/2020  4:00 AM       Urethral Catheter Temperature probe 16 fr (Active)   Catheter Indications Perioperative use in selected surgeries including but not limited to urologic, pelvic or need for intraoperative monitoring of urinary output due to prolonged surgery, large volume infusion or need for diuretic therapy in surgery 3/3/2020  4:00 AM   Site Assessment No urethral drainage 3/3/2020  4:00 AM   Urine Color Straw 3/3/2020  4:00 AM   Urine Appearance Clear 3/3/2020  4:00 AM   Output (mL) 50 mL 3/3/2020  6:09 AM       Findings: good quality RSVG and good quality skelatinized LIMA, CABG X 3 w/ SLIMA to LAD, RSVG1 to D1 and RSVG2 to large OM. D1 is small and calcified and was difficult to sew.   However There were palpable pulses with no EKG or ECHo evidence of any ischemia at the conclusion

## 2020-03-04 ENCOUNTER — APPOINTMENT (OUTPATIENT)
Dept: GENERAL RADIOLOGY | Age: 70
DRG: 233 | End: 2020-03-04
Payer: MEDICARE

## 2020-03-04 LAB
ALLEN TEST: ABNORMAL
ALLEN TEST: ABNORMAL
ANION GAP SERPL CALCULATED.3IONS-SCNC: 15 MMOL/L (ref 9–17)
ANION GAP SERPL CALCULATED.3IONS-SCNC: 16 MMOL/L (ref 9–17)
ANION GAP SERPL CALCULATED.3IONS-SCNC: 17 MMOL/L (ref 9–17)
ANION GAP SERPL CALCULATED.3IONS-SCNC: 18 MMOL/L (ref 9–17)
BUN BLDV-MCNC: 29 MG/DL (ref 8–23)
BUN BLDV-MCNC: 32 MG/DL (ref 8–23)
BUN BLDV-MCNC: 35 MG/DL (ref 8–23)
BUN BLDV-MCNC: 35 MG/DL (ref 8–23)
BUN/CREAT BLD: ABNORMAL (ref 9–20)
CALCIUM IONIZED: 1.26 MMOL/L (ref 1.13–1.33)
CALCIUM IONIZED: 1.27 MMOL/L (ref 1.13–1.33)
CALCIUM SERPL-MCNC: 9.1 MG/DL (ref 8.6–10.4)
CALCIUM SERPL-MCNC: 9.3 MG/DL (ref 8.6–10.4)
CALCIUM SERPL-MCNC: 9.4 MG/DL (ref 8.6–10.4)
CALCIUM SERPL-MCNC: 9.4 MG/DL (ref 8.6–10.4)
CHLORIDE BLD-SCNC: 103 MMOL/L (ref 98–107)
CHLORIDE BLD-SCNC: 104 MMOL/L (ref 98–107)
CHLORIDE BLD-SCNC: 104 MMOL/L (ref 98–107)
CHLORIDE BLD-SCNC: 105 MMOL/L (ref 98–107)
CO2: 17 MMOL/L (ref 20–31)
CO2: 17 MMOL/L (ref 20–31)
CO2: 18 MMOL/L (ref 20–31)
CO2: 20 MMOL/L (ref 20–31)
CREAT SERPL-MCNC: 4.07 MG/DL (ref 0.5–0.9)
CREAT SERPL-MCNC: 4.68 MG/DL (ref 0.5–0.9)
CREAT SERPL-MCNC: 4.71 MG/DL (ref 0.5–0.9)
CREAT SERPL-MCNC: 5.21 MG/DL (ref 0.5–0.9)
FIO2: 55
FIO2: 60
GFR AFRICAN AMERICAN: 10 ML/MIN
GFR AFRICAN AMERICAN: 11 ML/MIN
GFR AFRICAN AMERICAN: 11 ML/MIN
GFR AFRICAN AMERICAN: 13 ML/MIN
GFR NON-AFRICAN AMERICAN: 11 ML/MIN
GFR NON-AFRICAN AMERICAN: 8 ML/MIN
GFR NON-AFRICAN AMERICAN: 9 ML/MIN
GFR NON-AFRICAN AMERICAN: 9 ML/MIN
GFR SERPL CREATININE-BSD FRML MDRD: ABNORMAL ML/MIN/{1.73_M2}
GLUCOSE BLD-MCNC: 124 MG/DL (ref 65–105)
GLUCOSE BLD-MCNC: 127 MG/DL (ref 70–99)
GLUCOSE BLD-MCNC: 132 MG/DL (ref 70–99)
GLUCOSE BLD-MCNC: 134 MG/DL (ref 65–105)
GLUCOSE BLD-MCNC: 134 MG/DL (ref 74–100)
GLUCOSE BLD-MCNC: 139 MG/DL (ref 70–99)
GLUCOSE BLD-MCNC: 142 MG/DL (ref 65–105)
GLUCOSE BLD-MCNC: 147 MG/DL (ref 70–99)
GLUCOSE BLD-MCNC: 154 MG/DL (ref 65–105)
HCT VFR BLD CALC: 28 % (ref 36.3–47.1)
HEMOGLOBIN: 9.1 G/DL (ref 11.9–15.1)
INR BLD: 1
MAGNESIUM: 2.2 MG/DL (ref 1.6–2.6)
MAGNESIUM: 2.4 MG/DL (ref 1.6–2.6)
MAGNESIUM: 2.5 MG/DL (ref 1.6–2.6)
MCH RBC QN AUTO: 30.5 PG (ref 25.2–33.5)
MCHC RBC AUTO-ENTMCNC: 32.5 G/DL (ref 28.4–34.8)
MCV RBC AUTO: 94 FL (ref 82.6–102.9)
MODE: ABNORMAL
MODE: ABNORMAL
NEGATIVE BASE EXCESS, ART: 6 (ref 0–2)
NEGATIVE BASE EXCESS, ART: 6 (ref 0–2)
NRBC AUTOMATED: 0 PER 100 WBC
O2 DEVICE/FLOW/%: ABNORMAL
O2 DEVICE/FLOW/%: ABNORMAL
PARTIAL THROMBOPLASTIN TIME: 26.4 SEC (ref 20.5–30.5)
PATIENT TEMP: 36.3
PATIENT TEMP: ABNORMAL
PDW BLD-RTO: 14.9 % (ref 11.8–14.4)
PLATELET # BLD: ABNORMAL K/UL (ref 138–453)
PLATELET, FLUORESCENCE: 91 K/UL (ref 138–453)
PLATELET, IMMATURE FRACTION: 11.4 % (ref 1.1–10.3)
PMV BLD AUTO: ABNORMAL FL (ref 8.1–13.5)
POC HCO3: 19.4 MMOL/L (ref 21–28)
POC HCO3: 20.2 MMOL/L (ref 21–28)
POC LACTIC ACID: 0.88 MMOL/L (ref 0.56–1.39)
POC O2 SATURATION: 96 % (ref 94–98)
POC O2 SATURATION: 97 % (ref 94–98)
POC PCO2 TEMP: ABNORMAL MM HG
POC PCO2 TEMP: ABNORMAL MM HG
POC PCO2: 35.7 MM HG (ref 35–48)
POC PCO2: 41.4 MM HG (ref 35–48)
POC PH TEMP: ABNORMAL
POC PH TEMP: ABNORMAL
POC PH: 7.3 (ref 7.35–7.45)
POC PH: 7.34 (ref 7.35–7.45)
POC PO2 TEMP: ABNORMAL MM HG
POC PO2 TEMP: ABNORMAL MM HG
POC PO2: 89 MM HG (ref 83–108)
POC PO2: 92.7 MM HG (ref 83–108)
POSITIVE BASE EXCESS, ART: ABNORMAL (ref 0–3)
POSITIVE BASE EXCESS, ART: ABNORMAL (ref 0–3)
POTASSIUM SERPL-SCNC: 4.1 MMOL/L (ref 3.7–5.3)
POTASSIUM SERPL-SCNC: 4.4 MMOL/L (ref 3.7–5.3)
POTASSIUM SERPL-SCNC: 4.6 MMOL/L (ref 3.7–5.3)
POTASSIUM SERPL-SCNC: 4.6 MMOL/L (ref 3.7–5.3)
PROTHROMBIN TIME: 10.5 SEC (ref 9–12)
RBC # BLD: 2.98 M/UL (ref 3.95–5.11)
SAMPLE SITE: ABNORMAL
SAMPLE SITE: ABNORMAL
SODIUM BLD-SCNC: 136 MMOL/L (ref 135–144)
SODIUM BLD-SCNC: 138 MMOL/L (ref 135–144)
SODIUM BLD-SCNC: 140 MMOL/L (ref 135–144)
SODIUM BLD-SCNC: 140 MMOL/L (ref 135–144)
TCO2 (CALC), ART: 21 MMOL/L (ref 22–29)
TCO2 (CALC), ART: 22 MMOL/L (ref 22–29)
WBC # BLD: 18.2 K/UL (ref 3.5–11.3)

## 2020-03-04 PROCEDURE — 2580000003 HC RX 258: Performed by: INTERNAL MEDICINE

## 2020-03-04 PROCEDURE — 83735 ASSAY OF MAGNESIUM: CPT

## 2020-03-04 PROCEDURE — 90945 DIALYSIS ONE EVALUATION: CPT

## 2020-03-04 PROCEDURE — 2580000003 HC RX 258: Performed by: NURSE PRACTITIONER

## 2020-03-04 PROCEDURE — 99232 SBSQ HOSP IP/OBS MODERATE 35: CPT | Performed by: INTERNAL MEDICINE

## 2020-03-04 PROCEDURE — P9045 ALBUMIN (HUMAN), 5%, 250 ML: HCPCS | Performed by: NURSE PRACTITIONER

## 2020-03-04 PROCEDURE — 6360000002 HC RX W HCPCS: Performed by: NURSE PRACTITIONER

## 2020-03-04 PROCEDURE — 6370000000 HC RX 637 (ALT 250 FOR IP): Performed by: NURSE PRACTITIONER

## 2020-03-04 PROCEDURE — 6360000002 HC RX W HCPCS: Performed by: INTERNAL MEDICINE

## 2020-03-04 PROCEDURE — 85027 COMPLETE CBC AUTOMATED: CPT

## 2020-03-04 PROCEDURE — 93308 TTE F-UP OR LMTD: CPT

## 2020-03-04 PROCEDURE — 85730 THROMBOPLASTIN TIME PARTIAL: CPT

## 2020-03-04 PROCEDURE — 37799 UNLISTED PX VASCULAR SURGERY: CPT

## 2020-03-04 PROCEDURE — 82330 ASSAY OF CALCIUM: CPT

## 2020-03-04 PROCEDURE — 80048 BASIC METABOLIC PNL TOTAL CA: CPT

## 2020-03-04 PROCEDURE — 94003 VENT MGMT INPAT SUBQ DAY: CPT

## 2020-03-04 PROCEDURE — 94640 AIRWAY INHALATION TREATMENT: CPT

## 2020-03-04 PROCEDURE — 6370000000 HC RX 637 (ALT 250 FOR IP): Performed by: INTERNAL MEDICINE

## 2020-03-04 PROCEDURE — 94761 N-INVAS EAR/PLS OXIMETRY MLT: CPT

## 2020-03-04 PROCEDURE — 6360000002 HC RX W HCPCS: Performed by: THORACIC SURGERY (CARDIOTHORACIC VASCULAR SURGERY)

## 2020-03-04 PROCEDURE — 85055 RETICULATED PLATELET ASSAY: CPT

## 2020-03-04 PROCEDURE — 2500000003 HC RX 250 WO HCPCS: Performed by: THORACIC SURGERY (CARDIOTHORACIC VASCULAR SURGERY)

## 2020-03-04 PROCEDURE — 2700000000 HC OXYGEN THERAPY PER DAY

## 2020-03-04 PROCEDURE — 85610 PROTHROMBIN TIME: CPT

## 2020-03-04 PROCEDURE — 99291 CRITICAL CARE FIRST HOUR: CPT | Performed by: INTERNAL MEDICINE

## 2020-03-04 PROCEDURE — 94770 HC ETCO2 MONITOR DAILY: CPT

## 2020-03-04 PROCEDURE — 2580000003 HC RX 258: Performed by: THORACIC SURGERY (CARDIOTHORACIC VASCULAR SURGERY)

## 2020-03-04 PROCEDURE — 82803 BLOOD GASES ANY COMBINATION: CPT

## 2020-03-04 PROCEDURE — 82947 ASSAY GLUCOSE BLOOD QUANT: CPT

## 2020-03-04 PROCEDURE — 83605 ASSAY OF LACTIC ACID: CPT

## 2020-03-04 PROCEDURE — 2100000001 HC CVICU R&B

## 2020-03-04 PROCEDURE — 71045 X-RAY EXAM CHEST 1 VIEW: CPT

## 2020-03-04 RX ORDER — VANCOMYCIN HYDROCHLORIDE 1 G/200ML
1000 INJECTION, SOLUTION INTRAVENOUS EVERY 24 HOURS
Status: DISCONTINUED | OUTPATIENT
Start: 2020-03-04 | End: 2020-03-06

## 2020-03-04 RX ORDER — SODIUM CHLORIDE 9 MG/ML
INJECTION, SOLUTION INTRAVENOUS CONTINUOUS
Status: DISCONTINUED | OUTPATIENT
Start: 2020-03-04 | End: 2020-03-10

## 2020-03-04 RX ORDER — HEPARIN SODIUM 5000 [USP'U]/ML
5000 INJECTION, SOLUTION INTRAVENOUS; SUBCUTANEOUS EVERY 8 HOURS SCHEDULED
Status: DISCONTINUED | OUTPATIENT
Start: 2020-03-04 | End: 2020-03-07

## 2020-03-04 RX ADMIN — ASPIRIN 81 MG: 81 TABLET, COATED ORAL at 08:56

## 2020-03-04 RX ADMIN — OXYCODONE HYDROCHLORIDE AND ACETAMINOPHEN 2 TABLET: 5; 325 TABLET ORAL at 04:31

## 2020-03-04 RX ADMIN — HEPARIN SODIUM 5000 UNITS: 5000 INJECTION INTRAVENOUS; SUBCUTANEOUS at 22:05

## 2020-03-04 RX ADMIN — OXYCODONE HYDROCHLORIDE AND ACETAMINOPHEN 2 TABLET: 5; 325 TABLET ORAL at 21:45

## 2020-03-04 RX ADMIN — IPRATROPIUM BROMIDE AND ALBUTEROL SULFATE 1 AMPULE: .5; 3 SOLUTION RESPIRATORY (INHALATION) at 14:25

## 2020-03-04 RX ADMIN — FENTANYL CITRATE 50 MCG: 50 INJECTION, SOLUTION INTRAMUSCULAR; INTRAVENOUS at 19:40

## 2020-03-04 RX ADMIN — Medication 1500 ML/HR: at 21:38

## 2020-03-04 RX ADMIN — Medication 1500 ML/HR: at 11:02

## 2020-03-04 RX ADMIN — HEPARIN SODIUM 5000 UNITS: 5000 INJECTION INTRAVENOUS; SUBCUTANEOUS at 16:37

## 2020-03-04 RX ADMIN — FENTANYL CITRATE 50 MCG: 50 INJECTION, SOLUTION INTRAMUSCULAR; INTRAVENOUS at 16:05

## 2020-03-04 RX ADMIN — Medication 1500 ML/HR: at 11:00

## 2020-03-04 RX ADMIN — Medication 1 MG/HR: at 08:01

## 2020-03-04 RX ADMIN — IPRATROPIUM BROMIDE AND ALBUTEROL SULFATE 1 AMPULE: .5; 3 SOLUTION RESPIRATORY (INHALATION) at 19:31

## 2020-03-04 RX ADMIN — CLOPIDOGREL 75 MG: 75 TABLET, FILM COATED ORAL at 08:56

## 2020-03-04 RX ADMIN — SODIUM CHLORIDE: 9 INJECTION, SOLUTION INTRAVENOUS at 11:02

## 2020-03-04 RX ADMIN — DEXMEDETOMIDINE HYDROCHLORIDE 0.7 MCG/KG/HR: 100 INJECTION, SOLUTION INTRAVENOUS at 02:56

## 2020-03-04 RX ADMIN — HYDRALAZINE HYDROCHLORIDE 5 MG: 20 INJECTION INTRAMUSCULAR; INTRAVENOUS at 22:05

## 2020-03-04 RX ADMIN — MUPIROCIN: 20 OINTMENT TOPICAL at 21:46

## 2020-03-04 RX ADMIN — SENNOSIDES AND DOCUSATE SODIUM 1 TABLET: 8.6; 5 TABLET ORAL at 21:46

## 2020-03-04 RX ADMIN — INSULIN LISPRO 1 UNITS: 100 INJECTION, SOLUTION INTRAVENOUS; SUBCUTANEOUS at 11:44

## 2020-03-04 RX ADMIN — Medication 15 ML: at 07:57

## 2020-03-04 RX ADMIN — SENNOSIDES AND DOCUSATE SODIUM 1 TABLET: 8.6; 5 TABLET ORAL at 08:56

## 2020-03-04 RX ADMIN — Medication 1500 ML/HR: at 21:34

## 2020-03-04 RX ADMIN — MUPIROCIN: 20 OINTMENT TOPICAL at 08:55

## 2020-03-04 RX ADMIN — OXYCODONE HYDROCHLORIDE AND ACETAMINOPHEN 2 TABLET: 5; 325 TABLET ORAL at 08:56

## 2020-03-04 RX ADMIN — IPRATROPIUM BROMIDE AND ALBUTEROL SULFATE 1 AMPULE: .5; 3 SOLUTION RESPIRATORY (INHALATION) at 07:20

## 2020-03-04 RX ADMIN — VANCOMYCIN HYDROCHLORIDE 1000 MG: 1 INJECTION, SOLUTION INTRAVENOUS at 20:27

## 2020-03-04 RX ADMIN — Medication 1500 ML/HR: at 11:01

## 2020-03-04 RX ADMIN — OXYCODONE HYDROCHLORIDE AND ACETAMINOPHEN 2 TABLET: 5; 325 TABLET ORAL at 17:37

## 2020-03-04 RX ADMIN — DEXTROSE MONOHYDRATE 2 G: 50 INJECTION, SOLUTION INTRAVENOUS at 03:50

## 2020-03-04 RX ADMIN — FENTANYL CITRATE 25 MCG: 50 INJECTION, SOLUTION INTRAMUSCULAR; INTRAVENOUS at 07:52

## 2020-03-04 RX ADMIN — DESMOPRESSIN ACETATE 40 MG: 0.2 TABLET ORAL at 21:46

## 2020-03-04 RX ADMIN — AMIODARONE HYDROCHLORIDE 200 MG: 200 TABLET ORAL at 21:45

## 2020-03-04 RX ADMIN — MULTIPLE VITAMINS W/ MINERALS TAB 1 TABLET: TAB at 08:56

## 2020-03-04 RX ADMIN — ALBUMIN (HUMAN) 12.5 G: 12.5 INJECTION, SOLUTION INTRAVENOUS at 10:30

## 2020-03-04 RX ADMIN — FENTANYL CITRATE 25 MCG: 50 INJECTION, SOLUTION INTRAMUSCULAR; INTRAVENOUS at 12:26

## 2020-03-04 RX ADMIN — FOLIC ACID 1 MG: 1 TABLET ORAL at 08:56

## 2020-03-04 RX ADMIN — Medication 1500 ML/HR: at 21:35

## 2020-03-04 RX ADMIN — FENTANYL CITRATE 50 MCG: 50 INJECTION, SOLUTION INTRAMUSCULAR; INTRAVENOUS at 17:25

## 2020-03-04 RX ADMIN — IPRATROPIUM BROMIDE AND ALBUTEROL SULFATE 1 AMPULE: .5; 3 SOLUTION RESPIRATORY (INHALATION) at 03:28

## 2020-03-04 RX ADMIN — FAMOTIDINE 20 MG: 20 TABLET, FILM COATED ORAL at 08:56

## 2020-03-04 RX ADMIN — Medication 15 ML: at 21:46

## 2020-03-04 RX ADMIN — HYDRALAZINE HYDROCHLORIDE 5 MG: 20 INJECTION INTRAMUSCULAR; INTRAVENOUS at 19:30

## 2020-03-04 RX ADMIN — MILRINONE LACTATE 0.5 MCG/KG/MIN: 200 INJECTION, SOLUTION INTRAVENOUS at 02:15

## 2020-03-04 RX ADMIN — ALLOPURINOL 300 MG: 300 TABLET ORAL at 08:56

## 2020-03-04 RX ADMIN — OXYCODONE HYDROCHLORIDE AND ACETAMINOPHEN 2 TABLET: 5; 325 TABLET ORAL at 13:04

## 2020-03-04 ASSESSMENT — PULMONARY FUNCTION TESTS
PIF_VALUE: 20
PIF_VALUE: 24
PIF_VALUE: 22
PIF_VALUE: 20
PIF_VALUE: 23
PIF_VALUE: 22

## 2020-03-04 NOTE — PROGRESS NOTES
Dr Rachid Landa on unit and Dr Marcia Sewell updated via telephone on pt ABG post CPAP for 1.5 hours. Pt able to lift head and follow command. Order received to extubate, administer 1 amp Na Bicarb, and repeat ABG in 1 hour. RN will update physician as needed.

## 2020-03-04 NOTE — PROGRESS NOTES
nitroGLYCERIN, acetaminophen **OR** acetaminophen, ondansetron, [MAR Hold] heparin (porcine), [MAR Hold] heparin (porcine), [MAR Hold] LORazepam, [MAR Hold] heparin (porcine), [MAR Hold] heparin (porcine), [MAR Hold] nicotine    Input/Output:       I/O last 3 completed shifts: In:  [I.V.:]  Out: 1095 [Urine:665; Emesis/NG output:350; Chest Tube:80]. Patient Vitals for the past 96 hrs (Last 3 readings):   Weight   20 0325 177 lb 4 oz (80.4 kg)   20 0950 153 lb 7 oz (69.6 kg)   20 0740 156 lb 1.4 oz (70.8 kg)       Vital Signs:   Temperature:  Temp: 97.7 °F (36.5 °C)  TMax:   Temp (24hrs), Av.6 °F (36.4 °C), Min:97.5 °F (36.4 °C), Max:97.7 °F (36.5 °C)    Respirations:  Resp: 20  Pulse:   Pulse: 83  BP:    BP: 119/62  BP Range: No data recorded. No data recorded. Physical Examination:     General:  On mechanical intubation, Sedated  HEENT: ET tube positive  Eyes:   + conj pallor. Neck:   No bruits   Chest:   Bilateral vesicular breath sounds, + crackles   Cardiac:  S1 S2 RR, no murmurs, gallops or rubs. Abdomen: Soft, non-tender, no masses or organomegaly, . :   No suprapubic or flank tenderness. Neuro: On mechanical intubation sedated  SKIN:  No rashes, good skin turgor. Extremities:  +ve edema.     Labs:       Recent Labs     20  1735 20  0433 20  0538   WBC 24.2* 18.4* 18.2*   RBC 3.24* 2.93* 2.98*   HGB 9.9* 9.1* 9.1*   HCT 29.5* 27.1* 28.0*   MCV 91.0 92.5 94.0   MCH 30.6 31.1 30.5   MCHC 33.6 33.6 32.5   RDW 14.1 15.3* 14.9*   PLT 79* 89* See Reflexed IPF Result   MPV 11.8 12.4 NOT REPORTED      BMP:   Recent Labs     20  1851 20  2341 20  0538    136 140   K 4.3 4.6 4.6    104 105   CO2 17* 17* 17*   BUN 30* 32* 35*   CREATININE 4.71* 4.71* 5.21*   GLUCOSE 103* 127* 139*   CALCIUM 9.5 9.1 9.3      Magnesium:    Recent Labs     20  1735 20  0433 20  0538   MG 2.9* 2.5 2.5     TOBIAS:      Lab gradually start removing fluid. 2.  Pressors as needed. 3.  Continue to monitor strict I's and O's renal function. 4.  Chest tube removal as per CT surgery. 5.  Consider cardiac echo  6. Will follow. Patient's nurse was updated. Nutrition   Please ensure that patient is on a renal diet/TF. Avoid nephrotoxic drugs/contrast exposure. We will continue to follow along with you. Leoncio Harvey MD  [facility]     This note is created with the assistance of a speech-recognition program. While intending to generate a document that actually reflects the content of the visit, no guarantees can be provided that every mistake has been identified and corrected by editing.

## 2020-03-04 NOTE — PROGRESS NOTES
Dr Tali Her and CT surgery team at pt bedside to review case and determine plan of care for today. Plan to CPAP and extubate if able, wean milrinone as tolerated, will keep CT, clement, and PA cath at this time.

## 2020-03-04 NOTE — PROGRESS NOTES
Dr Kalyan Nesbitt with nephrology on unit. RN notified physician that Dr Blanca Boyce would like to have pt placed on CVVHD vs traditional hemodialysis today. Dr Kalyan Nesbitt states he will review the pt case and discuss with Dr Blanca Boyce.

## 2020-03-04 NOTE — PROGRESS NOTES
[facility]  Daily Progress Note    Surgeon:  Dr. Dequan Ontiveros      Subjective:  Ms. Bianca Sagastume is a 78y.o. year-old female status post CABG x 3 on 3/2/20 POD# 2. Patient was seen and examined at bedside this morning. Currently intubated and sedated on Precedex and Versed gtts. Vital Signs: /62   Pulse 83   Temp 97.7 °F (36.5 °C) (Axillary)   Resp 20   Ht 5' 3\" (1.6 m)   Wt 177 lb 4 oz (80.4 kg)   SpO2 97%   BMI 31.40 kg/m²  O2 Flow Rate (L/min): 2 L/min   Admit Weight: Weight: 170 lb (77.1 kg)   WEIGHTWeight: 177 lb 4 oz (80.4 kg)     I/O's:  I/O last 3 completed shifts: In: 1891 [I.V.:1891]  Out: 1095 [Urine:665; Emesis/NG output:350; Chest Tube:80]    Data:    CBC:   Recent Labs     03/02/20 1735 03/03/20 0433 03/04/20  0538   WBC 24.2* 18.4* 18.2*   HGB 9.9* 9.1* 9.1*   HCT 29.5* 27.1* 28.0*   MCV 91.0 92.5 94.0   PLT 79* 89* See Reflexed IPF Result     BMP:   Recent Labs     03/03/20  1851 03/03/20  2341 03/04/20  0538    136 140   K 4.3 4.6 4.6    104 105   CO2 17* 17* 17*   BUN 30* 32* 35*   CREATININE 4.71* 4.71* 5.21*     PT/INR:   Recent Labs     03/02/20 1735 03/03/20  0433 03/04/20  0538   PROTIME 12.3* 11.3 10.5   INR 1.2 1.1 1.0     APTT:   Recent Labs     03/02/20 1735 03/03/20  0433 03/04/20  0538   APTT 30.4 37.9* 26.4       Chest X-Ray:  EXAMINATION:   ONE XRAY VIEW OF THE CHEST       3/4/2020 5:47 am       COMPARISON:   03/03/2020       HISTORY:   ORDERING SYSTEM PROVIDED HISTORY:   TECHNOLOGIST PROVIDED HISTORY:   Reason for Exam:->Post op open heart surgery   Reason for Exam: vent care    upright port       FINDINGS:   Endotracheal tube, enteric tube, [place]oo catheter and   mediastinal/left-sided chest tubes remain in grossly unchanged positioning.    Right jugular venous dual-lumen catheter terminates within the right atrium   near the IVC/right atrial junction, also in unchanged positioning.  Cardiac   silhouette is borderline enlarged  EPINEPHrine infusion 0.015 mcg/kg/min (03/04/20 0817)    nitroGLYCERIN      dexmedetomidine (PRECEDEX) IV infusion 0.3 mcg/kg/hr (03/04/20 0815)    [MAR Hold] heparin (porcine) Stopped (03/02/20 1051)           Physical Exam:     General: Intubated and sedated   Heart: Normal S1, S2, RRR, + systolic murmur   Sternum: Prevena in place   Lungs: Diminished BS bilaterally at the bases with scattered rhonchi. CT's in place to [facility]. No air leak noted   Abdomen: soft, non tender, non distended, Hypoactive BS x 4   Extremities: + edema noted bilateral LE. EVH sites: CDI         Assessment & Plan:     Ms. Florencia Burks is a 78y.o. year-old female status post CABG x 3 on 3/2/20 POD# 2. Newly diagnosed CKD Stage V - HD initiated pre-op on admission. V-Paced at 80 since the OR. Underlying rhythm is 40's with PACs. Post-op course complicated by hypoxemia and severe agitation s/p extubation yesterday which required re-intubation within 30 minutes post extubation. Currently intubated on Precedex and Versed gtts. No issues or events noted with the patient overnight.     1. Continue current care and meds  2. Follow-up labs, CXR and ABG  3. DVT prophylaxis with SCD's  4. GI Prophylaxis  5. Continue clement for strict I&O's  6. Continue CT's to L[facility]  7. Pain management   8. Decrease Primacor gtt to 0.3 mcg/kg/min. Currently on 0.5 mcg/kg/min. Monitor CO and CI. Maintain CO >4.0 and CI > 2.0. Follow mixed venous blood gases if swan numbers do not seem accurate. 9. Pulmonary following. Cont management as suggested. Possible Bronchoscopy today. Will speak with Dr. Elvira Roberts   10. Nephrology following. Would like to begin CVVHD today for fluid removal   11. Cont Versed and Precedex gtts for mild sedation   12. Cardiac/Diabetic/Renal diet when extubated   13. OOB to chair - Ambulation with PT 3x daily when extubated and appropriate   14.  Incentive Spirometry / Pulmonary hygiene post extubation           The above recommendations including medications and orders were discussed and agreed upon with Dr. Anabel Iniguez, the attending on service for the cardiothoracic surgery group today.        Marisa Mills MBA, PA-C

## 2020-03-04 NOTE — PROGRESS NOTES
Pharmacy Vancomycin Consult     Vancomycin Day: 3  Current Dosing: Vancomycin 1500 mg once 12:27 3/2/2020 post-HD    Temp max:  97.5    Recent Labs     03/03/20  2341 03/04/20  0538   BUN 32* 35*       Recent Labs     03/03/20  2341 03/04/20  0538   CREATININE 4.71* 5.21*       Recent Labs     03/03/20  0433 03/04/20  0538   WBC 18.4* 18.2*         Intake/Output Summary (Last 24 hours) at 3/4/2020 1232  Last data filed at 3/4/2020 1100  Gross per 24 hour   Intake 1891 ml   Output 920 ml   Net 971 ml       Culture Date      Source                       Results  3/2/20                  Urine                          No Growth    Ht Readings from Last 1 Encounters:   02/27/20 5' 3\" (1.6 m)        Wt Readings from Last 1 Encounters:   03/04/20 177 lb 4 oz (80.4 kg)         Body mass index is 31.4 kg/m². Estimated Creatinine Clearance: 10 mL/min (A) (based on SCr of 5.21 mg/dL [facility])). Assessment/Plan:  Patient changed from HD to CRRT. Initiating vancomycin 1000 mg IV q24h while patient is on CRRT. Pharmacy will continue to follow while on vancomycin as requested.   Thank you,  Elis Pelayo, PharmD, BCPS  3/4/2020  12:44 PM

## 2020-03-04 NOTE — PROGRESS NOTES
RIANA Christian with CT surgery at pt bedside for an update on pt status. Pt CO/CI decreased since last updated, SVR 1600s, BP remains stable, milrinone infusion increased to 0.5mcg/kg/min at this time. Verbal order received to obtain a mixed venous reading on PA catheter to verify readings are accurate. RN will continue to monitor pt status and update PA as needed.

## 2020-03-04 NOTE — PROGRESS NOTES
Dr Sheri Collins, RT, and 3 RNs at pt bedside for intubation. 10:07-20mg etomidate administered  10:08-40mg rocuronium administered  10:10- pt BP decreased r/t intubation induction, epinephrine infusion restarted at this time. 10:11-pt intubated per Dr Sheri Collins x 1 attempt with a 7.5mm ETT to 23cm at [facility]. Bilateral breath sounds heard, positive color change on etco2 sensor. Pt spo2 reading 90s. Will obtain an ABG in 1 hour to assess ventilator settings/Fio2 requirements. Verbal order received from Dr Sheri Collins to obtain chest xray to verify tube placement.

## 2020-03-04 NOTE — PROGRESS NOTES
RIANA Keen updated on pt status and recent results via telephone by RN. No new orders received at this time, will continue to monitor.

## 2020-03-04 NOTE — PLAN OF CARE
Problem: OXYGENATION/RESPIRATORY FUNCTION  Goal: Patient will maintain patent airway  Outcome: Ongoing     Problem: OXYGENATION/RESPIRATORY FUNCTION  Goal: Patient will achieve/maintain normal respiratory rate/effort  Description  Respiratory rate and effort will be within normal limits for the patient  Outcome: Ongoing     Problem: MECHANICAL VENTILATION  Goal: Patient will maintain patent airway  Outcome: Ongoing     Problem: MECHANICAL VENTILATION  Goal: Oral health is maintained or improved  Outcome: Ongoing     Problem: MECHANICAL VENTILATION  Goal: ET tube will be managed safely  Outcome: Ongoing     Problem: MECHANICAL VENTILATION  Goal: Mobility/activity is maintained at optimum level for patient  Outcome: Ongoing     Problem: SKIN INTEGRITY  Goal: Skin integrity is maintained or improved  Outcome: Ongoing

## 2020-03-04 NOTE — PROGRESS NOTES
Dr Moon Jeffery with pulmonary at pt bedside to review case and determine plan of care. Ventilator settings changed per physician with Laura Livingston, RT at bedside. FiO2 increased to 60%, and duoneb treatments ordered. Will repeat ABG in couple hours to assess pt response to ventilator changes. RN will update physician if needed.

## 2020-03-04 NOTE — PLAN OF CARE
Problem: OXYGENATION/RESPIRATORY FUNCTION  Goal: Patient will maintain patent airway  3/4/2020 0735 by Sally Montgomery RCP  Outcome: Ongoing  3/3/2020 2308 by Lindsey Hoffman RCP  Outcome: Ongoing     Problem: OXYGENATION/RESPIRATORY FUNCTION  Goal: Patient will achieve/maintain normal respiratory rate/effort  Description  Respiratory rate and effort will be within normal limits for the patient  3/4/2020 0735 by Sally Montgomery RCP  Outcome: Ongoing  3/3/2020 2308 by Lindsey Hoffman RCP  Outcome: Ongoing     Problem: MECHANICAL VENTILATION  Goal: Patient will maintain patent airway  3/4/2020 0735 by Sally Montgomery RCP  Outcome: Ongoing  3/3/2020 2308 by [place] Hoffman RCP  Outcome: Ongoing     Problem: MECHANICAL VENTILATION  Goal: Oral health is maintained or improved  3/4/2020 0735 by Sally Montgomery RCP  Outcome: Ongoing  3/3/2020 2308 by [place] Hoffman RCP  Outcome: Ongoing     Problem: MECHANICAL VENTILATION  Goal: ET tube will be managed safely  3/4/2020 0735 by Sally Montgomery RCP  Outcome: Ongoing  3/3/2020 2308 by [place] Hoffman RCP  Outcome: Ongoing     Problem: MECHANICAL VENTILATION  Goal: Ability to express needs and understand communication  3/4/2020 0735 by Sally Montgomery RCP  Outcome: Ongoing  3/3/2020 2308 by Lindsey Hoffman RCP  Outcome: Ongoing     Problem: MECHANICAL VENTILATION  Goal: Mobility/activity is maintained at optimum level for patient  3/4/2020 0735 by Sally Montgomery RCP  Outcome: Ongoing  3/3/2020 2308 by Lindsey Hoffman RCP  Outcome: Ongoing     Problem: SKIN INTEGRITY  Goal: Skin integrity is maintained or improved  3/4/2020 0735 by Sally Montgomery RCP  Outcome: Ongoing  3/3/2020 2308 by Lindsey Hoffman RCP  Outcome: Ongoing

## 2020-03-04 NOTE — PROGRESS NOTES
[facility]    Progress Note    3/4/2020    6:28 PM    Name:   Angelia Parks  MRN:     [id number]     Acct:      [de-identified]   Room:   [address] [place] [address] Day:  15  Admit Date:  2/19/2020 11:01 PM    PCP:   Clive Henderson PA-C  Code Status:  Full Code    Subjective:     C/C:   Chief Complaint   Patient presents with    Shortness of Breath     Interval History Status: not changed. Patient seen and examined this morning. Remains intubated and sedated at this time. She does awaken to voice and follows commands appropriately. During my examination, patient with inherent heart rate noted at 78 bpm.  She is currently undergoing continuous hemodialysis at this time. Milrinone was stopped earlier this morning. She remains on epinephrine as well. FiO2 55% with 8.0 of PEEP. Blood pressure is improved after patient's heart rate has increased. She remains afebrile. Brief History:     Mrs. Maria R Sandoval is a 78year old  female who presented to the emergency dept initially on 2/19/2020 with c/c of shortness of breath. Her shortness of breath initially was only with exertion, but then became present at rest as well. She denied chest pain at that time. She was hypoxic with EMS and upon arrival to the ED. CXR in the ED revealed pulmonary edema. Patient was placed on BIPAP therapy with improved O2 saturations. Comorbidities include tobacco abuse, CKD IV (baseline creatinine 3.2 - 3.5), prior left-sided CVA, hyperlipidemia, gout, DM II, COPD. Troponins were: 56 --> 160 --> 409 --> 476 --> 500 --> 365 --> 430 --> 1422. EKG with inferolateral ST depressions. She was diagnosed with NSTEMI. Creatinine noted to be 3.97. Prior ECHO 12/2016: EF >55%, mild pulm HTN. Gerson cath placed 02/21 and HD started 02/22.  LHC on 2/24/2020 revealed: LMCA: 0% stenosis; LAD: Mid 99% in upper long branch (Reaching to apex) 80% in lower LAD branch; D1: with diabetic chronic kidney disease ([facility] 2/21/2020 Yes    Essential hypertension 2/21/2020 Yes    Hypercholesteremia 2/21/2020 Yes    HERNÁN (acute kidney injury) ([facility] 2/20/2020 Yes    NSTEMI (non-ST elevated myocardial infarction) ([facility] 2/21/2020 Yes    COPD exacerbation ([facility] 3/3/2020 Yes    Moderate malnutrition ([facility] 3/3/2020 Yes          Plan:        1. Bradycardia - paced today; intrinsic rhythm has returned to normal today; BP has improved as well. Holding coreg at this time  2. Shock; likely related to bradycardia - wean epinephrine as able  3. Acute CHF - monitor I/O's, HD.  Symptoms improved. Volume being removed with HD  4. CKD IV --> ESRD on HD - continue dialysis as per nephrology. Volume removal critical for patient to be extubated safely  5. Multivessel CAD - s/p CABGx3 on 3/2/2020. Continue management per cardiology and CT surgery  6. DM2 - BS stable, Lantus, SSI - glucoses currently stable  7. NSTEMI - s/p CABG x 3 on 3/2/2020. Management per cardiology. Continue aspirin, plavix, high-intensity statin therapy  8. Oral care  9. Anxiety - PRN ativan  10. Bilateral iliac artery disease - will need outpatient follow up with vascular surgery  11. Daily labs  12. Monitor I/Os - 1.5 L off with CRRT today; 145 cc of urine output during the day today  13. Gi ppx - pepcid  14. DVT ppx with heparin SQ  15. Disposition - patient is status post CABG. Currently intubated. Wean pressors as able. Daily SBTs. Extubate when appropriate.  Continue CRRT    33 Catherine Mon DO  3/4/2020  6:28 PM

## 2020-03-04 NOTE — PROGRESS NOTES
[facility]   Progress Note                    Date:   3/4/2020  Patient name:  Jaswant Solitario  Date of admission:  2/19/2020 11:01 PM  MRN:   [id number]  YOB: 1950  PCP:    Leopoldo Goring, PA-C    Reason for Admission:  HERNÁN (acute kidney injury) ([facility] [N17.9]    Subjective:       Patient underwent CABG X3 yesterday. She remains intubated and has been requiring epinephrine intermittently. She continues to require pacing and with intrinsic heart rate in 40s. I/O last 3 completed shifts:   In: 1891 [I.V.:1891]  Out: 1095 [Urine:665; Emesis/NG output:350; Chest Tube:80]  I/O this shift:  In: -   Out: 79 [Urine:70]      In: 1891 [I.V.:1891]  Out: 810 [Urine:380]      Intake/Output Summary (Last 24 hours) at 3/4/2020 1220  Last data filed at 3/4/2020 1100  Gross per 24 hour   Intake 1891 ml   Output 920 ml   Net 971 ml       Medications:   Scheduled Meds:   ipratropium-albuterol  1 ampule Inhalation Q6H    insulin lispro  0-6 Units Subcutaneous TID WC    insulin lispro  0-3 Units Subcutaneous Nightly    sodium chloride flush  10 mL Intravenous 2 times per day    amiodarone  200 mg Oral BID    chlorhexidine  15 mL Mouth/Throat BID    mupirocin   Nasal BID    therapeutic multivitamin-minerals  1 tablet Oral Daily with breakfast    aspirin  81 mg Oral Daily    sennosides-docusate sodium  1 tablet Oral BID    clopidogrel  75 mg Oral Daily    famotidine  20 mg Oral Daily    Or    famotidine (PEPCID) injection  20 mg Intravenous Daily    allopurinol  300 mg Oral Daily    atorvastatin  40 mg Oral Nightly    folic acid  1 mg Oral Daily    vancomycin (VANCOCIN) 1500mg in 250ml D5W IVPB  750 mg Intravenous Once per day on Mon Wed Fri    vancomycin (VANCOCIN) intermittent dosing (placeholder)   Other RX Placeholder    [MAR Hold] carvedilol  37.5 mg Oral BID    [MAR Hold] insulin glargine  25 Units Subcutaneous Nightly    [MAR Hold] vitamin D3  400 Units Oral BID   [facility] Hold] bumetanide  2 mg Intravenous Daily     Continuous Infusions:   midazolam 1 mg/hr (03/04/20 0801)    CRRT dialysis builder 1,500 mL/hr (03/04/20 1102)    sodium chloride 100 mL/hr at 03/04/20 1102    dextrose      phenylephrine (CLAUDETTE-SYNEPHRINE) 50mg/250mL infusion Stopped (03/03/20 0215)    milrinone Stopped (03/04/20 0835)    norepinephrine Stopped (03/03/20 0353)    EPINEPHrine infusion 0.03 mcg/kg/min (03/04/20 1020)    nitroGLYCERIN      dexmedetomidine (PRECEDEX) IV infusion Stopped (03/04/20 0830)    [MAR Hold] heparin (porcine) Stopped (03/02/20 1051)     CBC:   Recent Labs     03/02/20  1735 03/03/20  0433 03/04/20  0538   WBC 24.2* 18.4* 18.2*   HGB 9.9* 9.1* 9.1*   PLT 79* 89* See Reflexed IPF Result     BMP:    Recent Labs     03/03/20  1851 03/03/20  2341 03/04/20  0538    136 140   K 4.3 4.6 4.6    104 105   CO2 17* 17* 17*   BUN 30* 32* 35*   CREATININE 4.71* 4.71* 5.21*   GLUCOSE 103* 127* 139*     Hepatic:   No results for input(s): AST, ALT, ALB, BILITOT, ALKPHOS in the last 72 hours. Troponin: No results for input(s): TROPONINI in the last 72 hours. No results for input(s): TROPONINT in the last 72 hours. BNP:   No results for input(s): PROBNP in the last 72 hours. No results for input(s): BNP in the last 72 hours. Lipids: No results for input(s): CHOL, HDL in the last 72 hours. Invalid input(s): LDLCALCU  INR:   Recent Labs     03/02/20  1735 03/03/20  0433 03/04/20  0538   INR 1.2 1.1 1.0       Objective:   Vitals: /62   Pulse 60   Temp 97.7 °F (36.5 °C) (Axillary)   Resp 19   Ht 5' 3\" (1.6 m)   Wt 177 lb 4 oz (80.4 kg)   SpO2 97%   BMI 31.40 kg/m²    General appearance: awake, alert, in no apparent distress. HEENT: Head: Normocephalic, atraumatic without any obvious abnormalities. Neck: JVD absent   Lungs:good bilateral equal air entry. No adventitious lung sounds auscultated. Heart: S1, S2, regular, + systolic murmur.   Abdomen: soft, nontender with bowel sounds present in all 4 quadrants. Extremities: b/l femoral access site - no signs of hematoma   Integumentum:Intact with no rashes noted.       Diagnostic Studies:     EKG:   Sinus rhythm with possible old septal infarct and inferolateral ST depressions     ECHO: 2/21/2020  Left ventricle is normal in size. Global left ventricular systolic function is moderately reduced. Calculated EF via heart model is 36%. Mild left ventricular hypertrophy. Grade II (moderate) left ventricular diastolic dysfunction. Left atrium is mildly dilated. The aortic valve is calcified with reduced cusp mobility. Moderate aortic valve stenosis with a mean gradient of 20 mmHg. Maybe underestimated due to poor LV function. Trivial aortic insufficiency. Thickened mitral valve leaflets. Mitral annular calcification is seen. At least Moderate mitral regurgitation. Mild tricuspid regurgitation. Moderate pulmonary hypertension with an estimated right ventricular systolic pressure of 56 mmHg  Mild pulmonic insufficiency. Small pericardial effusion. IVC Increased diameter and impaired or no inspiratory variation indicating elevated RA filling pressure (i.e. CVP) . Cath 02/24/2020  LMCA: Normal 0% stenosis.     LAD: Mid 99% in upper long branch (Reaching to apex) 80% in lower LAD branch. D1: proximal 90% stenosis     LCx: Mid 80% stenosis  OM1: Ostial 80% stenosis  OM2: Proximal 80% stenosis     RCA: 100% proximal stenosis  Collateral from the left        Peripheral Arteries and Lesion Findings  Descending aorta: Severe iliac disease in both sides  80% in the left side and 60-70% in the right side     The LV gram was performed in the BAUGH 30 position. LVEF: 35%.  LV Wall Motion: Severe basal anterior and basal inferior hypokinesis        · Hemodynamic Pressures                Site S/A (mmHg) D/V (mmHg) M/ED (mmHg)   Right Atrium 23 22 19   Right Ventricle 51 19 20   Pulmonary Artery 43 27 33   PCWP 26 26 25          · Oxygen Saturations     Site Oxygen Saturation (%)   Pulmonary Artery Main 54.5   Left Femoral Artery  89.5         · Cardiac Output     Calculation Method Cardiac Output (L/min) Cardiac Index (L/min/m2)   Christiano 4.25 2.4          Estimated Blood Loss: 10 mL     Conclusions:  1. Mildly elevated right heart pressures  2. Aortic valve gradient is not as bad as reported by echocardiogram: 9-11 mmHg  3. Severe multivessel CAD  4. Reduced LV systolic function with segmental wall motion abnormalities  5. Bilateral iliac disease         Assessment / Acute Cardiac Problems:     1. NSTEMI- s/p cath s/p CABG x3 (LIMA- LAD, SVG-D1, SVG2 - OM) on 3/2/2020  2. Acute CHF - volume overload in the setting of CKD  3. Ischemic cardiomyopathy (EF 36%)  4. Moderate AS - Mean gradient 20, CHEIKH (continuity : 0.87), no significant gradient noticed on cardiac cath   5. Moderate MR  6. B/L significant iliac disease on angiogram   7. CKD stage V-initiated on dialysis this admission  8. H/O CVA  9. DM-2  10. Hx smoking    Plan of Treatment:   1. Continue aspirin, Plavix, amiodarone and Lipitor  2. Was on Coreg 37.5 mg bid, lisinopril 10 and hydralazine 50 TID prior to CABG  3. Currently paced. 4. Will repeat limited 2D echo to assess LV function and for any pericardial effusion contributing to hypotension  5. B/L Iliac disease - recommend outpatient follow-up with vascular surgery  6.  Nephrology on board for dialysis management    Electronically signed on 03/04/20 at 12:20 PM by:    Madai Baltazar MD   Fellow, [address]

## 2020-03-04 NOTE — PROGRESS NOTES
acid  1 mg Oral Daily    vancomycin (VANCOCIN) intermittent dosing (placeholder)   Other RX Placeholder    [MAR Hold] carvedilol  37.5 mg Oral BID    [MAR Hold] insulin glargine  25 Units Subcutaneous Nightly    [MAR Hold] vitamin D3  400 Units Oral BID    [MAR Hold] bumetanide  2 mg Intravenous Daily     Continuous Infusions:   midazolam 1 mg/hr (03/04/20 0801)    CRRT dialysis builder 1,500 mL/hr (03/04/20 1102)    sodium chloride 100 mL/hr at 03/04/20 1102    dextrose      phenylephrine (CLAUDETTE-SYNEPHRINE) 50mg/250mL infusion Stopped (03/03/20 0215)    milrinone Stopped (03/04/20 0835)    norepinephrine Stopped (03/03/20 0353)    EPINEPHrine infusion 0.03 mcg/kg/min (03/04/20 1020)    nitroGLYCERIN      dexmedetomidine (PRECEDEX) IV infusion Stopped (03/04/20 0830)    [MAR Hold] heparin (porcine) Stopped (03/02/20 1051)     PRN Meds:   albuterol, 2.5 mg, As Directed RT PRN  sodium chloride flush, 10 mL, PRN  calcium chloride IVPB, 1 g, PRN  magnesium sulfate, 1 g, PRN  potassium chloride, 20 mEq, PRN  oxyCODONE-acetaminophen, 1 tablet, Q4H PRN    Or  oxyCODONE-acetaminophen, 2 tablet, Q4H PRN  fentanNYL, 25 mcg, Q1H PRN    Or  fentanNYL, 50 mcg, Q1H PRN  diphenhydrAMINE, 25 mg, Nightly PRN  hydrALAZINE, 5 mg, Q5 Min PRN  albumin human, 25 g, PRN  glucose, 15 g, PRN  dextrose, 12.5 g, PRN  glucagon (rDNA), 1 mg, PRN  dextrose, 100 mL/hr, PRN  bisacodyl, 10 mg, Daily PRN  bisacodyl, 5 mg, Daily PRN  phenylephrine (CLAUDETTE-SYNEPHRINE) 50mg/250mL infusion, 50 mcg/min, Continuous PRN  milrinone, 0.375 mcg/kg/min, Continuous PRN  norepinephrine, 0.01 mcg/kg/min, Continuous PRN  EPINEPHrine infusion, 0.01 mcg/kg/min, Continuous PRN  nitroGLYCERIN, 10 mcg/min, Continuous PRN  acetaminophen, 650 mg, Q4H PRN    Or  acetaminophen, 650 mg, Q4H PRN  ondansetron, 4 mg, Q6H PRN  [MAR Hold] heparin (porcine), 1,900 Units, PRN  [MAR Hold] heparin (porcine), 1,900 Units, PRN  [MAR Hold] LORazepam, 0.5 mg, Q6H PRN  [facility] Hold] heparin (porcine), 1,200 Units, PRN  [MAR Hold] heparin (porcine), 1,300 Units, PRN  [MAR Hold] nicotine, 1 patch, Daily PRN        SUPPORT DEVICES: [x] Ventilator [] BIPAP  [] Nasal Cannula [] Room Air    VENT SETTINGS (Comprehensive) (if applicable):    Vent Information  $Ventilation: $Subsequent Day  Ventilator Started: Yes  Ventilation Day(s): 3  Skin Assessment: Clean, dry, & intact  Equipment ID: [id number]  Equipment Changed: (S) HME  Vent Type: Servo i  Vent Mode: PRVC  Vt Ordered: 420 mL  Pressure Ordered: (S) 14  Rate Set: 18 bmp  Pressure Support: (S) 6 cmH20  FiO2 : 55 %  Sensitivity: 5  PEEP/CPAP: 8  I Time/ I Time %: 0.9 s  Humidification Source: HME  Additional Respiratory  Assessments  Pulse: 60  Resp: 19  SpO2: 97 %  End Tidal CO2: 32 (%)  Position: Semi-Hampton's  Humidification Source: HME  Oral Care Completed?: Yes  Oral Care: Mouthwash, Mouth swabbed, Mouth moisturizer, Mouth suctioned, Suction to[place]  Subglottic Suction Done?: Yes    ABGs:     Lab Results   Component Value Date    PLE5OOU 21 03/04/2020    FIO2 60.0 03/04/2020       DATA:  Complete Blood Count:   Recent Labs     03/02/20  1735 03/03/20  0433 03/04/20  0538   WBC 24.2* 18.4* 18.2*   RBC 3.24* 2.93* 2.98*   HGB 9.9* 9.1* 9.1*   HCT 29.5* 27.1* 28.0*   MCV 91.0 92.5 94.0   MCH 30.6 31.1 30.5   MCHC 33.6 33.6 32.5   RDW 14.1 15.3* 14.9*   PLT 79* 89* See Reflexed IPF Result   MPV 11.8 12.4 NOT REPORTED        Last 3 Blood Glucose:   Recent Labs     03/02/20  0634 03/02/20  0721 03/02/20  1735 03/03/20  0433 03/03/20  1402 03/03/20  1851 03/03/20  2341 03/04/20  0538   GLUCOSE 111* 115* 112* 107* 118* 103* 127* 139*        PT/INR:    Lab Results   Component Value Date    PROTIME 10.5 03/04/2020    INR 1.0 03/04/2020     PTT:    Lab Results   Component Value Date    APTT 26.4 03/04/2020       Comprehensive Metabolic Profile:   Recent Labs     03/03/20  1851 03/03/20  2341 03/04/20  0538    136 140   K 4.3 4.6 4.6    will re evaluate xray chest sarah -  D/W RT , RN   D/W FAMILY   D/W DR Lorna Basurto MD  3/4/2020 12:25 PM    Total critical care time caring for this patient with life threatening, unstable organ failure, including direct patient contact, management of life support systems, review of data including imaging and labs, discussions with other team members and physicians at least 27  Min so far today, excluding procedures.

## 2020-03-05 ENCOUNTER — APPOINTMENT (OUTPATIENT)
Dept: GENERAL RADIOLOGY | Age: 70
DRG: 233 | End: 2020-03-05
Payer: MEDICARE

## 2020-03-05 PROBLEM — D69.6 THROMBOCYTOPENIA (HCC): Status: ACTIVE | Noted: 2020-03-05

## 2020-03-05 LAB
ABSOLUTE EOS #: 0.23 K/UL (ref 0–0.44)
ABSOLUTE IMMATURE GRANULOCYTE: 0.13 K/UL (ref 0–0.3)
ABSOLUTE LYMPH #: 0.94 K/UL (ref 1.1–3.7)
ABSOLUTE MONO #: 1.04 K/UL (ref 0.1–1.2)
ALLEN TEST: ABNORMAL
ANION GAP SERPL CALCULATED.3IONS-SCNC: 15 MMOL/L (ref 9–17)
ANION GAP SERPL CALCULATED.3IONS-SCNC: 17 MMOL/L (ref 9–17)
ANION GAP SERPL CALCULATED.3IONS-SCNC: 17 MMOL/L (ref 9–17)
BASOPHILS # BLD: 0 % (ref 0–2)
BASOPHILS ABSOLUTE: 0.03 K/UL (ref 0–0.2)
BUN BLDV-MCNC: 25 MG/DL (ref 8–23)
BUN BLDV-MCNC: 28 MG/DL (ref 8–23)
BUN BLDV-MCNC: 28 MG/DL (ref 8–23)
BUN/CREAT BLD: ABNORMAL (ref 9–20)
CALCIUM IONIZED: 1.23 MMOL/L (ref 1.13–1.33)
CALCIUM IONIZED: 1.25 MMOL/L (ref 1.13–1.33)
CALCIUM IONIZED: 1.27 MMOL/L (ref 1.13–1.33)
CALCIUM IONIZED: 1.28 MMOL/L (ref 1.13–1.33)
CALCIUM SERPL-MCNC: 10 MG/DL (ref 8.6–10.4)
CALCIUM SERPL-MCNC: 9.3 MG/DL (ref 8.6–10.4)
CALCIUM SERPL-MCNC: 9.4 MG/DL (ref 8.6–10.4)
CALCIUM SERPL-MCNC: 9.6 MG/DL (ref 8.6–10.4)
CALCIUM SERPL-MCNC: 9.9 MG/DL (ref 8.6–10.4)
CHLORIDE BLD-SCNC: 100 MMOL/L (ref 98–107)
CHLORIDE BLD-SCNC: 101 MMOL/L (ref 98–107)
CHLORIDE BLD-SCNC: 102 MMOL/L (ref 98–107)
CHLORIDE BLD-SCNC: 102 MMOL/L (ref 98–107)
CHLORIDE BLD-SCNC: 103 MMOL/L (ref 98–107)
CO2: 19 MMOL/L (ref 20–31)
CO2: 20 MMOL/L (ref 20–31)
CO2: 22 MMOL/L (ref 20–31)
CREAT SERPL-MCNC: 2.57 MG/DL (ref 0.5–0.9)
CREAT SERPL-MCNC: 2.63 MG/DL (ref 0.5–0.9)
CREAT SERPL-MCNC: 2.77 MG/DL (ref 0.5–0.9)
CREAT SERPL-MCNC: 3.26 MG/DL (ref 0.5–0.9)
CREAT SERPL-MCNC: 3.56 MG/DL (ref 0.5–0.9)
DIFFERENTIAL TYPE: ABNORMAL
EOSINOPHILS RELATIVE PERCENT: 1 % (ref 1–4)
FIO2: 55
GFR AFRICAN AMERICAN: 15 ML/MIN
GFR AFRICAN AMERICAN: 17 ML/MIN
GFR AFRICAN AMERICAN: 21 ML/MIN
GFR AFRICAN AMERICAN: 22 ML/MIN
GFR AFRICAN AMERICAN: 22 ML/MIN
GFR NON-AFRICAN AMERICAN: 13 ML/MIN
GFR NON-AFRICAN AMERICAN: 14 ML/MIN
GFR NON-AFRICAN AMERICAN: 17 ML/MIN
GFR NON-AFRICAN AMERICAN: 18 ML/MIN
GFR NON-AFRICAN AMERICAN: 19 ML/MIN
GFR SERPL CREATININE-BSD FRML MDRD: ABNORMAL ML/MIN/{1.73_M2}
GLUCOSE BLD-MCNC: 130 MG/DL (ref 65–105)
GLUCOSE BLD-MCNC: 134 MG/DL (ref 70–99)
GLUCOSE BLD-MCNC: 137 MG/DL (ref 65–105)
GLUCOSE BLD-MCNC: 145 MG/DL (ref 70–99)
GLUCOSE BLD-MCNC: 152 MG/DL (ref 70–99)
GLUCOSE BLD-MCNC: 161 MG/DL (ref 70–99)
GLUCOSE BLD-MCNC: 163 MG/DL (ref 65–105)
GLUCOSE BLD-MCNC: 190 MG/DL (ref 65–105)
GLUCOSE BLD-MCNC: 206 MG/DL (ref 70–99)
HBCO, MIXED, EXTENDED: 0.4 % (ref 0–5)
HCT VFR BLD CALC: 25.6 % (ref 36.3–47.1)
HEMOGLOBIN, MIXED, EXTENDED: 9.9 G/DL (ref 12–18)
HEMOGLOBIN: 8.3 G/DL (ref 11.9–15.1)
IMMATURE GRANULOCYTES: 1 %
INR BLD: 1
LYMPHOCYTES # BLD: 6 % (ref 24–43)
MAGNESIUM: 2 MG/DL (ref 1.6–2.6)
MAGNESIUM: 2.1 MG/DL (ref 1.6–2.6)
MAGNESIUM: 2.1 MG/DL (ref 1.6–2.6)
MAGNESIUM: 2.2 MG/DL (ref 1.6–2.6)
MAGNESIUM: 2.2 MG/DL (ref 1.6–2.6)
MCH RBC QN AUTO: 30.6 PG (ref 25.2–33.5)
MCHC RBC AUTO-ENTMCNC: 32.4 G/DL (ref 28.4–34.8)
MCV RBC AUTO: 94.5 FL (ref 82.6–102.9)
METHB, MIXED, EXTENDED: 0.5 % (ref 0–1.5)
MODE: ABNORMAL
MONOCYTES # BLD: 6 % (ref 3–12)
NEGATIVE BASE EXCESS, ART: 1 (ref 0–2)
NRBC AUTOMATED: 0 PER 100 WBC
O2 CONTENT, MIXED, EXTENDED: 9 VOL % (ref 6–15)
O2 DEVICE/FLOW/%: ABNORMAL
O2 SAT, MIXED, EXTENDED: 66.7 % (ref 60–80)
OXYGEN STATUS: 55
PARTIAL THROMBOPLASTIN TIME: 33.6 SEC (ref 20.5–30.5)
PATIENT TEMP: 35.2
PDW BLD-RTO: 14.4 % (ref 11.8–14.4)
PLATELET # BLD: ABNORMAL K/UL (ref 138–453)
PLATELET ESTIMATE: ABNORMAL
PLATELET, FLUORESCENCE: 88 K/UL (ref 138–453)
PLATELET, IMMATURE FRACTION: 11.2 % (ref 1.1–10.3)
PMV BLD AUTO: ABNORMAL FL (ref 8.1–13.5)
POC HCO3: 24.2 MMOL/L (ref 21–28)
POC O2 SATURATION: 99 % (ref 94–98)
POC PCO2 TEMP: 38 MM HG
POC PCO2: 40.8 MM HG (ref 35–48)
POC PH TEMP: 7.41
POC PH: 7.38 (ref 7.35–7.45)
POC PO2 TEMP: 108 MM HG
POC PO2: 118.8 MM HG (ref 83–108)
POSITIVE BASE EXCESS, ART: ABNORMAL (ref 0–3)
POTASSIUM SERPL-SCNC: 3.6 MMOL/L (ref 3.7–5.3)
POTASSIUM SERPL-SCNC: 3.7 MMOL/L (ref 3.7–5.3)
POTASSIUM SERPL-SCNC: 3.9 MMOL/L (ref 3.7–5.3)
PROTHROMBIN TIME: 11 SEC (ref 9–12)
RBC # BLD: 2.71 M/UL (ref 3.95–5.11)
RBC # BLD: ABNORMAL 10*6/UL
SAMPLE SITE: ABNORMAL
SEG NEUTROPHILS: 85 % (ref 36–65)
SEGMENTED NEUTROPHILS ABSOLUTE COUNT: 13.84 K/UL (ref 1.5–8.1)
SODIUM BLD-SCNC: 136 MMOL/L (ref 135–144)
SODIUM BLD-SCNC: 137 MMOL/L (ref 135–144)
SODIUM BLD-SCNC: 137 MMOL/L (ref 135–144)
SODIUM BLD-SCNC: 138 MMOL/L (ref 135–144)
SODIUM BLD-SCNC: 138 MMOL/L (ref 135–144)
TCO2 (CALC), ART: 25 MMOL/L (ref 22–29)
WBC # BLD: 16.2 K/UL (ref 3.5–11.3)
WBC # BLD: ABNORMAL 10*3/UL

## 2020-03-05 PROCEDURE — 83735 ASSAY OF MAGNESIUM: CPT

## 2020-03-05 PROCEDURE — 90945 DIALYSIS ONE EVALUATION: CPT

## 2020-03-05 PROCEDURE — 80048 BASIC METABOLIC PNL TOTAL CA: CPT

## 2020-03-05 PROCEDURE — 6360000002 HC RX W HCPCS: Performed by: INTERNAL MEDICINE

## 2020-03-05 PROCEDURE — 6370000000 HC RX 637 (ALT 250 FOR IP): Performed by: INTERNAL MEDICINE

## 2020-03-05 PROCEDURE — 82330 ASSAY OF CALCIUM: CPT

## 2020-03-05 PROCEDURE — 85610 PROTHROMBIN TIME: CPT

## 2020-03-05 PROCEDURE — 99233 SBSQ HOSP IP/OBS HIGH 50: CPT | Performed by: INTERNAL MEDICINE

## 2020-03-05 PROCEDURE — 2580000003 HC RX 258: Performed by: THORACIC SURGERY (CARDIOTHORACIC VASCULAR SURGERY)

## 2020-03-05 PROCEDURE — 82805 BLOOD GASES W/O2 SATURATION: CPT

## 2020-03-05 PROCEDURE — 94761 N-INVAS EAR/PLS OXIMETRY MLT: CPT

## 2020-03-05 PROCEDURE — 82947 ASSAY GLUCOSE BLOOD QUANT: CPT

## 2020-03-05 PROCEDURE — 94003 VENT MGMT INPAT SUBQ DAY: CPT

## 2020-03-05 PROCEDURE — 97110 THERAPEUTIC EXERCISES: CPT

## 2020-03-05 PROCEDURE — 85730 THROMBOPLASTIN TIME PARTIAL: CPT

## 2020-03-05 PROCEDURE — 2580000003 HC RX 258: Performed by: INTERNAL MEDICINE

## 2020-03-05 PROCEDURE — 99291 CRITICAL CARE FIRST HOUR: CPT | Performed by: INTERNAL MEDICINE

## 2020-03-05 PROCEDURE — 85025 COMPLETE CBC W/AUTO DIFF WBC: CPT

## 2020-03-05 PROCEDURE — 83050 HGB METHEMOGLOBIN QUAN: CPT

## 2020-03-05 PROCEDURE — 82375 ASSAY CARBOXYHB QUANT: CPT

## 2020-03-05 PROCEDURE — 2700000000 HC OXYGEN THERAPY PER DAY

## 2020-03-05 PROCEDURE — 6360000002 HC RX W HCPCS: Performed by: NURSE PRACTITIONER

## 2020-03-05 PROCEDURE — 94640 AIRWAY INHALATION TREATMENT: CPT

## 2020-03-05 PROCEDURE — 2100000001 HC CVICU R&B

## 2020-03-05 PROCEDURE — 6360000002 HC RX W HCPCS: Performed by: THORACIC SURGERY (CARDIOTHORACIC VASCULAR SURGERY)

## 2020-03-05 PROCEDURE — 71045 X-RAY EXAM CHEST 1 VIEW: CPT

## 2020-03-05 PROCEDURE — 6370000000 HC RX 637 (ALT 250 FOR IP): Performed by: NURSE PRACTITIONER

## 2020-03-05 PROCEDURE — 85055 RETICULATED PLATELET ASSAY: CPT

## 2020-03-05 PROCEDURE — 2580000003 HC RX 258: Performed by: NURSE PRACTITIONER

## 2020-03-05 PROCEDURE — 94770 HC ETCO2 MONITOR DAILY: CPT

## 2020-03-05 PROCEDURE — 82803 BLOOD GASES ANY COMBINATION: CPT

## 2020-03-05 PROCEDURE — 86022 PLATELET ANTIBODIES: CPT

## 2020-03-05 RX ORDER — ALBUTEROL SULFATE 2.5 MG/3ML
2.5 SOLUTION RESPIRATORY (INHALATION) EVERY 6 HOURS PRN
Status: DISCONTINUED | OUTPATIENT
Start: 2020-03-05 | End: 2020-03-12 | Stop reason: HOSPADM

## 2020-03-05 RX ORDER — CARVEDILOL 3.12 MG/1
3.12 TABLET ORAL 2 TIMES DAILY WITH MEALS
Status: DISCONTINUED | OUTPATIENT
Start: 2020-03-05 | End: 2020-03-06

## 2020-03-05 RX ADMIN — FENTANYL CITRATE 50 MCG: 50 INJECTION, SOLUTION INTRAMUSCULAR; INTRAVENOUS at 06:33

## 2020-03-05 RX ADMIN — HYDRALAZINE HYDROCHLORIDE 5 MG: 20 INJECTION INTRAMUSCULAR; INTRAVENOUS at 10:10

## 2020-03-05 RX ADMIN — MULTIPLE VITAMINS W/ MINERALS TAB 1 TABLET: TAB at 08:24

## 2020-03-05 RX ADMIN — Medication 1500 ML/HR: at 16:50

## 2020-03-05 RX ADMIN — ASPIRIN 81 MG: 81 TABLET, COATED ORAL at 08:24

## 2020-03-05 RX ADMIN — FENTANYL CITRATE 50 MCG: 50 INJECTION, SOLUTION INTRAMUSCULAR; INTRAVENOUS at 15:30

## 2020-03-05 RX ADMIN — IPRATROPIUM BROMIDE AND ALBUTEROL SULFATE 1 AMPULE: .5; 3 SOLUTION RESPIRATORY (INHALATION) at 02:49

## 2020-03-05 RX ADMIN — FENTANYL CITRATE 50 MCG: 50 INJECTION, SOLUTION INTRAMUSCULAR; INTRAVENOUS at 21:43

## 2020-03-05 RX ADMIN — AMIODARONE HYDROCHLORIDE 0.5 MG/MIN: 50 INJECTION, SOLUTION INTRAVENOUS at 20:57

## 2020-03-05 RX ADMIN — IPRATROPIUM BROMIDE AND ALBUTEROL SULFATE 1 AMPULE: .5; 3 SOLUTION RESPIRATORY (INHALATION) at 07:50

## 2020-03-05 RX ADMIN — MUPIROCIN: 20 OINTMENT TOPICAL at 08:24

## 2020-03-05 RX ADMIN — HYDRALAZINE HYDROCHLORIDE 5 MG: 20 INJECTION INTRAMUSCULAR; INTRAVENOUS at 10:15

## 2020-03-05 RX ADMIN — Medication 1500 ML/HR: at 16:51

## 2020-03-05 RX ADMIN — OXYCODONE HYDROCHLORIDE AND ACETAMINOPHEN 2 TABLET: 5; 325 TABLET ORAL at 13:44

## 2020-03-05 RX ADMIN — FOLIC ACID 1 MG: 1 TABLET ORAL at 08:24

## 2020-03-05 RX ADMIN — Medication 1500 ML/HR: at 07:46

## 2020-03-05 RX ADMIN — OXYCODONE HYDROCHLORIDE AND ACETAMINOPHEN 2 TABLET: 5; 325 TABLET ORAL at 17:45

## 2020-03-05 RX ADMIN — AMIODARONE HYDROCHLORIDE 200 MG: 200 TABLET ORAL at 08:24

## 2020-03-05 RX ADMIN — FENTANYL CITRATE 50 MCG: 50 INJECTION, SOLUTION INTRAMUSCULAR; INTRAVENOUS at 07:53

## 2020-03-05 RX ADMIN — CARVEDILOL 3.12 MG: 3.12 TABLET, FILM COATED ORAL at 13:44

## 2020-03-05 RX ADMIN — Medication 2 MG/HR: at 12:27

## 2020-03-05 RX ADMIN — DESMOPRESSIN ACETATE 40 MG: 0.2 TABLET ORAL at 21:32

## 2020-03-05 RX ADMIN — CLOPIDOGREL 75 MG: 75 TABLET, FILM COATED ORAL at 08:24

## 2020-03-05 RX ADMIN — AMIODARONE HYDROCHLORIDE 1 MG/MIN: 50 INJECTION, SOLUTION INTRAVENOUS at 12:31

## 2020-03-05 RX ADMIN — AMIODARONE HYDROCHLORIDE 0.5 MG/MIN: 50 INJECTION, SOLUTION INTRAVENOUS at 18:16

## 2020-03-05 RX ADMIN — CARVEDILOL 3.12 MG: 3.12 TABLET, FILM COATED ORAL at 17:45

## 2020-03-05 RX ADMIN — Medication 15 ML: at 08:30

## 2020-03-05 RX ADMIN — OXYCODONE HYDROCHLORIDE AND ACETAMINOPHEN 2 TABLET: 5; 325 TABLET ORAL at 21:35

## 2020-03-05 RX ADMIN — OXYCODONE HYDROCHLORIDE AND ACETAMINOPHEN 2 TABLET: 5; 325 TABLET ORAL at 08:17

## 2020-03-05 RX ADMIN — Medication 1500 ML/HR: at 16:52

## 2020-03-05 RX ADMIN — HEPARIN SODIUM 5000 UNITS: 5000 INJECTION INTRAVENOUS; SUBCUTANEOUS at 14:55

## 2020-03-05 RX ADMIN — Medication 1500 ML/HR: at 07:45

## 2020-03-05 RX ADMIN — INSULIN LISPRO 1 UNITS: 100 INJECTION, SOLUTION INTRAVENOUS; SUBCUTANEOUS at 17:15

## 2020-03-05 RX ADMIN — VANCOMYCIN HYDROCHLORIDE 1000 MG: 1 INJECTION, SOLUTION INTRAVENOUS at 20:41

## 2020-03-05 RX ADMIN — HEPARIN SODIUM 5000 UNITS: 5000 INJECTION INTRAVENOUS; SUBCUTANEOUS at 21:32

## 2020-03-05 RX ADMIN — Medication 1500 ML/HR: at 07:47

## 2020-03-05 RX ADMIN — ALLOPURINOL 300 MG: 300 TABLET ORAL at 08:24

## 2020-03-05 RX ADMIN — INSULIN LISPRO 1 UNITS: 100 INJECTION, SOLUTION INTRAVENOUS; SUBCUTANEOUS at 13:48

## 2020-03-05 RX ADMIN — FAMOTIDINE 20 MG: 20 TABLET, FILM COATED ORAL at 08:24

## 2020-03-05 RX ADMIN — SENNOSIDES AND DOCUSATE SODIUM 1 TABLET: 8.6; 5 TABLET ORAL at 21:31

## 2020-03-05 RX ADMIN — FENTANYL CITRATE 25 MCG: 50 INJECTION, SOLUTION INTRAMUSCULAR; INTRAVENOUS at 10:00

## 2020-03-05 RX ADMIN — Medication 15 ML: at 20:39

## 2020-03-05 RX ADMIN — SODIUM CHLORIDE: 9 INJECTION, SOLUTION INTRAVENOUS at 16:35

## 2020-03-05 RX ADMIN — SODIUM CHLORIDE, PRESERVATIVE FREE 10 ML: 5 INJECTION INTRAVENOUS at 08:26

## 2020-03-05 RX ADMIN — MUPIROCIN: 20 OINTMENT TOPICAL at 21:31

## 2020-03-05 RX ADMIN — SENNOSIDES AND DOCUSATE SODIUM 1 TABLET: 8.6; 5 TABLET ORAL at 08:24

## 2020-03-05 RX ADMIN — HEPARIN SODIUM 5000 UNITS: 5000 INJECTION INTRAVENOUS; SUBCUTANEOUS at 06:35

## 2020-03-05 RX ADMIN — DEXTROSE 150 MG: 50 INJECTION, SOLUTION INTRAVENOUS at 12:03

## 2020-03-05 ASSESSMENT — PULMONARY FUNCTION TESTS
PIF_VALUE: 23
PIF_VALUE: 11
PIF_VALUE: 24
PIF_VALUE: 24
PIF_VALUE: 14
PIF_VALUE: 23
PIF_VALUE: 12
PIF_VALUE: 13

## 2020-03-05 NOTE — PROGRESS NOTES
Dr Joanie Prajapati at pt bedside to review case and get an update on pt status. Order acknowledged for HIT panel lab. Physician just wants to r/o HIT due to decreased PLT count. No other new orders received, continue with current plan of care.

## 2020-03-05 NOTE — PROGRESS NOTES
Dr Mixon returned to pt bedside. Physician questioned why pt was not on additional DVT prophylaxis other than EPC cuffs.  Order received for heparin SC.

## 2020-03-05 NOTE — CARE COORDINATION
Met with patient's daughter Garland Reddy at bedside to discuss transitional planning. Preop plan was home with daughter, we discussed other possible needs (SNF, ARU, LTACH)  Daughter given lists for review and freedom of choice. Await PT/OT when appropriate.   Per SW note, patient has OP HD spot at [facility]

## 2020-03-05 NOTE — PROGRESS NOTES
Dr Shavon Aguillon at pt bedside. Updated on pt status, epinephrine infusion restarted low dose as pts SBP decreased to 80s. Verbal order received to stop milrinone infusion at this time.

## 2020-03-05 NOTE — PROGRESS NOTES
Dr Sergey Gil responded to perfect serve page at this time. Telephone orders received for CVVHD as follows:  Primsate solution with 2mEq/L K added, blood flow rate 200ml/h, dialysate flow rate 1.5L/hr, DFB -50ml/hr, and 100ml/hr prefilter NS infusion.

## 2020-03-05 NOTE — PLAN OF CARE
treatments as ordered  Note:   BRONCHOSPASM/BRONCHOCONSTRICTION     [x]         IMPROVE AERATION/BREATH SOUNDS  [x]   ADMINISTER BRONCHODILATOR THERAPY AS APPROPRIATE  [x]   ASSESS BREATH SOUNDS  [x]   IMPLEMENT AEROSOL/MDI PROTOCOL  [x]   PATIENT EDUCATION AS NEEDED

## 2020-03-05 NOTE — PROGRESS NOTES
143/58 -- 89 -- 99 %   03/05/20 0757 -- -- -- -- 99 %   03/05/20 0748 -- -- 85 -- 100 %   03/05/20 0745 -- -- 85 -- 100 %   03/05/20 0730 -- -- 84 -- 100 %   03/05/20 0715 -- -- 84 -- 99 %   03/05/20 0700 (!) 130/55 -- 84 -- 100 %   03/05/20 0645 -- -- 85 18 100 %   03/05/20 0630 (!) 140/61 -- 86 18 99 %         Intake/Output Summary (Last 24 hours) at 3/5/2020 1428  Last data filed at 3/5/2020 0600  Gross per 24 hour   Intake 3578 ml   Output 4171 ml   Net -593 ml     Date 03/05/20 0000 - 03/05/20 2359   Shift 5164-1299 9424-1326 8545-9170 24 Hour Total   INTAKE   I.V.(mL/kg) 6605(06.4)   9917(06.3)   NG/GT(mL/kg) 261(3.4)   261(3.4)   Shift Total(mL/kg) 0926(46.7)   0250(56.0)   OUTPUT   Urine(mL/kg/hr) 70(0.1)   70   Chest Tube 60   60   CRRT 1402   1402   Shift Total(mL/kg) 1532(19.7)   1532(19.7)   Weight (kg) 77.9 77.9 77.9 77.9     Wt Readings from Last 3 Encounters:   03/05/20 171 lb 11.8 oz (77.9 kg)   11/21/19 170 lb (77.1 kg)   09/05/19 169 lb 3.2 oz (76.7 kg)     Body mass index is 30.42 kg/m².         PHYSICAL EXAM:  Patient is intubated left IJ [place] in place right dialysis catheter  Lungs rhonchi bilaterally harsh vesicular breath sounds  CVS S1-S2 regular  Abdomen soft nontender bowel sounds are present  Lower extremity edema  Neuro patient follows commands of sedation    MEDICATIONS:  Scheduled Meds:   carvedilol  3.125 mg Oral BID WC    vancomycin  1,000 mg Intravenous Q24H    heparin (porcine)  5,000 Units Subcutaneous 3 times per day    insulin lispro  0-6 Units Subcutaneous TID     insulin lispro  0-3 Units Subcutaneous Nightly    sodium chloride flush  10 mL Intravenous 2 times per day    amiodarone  200 mg Oral BID    chlorhexidine  15 mL Mouth/Throat BID    mupirocin   Nasal BID    therapeutic multivitamin-minerals  1 tablet Oral Daily with breakfast    aspirin  81 mg Oral Daily    sennosides-docusate sodium  1 tablet Oral BID    clopidogrel  75 mg Oral Daily    famotidine 20 mg Oral Daily    Or    famotidine (PEPCID) injection  20 mg Intravenous Daily    allopurinol  300 mg Oral Daily    atorvastatin  40 mg Oral Nightly    folic acid  1 mg Oral Daily    vancomycin (VANCOCIN) intermittent dosing (placeholder)   Other RX Placeholder    [MAR Hold] carvedilol  37.5 mg Oral BID    [MAR Hold] insulin glargine  25 Units Subcutaneous Nightly    [MAR Hold] vitamin D3  400 Units Oral BID    [MAR Hold] bumetanide  2 mg Intravenous Daily     Continuous Infusions:   amiodarone Stopped (03/05/20 0655)    midazolam 2 mg/hr (03/05/20 1227)    CRRT dialysis builder 1,500 mL/hr (03/05/20 0747)    sodium chloride 100 mL/hr at 03/04/20 1102    dextrose      phenylephrine (CLAUDETTE-SYNEPHRINE) 50mg/250mL infusion Stopped (03/03/20 0215)    milrinone Stopped (03/04/20 0835)    norepinephrine Stopped (03/03/20 0353)    EPINEPHrine infusion Stopped (03/04/20 1515)    nitroGLYCERIN      dexmedetomidine (PRECEDEX) IV infusion Stopped (03/04/20 0830)     PRN Meds:   albuterol, 2.5 mg, Q6H PRN  sodium chloride flush, 10 mL, PRN  calcium chloride IVPB, 1 g, PRN  magnesium sulfate, 1 g, PRN  potassium chloride, 20 mEq, PRN  oxyCODONE-acetaminophen, 1 tablet, Q4H PRN    Or  oxyCODONE-acetaminophen, 2 tablet, Q4H PRN  fentanNYL, 25 mcg, Q1H PRN    Or  fentanNYL, 50 mcg, Q1H PRN  diphenhydrAMINE, 25 mg, Nightly PRN  hydrALAZINE, 5 mg, Q5 Min PRN  albumin human, 25 g, PRN  glucose, 15 g, PRN  dextrose, 12.5 g, PRN  glucagon (rDNA), 1 mg, PRN  dextrose, 100 mL/hr, PRN  bisacodyl, 10 mg, Daily PRN  bisacodyl, 5 mg, Daily PRN  phenylephrine (CLAUDETTE-SYNEPHRINE) 50mg/250mL infusion, 50 mcg/min, Continuous PRN  milrinone, 0.375 mcg/kg/min, Continuous PRN  norepinephrine, 0.01 mcg/kg/min, Continuous PRN  EPINEPHrine infusion, 0.01 mcg/kg/min, Continuous PRN  nitroGLYCERIN, 10 mcg/min, Continuous PRN  acetaminophen, 650 mg, Q4H PRN    Or  acetaminophen, 650 mg, Q4H PRN  ondansetron, 4 mg, Q6H PRN  [MAR Hold] heparin (porcine), 1,900 Units, PRN  [MAR Hold] heparin (porcine), 1,900 Units, PRN  [MAR Hold] LORazepam, 0.5 mg, Q6H PRN  [MAR Hold] heparin (porcine), 1,200 Units, PRN  [MAR Hold] heparin (porcine), 1,300 Units, PRN  [MAR Hold] nicotine, 1 patch, Daily PRN        SUPPORT DEVICES: [x] Ventilator [] BIPAP  [] Nasal Cannula [] Room Air    VENT SETTINGS (Comprehensive) (if applicable):    Vent Information  $Ventilation: $Subsequent Day  Ventilator Started: Yes  Ventilation Day(s): 3  Skin Assessment: Clean, dry, & intact  Equipment ID: [id number]  Equipment Changed: (S) HME  Vent Type: Servo i  Vent Mode: CPAP  Vt Ordered: 420 mL  Pressure Ordered: (S) 14  Rate Set: 18 bmp  Pressure Support: 8 cmH20  FiO2 : 30 %  Sensitivity: 3  PEEP/CPAP: 5  I Time/ I Time %: 0.9 s  Cuff Pressure (cm H2O): (mov)  Humidification Source: HME  Additional Respiratory  Assessments  Pulse: 80  Resp: 23  SpO2: 98 %  End Tidal CO2: 35 (%)  Position: Semi-Hampton's  Humidification Source: HME  Oral Care Completed?: Yes  Oral Care: Mouth swabbed, Mouth suctioned  Subglottic Suction Done?: Yes  Cuff Pressure (cm H2O): (mov)    ABGs:     Lab Results   Component Value Date    KJW9LXL 25 03/05/2020    FIO2 55.0 03/05/2020       DATA:  Complete Blood Count:   Recent Labs     03/03/20  0433 03/04/20  0538 03/05/20  0534   WBC 18.4* 18.2* 16.2*   RBC 2.93* 2.98* 2.71*   HGB 9.1* 9.1* 8.3*   HCT 27.1* 28.0* 25.6*   MCV 92.5 94.0 94.5   MCH 31.1 30.5 30.6   MCHC 33.6 32.5 32.4   RDW 15.3* 14.9* 14.4   PLT 89* See Reflexed IPF Result See Reflexed IPF Result   MPV 12.4 NOT REPORTED NOT REPORTED        Last 3 Blood Glucose:   Recent Labs     03/03/20  1851 03/03/20  2341 03/04/20  0538 03/04/20  1518 03/04/20  2131 03/05/20  0311 03/05/20  0534 03/05/20  1244   GLUCOSE 103* 127* 139* 132* 147* 145* 152* 206*        PT/INR:    Lab Results   Component Value Date    PROTIME 11.0 03/05/2020    INR 1.0 03/05/2020     PTT:    Lab Results   Component Value Date Morales Leo MD  3/5/2020 2:28 PM    Total critical care time caring for this patient with life threatening, unstable organ failure, including direct patient contact, management of life support systems, review of data including imaging and labs, discussions with other team members and physicians at least 27  Min so far today, excluding procedures.

## 2020-03-05 NOTE — PROGRESS NOTES
Dr Sebastian Bustillo by writer to see if able to discontinue pt chest tubes. RN awaiting response at this time.

## 2020-03-05 NOTE — PROGRESS NOTES
Dr Halima Taylor at pt bedside to review case, RN provided update on pt status. Pacer rate decreased to 60bpm from 80bpm, pt still ventricular pacing dependent at this time with underlying rhythm SB 40s with freq PACs. Discussion of PPM/AICD vs Life Vest if needed, order acknowledged for a repeat limited 2D echo to reassess pt post op EF.

## 2020-03-05 NOTE — PROGRESS NOTES
infusion, milrinone, norepinephrine, EPINEPHrine infusion, nitroGLYCERIN, acetaminophen **OR** acetaminophen, ondansetron, [MAR Hold] heparin (porcine), [MAR Hold] heparin (porcine), [MAR Hold] LORazepam, [MAR Hold] heparin (porcine), [MAR Hold] heparin (porcine), [MAR Hold] nicotine    Input/Output:       I/O last 3 completed shifts: In: 0363 [I.V.:3223; NG/GT:455]  Out: 5134 [Urine:280; Chest Tube:230]. Patient Vitals for the past 96 hrs (Last 3 readings):   Weight   20 0600 171 lb 11.8 oz (77.9 kg)   20 0325 177 lb 4 oz (80.4 kg)   20 0950 153 lb 7 oz (69.6 kg)       Vital Signs:   Temperature:  Temp: 97.5 °F (36.4 °C)  TMax:   Temp (24hrs), Av.7 °F (36.5 °C), Min:97.5 °F (36.4 °C), Max:97.9 °F (36.6 °C)    Respirations:  Resp: 18  Pulse:   Pulse: 88  BP:    BP: (!) 130/55  BP Range: Systolic (22PYG), TDO:479 , Min:121 , VTH:282       Diastolic (73SEI), VFS:44, Min:44, Max:61      Physical Examination:     General:  On mechanical intubation, Sedated  HEENT: ET tube positive  Eyes:   + conj pallor. Neck:   No bruits   Chest:   Bilateral vesicular breath sounds, + crackles   Cardiac:  S1 S2 RR, no murmurs, gallops or rubs. Abdomen: Soft, non-tender, no masses or organomegaly, . :   No suprapubic or flank tenderness. Neuro: On mechanical intubation sedated  SKIN:  No rashes, good skin turgor. Extremities:  +ve edema.     Labs:       Recent Labs     20  0433 20  0538 20  0534   WBC 18.4* 18.2* 16.2*   RBC 2.93* 2.98* 2.71*   HGB 9.1* 9.1* 8.3*   HCT 27.1* 28.0* 25.6*   MCV 92.5 94.0 94.5   MCH 31.1 30.5 30.6   MCHC 33.6 32.5 32.4   RDW 15.3* 14.9* 14.4   PLT 89* See Reflexed IPF Result See Reflexed IPF Result   MPV 12.4 NOT REPORTED NOT REPORTED      BMP:   Recent Labs     20  2131 20  0311 20  0534    138 137   K 4.1 3.9 3.9    102 103   CO2 18* 19* 19*   BUN 29* 28* 28*   CREATININE 4.07* 3.56* 3.26*   GLUCOSE 147* 145* 152* CALCIUM 9.4 9.4 9.3      Magnesium:    Recent Labs     03/04/20  2131 03/05/20  0311 03/05/20  0534   MG 2.2 2.0 2.2     TOBIAS:      Lab Results   Component Value Date    TOBIAS NEGATIVE 03/11/2013     SPEP:  Lab Results   Component Value Date    PROT 7.1 02/29/2020    PATH ELECTRONICALLY SIGNED. Silvia Jason M.D. 03/11/2013     C3:     Lab Results   Component Value Date    C3 162 03/11/2013     C4:     Lab Results   Component Value Date    C4 30 03/11/2013     Hep BsAg:         Lab Results   Component Value Date    HEPBSAG NONREACTIVE 02/22/2020     Hep C AB:          Lab Results   Component Value Date    HEPCAB NONREACTIVE 02/22/2020       Urinalysis/Chemistries:      Lab Results   Component Value Date    NITRU NEGATIVE 02/20/2020    COLORU YELLOW 02/20/2020    PHUR 6.0 02/20/2020    WBCUA 2 TO 5 02/20/2020    RBCUA 0 TO 2 02/20/2020    MUCUS NOT REPORTED 02/20/2020    TRICHOMONAS NOT REPORTED 02/20/2020    YEAST NOT REPORTED 02/20/2020    BACTERIA NOT REPORTED 02/20/2020    SPECGRAV 1.017 02/20/2020    LEUKOCYTESUR NEGATIVE 02/20/2020    UROBILINOGEN Normal 02/20/2020    BILIRUBINUR NEGATIVE 02/20/2020    GLUCOSEU 1+ 02/20/2020    KETUA TRACE 02/20/2020    AMORPHOUS NOT REPORTED 02/20/2020     Urine Sodium:     Lab Results   Component Value Date    BRENT 51 02/20/2020     Urine Creatinine:     Lab Results   Component Value Date    LABCREA 80.6 03/01/2019     Repeat cardiac echo shows LV function about 55%. No pericardial effusion noted. Radiology:     Chest x-ray showing vascular congestion    Assessment:       1. ESRD new start on hemodialysis during this admission, now with a tunneled hemodialysis catheter. 2. High anion gap metabolic acidosis, from renal failure : improved  3. Non-ST elevation myocardial infarction, cath results reviewed, s/p CABG done on 3/2/2020.  4. Decompensated congestive heart failure with echo showing evidence of decreased EF of 35% along with grade 2 diastolic dysfunction.   Also has moderate aortic stenosis and mitral valve regurgitation.  + Pulmonary hypertension, moderate  5. Anemia of chronic disease  6. Chronic tobacco abuse    Plan:     1. Will continue with CVVHD till her cartridge and system allow. Hemodynamic standpoint she potentially can be switched over to regular hemodialysis. 2.  Is currently not on any pressors with fairly stable blood pressures. .  3.  Continue to monitor strict I's and O's renal function. 4.  Chest tube removal as per CT surgery. 5.  Yesterday's cardiac echo report reviewed. 6.  Will follow. Patient's nurse was updated. Nutrition   Please ensure that patient is on a renal diet/TF. Avoid nephrotoxic drugs/contrast exposure. We will continue to follow along with you. Read MD Mary Jo  [facility]     This note is created with the assistance of a speech-recognition program. While intending to generate a document that actually reflects the content of the visit, no guarantees can be provided that every mistake has been identified and corrected by editing.

## 2020-03-05 NOTE — PROGRESS NOTES
Dr Kristi Yusuf and the CT surgery team at pt bedside. Plan for pt care today- continue to pull volume off with CVVHD, discuss extubation plans with pulmonary, and verbal order received to discontinue PA catheter.

## 2020-03-05 NOTE — PROGRESS NOTES
Mary Briones 21    Progress Note    3/5/2020    5:54 PM    Name:   Jaswant Solitario  MRN:     [id number]     Acct:      [de-identified]   Room:   [facility] Day:  15  Admit Date:  2/19/2020 11:01 PM    PCP:   Leopoldo Goring, PA-C  Code Status:  Full Code    Subjective:     C/C:   Chief Complaint   Patient presents with    Shortness of Breath     Interval History Status: not changed. Patient seen and examined this morning. Remains intubated and sedated at this time. Currently on a versed infusion for sedation. She is -2 RAAS. Currently on CRRT. BP has significantly improved. Restarting BB today. UOP still minimal. On CPAP right now with pressure support of 8. Brief History:     Mrs. Yang Mcarthur is a 78year old  female who presented to the emergency dept initially on 2/19/2020 with c/c of shortness of breath. Her shortness of breath initially was only with exertion, but then became present at rest as well. She denied chest pain at that time. She was hypoxic with EMS and upon arrival to the ED. CXR in the ED revealed pulmonary edema. Patient was placed on BIPAP therapy with improved O2 saturations. Comorbidities include tobacco abuse, CKD IV (baseline creatinine 3.2 - 3.5), prior left-sided CVA, hyperlipidemia, gout, DM II, COPD. Troponins were: 56 --> 160 --> 409 --> 476 --> 500 --> 365 --> 430 --> 1422. EKG with inferolateral ST depressions. She was diagnosed with NSTEMI. Creatinine noted to be 3.97. Prior ECHO 12/2016: EF >55%, mild pulm HTN. Gerson cath placed 02/21 and HD started 02/22. LHC on 2/24/2020 revealed: LMCA: 0% stenosis; LAD: Mid 99% in upper long branch (Reaching to apex) 80% in lower LAD branch; D1: proximal 90% stenosis; LCx: Mid 80% stenosis; OM1: Ostial 80% stenosis; OM2: Proximal 80% stenosis; RCA: 100% proximal stenosis, collateral from the left.     Peripheral Arteries and Lesion Findings  Descending aorta: Severe iliac disease in both sides  80% in the left side and 60-70% in the right side     The LV gram was performed in the BAUGH 30 position. LVEF: 35%. LV Wall Motion: Severe basal anterior and basal inferior hypokinesis     Permacath was placed in subclavian vein on 3/24    CABG on 3/2/2020  Increased work of breathing on 3/3. Re-intubated on 3/3. Undergoing HD. SBT daily. Possible extubation on 3/5/2020. Review of Systems:     Unable to obtain as patient is currently intubated and sedated    Medications:      Allergies:  No Known Allergies    Current Meds:   Scheduled Meds:    carvedilol  3.125 mg Oral BID WC    vancomycin  1,000 mg Intravenous Q24H    heparin (porcine)  5,000 Units Subcutaneous 3 times per day    insulin lispro  0-6 Units Subcutaneous TID WC    insulin lispro  0-3 Units Subcutaneous Nightly    sodium chloride flush  10 mL Intravenous 2 times per day    amiodarone  200 mg Oral BID    chlorhexidine  15 mL Mouth/Throat BID    mupirocin   Nasal BID    therapeutic multivitamin-minerals  1 tablet Oral Daily with breakfast    aspirin  81 mg Oral Daily    sennosides-docusate sodium  1 tablet Oral BID    clopidogrel  75 mg Oral Daily    famotidine  20 mg Oral Daily    Or    famotidine (PEPCID) injection  20 mg Intravenous Daily    allopurinol  300 mg Oral Daily    atorvastatin  40 mg Oral Nightly    folic acid  1 mg Oral Daily    vancomycin (VANCOCIN) intermittent dosing (placeholder)   Other RX Placeholder    [MAR Hold] carvedilol  37.5 mg Oral BID    [MAR Hold] insulin glargine  25 Units Subcutaneous Nightly    [MAR Hold] vitamin D3  400 Units Oral BID    [MAR Hold] bumetanide  2 mg Intravenous Daily     Continuous Infusions:    amiodarone Stopped (03/05/20 0814)    midazolam 3 mg/hr (03/05/20 1716)    CRRT dialysis builder 1,500 mL/hr (03/05/20 1652)    sodium chloride 100 mL/hr at 03/05/20 1635    dextrose      phenylephrine (CLAUDETTE-SYNEPHRINE) 50mg/250mL infusion Stopped (20 0215)    milrinone Stopped (20 0835)    norepinephrine Stopped (20 0353)    EPINEPHrine infusion Stopped (20 1515)    nitroGLYCERIN      dexmedetomidine (PRECEDEX) IV infusion Stopped (20 0830)     PRN Meds: albuterol, sodium chloride flush, calcium chloride IVPB, magnesium sulfate, potassium chloride, oxyCODONE-acetaminophen **OR** oxyCODONE-acetaminophen, fentanNYL **OR** fentanNYL, diphenhydrAMINE, hydrALAZINE, albumin human, glucose, dextrose, glucagon (rDNA), dextrose, bisacodyl, bisacodyl, phenylephrine (CLAUDETTE-SYNEPHRINE) 50mg/250mL infusion, milrinone, norepinephrine, EPINEPHrine infusion, nitroGLYCERIN, acetaminophen **OR** acetaminophen, ondansetron, [MAR Hold] heparin (porcine), [MAR Hold] heparin (porcine), [MAR Hold] LORazepam, [MAR Hold] heparin (porcine), [MAR Hold] heparin (porcine), [MAR Hold] nicotine    Data:     Past Medical History:   has a past medical history of Acute CHF (congestive heart failure) ([facility] [address] Anemia in chronic kidney disease, Anemia in stage 4 chronic kidney disease ([facility] [address] Cerebral artery occlusion with cerebral infarction [facility] Chronic kidney disease, Gout, arthritis, Hyperlipidemia, Hypertension, Left sided lacunar infarction [facility]), Peripheral vascular disease ([facility] [address] and Type II or unspecified type diabetes mellitus without mention of complication, not stated as uncontrolled. Social History:   reports that she has been smoking cigarettes. She started smoking about 47 years ago. She has a 40.00 pack-year smoking history. She uses smokeless tobacco. She reports that she does not drink alcohol or use drugs.      Family History:   Family History   Problem Relation Age of Onset    Diabetes Mother     Heart Disease Father        Vitals:  BP (!) 118/49   Pulse 69   Temp 97.9 °F (36.6 °C) (Axillary)   Resp 22   Ht 5' 3\" (1.6 m)   Wt 171 lb 11.8 oz (77.9 kg)   SpO2 99%   BMI 30.42 kg/m²   Temp (24hrs), Av.7 °F (36.5 °C), structures as lung markings appear to extend into the lung apex. Follow-up x-ray recommended. Xr Chest Portable    Result Date: 3/3/2020  No acute cardiopulmonary process. Support tubes as described above. Xr Chest Portable    Result Date: 3/3/2020  Unchanged chest radiograph. Xr Chest Portable    Result Date: 3/2/2020  Interval sternotomy and new life support apparatus. Mild edema or CHF unchanged. Ir Tunneled Cvc Place Wo Sq Port/pump > 5 Years    Result Date: 2/27/2020  Successful placement of 14.5 Greek by 23 cm tip to cuff palindrome dialysis catheter and right IJ. Okay to use dialysis catheter.        Physical Examination:        General appearance:  Intubated, sedated, awakens to voice, following commands  Mental Status:  Unable to obtain, as patient is intubated  Lungs:  Mechanical breath sounds; clear lung sounds, no wheezing appreciated  Heart:  regular rate and rhythm (78 bpm), no murmur  Chest wall: anterior chest wall with post-CABG changes, wound vac in place; epicardial pacemaker present  Abdomen:  Soft, nontender, nondistended, normal bowel sounds, no masses, hepatomegaly, splenomegaly  Extremities:  No edema, redness, tenderness in the calves  Lines: Right SC permacath; left IJ Tina with pigtail, Venegas present; right radial artery arterial line present  Skin:  no gross lesions, rashes, induration    Assessment:        Hospital Problems           Last Modified POA    * (Principal) Acute on chronic combined systolic and diastolic CHF (congestive heart failure) ([address] 2/27/2020 Yes    Thrombocytopenia ([address] 3/5/2020 No    Anemia in stage 4 chronic kidney disease ([address] 2/21/2020 Yes    Overview Signed 7/29/2017  8:09 PM by [facility]     Updating deleted diagnoses         Type 2 diabetes mellitus with diabetic chronic kidney disease ([address] 2/21/2020 Yes    Essential hypertension 2/21/2020 Yes    Hypercholesteremia 2/21/2020 Yes    HERNÁN (acute kidney injury) ([address] 2/20/2020 Yes

## 2020-03-05 NOTE — PROGRESS NOTES
Nutrition Assessment (Enteral Nutrition)    Type and Reason for Visit: Reassess    Nutrition Recommendations: Recommend continue Nepro at 35 ml per hour (1512 kcal, 68 g protein)    Nutrition Assessment: Pt is tolerating Nepro at 35 ml per hour (goal). No BM x 6 days. Discussion of changing CVVHD to HD noted. Malnutrition Assessment:  · Malnutrition Status: Meets the criteria for moderate malnutrition  · Context: Acute illness or injury  · Findings of the 6 clinical characteristics of malnutrition (Minimum of 2 out of 6 clinical characteristics is required to make the diagnosis of moderate or severe Protein Calorie Malnutrition based on AND/ASPEN Guidelines):  1. Energy Intake-Less than or equal to 75% of estimated energy requirement, (x 2 days )    2. Weight Loss-7.5% loss or greater, (x 2 wks )  3. Fat Loss-Unable to assess,    4. Muscle Loss-Unable to assess,    5. Fluid Accumulation-Mild fluid accumulation, Extremities, Generalized  6.  Strength-Not measured    Nutrition Risk Level: Moderate    Nutrition Needs:  · Estimated Daily Total Kcal: 19-22 ~> 5539-1536 kcals/d   · Estimated Daily Protein (g): 1.2-2.0 gm/kg ~> gms/d   Nutrition Diagnosis:   · Problem:  Moderate malnutrition, In context of acute illness or injury  · Etiology: related to Impaired respiratory function-inability to consume food, Insufficient energy/nutrient consumption     Signs and symptoms:  as evidenced by NPO status due to medical condition, Intubation, Weight loss greater than or equal to 2% in 1 week, Localized or generalized fluid accumulation    Objective Information:  · Wound Type: Multiple, Open Wounds, Surgical Wound  · Current Nutrition Therapies:  · Oral Diet Orders: NPO   · Tube Feeding (TF) Orders:   · Formula: Renal  · Rate (ml/hr):35 ml per hour    · Volume (ml/day): 840 ml  · Duration: Continuous  · Goal TF & Flush Orders Provides: 1512 kcal, 68 g protein  · Anthropometric Measures:  · Ht: 5' 3\" (160 cm)   · Current Body Wt: 171 lb (77.6 kg)  · Admission Body Wt: 169 lb (76.7 kg)  · Weight Change: 9.5% wt reduction x 2 wks    · Ideal Body Wt: 115 lb (52.2 kg), % Ideal Body 147% adm/ideal  · BMI Classification: BMI 30.0 - 34.9 Obese Class I(30.0)    Nutrition Interventions:   Continue current Tube Feeding  Continued Inpatient Monitoring, Education not appropriate at this time    Nutrition Evaluation:   · Evaluation: Goal achieved   · Goals: Provide greater than 75% of nutrition needs   · Monitoring: TF Intake, TF Tolerance, Wound Healing, Monitor Bowel Function, Pertinent Labs      Electronically signed by Greg Nixon RD, LD on 3/5/20 at 2:41 PM    Contact Number: [phone]

## 2020-03-05 NOTE — PROGRESS NOTES
[facility]  Daily Progress Note    Surgeon:  Dr. Bianka Cespedes      Subjective:  Ms. Evan Garibay is a 78y.o. year-old female status post CABG x 3 on 3/2/20 POD# 3. Patient was seen and examined at bedside this morning. Currently intubated on a Versed gtt following commands loosely. Vital Signs: BP (!) 130/55   Pulse 88   Temp 97.5 °F (36.4 °C) (Axillary)   Resp 18   Ht 5' 3\" (1.6 m)   Wt 171 lb 11.8 oz (77.9 kg)   SpO2 99%   BMI 30.42 kg/m²  O2 Flow Rate (L/min): 2 L/min   Admit Weight: Weight: 170 lb (77.1 kg)   WEIGHTWeight: 171 lb 11.8 oz (77.9 kg)     I/O's:  I/O last 3 completed shifts: In: 9599 [I.V.:3223; NG/GT:455]  Out: 5134 [Urine:280; Chest Tube:230]    Data:    CBC:   Recent Labs     03/03/20 0433 03/04/20  0538 03/05/20  0534   WBC 18.4* 18.2* 16.2*   HGB 9.1* 9.1* 8.3*   HCT 27.1* 28.0* 25.6*   MCV 92.5 94.0 94.5   PLT 89* See Reflexed IPF Result See Reflexed IPF Result     BMP:   Recent Labs     03/04/20  2131 03/05/20  0311 03/05/20  0534    138 137   K 4.1 3.9 3.9    102 103   CO2 18* 19* 19*   BUN 29* 28* 28*   CREATININE 4.07* 3.56* 3.26*     PT/INR:   Recent Labs     03/03/20 0433 03/04/20  0538 03/05/20  0534   PROTIME 11.3 10.5 11.0   INR 1.1 1.0 1.0     APTT:   Recent Labs     03/03/20 0433 03/04/20  0538 03/05/20  0534   APTT 37.9* 26.4 33.6*       Chest X-Ray:   EXAMINATION:   ONE XRAY VIEW OF THE CHEST       3/5/2020 6:40 am       COMPARISON:   03/04/2020       HISTORY:   ORDERING SYSTEM PROVIDED HISTORY:   TECHNOLOGIST PROVIDED HISTORY:   Reason for Exam:->Post op open heart surgery       FINDINGS:   An endotracheal tube, enteric tube, bilateral internal jugular central venous   catheters and chest tubes are noted.  The mediastinal and cardiac contours   are stable.  There are persistent bilateral perihilar opacities present.    There is worsened atelectasis within the right lower lobe.  No pneumothorax   is identified.           Impression 1. Stable position of support lines and tubes. 2. No pneumothorax identified.    3. Persistent pulmonary vascular congestion and suspected worsened volume   loss in the right lower lobe.             Scheduled Meds:    amiodarone  150 mg Intravenous Once    vancomycin  1,000 mg Intravenous Q24H    heparin (porcine)  5,000 Units Subcutaneous 3 times per day    ipratropium-albuterol  1 ampule Inhalation Q6H    insulin lispro  0-6 Units Subcutaneous TID WC    insulin lispro  0-3 Units Subcutaneous Nightly    sodium chloride flush  10 mL Intravenous 2 times per day    amiodarone  200 mg Oral BID    chlorhexidine  15 mL Mouth/Throat BID    mupirocin   Nasal BID    therapeutic multivitamin-minerals  1 tablet Oral Daily with breakfast    aspirin  81 mg Oral Daily    sennosides-docusate sodium  1 tablet Oral BID    clopidogrel  75 mg Oral Daily    famotidine  20 mg Oral Daily    Or    famotidine (PEPCID) injection  20 mg Intravenous Daily    allopurinol  300 mg Oral Daily    atorvastatin  40 mg Oral Nightly    folic acid  1 mg Oral Daily    vancomycin (VANCOCIN) intermittent dosing (placeholder)   Other RX Placeholder    [MAR Hold] carvedilol  37.5 mg Oral BID    [MAR Hold] insulin glargine  25 Units Subcutaneous Nightly    [MAR Hold] vitamin D3  400 Units Oral BID    [MAR Hold] bumetanide  2 mg Intravenous Daily     Continuous Infusions:    amiodarone Stopped (03/05/20 0722)    Followed by    amiodarone Stopped (03/05/20 0814)    midazolam 2 mg/hr (03/04/20 1300)    CRRT dialysis builder 1,500 mL/hr (03/05/20 0747)    sodium chloride 100 mL/hr at 03/04/20 1102    dextrose      phenylephrine (CLAUDETTE-SYNEPHRINE) 50mg/250mL infusion Stopped (03/03/20 0215)    milrinone Stopped (03/04/20 0835)    norepinephrine Stopped (03/03/20 0353)    EPINEPHrine infusion Stopped (03/04/20 1515)    nitroGLYCERIN      dexmedetomidine (PRECEDEX) IV infusion Stopped (03/04/20 0830)           Physical Exam:

## 2020-03-05 NOTE — PROGRESS NOTES
Dr Whitney Leonora with CT surgery at pt bedside to review case and receive update on pt status. No new orders received at this time. Will continue current plan of care and close monitoring of pt status. RN will update care team as needed.

## 2020-03-05 NOTE — PROGRESS NOTES
[facility]. No new orders received at this time.

## 2020-03-05 NOTE — PROGRESS NOTES
RIANA Kelley contacted via telephone by RN to confirm heparin SC for DVT prophylaxis was ok from CT surgery standpoint. Order was received by Dr Kaye Ocasio with pulmonary.

## 2020-03-05 NOTE — PROGRESS NOTES
Dr Philip Petit at pt bedside. Verbal order received to start CVVHD. Desired fluid balance to be -50ml/hr, but keep DFB 0 for the first hour after initiation.

## 2020-03-05 NOTE — PROGRESS NOTES
Dr Efraín Akbar returned call to previous message sent by Maurilio Garcia about pt chest tubes. Telephone order received at this time to discontinue both of pt chest tubes.

## 2020-03-05 NOTE — PROGRESS NOTES
Dr Delphina Meigs at pt bedside to review case and assess pt status. Will continue CVVHD until current cartridge life met or cartridge clots off, once cartridge done will convert to conventional hemo. Verbal order received to increase DFB to -100ml/hr. RN to notify nephrology when CVVHD stopped so they may get pt converted over to conventional hemo.

## 2020-03-05 NOTE — PROGRESS NOTES
[facility]   Progress Note                    Date:   3/5/2020  Patient name:  Lizett Ly  Date of admission:  2/19/2020 11:01 PM  MRN:   [id number]  YOB: 1950  PCP:    Dalton Huerta PA-C    Reason for Admission:  [facility] [N17.9]    Subjective:       Patient has been weaned off pressors and is no longer requiring pacing. She remains intubated. I/O last 3 completed shifts: In: 3678 [I.V.:3223; NG/GT:455]  Out: 8311 [Urine:280; Chest Tube:230]  No intake/output data recorded.       In: 3578 [I.V.:3223; NG/GT:355]  Out: 3668 [Urine:180]      Intake/Output Summary (Last 24 hours) at 3/5/2020 0730  Last data filed at 3/5/2020 0600  Gross per 24 hour   Intake 3678 ml   Output 4892 ml   Net -1214 ml       Medications:   Scheduled Meds:   amiodarone  150 mg Intravenous Once    vancomycin  1,000 mg Intravenous Q24H    heparin (porcine)  5,000 Units Subcutaneous 3 times per day    ipratropium-albuterol  1 ampule Inhalation Q6H    insulin lispro  0-6 Units Subcutaneous TID WC    insulin lispro  0-3 Units Subcutaneous Nightly    sodium chloride flush  10 mL Intravenous 2 times per day    amiodarone  200 mg Oral BID    chlorhexidine  15 mL Mouth/Throat BID    mupirocin   Nasal BID    therapeutic multivitamin-minerals  1 tablet Oral Daily with breakfast    aspirin  81 mg Oral Daily    sennosides-docusate sodium  1 tablet Oral BID    clopidogrel  75 mg Oral Daily    famotidine  20 mg Oral Daily    Or    famotidine (PEPCID) injection  20 mg Intravenous Daily    allopurinol  300 mg Oral Daily    atorvastatin  40 mg Oral Nightly    folic acid  1 mg Oral Daily    vancomycin (VANCOCIN) intermittent dosing (placeholder)   Other RX Placeholder    [MAR Hold] carvedilol  37.5 mg Oral BID    [MAR Hold] insulin glargine  25 Units Subcutaneous Nightly    [MAR Hold] vitamin D3  400 Units Oral BID    [MAR Hold] bumetanide  2 mg Intravenous Daily Continuous Infusions:   amiodarone Stopped (03/05/20 1855)    Followed by   [facility] amiodarone      midazolam 2 mg/hr (03/04/20 1300)    CRRT dialysis builder 1,500 mL/hr (03/04/20 2138)    sodium chloride 100 mL/hr at 03/04/20 1102    dextrose      phenylephrine (CLAUDETTE-SYNEPHRINE) 50mg/250mL infusion Stopped (03/03/20 0215)    milrinone Stopped (03/04/20 0835)    norepinephrine Stopped (03/03/20 0353)    EPINEPHrine infusion Stopped (03/04/20 1515)    nitroGLYCERIN      dexmedetomidine (PRECEDEX) IV infusion Stopped (03/04/20 0830)     CBC:   Recent Labs     03/03/20  0433 03/04/20  0538 03/05/20  0534   WBC 18.4* 18.2* 16.2*   HGB 9.1* 9.1* 8.3*   PLT 89* See Reflexed IPF Result See Reflexed IPF Result     BMP:    Recent Labs     03/04/20 2131 03/05/20 0311 03/05/20  0534    138 137   K 4.1 3.9 3.9    102 103   CO2 18* 19* 19*   BUN 29* 28* 28*   CREATININE 4.07* 3.56* 3.26*   GLUCOSE 147* 145* 152*     Hepatic:   No results for input(s): AST, ALT, ALB, BILITOT, ALKPHOS in the last 72 hours. Troponin: No results for input(s): TROPONINI in the last 72 hours. No results for input(s): TROPONINT in the last 72 hours. BNP:   No results for input(s): PROBNP in the last 72 hours. No results for input(s): BNP in the last 72 hours. Lipids: No results for input(s): CHOL, HDL in the last 72 hours. Invalid input(s): LDLCALCU  INR:   Recent Labs     03/03/20 0433 03/04/20  0538 03/05/20  0534   INR 1.1 1.0 1.0       Objective:   Vitals: BP (!) 130/51   Pulse 82   Temp 97.5 °F (36.4 °C) (Axillary)   Resp 18   Ht 5' 3\" (1.6 m)   Wt 171 lb 11.8 oz (77.9 kg)   SpO2 99%   BMI 30.42 kg/m²    General appearance: awake, alert, in no apparent distress. HEENT: Head: Normocephalic, atraumatic without any obvious abnormalities. Neck: JVD absent   Lungs:good bilateral equal air entry. No adventitious lung sounds auscultated. Heart: S1, S2, regular, + systolic murmur.   Abdomen: soft, nontender BAUGH 30 position. LVEF: 35%. LV Wall Motion: Severe basal anterior and basal inferior hypokinesis        · Hemodynamic Pressures                Site S/A (mmHg) D/V (mmHg) M/ED (mmHg)   Right Atrium 23 22 19   Right Ventricle 51 19 20   Pulmonary Artery 43 27 33   PCWP 26 26 25            · Oxygen Saturations     Site Oxygen Saturation (%)   Pulmonary Artery Main 54.5   Left Femoral Artery  89.5         · Cardiac Output     Calculation Method Cardiac Output (L/min) Cardiac Index (L/min/m2)   Christiano 4.25 2.4          Estimated Blood Loss: 10 mL     Conclusions:  1. Mildly elevated right heart pressures  2. Aortic valve gradient is not as bad as reported by echocardiogram: 9-11 mmHg  3. Severe multivessel CAD  4. Reduced LV systolic function with segmental wall motion abnormalities  5. Bilateral iliac disease         Assessment / Acute Cardiac Problems:     1. NSTEMI- s/p cath s/p CABG x3 (LIMA- LAD, SVG-D1, SVG2 - OM) on 3/2/2020  2. Acute CHF - volume overload in the setting of CKD  3. Preserved LVEF post CABG (36% prior to surgery)  4. Moderate AS - Mean gradient 20, CHEIKH (continuity : 0.87), no significant gradient noticed on cardiac cath   5. Moderate MR  6. B/L significant iliac disease on angiogram   7. CKD stage V-initiated on dialysis this admission  8. H/O CVA  9. DM-2  10. Hx smoking    Plan of Treatment:   1. Continue aspirin, Plavix, amiodarone and Lipitor  2. Was on Coreg 37.5 mg bid, lisinopril 10 and hydralazine 50 TID prior to CABG  3. Can start Coreg at 3.125 mg bid today if BP remains high  4. B/L Iliac disease - recommend outpatient follow-up with vascular surgery  5. Nephrology on board for dialysis management    Electronically signed on 03/05/20 at 7:30 AM by:    Zen López MD   Fellow, [address]       All reviewed  Patient was interviewed and examined by me personally.     Plan of treatments and testing were discussed and agreed on    Note was

## 2020-03-06 ENCOUNTER — APPOINTMENT (OUTPATIENT)
Dept: GENERAL RADIOLOGY | Age: 70
DRG: 233 | End: 2020-03-06
Payer: MEDICARE

## 2020-03-06 LAB
ALLEN TEST: NORMAL
ANION GAP SERPL CALCULATED.3IONS-SCNC: 14 MMOL/L (ref 9–17)
ANION GAP SERPL CALCULATED.3IONS-SCNC: 15 MMOL/L (ref 9–17)
ANION GAP SERPL CALCULATED.3IONS-SCNC: 16 MMOL/L (ref 9–17)
BUN BLDV-MCNC: 23 MG/DL (ref 8–23)
BUN BLDV-MCNC: 23 MG/DL (ref 8–23)
BUN BLDV-MCNC: 24 MG/DL (ref 8–23)
BUN/CREAT BLD: ABNORMAL (ref 9–20)
CALCIUM IONIZED: 1.25 MMOL/L (ref 1.13–1.33)
CALCIUM IONIZED: 1.28 MMOL/L (ref 1.13–1.33)
CALCIUM IONIZED: 1.29 MMOL/L (ref 1.13–1.33)
CALCIUM IONIZED: 1.33 MMOL/L (ref 1.13–1.33)
CALCIUM SERPL-MCNC: 10.2 MG/DL (ref 8.6–10.4)
CALCIUM SERPL-MCNC: 9.9 MG/DL (ref 8.6–10.4)
CALCIUM SERPL-MCNC: 9.9 MG/DL (ref 8.6–10.4)
CHLORIDE BLD-SCNC: 100 MMOL/L (ref 98–107)
CHLORIDE BLD-SCNC: 102 MMOL/L (ref 98–107)
CHLORIDE BLD-SCNC: 99 MMOL/L (ref 98–107)
CO2: 20 MMOL/L (ref 20–31)
CO2: 20 MMOL/L (ref 20–31)
CO2: 21 MMOL/L (ref 20–31)
CREAT SERPL-MCNC: 1.91 MG/DL (ref 0.5–0.9)
CREAT SERPL-MCNC: 1.99 MG/DL (ref 0.5–0.9)
CREAT SERPL-MCNC: 2.2 MG/DL (ref 0.5–0.9)
EKG ATRIAL RATE: 59 BPM
EKG P AXIS: 62 DEGREES
EKG P-R INTERVAL: 172 MS
EKG Q-T INTERVAL: 496 MS
EKG QRS DURATION: 90 MS
EKG QTC CALCULATION (BAZETT): 491 MS
EKG R AXIS: 66 DEGREES
EKG T AXIS: -9 DEGREES
EKG VENTRICULAR RATE: 59 BPM
FIO2: 30
GFR AFRICAN AMERICAN: 27 ML/MIN
GFR AFRICAN AMERICAN: 30 ML/MIN
GFR AFRICAN AMERICAN: 32 ML/MIN
GFR NON-AFRICAN AMERICAN: 22 ML/MIN
GFR NON-AFRICAN AMERICAN: 25 ML/MIN
GFR NON-AFRICAN AMERICAN: 26 ML/MIN
GFR SERPL CREATININE-BSD FRML MDRD: ABNORMAL ML/MIN/{1.73_M2}
GLUCOSE BLD-MCNC: 155 MG/DL (ref 70–99)
GLUCOSE BLD-MCNC: 171 MG/DL (ref 65–105)
GLUCOSE BLD-MCNC: 176 MG/DL (ref 65–105)
GLUCOSE BLD-MCNC: 182 MG/DL (ref 65–105)
GLUCOSE BLD-MCNC: 185 MG/DL (ref 70–99)
GLUCOSE BLD-MCNC: 186 MG/DL (ref 65–105)
GLUCOSE BLD-MCNC: 193 MG/DL (ref 70–99)
HCT VFR BLD CALC: 29.9 % (ref 36.3–47.1)
HEMOGLOBIN: 9.9 G/DL (ref 11.9–15.1)
HEPARIN INDUCED PLATELET ANTIBODY: 0.1 O.D. (ref 0–0.4)
INR BLD: 1
MAGNESIUM: 2 MG/DL (ref 1.6–2.6)
MAGNESIUM: 2 MG/DL (ref 1.6–2.6)
MAGNESIUM: 2.1 MG/DL (ref 1.6–2.6)
MCH RBC QN AUTO: 31.3 PG (ref 25.2–33.5)
MCHC RBC AUTO-ENTMCNC: 33.1 G/DL (ref 28.4–34.8)
MCV RBC AUTO: 94.6 FL (ref 82.6–102.9)
MODE: NORMAL
NEGATIVE BASE EXCESS, ART: 1 (ref 0–2)
NRBC AUTOMATED: 0 PER 100 WBC
O2 DEVICE/FLOW/%: NORMAL
PARTIAL THROMBOPLASTIN TIME: 30.8 SEC (ref 20.5–30.5)
PATIENT TEMP: NORMAL
PDW BLD-RTO: 14.4 % (ref 11.8–14.4)
PLATELET # BLD: 106 K/UL (ref 138–453)
PMV BLD AUTO: 12.7 FL (ref 8.1–13.5)
POC HCO3: 24.4 MMOL/L (ref 21–28)
POC O2 SATURATION: 96 % (ref 94–98)
POC PCO2 TEMP: NORMAL MM HG
POC PCO2: 43.5 MM HG (ref 35–48)
POC PH TEMP: NORMAL
POC PH: 7.36 (ref 7.35–7.45)
POC PO2 TEMP: NORMAL MM HG
POC PO2: 89.3 MM HG (ref 83–108)
POSITIVE BASE EXCESS, ART: NORMAL (ref 0–3)
POTASSIUM SERPL-SCNC: 3.5 MMOL/L (ref 3.7–5.3)
POTASSIUM SERPL-SCNC: 4 MMOL/L (ref 3.7–5.3)
POTASSIUM SERPL-SCNC: 4 MMOL/L (ref 3.7–5.3)
PROTHROMBIN TIME: 10.3 SEC (ref 9–12)
RBC # BLD: 3.16 M/UL (ref 3.95–5.11)
SAMPLE SITE: NORMAL
SODIUM BLD-SCNC: 134 MMOL/L (ref 135–144)
SODIUM BLD-SCNC: 136 MMOL/L (ref 135–144)
SODIUM BLD-SCNC: 137 MMOL/L (ref 135–144)
TCO2 (CALC), ART: 26 MMOL/L (ref 22–29)
WBC # BLD: 15.6 K/UL (ref 3.5–11.3)

## 2020-03-06 PROCEDURE — 6360000002 HC RX W HCPCS: Performed by: INTERNAL MEDICINE

## 2020-03-06 PROCEDURE — 6370000000 HC RX 637 (ALT 250 FOR IP): Performed by: NURSE PRACTITIONER

## 2020-03-06 PROCEDURE — P9041 ALBUMIN (HUMAN),5%, 50ML: HCPCS | Performed by: PHYSICIAN ASSISTANT

## 2020-03-06 PROCEDURE — 85027 COMPLETE CBC AUTOMATED: CPT

## 2020-03-06 PROCEDURE — 82330 ASSAY OF CALCIUM: CPT

## 2020-03-06 PROCEDURE — 6360000002 HC RX W HCPCS: Performed by: NURSE PRACTITIONER

## 2020-03-06 PROCEDURE — 2100000001 HC CVICU R&B

## 2020-03-06 PROCEDURE — 2580000003 HC RX 258: Performed by: NURSE PRACTITIONER

## 2020-03-06 PROCEDURE — 6370000000 HC RX 637 (ALT 250 FOR IP): Performed by: PHYSICIAN ASSISTANT

## 2020-03-06 PROCEDURE — 94770 HC ETCO2 MONITOR DAILY: CPT

## 2020-03-06 PROCEDURE — 82803 BLOOD GASES ANY COMBINATION: CPT

## 2020-03-06 PROCEDURE — 99232 SBSQ HOSP IP/OBS MODERATE 35: CPT | Performed by: INTERNAL MEDICINE

## 2020-03-06 PROCEDURE — 2700000000 HC OXYGEN THERAPY PER DAY

## 2020-03-06 PROCEDURE — 83735 ASSAY OF MAGNESIUM: CPT

## 2020-03-06 PROCEDURE — 71045 X-RAY EXAM CHEST 1 VIEW: CPT

## 2020-03-06 PROCEDURE — 94761 N-INVAS EAR/PLS OXIMETRY MLT: CPT

## 2020-03-06 PROCEDURE — 99291 CRITICAL CARE FIRST HOUR: CPT | Performed by: INTERNAL MEDICINE

## 2020-03-06 PROCEDURE — 2580000003 HC RX 258: Performed by: INTERNAL MEDICINE

## 2020-03-06 PROCEDURE — 82947 ASSAY GLUCOSE BLOOD QUANT: CPT

## 2020-03-06 PROCEDURE — 80048 BASIC METABOLIC PNL TOTAL CA: CPT

## 2020-03-06 PROCEDURE — 37799 UNLISTED PX VASCULAR SURGERY: CPT

## 2020-03-06 PROCEDURE — 85730 THROMBOPLASTIN TIME PARTIAL: CPT

## 2020-03-06 PROCEDURE — 6360000002 HC RX W HCPCS: Performed by: PHYSICIAN ASSISTANT

## 2020-03-06 PROCEDURE — 2500000003 HC RX 250 WO HCPCS: Performed by: NURSE PRACTITIONER

## 2020-03-06 PROCEDURE — 94003 VENT MGMT INPAT SUBQ DAY: CPT

## 2020-03-06 PROCEDURE — 85610 PROTHROMBIN TIME: CPT

## 2020-03-06 PROCEDURE — 97110 THERAPEUTIC EXERCISES: CPT

## 2020-03-06 PROCEDURE — 93005 ELECTROCARDIOGRAM TRACING: CPT | Performed by: STUDENT IN AN ORGANIZED HEALTH CARE EDUCATION/TRAINING PROGRAM

## 2020-03-06 PROCEDURE — 6370000000 HC RX 637 (ALT 250 FOR IP): Performed by: INTERNAL MEDICINE

## 2020-03-06 RX ORDER — POTASSIUM CHLORIDE 20 MEQ/1
TABLET, EXTENDED RELEASE ORAL
Status: DISPENSED
Start: 2020-03-06 | End: 2020-03-06

## 2020-03-06 RX ORDER — HEPARIN SODIUM 1000 [USP'U]/ML
1900 INJECTION, SOLUTION INTRAVENOUS; SUBCUTANEOUS ONCE
Status: COMPLETED | OUTPATIENT
Start: 2020-03-06 | End: 2020-03-06

## 2020-03-06 RX ORDER — POTASSIUM CHLORIDE 20 MEQ/1
20 TABLET, EXTENDED RELEASE ORAL 2 TIMES DAILY WITH MEALS
Status: DISCONTINUED | OUTPATIENT
Start: 2020-03-06 | End: 2020-03-06 | Stop reason: CLARIF

## 2020-03-06 RX ORDER — POTASSIUM CHLORIDE 20 MEQ/1
20 TABLET, EXTENDED RELEASE ORAL ONCE
Status: COMPLETED | OUTPATIENT
Start: 2020-03-06 | End: 2020-03-06

## 2020-03-06 RX ORDER — ALBUMIN, HUMAN INJ 5% 5 %
50 SOLUTION INTRAVENOUS ONCE
Status: COMPLETED | OUTPATIENT
Start: 2020-03-06 | End: 2020-03-06

## 2020-03-06 RX ADMIN — HEPARIN SODIUM 5000 UNITS: 5000 INJECTION INTRAVENOUS; SUBCUTANEOUS at 06:11

## 2020-03-06 RX ADMIN — POTASSIUM BICARBONATE 20 MEQ: 782 TABLET, EFFERVESCENT ORAL at 09:45

## 2020-03-06 RX ADMIN — Medication 1500 ML/HR: at 10:50

## 2020-03-06 RX ADMIN — Medication 15 ML: at 20:46

## 2020-03-06 RX ADMIN — INSULIN LISPRO 1 UNITS: 100 INJECTION, SOLUTION INTRAVENOUS; SUBCUTANEOUS at 18:20

## 2020-03-06 RX ADMIN — HEPARIN SODIUM 5000 UNITS: 5000 INJECTION INTRAVENOUS; SUBCUTANEOUS at 14:39

## 2020-03-06 RX ADMIN — HEPARIN SODIUM 1900 UNITS: 1000 INJECTION, SOLUTION INTRAVENOUS; SUBCUTANEOUS at 16:30

## 2020-03-06 RX ADMIN — Medication 1500 ML/HR: at 10:51

## 2020-03-06 RX ADMIN — Medication 1500 ML/HR: at 01:11

## 2020-03-06 RX ADMIN — ALBUMIN (HUMAN) 50 G: 12.5 INJECTION, SOLUTION INTRAVENOUS at 22:23

## 2020-03-06 RX ADMIN — Medication 15 ML: at 09:23

## 2020-03-06 RX ADMIN — CLOPIDOGREL 75 MG: 75 TABLET, FILM COATED ORAL at 08:59

## 2020-03-06 RX ADMIN — FENTANYL CITRATE 25 MCG: 50 INJECTION, SOLUTION INTRAMUSCULAR; INTRAVENOUS at 21:35

## 2020-03-06 RX ADMIN — FAMOTIDINE 20 MG: 10 INJECTION INTRAVENOUS at 08:59

## 2020-03-06 RX ADMIN — SODIUM CHLORIDE: 9 INJECTION, SOLUTION INTRAVENOUS at 03:21

## 2020-03-06 RX ADMIN — MUPIROCIN: 20 OINTMENT TOPICAL at 08:59

## 2020-03-06 RX ADMIN — ONDANSETRON 4 MG: 2 INJECTION INTRAMUSCULAR; INTRAVENOUS at 18:59

## 2020-03-06 RX ADMIN — POTASSIUM CHLORIDE 20 MEQ: 1500 TABLET, EXTENDED RELEASE ORAL at 15:10

## 2020-03-06 RX ADMIN — FENTANYL CITRATE 50 MCG: 50 INJECTION, SOLUTION INTRAMUSCULAR; INTRAVENOUS at 02:45

## 2020-03-06 RX ADMIN — SODIUM CHLORIDE, PRESERVATIVE FREE 10 ML: 5 INJECTION INTRAVENOUS at 19:57

## 2020-03-06 RX ADMIN — OXYCODONE HYDROCHLORIDE AND ACETAMINOPHEN 2 TABLET: 5; 325 TABLET ORAL at 09:01

## 2020-03-06 RX ADMIN — POTASSIUM CHLORIDE 20 MEQ: 400 INJECTION, SOLUTION INTRAVENOUS at 05:00

## 2020-03-06 RX ADMIN — INSULIN LISPRO 1 UNITS: 100 INJECTION, SOLUTION INTRAVENOUS; SUBCUTANEOUS at 09:07

## 2020-03-06 RX ADMIN — MUPIROCIN: 20 OINTMENT TOPICAL at 20:51

## 2020-03-06 RX ADMIN — OXYCODONE HYDROCHLORIDE AND ACETAMINOPHEN 2 TABLET: 5; 325 TABLET ORAL at 02:31

## 2020-03-06 RX ADMIN — INSULIN LISPRO 1 UNITS: 100 INJECTION, SOLUTION INTRAVENOUS; SUBCUTANEOUS at 20:47

## 2020-03-06 RX ADMIN — ALLOPURINOL 300 MG: 300 TABLET ORAL at 08:59

## 2020-03-06 RX ADMIN — FOLIC ACID 1 MG: 1 TABLET ORAL at 08:59

## 2020-03-06 RX ADMIN — Medication 1500 ML/HR: at 01:13

## 2020-03-06 RX ADMIN — MULTIPLE VITAMINS W/ MINERALS TAB 1 TABLET: TAB at 08:59

## 2020-03-06 RX ADMIN — SENNOSIDES AND DOCUSATE SODIUM 1 TABLET: 8.6; 5 TABLET ORAL at 08:59

## 2020-03-06 RX ADMIN — HEPARIN SODIUM 5000 UNITS: 5000 INJECTION INTRAVENOUS; SUBCUTANEOUS at 20:53

## 2020-03-06 RX ADMIN — SODIUM CHLORIDE, PRESERVATIVE FREE 10 ML: 5 INJECTION INTRAVENOUS at 09:51

## 2020-03-06 RX ADMIN — Medication 1500 ML/HR: at 01:12

## 2020-03-06 RX ADMIN — ASPIRIN 81 MG: 81 TABLET, COATED ORAL at 08:59

## 2020-03-06 RX ADMIN — Medication 1500 ML/HR: at 10:52

## 2020-03-06 RX ADMIN — INSULIN LISPRO 1 UNITS: 100 INJECTION, SOLUTION INTRAVENOUS; SUBCUTANEOUS at 12:09

## 2020-03-06 RX ADMIN — OXYCODONE HYDROCHLORIDE AND ACETAMINOPHEN 2 TABLET: 5; 325 TABLET ORAL at 13:01

## 2020-03-06 ASSESSMENT — PAIN SCALES - GENERAL
PAINLEVEL_OUTOF10: 0
PAINLEVEL_OUTOF10: 8
PAINLEVEL_OUTOF10: 0

## 2020-03-06 ASSESSMENT — PULMONARY FUNCTION TESTS
PIF_VALUE: 13
PIF_VALUE: 24
PIF_VALUE: 13
PIF_VALUE: 13
PIF_VALUE: 14
PIF_VALUE: 12

## 2020-03-06 NOTE — PROGRESS NOTES
Dr. Fantasma Phillip in at bedside. Updated on pt's progress. Orders received to SAM/C leigh and coreg.

## 2020-03-06 NOTE — PROGRESS NOTES
03/06/20 0445 -- -- -- 58 18 100 % --   03/06/20 0440 -- -- -- -- 17 99 % --   03/06/20 0430 -- -- -- 58 18 99 % --   03/06/20 0415 -- -- -- 58 18 99 % --   03/06/20 0400 (!) 84/36 97.9 °F (36.6 °C) Axillary 59 18 99 % --   03/06/20 0345 -- -- -- 59 18 99 % --   03/06/20 0330 -- -- -- 60 18 99 % --   03/06/20 0315 -- -- -- 62 21 100 % --   03/06/20 0306 -- -- -- 62 18 100 % --   03/06/20 0300 (!) 125/111 -- -- 61 18 100 % --   03/06/20 0245 -- -- -- 66 14 100 % --   03/06/20 0230 -- -- -- 63 18 100 % --   03/06/20 0200 (!) 110/53 -- -- -- -- 100 % --         Intake/Output Summary (Last 24 hours) at 3/6/2020 0958  Last data filed at 3/6/2020 0800  Gross per 24 hour   Intake 4549 ml   Output 9777 ml   Net -5228 ml     Date 03/06/20 0000 - 03/06/20 2359   Shift 7288-1646 0762-9169 3208-2241 24 Hour Total   INTAKE   I.V.(mL/kg) 1675(24.7)   1675(24.7)   NG/GT(mL/kg) 468(6.9)   468(6.9)   Shift Total(mL/kg) 8295(36.5)   6409(09.6)   OUTPUT   Urine(mL/kg/hr) 30(0.1)   30   CRRT 2358 291  2649   Shift Total(mL/kg) 5937(99.8) 291(4.3)  7892(02.1)   Weight (kg) 67.9 67.9 67.9 67.9     Wt Readings from Last 3 Encounters:   03/06/20 149 lb 11.1 oz (67.9 kg)   11/21/19 170 lb (77.1 kg)   09/05/19 169 lb 3.2 oz (76.7 kg)     Body mass index is 26.52 kg/m².         PHYSICAL EXAM:  Patient is intubated left IJ [place]-Joo in place right dialysis catheter  Lungs rhonchi bilaterally harsh vesicular breath sounds  CVS S1-S2 regular  Abdomen soft nontender bowel sounds are present  Lower extremity edema  Neuro patient follows commands of sedation    MEDICATIONS:  Scheduled Meds:   potassium chloride        potassium bicarb-citric acid  20 mEq Oral Once    potassium bicarb-citric acid        carvedilol  3.125 mg Oral BID WC    vancomycin  1,000 mg Intravenous Q24H    heparin (porcine)  5,000 Units Subcutaneous 3 times per day    insulin lispro  0-6 Units Subcutaneous TID WC    insulin lispro  0-3 Units Subcutaneous Nightly    REPORTED NOT REPORTED 12.7        Last 3 Blood Glucose:   Recent Labs     03/04/20  1518 03/04/20  2131 03/05/20  0311 03/05/20  0534 03/05/20  1244 03/05/20  1715 03/05/20 2059 03/06/20  0315   GLUCOSE 132* 147* 145* 152* 206* 161* 134* 193*        PT/INR:    Lab Results   Component Value Date    PROTIME 10.3 03/06/2020    INR 1.0 03/06/2020     PTT:    Lab Results   Component Value Date    APTT 30.8 03/06/2020       Comprehensive Metabolic Profile:   Recent Labs     03/05/20  1715 03/05/20 2059 03/06/20  0315    138 134*   K 3.7 3.6* 3.5*    102 99   CO2 22 19* 21   BUN 25* 25* 23   CREATININE 2.63* 2.57* 2.20*   GLUCOSE 161* 134* 193*   CALCIUM 9.9 10.0 9.9      Magnesium:   Lab Results   Component Value Date    MG 2.1 03/06/2020    MG 2.1 03/05/2020    MG 2.2 03/05/2020     Phosphorus:   Lab Results   Component Value Date    PHOS 6.0 02/27/2020    PHOS 3.9 02/25/2020    PHOS 5.5 02/22/2020     Ionized Calcium:   Lab Results   Component Value Date    CAION 1.28 03/06/2020    CAION 1.23 03/05/2020    CAION 1.28 03/05/2020        Urinalysis:   Lab Results   Component Value Date    NITRU NEGATIVE 02/20/2020    COLORU YELLOW 02/20/2020    PHUR 6.0 02/20/2020    WBCUA 2 TO 5 02/20/2020    RBCUA 0 TO 2 02/20/2020    MUCUS NOT REPORTED 02/20/2020    TRICHOMONAS NOT REPORTED 02/20/2020    YEAST NOT REPORTED 02/20/2020    BACTERIA NOT REPORTED 02/20/2020    SPECGRAV 1.017 02/20/2020    LEUKOCYTESUR NEGATIVE 02/20/2020    UROBILINOGEN Normal 02/20/2020    BILIRUBINUR NEGATIVE 02/20/2020    GLUCOSEU 1+ 02/20/2020    KETUA TRACE 02/20/2020    AMORPHOUS NOT REPORTED 02/20/2020       HgBA1c:    Lab Results   Component Value Date    LABA1C 6.8 02/21/2020     TSH:    Lab Results   Component Value Date    TSH 4.66 12/19/2016       Lactic Acid: No results found for: LACTA   Troponin: No results for input(s): TROPONINI in the last 72 hours.     Microbiology:  No + cultures       Xray   Pulmonary vascular congestion PO        INSULIN DRIP:   [x] No   [] Yes    CONSULTATION NEEDED:  [] No   [x] Yes    FAMILY UPDATED:    [] No   [x] Yes    TRANSFER OUT OF ICU:   [x] No   [] Yes    ADDITIONAL PLAN:  -Patient weaning comfortably, will reassess in afternoon for  extubation   On CVVHD for volume removal, plan for conventional hemodialysis tomorrow  Tube feeds at 35 mL/h, tolerating  Well  Monitor  blood glucose  Continue  DuoNeb  Continue  aspirin, Plavix, amiodarone, Lipitor  Heparin For DVT prophylax  Aron Chavez MD  3/6/2020 9:58 AM

## 2020-03-06 NOTE — PROGRESS NOTES
[facility]   Progress Note                    Date:   3/6/2020  Patient name:  Isauar Soto  Date of admission:  2/19/2020 11:01 PM  MRN:   [id number]  YOB: 1950  PCP:    Phoenix Brown PA-C    Reason for Admission:  [facility] [N17.9]    Subjective:       Patient has been doing well. She received CVVH yesterday and her vent requirements have improved. She has been on CPAP setting for the last 8 hours. Heart rate has been in 50s, although her blood pressure has been stable. During the day, patient had pauses without pacemaker output. I/O last 3 completed shifts: In: 0043 [I.V.:3521; NG/GT:1128]  Out: 49009 [Urine:145; Chest Tube:20]  No intake/output data recorded.       In: 6828 [I.V.:3521; NG/GT:978]  Out: 5881 [Urine:70]      Intake/Output Summary (Last 24 hours) at 3/6/2020 0729  Last data filed at 3/6/2020 0615  Gross per 24 hour   Intake 4649 ml   Output 66468 ml   Net -5478 ml       Medications:   Scheduled Meds:   carvedilol  3.125 mg Oral BID WC    vancomycin  1,000 mg Intravenous Q24H    heparin (porcine)  5,000 Units Subcutaneous 3 times per day    insulin lispro  0-6 Units Subcutaneous TID WC    insulin lispro  0-3 Units Subcutaneous Nightly    sodium chloride flush  10 mL Intravenous 2 times per day    amiodarone  200 mg Oral BID    chlorhexidine  15 mL Mouth/Throat BID    mupirocin   Nasal BID    therapeutic multivitamin-minerals  1 tablet Oral Daily with breakfast    aspirin  81 mg Oral Daily    sennosides-docusate sodium  1 tablet Oral BID    clopidogrel  75 mg Oral Daily    famotidine  20 mg Oral Daily    Or    famotidine (PEPCID) injection  20 mg Intravenous Daily    allopurinol  300 mg Oral Daily    atorvastatin  40 mg Oral Nightly    folic acid  1 mg Oral Daily    vancomycin (VANCOCIN) intermittent dosing (placeholder)   Other RX Placeholder    [MAR Hold] carvedilol  37.5 mg Oral BID    [MAR Hold] insulin glargine  25 Units Subcutaneous Nightly    [MAR Hold] vitamin D3  400 Units Oral BID    [MAR Hold] bumetanide  2 mg Intravenous Daily     Continuous Infusions:   amiodarone 0.5 mg/min (03/05/20 2057)    midazolam 2 mg/hr (03/06/20 0510)    CRRT dialysis builder 1,500 mL/hr (03/06/20 0113)    sodium chloride 100 mL/hr at 03/06/20 0321    dextrose      phenylephrine (CLAUDETTE-SYNEPHRINE) 50mg/250mL infusion Stopped (03/03/20 0215)    milrinone Stopped (03/04/20 0835)    norepinephrine Stopped (03/03/20 0353)    EPINEPHrine infusion Stopped (03/04/20 1515)    nitroGLYCERIN      dexmedetomidine (PRECEDEX) IV infusion Stopped (03/04/20 0830)     CBC:   Recent Labs     03/04/20 0538 03/05/20 0534 03/06/20  0440   WBC 18.2* 16.2* 15.6*   HGB 9.1* 8.3* 9.9*   PLT See Reflexed IPF Result See Reflexed IPF Result 106*     BMP:    Recent Labs     03/05/20 1715 03/05/20 2059 03/06/20 0315    138 134*   K 3.7 3.6* 3.5*    102 99   CO2 22 19* 21   BUN 25* 25* 23   CREATININE 2.63* 2.57* 2.20*   GLUCOSE 161* 134* 193*     Hepatic:   No results for input(s): AST, ALT, ALB, BILITOT, ALKPHOS in the last 72 hours. Troponin: No results for input(s): TROPONINI in the last 72 hours. No results for input(s): TROPONINT in the last 72 hours. BNP:   No results for input(s): PROBNP in the last 72 hours. No results for input(s): BNP in the last 72 hours. Lipids: No results for input(s): CHOL, HDL in the last 72 hours. Invalid input(s): LDLCALCU  INR:   Recent Labs     03/04/20 0538 03/05/20 0534 03/06/20  0440   INR 1.0 1.0 1.0       Objective:   Vitals: BP (!) 105/44   Pulse 59   Temp 97.9 °F (36.6 °C) (Axillary)   Resp 17   Ht 5' 3\" (1.6 m)   Wt 149 lb 11.1 oz (67.9 kg)   SpO2 100%   BMI 26.52 kg/m²    General appearance: Intubated and sedated  HEENT: Head: Normocephalic, atraumatic without any obvious abnormalities. Neck: JVD absent   Lungs:good bilateral equal air entry.  No adventitious lung sounds auscultated. Heart: S1, S2, regular, + systolic murmur. Abdomen: soft, nontender with bowel sounds present in all 4 quadrants. Extremities: b/l femoral access site - no signs of hematoma   Integumentum:Intact with no rashes noted.       Diagnostic Studies:     EKG:   Sinus rhythm with possible old septal infarct and inferolateral ST depressions     ECHO:  3/4/2020 :  Global left ventricular systolic function is normal. Calculated ejection  fraction is 55 % by heart Model . Abnormal septal motion due to BBB or Paced rhythm  Normal right ventricular function. Pacemaker / ICD lead seen in right  ventricle. No pericardial effusion seen. 2/21/2020  Left ventricle is normal in size. Global left ventricular systolic function is moderately reduced. Calculated EF via heart model is 36%. Mild left ventricular hypertrophy. Grade II (moderate) left ventricular diastolic dysfunction. Left atrium is mildly dilated. The aortic valve is calcified with reduced cusp mobility. Moderate aortic valve stenosis with a mean gradient of 20 mmHg. Maybe underestimated due to poor LV function. Trivial aortic insufficiency. Thickened mitral valve leaflets. Mitral annular calcification is seen. At least Moderate mitral regurgitation. Mild tricuspid regurgitation. Moderate pulmonary hypertension with an estimated right ventricular systolic pressure of 56 mmHg  Mild pulmonic insufficiency. Small pericardial effusion. IVC Increased diameter and impaired or no inspiratory variation indicating elevated RA filling pressure (i.e. CVP) . Cath 02/24/2020  LMCA: Normal 0% stenosis.     LAD: Mid 99% in upper long branch (Reaching to apex) 80% in lower LAD branch.   D1: proximal 90% stenosis     LCx: Mid 80% stenosis  OM1: Ostial 80% stenosis  OM2: Proximal 80% stenosis     RCA: 100% proximal stenosis  Collateral from the left        Peripheral Arteries and Lesion Findings  Descending aorta: Severe iliac disease in both sides  80% in the left side and 60-70% in the right side     The LV gram was performed in the BAUGH 30 position. LVEF: 35%. LV Wall Motion: Severe basal anterior and basal inferior hypokinesis        · Hemodynamic Pressures                Site S/A (mmHg) D/V (mmHg) M/ED (mmHg)   Right Atrium 23 22 19   Right Ventricle 51 19 20   Pulmonary Artery 43 27 33   PCWP 26 26 25            · Oxygen Saturations     Site Oxygen Saturation (%)   Pulmonary Artery Main 54.5   Left Femoral Artery  89.5         · Cardiac Output     Calculation Method Cardiac Output (L/min) Cardiac Index (L/min/m2)   Christiano 4.25 2.4          Estimated Blood Loss: 10 mL     Conclusions:  1. Mildly elevated right heart pressures  2. Aortic valve gradient is not as bad as reported by echocardiogram: 9-11 mmHg  3. Severe multivessel CAD  4. Reduced LV systolic function with segmental wall motion abnormalities  5. Bilateral iliac disease         Assessment / Acute Cardiac Problems:     1. NSTEMI- s/p cath s/p CABG x3 (LIMA- LAD, SVG-D1, SVG2 - OM) on 3/2/2020  2. Bradycardia Post Op  3. Acute CHF - volume overload in the setting of CKD  4. Preserved LVEF post CABG (36% prior to surgery)  5. Moderate AS - Mean gradient 20, CHEIKH (continuity : 0.87), no significant gradient noticed on cardiac cath   6. Moderate MR  7. B/L significant iliac disease on angiogram   8. CKD stage V-initiated on dialysis this admission  9. H/O CVA  10. DM-2  11. Hx smoking    Plan of Treatment:   1. Continue aspirin, Plavix,  and Lipitor  2. Discontinue amiodarone drip due to bradycardia  3. Was on Coreg 37.5 mg bid, lisinopril 10 and hydralazine 50 TID prior to CABG  4. Continue to hold Coreg until heart rate improves  5. B/L Iliac disease - recommend outpatient follow-up with vascular surgery  6.  Nephrology on board for dialysis management    Electronically signed on 03/06/20 at 7:29 AM by:    Ariel Joyce MD   Fellow, Cardiovascular Diseases  [facility]. [address]     I have reviewed the case / procedure with resident / fellow  I have examined the patient personally  Patient agree with treatment plan, correction innotes was made as appropriate, and discussed final arrangement based on  my evaluation and exam.    Risk and benefit of procedure if planned were explained in details. Procedure was performed by me, with all aspect of the procedure being done using standard protocol. Note was modified based on my own assessment and treatment.     Siva Birmingham MD  [facility]

## 2020-03-06 NOTE — PROGRESS NOTES
[id number] [facility]  Daily Progress Note    Surgeon:  Dr. Maurine Oppenheim      Subjective:  Ms. Laurence Young is a 78y.o. year-old female status post CABG x 3 on 3/2/20 POD# 4. Patient was seen and examined at bedside this morning. Currently intubated on mild sedation. Vital Signs: BP (!) 105/44   Pulse 58   Temp 97.9 °F (36.6 °C) (Axillary)   Resp 12   Ht 5' 3\" (1.6 m)   Wt 149 lb 11.1 oz (67.9 kg)   SpO2 100%   BMI 26.52 kg/m²  O2 Flow Rate (L/min): 2 L/min   Admit Weight: Weight: 170 lb (77.1 kg)   WEIGHTWeight: 149 lb 11.1 oz (67.9 kg)     I/O's:  I/O last 3 completed shifts: In: 6552 [I.V.:3521; NG/GT:1128]  Out: 56104 [Urine:145; Chest Tube:20]    Data:    CBC:   Recent Labs     03/04/20 0538 03/05/20  0534 03/06/20  0440   WBC 18.2* 16.2* 15.6*   HGB 9.1* 8.3* 9.9*   HCT 28.0* 25.6* 29.9*   MCV 94.0 94.5 94.6   PLT See Reflexed IPF Result See Reflexed IPF Result 106*     BMP:   Recent Labs     03/05/20  1715 03/05/20 2059 03/06/20  0315    138 134*   K 3.7 3.6* 3.5*    102 99   CO2 22 19* 21   BUN 25* 25* 23   CREATININE 2.63* 2.57* 2.20*     PT/INR:   Recent Labs     03/04/20  0538 03/05/20  0534 03/06/20  0440   PROTIME 10.5 11.0 10.3   INR 1.0 1.0 1.0     APTT:   Recent Labs     03/04/20  0538 03/05/20  0534 03/06/20  0440   APTT 26.4 33.6* 30.8*       Chest X-Ray:  EXAMINATION:   ONE XRAY VIEW OF THE CHEST       3/6/2020 5:52 am       COMPARISON:   March 5, 2020, March 4, 2020       HISTORY:   ORDERING SYSTEM PROVIDED HISTORY: s/p OHS   TECHNOLOGIST PROVIDED HISTORY:   s/p OHS       FINDINGS:   The endotracheal tube terminates in appropriate position above the nora.    The enteric tube terminates in the body of the stomach.  Left chest tube has   been removed.  No pneumothorax on the left side.  Small right apical   pneumothorax is now evident.  Similar appearance of right basilar   atelectasis.  [place]-Joo catheter has been removed.  The left internal jugular   sheath

## 2020-03-06 NOTE — OP NOTE
applied, and a dose of cold [place] solution was given to achieve  an adequate diastolic arrest.  Topical ice was applied and I looked  about the heart with the above-mentioned findings. I therefore  conducted the operation by performing the distal anastomoses of the  reversed saphenous vein grafts first to the OM, then to the D1. I then  performed skeletonized LIMA to LAD anastomosis and lastly connected the  proximal portions of the reversed saphenous vein grafts to their origins  at the ascending aorta. The heart was de-aired, the cross-clamp was  removed, and there was the eventual return of normal sinus rhythm. A  ventricular wire was placed. I was able to wean the patient from  cardiopulmonary bypass with good hemodynamics on minimal inotropic  support. Satisfied with this, I thus decannulated and administered  protamine. Hemostasis was achieved and verified. Chest tubes were  placed. Ultimately, her chest was closed with my SternaLock system for  the reasons described above. The remainder of the incisions were closed  in the usual layered fashion. The patient tolerated the procedure well  and was transported to the CVICU in stable condition. There were no adequately trained or available residents for assistance  in this operation, and the presence of Henrietta Sails was critical for the  independent performance of the vein harvest as well as the first  assistance necessary to complete the operation. VILLA Snow MD    D: 03/05/2020 10:28:30       T: 03/05/2020 11:11:13     [email or web address]  Job#: [id number]     Doc#: [id number]    CC:

## 2020-03-06 NOTE — PROGRESS NOTES
acid  20 mEq Oral Once    potassium bicarb-citric acid        carvedilol  3.125 mg Oral BID     heparin (porcine)  5,000 Units Subcutaneous 3 times per day    insulin lispro  0-6 Units Subcutaneous TID     insulin lispro  0-3 Units Subcutaneous Nightly    sodium chloride flush  10 mL Intravenous 2 times per day    amiodarone  200 mg Oral BID    chlorhexidine  15 mL Mouth/Throat BID    mupirocin   Nasal BID    therapeutic multivitamin-minerals  1 tablet Oral Daily with breakfast    aspirin  81 mg Oral Daily    sennosides-docusate sodium  1 tablet Oral BID    clopidogrel  75 mg Oral Daily    famotidine  20 mg Oral Daily    Or    famotidine (PEPCID) injection  20 mg Intravenous Daily    allopurinol  300 mg Oral Daily    atorvastatin  40 mg Oral Nightly    folic acid  1 mg Oral Daily    [MAR Hold] carvedilol  37.5 mg Oral BID    [MAR Hold] insulin glargine  25 Units Subcutaneous Nightly    [MAR Hold] vitamin D3  400 Units Oral BID    [MAR Hold] bumetanide  2 mg Intravenous Daily     Continuous Infusions:    amiodarone Stopped (03/06/20 8328)    midazolam Stopped (03/06/20 0800)    CRRT dialysis builder 1,500 mL/hr (03/06/20 0113)    sodium chloride 100 mL/hr at 03/06/20 0321    dextrose      phenylephrine (CLAUDETTE-SYNEPHRINE) 50mg/250mL infusion Stopped (03/03/20 0215)    milrinone Stopped (03/04/20 0835)    norepinephrine Stopped (03/03/20 0353)    EPINEPHrine infusion Stopped (03/04/20 1515)    nitroGLYCERIN      dexmedetomidine (PRECEDEX) IV infusion Stopped (03/04/20 0830)     PRN Meds:  albuterol, sodium chloride flush, calcium chloride IVPB, magnesium sulfate, potassium chloride, oxyCODONE-acetaminophen **OR** oxyCODONE-acetaminophen, fentanNYL **OR** fentanNYL, diphenhydrAMINE, hydrALAZINE, albumin human, glucose, dextrose, glucagon (rDNA), dextrose, bisacodyl, bisacodyl, phenylephrine (CLAUDETTE-SYNEPHRINE) 50mg/250mL infusion, milrinone, norepinephrine, EPINEPHrine infusion,

## 2020-03-06 NOTE — PROGRESS NOTES
Dr. Roy Clamp in at bedside. Updated on pt's progress.  Ok to extubate this afternoon (1600)  if pt tolerates weaning trial.

## 2020-03-06 NOTE — PROGRESS NOTES
Dr. Glover Parent in at bedside. Updated on pt's progress. Orders received for 20mew of K. Continue CVVHD til this afternoon 1600.

## 2020-03-06 NOTE — PLAN OF CARE
Problem: Gas Exchange - Impaired:  Goal: Levels of oxygenation will improve  Description  Levels of oxygenation will improve  Outcome: Ongoing  Note:   PROVIDE ADEQUATE OXYGENATION WITH ACCEPTABLE SP02/ABG'S    [x]  IDENTIFY APPROPRIATE OXYGEN THERAPY  [x]   MONITOR SP02/ABG'S AS NEEDED   [x]   PATIENT EDUCATION AS NEEDED        Problem: RESPIRATORY  Intervention: Respiratory assessment  Note:   KATHIA ALVAREZ, PPatient Assessment complete. [facility] [N17.9[facility] . Vitals:    03/06/20 1756   BP:    Pulse: 53   Resp: 22   Temp:    SpO2: 99%   . Patients home meds are   Prior to Admission medications    Medication Sig Start Date End Date Taking?  Authorizing Provider   clopidogrel (PLAVIX) 75 MG tablet Take 1 tablet by mouth daily 2/20/20   Vianey Ram PA-C   sodium bicarbonate 650 MG tablet TAKE 1 TABLET BY MOUTH TWICE DAILY 2/17/20   Too Lisa MD   folic acid (FOLVITE) 1 MG tablet Take 1 tablet by mouth 2 times daily 11/21/19   Vianey Ram PA-C   labetalol (NORMODYNE) 300 MG tablet TAKE 1 TABLET BY MOUTH TWICE DAILY 11/21/19   Vianey Ram PA-C   allopurinol (ZYLOPRIM) 300 MG tablet Take 1 tablet by mouth daily 5/23/19   Isa Byers MD   Insulin Pen Needle 31G X 4 MM MISC 4 times daily 5/23/19   Isa Byers MD   insulin glargine (LANTUS SOLOSTAR) 100 UNIT/ML injection pen 25 units in skin every night 5/23/19   Isa Byers MD   magnesium hydroxide (MILK OF MAGNESIA) 400 MG/5ML suspension Take 30 mLs by mouth daily as needed for Constipation 12/20/16   Mary Beth Elizondo MD   glucose monitoring kit (FREESTYLE) monitoring kit 1 12/20/16   Roxanne Velasco MD   Alcohol Swabs (ALCOHOL PREP) 70 % PADS 4 time daily 12/20/16   Roxanne Velasco MD   FREESTYLE LANCETS MISC 1 each by Does not apply route daily 12/20/16   Roxanne Velasco MD   Glucose Blood (BLOOD GLUCOSE TEST STRIPS) STRP Test 4 times daily Diagnosis 12/20/16   Roxanne Velasco MD   Lancet Device MISC 1 Device by Surgery within last 2 weeks []  None or general   []  Abdominal or thoracic surgery  []  Abdominal or thoracic   Chronic Pulmonary Historyre []  No []  Yes []  Yes     []  Secretion Management Assessment  Score 1 2 3   Bilateral Breath Sounds   []  Occasional Rhonchi []  Scattered Rhonchi []  Course Rhonchi and/or poor aeration   Sputum    []  Small amount of thin secretions []  Moderate amount of viscous secretions []  Copius, Viscious Yellow/ Secretions   CXR as reported by physician []  clear  []  Unavailable []  Infiltrates and/or consolidation  []  Unavailable []  Mucus Plugging and or lobar consolidation  []  Unavailable   Cough []  Strong, productive cough []  Weak productive cough []  No cough or weak non-productive cough   KATHIA ALVAREZ  6:26 PM                            FEMALE                                  MALE                            FEV1 Predicted Normal Values                        FEV1 Predicted Normal Values          Age                                     Height in Feet and Inches       Age                                     Height in Feet and Inches       4' 11\" 5' 1\" 5' 3\" 5' 5\" 5' 7\" 5' 9\" 5' 11\" 6' 1\"  4' 11\" 5' 1\" 5' 3\" 5' 5\" 5' 7\" 5' 9\" 5' 11\" 6' 1\"   42 - 45 2.49 2.66 2.84 3.03 3.22 3.42 3.62 3.83 42 - 45 2.82 3.03 3.26 3.49 3.72 3.96 4.22 4.47   46 - 49 2.40 2.57 2.76 2.94 3.14 3.33 3.54 3.75 46 - 49 2.70 2.92 3.14 3.37 3.61 3.85 4.10 4.36   50 - 53 2.31 2.48 2.66 2.85 3.04 3.24 3.45 3.66 50 - 53 2.58 2.80 3.02 3.25 3.49 3.73 3.98 4.24   54 - 57 2.21 2.38 2.57 2.75 2.95 3.14 3.35 3.56 54 - 57 2.46 2.67 2.89 3.12 3.36 3.60 3.85 4.11   58 - 61 2.10 2.28 2.46 2.65 2.84 3.04 3.24 3.45 58 - 61 2.32 2.54 2.76 2.99 3.23 3.47 3.72 3.98   62 - 65 1.99 2.17 2.35 2.54 2.73 2.93 3.13 3.34 62 - 65 2.19 2.40 2.62 2.85 3.09 3.33 3.58 3.84   66 - 69 1.88 2.05 2.23 2.42 2.61 2.81 3.02 3.23 66 - 69 2.04 2.26 2.48 2.71 2.95 3.19 3.44 3.70   70+ 1.82 1.99 2.17 2.36 2.55 2.75 2.95 3.16 70+ 1.97 2.19

## 2020-03-06 NOTE — PLAN OF CARE
Problem: Infection:  Goal: Will remain free from infection  Description  Will remain free from infection  3/5/2020 2300 by Duke Lefort, RN  Outcome: Ongoing  3/5/2020 1536 by Gerda Grewal RN  Outcome: Ongoing     Problem: Safety:  Goal: Free from accidental physical injury  Description  Free from accidental physical injury  3/5/2020 2300 by Duke Lefort, RN  Outcome: Ongoing  3/5/2020 1536 by Gerda Grewal RN  Outcome: Met This Shift  Goal: Free from intentional harm  Description  Free from intentional harm  3/5/2020 2300 by Duke Lefort, RN  Outcome: Ongoing  3/5/2020 1536 by Gerda Grewal RN  Outcome: Met This Shift     Problem: Daily Care:  Goal: Daily care needs are met  Description  Daily care needs are met  3/5/2020 2300 by Duke Lefort, RN  Outcome: Ongoing  3/5/2020 1536 by Gerda Grewal RN  Outcome: Met This Shift     Problem: Pain:  Goal: Patient's pain/discomfort is manageable  Description  Patient's pain/discomfort is manageable  3/5/2020 2300 by Duke Lefort, RN  Outcome: Ongoing  3/5/2020 1536 by Gerda Grewal RN  Outcome: Met This Shift  Goal: Pain level will decrease  Description  Pain level will decrease  3/5/2020 2300 by Duke Lefort, RN  Outcome: Ongoing  3/5/2020 1536 by Gerda Grewal RN  Outcome: Met This Shift  Goal: Control of acute pain  Description  Control of acute pain  3/5/2020 2300 by Duke Lefort, RN  Outcome: Ongoing  3/5/2020 1536 by Gerda Grewal RN  Outcome: Met This Shift  Goal: Control of chronic pain  Description  Control of chronic pain  Outcome: Ongoing     Problem: Skin Integrity:  Goal: Skin integrity will stabilize  Description  Skin integrity will stabilize  3/5/2020 2300 by Duke Lefort, RN  Outcome: Ongoing  3/5/2020 1536 by Gerda Grewal RN  Outcome: Ongoing     Problem: Discharge Planning:  Goal: Patients continuum of care needs are met  Description  Patients continuum of care needs are met  Outcome: Ongoing Problem: Falls - Risk of:  Goal: Will remain free from falls  Description  Will remain free from falls  3/5/2020 2300 by Duke Lefort, RN  Outcome: Ongoing  3/5/2020 1536 by Gerda Grewal RN  Outcome: Met This Shift  Goal: Absence of physical injury  Description  Absence of physical injury  3/5/2020 2300 by Duke Lefort, RN  Outcome: Ongoing  3/5/2020 1536 by Gerda Grewal RN  Outcome: Met This Shift     Problem:  Activity:  Goal: Risk for activity intolerance will decrease  Description  Risk for activity intolerance will decrease  Outcome: Ongoing  Goal: Fatigue will decrease  Description  Fatigue will decrease  Outcome: Ongoing     Problem: Coping:  Goal: Ability to adjust to condition or change in health will improve  Description  Ability to adjust to condition or change in health will improve  Outcome: Ongoing     Problem: Fluid Volume:  Goal: Ability to maintain a balanced intake and output will improve  Description  Ability to maintain a balanced intake and output will improve  3/5/2020 2300 by Duke Lefort, RN  Outcome: Ongoing  3/5/2020 1536 by Gerda Grewal RN  Outcome: Ongoing  Goal: Will show no signs or symptoms of fluid imbalance  Description  Will show no signs or symptoms of fluid imbalance  Outcome: Ongoing     Problem: Health Behavior:  Goal: Ability to identify and utilize available resources and services will improve  Description  Ability to identify and utilize available resources and services will improve  Outcome: Ongoing  Goal: Ability to manage health-related needs will improve  Description  Ability to manage health-related needs will improve  Outcome: Ongoing     Problem: Metabolic:  Goal: Ability to maintain appropriate glucose levels will improve  Description  Ability to maintain appropriate glucose levels will improve  Outcome: Ongoing     Problem: Nutritional:  Goal: Maintenance of adequate nutrition will improve  Description  Maintenance of adequate nutrition will Ongoing  3/5/2020 1102 by Reji Tenorio RCP  Outcome: Ongoing  Goal: Patient will achieve/maintain normal respiratory rate/effort  Description  Respiratory rate and effort will be within normal limits for the patient  3/5/2020 2300 by Jv Mcdaniel RN  Outcome: Ongoing  3/5/2020 2240 by Tammy Oconnor, RCP  Outcome: Ongoing  3/5/2020 1102 by Reji Tenorio, RCP  Outcome: Ongoing     Problem: MECHANICAL VENTILATION  Goal: Patient will maintain patent airway  3/5/2020 2300 by Jv Mcdaniel RN  Outcome: Ongoing  3/5/2020 2240 by Tammy Oconnor, RCP  Outcome: Ongoing  3/5/2020 1102 by Reji Tenorio RCP  Outcome: Ongoing  Goal: Oral health is maintained or improved  3/5/2020 2300 by Jv Mcdaniel RN  Outcome: Ongoing  3/5/2020 2240 by Tammy Oconnor, RCP  Outcome: Ongoing  3/5/2020 1102 by Reji Tenorio RCP  Outcome: Ongoing  Goal: ET tube will be managed safely  3/5/2020 2300 by Jv Mcdaniel RN  Outcome: Ongoing  3/5/2020 2240 by Tammy Oconnor, RCP  Outcome: Ongoing  3/5/2020 1102 by Reji Tenorio RCP  Outcome: Ongoing  Goal: Ability to express needs and understand communication  3/5/2020 2300 by Jv Mcdaniel RN  Outcome: Ongoing  3/5/2020 2240 by Tammy Oconnor, RCP  Outcome: Ongoing  3/5/2020 1102 by Reji Tenorio, RCP  Outcome: Ongoing  Goal: Mobility/activity is maintained at optimum level for patient  3/5/2020 2300 by Jv Mcdaniel RN  Outcome: Ongoing  3/5/2020 2240 by Tammy Oconnor, RCP  Outcome: Ongoing  3/5/2020 1102 by Reji Tenorio RCP  Outcome: Ongoing     Problem: SKIN INTEGRITY  Goal: Skin integrity is maintained or improved  3/5/2020 2300 by Jv Mcdaniel RN  Outcome: Ongoing  3/5/2020 2240 by Tammy Oconnor, RCP  Outcome: Ongoing  3/5/2020 1102 by Reji Tenorio RCP  Outcome: Ongoing     Problem: Restraint Use - Nonviolent/Non-Self-Destructive Behavior:  Goal: Absence of restraint indications  Description  Absence of restraint indications  3/5/2020 2300 by Jv Mcdaniel RN  Outcome: Ongoing  3/5/2020 1536 by Sarah Espinal RN  Outcome: Met This Shift  Goal: Absence of restraint-related injury  Description  Absence of restraint-related injury  3/5/2020 2300 by Naa Reynolds RN  Outcome: Ongoing  3/5/2020 1536 by Sarah Espinal RN  Outcome: Met This Shift     Problem: Risk for Impaired Skin Integrity  Goal: Tissue integrity - skin and mucous membranes  Description  Structural intactness and normal physiological function of skin and  mucous membranes.   3/5/2020 2300 by Naa Reynolds RN  Outcome: Ongoing  3/5/2020 1536 by Sarah Espinal RN  Outcome: Ongoing

## 2020-03-06 NOTE — PROGRESS NOTES
Perfect serve sent to Dr. Valencia Iniguez regarding extubation. Notified of chest xray and most recent labs. Awaiting response. 18- Dr. Valencia Iniguez states okay to extubate and to use BIPAP post extubation as needed.

## 2020-03-06 NOTE — PROGRESS NOTES
Ostial 80% stenosis; OM2: Proximal 80% stenosis; RCA: 100% proximal stenosis, collateral from the left. Peripheral Arteries and Lesion Findings  Descending aorta: Severe iliac disease in both sides  80% in the left side and 60-70% in the right side     The LV gram was performed in the BAUGH 30 position. LVEF: 35%. LV Wall Motion: Severe basal anterior and basal inferior hypokinesis     Permacath was placed in subclavian vein on 3/24    CABG on 3/2/2020  Increased work of breathing on 3/3. Re-intubated on 3/3. Undergoing HD. SBT daily. Possible extubation on 3/6/2020. Review of Systems:     Unable to obtain as patient is currently intubated and sedated    Medications:      Allergies:  No Known Allergies    Current Meds:   Scheduled Meds:    potassium chloride        potassium bicarb-citric acid  20 mEq Oral Once    potassium bicarb-citric acid        carvedilol  3.125 mg Oral BID WC    vancomycin  1,000 mg Intravenous Q24H    heparin (porcine)  5,000 Units Subcutaneous 3 times per day    insulin lispro  0-6 Units Subcutaneous TID WC    insulin lispro  0-3 Units Subcutaneous Nightly    sodium chloride flush  10 mL Intravenous 2 times per day    amiodarone  200 mg Oral BID    chlorhexidine  15 mL Mouth/Throat BID    mupirocin   Nasal BID    therapeutic multivitamin-minerals  1 tablet Oral Daily with breakfast    aspirin  81 mg Oral Daily    sennosides-docusate sodium  1 tablet Oral BID    clopidogrel  75 mg Oral Daily    famotidine  20 mg Oral Daily    Or    famotidine (PEPCID) injection  20 mg Intravenous Daily    allopurinol  300 mg Oral Daily    atorvastatin  40 mg Oral Nightly    folic acid  1 mg Oral Daily    vancomycin (VANCOCIN) intermittent dosing (placeholder)   Other RX Placeholder    [MAR Hold] carvedilol  37.5 mg Oral BID    [MAR Hold] insulin glargine  25 Units Subcutaneous Nightly    [MAR Hold] vitamin D3  400 Units Oral BID    [MAR Hold] bumetanide  2 mg Intravenous Daily of Onset    Diabetes Mother     Heart Disease Father        Vitals:  BP (!) 105/44   Pulse 58   Temp 97.9 °F (36.6 °C) (Axillary)   Resp 12   Ht 5' 3\" (1.6 m)   Wt 149 lb 11.1 oz (67.9 kg)   SpO2 100%   BMI 26.52 kg/m²   Temp (24hrs), Av.7 °F (36.5 °C), Min:97.3 °F (36.3 °C), Max:97.9 °F (36.6 °C)    Recent Labs     20  0756 20  1239 20   POCGLU 130* 190* 163* 137*       I/O (24Hr):     Intake/Output Summary (Last 24 hours) at 3/6/2020 1012  Last data filed at 3/6/2020 0800  Gross per 24 hour   Intake 4549 ml   Output 9250 ml   Net -4701 ml       Labs:  Hematology:  Recent Labs     20  0538 20  0534 20  0440   WBC 18.2* 16.2* 15.6*   RBC 2.98* 2.71* 3.16*   HGB 9.1* 8.3* 9.9*   HCT 28.0* 25.6* 29.9*   MCV 94.0 94.5 94.6   MCH 30.5 30.6 31.3   MCHC 32.5 32.4 33.1   RDW 14.9* 14.4 14.4   PLT See Reflexed IPF Result See Reflexed IPF Result 106*   MPV NOT REPORTED NOT REPORTED 12.7   INR 1.0 1.0 1.0     Chemistry:  Recent Labs     20  17120  0315    138 134*   K 3.7 3.6* 3.5*    102 99   CO2 22 19* 21   GLUCOSE 161* 134* 193*   BUN 25* 25* 23   CREATININE 2.63* 2.57* 2.20*   MG 2.2 2.1 2.1   ANIONGAP 15 17 14   LABGLOM 18* 19* 22*   GFRAA 22* 22* 27*   CALCIUM 9.9 10.0 9.9   CAION 1.28 1.23 1.28     Recent Labs     20  1624 20  2125 20  0756 20  1239 20  1712 20   POCGLU 124* 134* 130* 190* 163* 137*     ABG:  Lab Results   Component Value Date    POCPH 7.356 2020    POCPCO2 43.5 2020    POCPO2 89.3 2020    POCHCO3 24.4 2020    NBEA 1 2020    PBEA NOT REPORTED 2020    ZWY4CQJ 26 2020    YEQE5BWB 96 2020    FIO2 30.0 2020     Lab Results   Component Value Date/Time    SPECIAL NOT REPORTED 2020 11:14 AM     Lab Results   Component Value Date/Time    CULTURE NO GROWTH 2020 11:14 AM       Radiology:  Charly Luis Portable    Result Date: 3/4/2020  Cardiomegaly and pulmonary vascular congestion. Support tubes and lines as above, in grossly unchanged positioning. Right apical lucency favors overlying structures as lung markings appear to extend into the lung apex. Follow-up x-ray recommended. Xr Chest Portable    Result Date: 3/3/2020  No acute cardiopulmonary process. Support tubes as described above. Xr Chest Portable    Result Date: 3/3/2020  Unchanged chest radiograph. Xr Chest Portable    Result Date: 3/2/2020  Interval sternotomy and new life support apparatus. Mild edema or CHF unchanged. Ir Tunneled Cvc Place Wo Sq Port/pump > 5 Years    Result Date: 2/27/2020  Successful placement of 14.5 M[place]gas[place] by 23 cm tip to cuff palindrome dialysis catheter and right IJ. Okay to use dialysis catheter.        Physical Examination:        General appearance:  Intubated, sedated, awakens to voice, following commands  Mental Status:  Unable to obtain, as patient is intubated  Lungs:  Mechanical breath sounds; clear lung sounds, no wheezing appreciated  Heart:  regular rate and rhythm (71 bpm), no murmur  Chest wall: anterior chest wall with post-CABG changes, wound vac in place; epicardial pacemaker present  Abdomen:  Soft, nontender, nondistended, normal bowel sounds, no masses, hepatomegaly, splenomegaly  Extremities:  No edema, redness, tenderness in the calves, BALJIT hose in place  Lines: Right SC permacath; left IJ introducer with pigtail, Venegas present; right radial artery arterial line present, left chest tube has been removed  Skin:  no gross lesions, rashes, induration    Assessment:        Hospital Problems           Last Modified POA    * (Principal) Acute on chronic combined systolic and diastolic CHF (congestive heart failure) ([address] 2/27/2020 Yes    Thrombocytopenia ([address] 3/5/2020 No    Anemia in stage 4 chronic kidney disease ([address] 2/21/2020 Yes    Overview Signed 7/29/2017  8:09 PM by Flaquito Salazar

## 2020-03-06 NOTE — PROGRESS NOTES
Physical Therapy  DATE: 3/6/2020  NAME: Lizett Ly  MRN: [id number]   : 1950    Discharge Recommendations: Continue to Assess (pending progress)   Subjective: RN agreeable to ROM; Pt sleeping upon arrival, able to open eye at mention of name,   Pain: JEFF; some facial grimacing with L UE ROM  Patient follows: Most  Commands  Is patient on ventilator: Yes  Is patient on sedation:Yes  Precautions: General,     Therapeutic exercises:  AAROM to BUE and BLEs x12-15 reps all planes. Pt demo resistance to ROM when fatigue . Bilateral gastrocnemius stretching3 reps x 20 seconds  All vitals remained stable throughout. Goals  Short Term Goals  Short term goal 1: prevent loss of strength/mobility/independence while pt is in the hospital  Short term goal 2: pt to ambulate 100' independently without device  Short term goal 3: stair ambulation x 1 flight with 1 HR independently          Plan: Progress functional mobility as medically appropriate.    Time In:852  Time Out: 904  Time Coded Minutes (treatment minutes): 12  Rehab Potential: Good  Treatments/week: 5x/wk    Stephanie Altman PTA

## 2020-03-06 NOTE — PROGRESS NOTES
Dr Sean Rodriguez and CT surg team at bedside, updated on acute events overnight.      No new orders received

## 2020-03-06 NOTE — CARE COORDINATION
Plan to DC CVVHD and extubate today.   Once patient is able to be evaluated by PT/OT, will need notes faxed to O at [phone]

## 2020-03-07 ENCOUNTER — APPOINTMENT (OUTPATIENT)
Dept: GENERAL RADIOLOGY | Age: 70
DRG: 233 | End: 2020-03-07
Payer: MEDICARE

## 2020-03-07 ENCOUNTER — APPOINTMENT (OUTPATIENT)
Dept: CT IMAGING | Age: 70
DRG: 233 | End: 2020-03-07
Payer: MEDICARE

## 2020-03-07 PROBLEM — J96.00 ACUTE RESPIRATORY FAILURE (HCC): Status: ACTIVE | Noted: 2020-03-07

## 2020-03-07 PROBLEM — Z99.11 VENTILATOR DEPENDENCE (HCC): Status: ACTIVE | Noted: 2020-03-07

## 2020-03-07 LAB
ABO/RH: NORMAL
ALLEN TEST: ABNORMAL
ANION GAP SERPL CALCULATED.3IONS-SCNC: 15 MMOL/L (ref 9–17)
ANION GAP SERPL CALCULATED.3IONS-SCNC: 15 MMOL/L (ref 9–17)
ANION GAP SERPL CALCULATED.3IONS-SCNC: 16 MMOL/L (ref 9–17)
ANION GAP SERPL CALCULATED.3IONS-SCNC: 16 MMOL/L (ref 9–17)
ANION GAP SERPL CALCULATED.3IONS-SCNC: 17 MMOL/L (ref 9–17)
ANION GAP SERPL CALCULATED.3IONS-SCNC: 17 MMOL/L (ref 9–17)
ANION GAP: 11 MMOL/L (ref 7–16)
ANTIBODY SCREEN: NEGATIVE
ARM BAND NUMBER: NORMAL
BUN BLDV-MCNC: 31 MG/DL (ref 8–23)
BUN BLDV-MCNC: 36 MG/DL (ref 8–23)
BUN BLDV-MCNC: 36 MG/DL (ref 8–23)
BUN BLDV-MCNC: 39 MG/DL (ref 8–23)
BUN BLDV-MCNC: 44 MG/DL (ref 8–23)
BUN BLDV-MCNC: 48 MG/DL (ref 8–23)
BUN/CREAT BLD: ABNORMAL (ref 9–20)
CALCIUM IONIZED: 1.19 MMOL/L (ref 1.13–1.33)
CALCIUM IONIZED: 1.21 MMOL/L (ref 1.13–1.33)
CALCIUM IONIZED: 1.24 MMOL/L (ref 1.13–1.33)
CALCIUM IONIZED: 1.25 MMOL/L (ref 1.13–1.33)
CALCIUM IONIZED: 1.27 MMOL/L (ref 1.13–1.33)
CALCIUM SERPL-MCNC: 9.1 MG/DL (ref 8.6–10.4)
CALCIUM SERPL-MCNC: 9.2 MG/DL (ref 8.6–10.4)
CALCIUM SERPL-MCNC: 9.5 MG/DL (ref 8.6–10.4)
CALCIUM SERPL-MCNC: 9.5 MG/DL (ref 8.6–10.4)
CALCIUM SERPL-MCNC: 9.8 MG/DL (ref 8.6–10.4)
CALCIUM SERPL-MCNC: 9.8 MG/DL (ref 8.6–10.4)
CHLORIDE BLD-SCNC: 101 MMOL/L (ref 98–107)
CHLORIDE BLD-SCNC: 102 MMOL/L (ref 98–107)
CHLORIDE BLD-SCNC: 98 MMOL/L (ref 98–107)
CHLORIDE BLD-SCNC: 99 MMOL/L (ref 98–107)
CO2: 19 MMOL/L (ref 20–31)
CO2: 20 MMOL/L (ref 20–31)
CO2: 20 MMOL/L (ref 20–31)
CO2: 21 MMOL/L (ref 20–31)
CO2: 21 MMOL/L (ref 20–31)
CO2: 22 MMOL/L (ref 20–31)
CREAT SERPL-MCNC: 2.8 MG/DL (ref 0.5–0.9)
CREAT SERPL-MCNC: 3.26 MG/DL (ref 0.5–0.9)
CREAT SERPL-MCNC: 3.36 MG/DL (ref 0.5–0.9)
CREAT SERPL-MCNC: 3.68 MG/DL (ref 0.5–0.9)
CREAT SERPL-MCNC: 4.2 MG/DL (ref 0.5–0.9)
CREAT SERPL-MCNC: 4.48 MG/DL (ref 0.5–0.9)
EKG ATRIAL RATE: 81 BPM
EKG ATRIAL RATE: 88 BPM
EKG P AXIS: 65 DEGREES
EKG P-R INTERVAL: 152 MS
EKG Q-T INTERVAL: 362 MS
EKG Q-T INTERVAL: 408 MS
EKG QRS DURATION: 106 MS
EKG QRS DURATION: 94 MS
EKG QTC CALCULATION (BAZETT): 473 MS
EKG QTC CALCULATION (BAZETT): 489 MS
EKG R AXIS: 87 DEGREES
EKG R AXIS: 95 DEGREES
EKG T AXIS: -21 DEGREES
EKG T AXIS: -49 DEGREES
EKG VENTRICULAR RATE: 110 BPM
EKG VENTRICULAR RATE: 81 BPM
EXPIRATION DATE: NORMAL
FIBRINOGEN: 608 MG/DL (ref 140–420)
FIO2: 100
FIO2: 80
GFR AFRICAN AMERICAN: 12 ML/MIN
GFR AFRICAN AMERICAN: 13 ML/MIN
GFR AFRICAN AMERICAN: 15 ML/MIN
GFR AFRICAN AMERICAN: 17 ML/MIN
GFR AFRICAN AMERICAN: 17 ML/MIN
GFR AFRICAN AMERICAN: 20 ML/MIN
GFR NON-AFRICAN AMERICAN: 10 ML/MIN
GFR NON-AFRICAN AMERICAN: 11 ML/MIN
GFR NON-AFRICAN AMERICAN: 12 ML/MIN
GFR NON-AFRICAN AMERICAN: 14 ML/MIN
GFR NON-AFRICAN AMERICAN: 17 ML/MIN
GFR SERPL CREATININE-BSD FRML MDRD: 17 ML/MIN
GFR SERPL CREATININE-BSD FRML MDRD: ABNORMAL ML/MIN/{1.73_M2}
GLUCOSE BLD-MCNC: 132 MG/DL (ref 65–105)
GLUCOSE BLD-MCNC: 134 MG/DL (ref 65–105)
GLUCOSE BLD-MCNC: 135 MG/DL (ref 70–99)
GLUCOSE BLD-MCNC: 140 MG/DL (ref 70–99)
GLUCOSE BLD-MCNC: 146 MG/DL (ref 65–105)
GLUCOSE BLD-MCNC: 167 MG/DL (ref 70–99)
GLUCOSE BLD-MCNC: 170 MG/DL (ref 70–99)
GLUCOSE BLD-MCNC: 172 MG/DL (ref 65–105)
GLUCOSE BLD-MCNC: 177 MG/DL (ref 70–99)
GLUCOSE BLD-MCNC: 181 MG/DL (ref 65–105)
GLUCOSE BLD-MCNC: 231 MG/DL (ref 70–99)
GLUCOSE BLD-MCNC: 245 MG/DL (ref 74–100)
HCO3 VENOUS: 23.9 MMOL/L (ref 22–29)
HCT VFR BLD CALC: 26.3 % (ref 36.3–47.1)
HCT VFR BLD CALC: 28.2 % (ref 36.3–47.1)
HEMOGLOBIN: 8.5 G/DL (ref 11.9–15.1)
HEMOGLOBIN: 8.7 G/DL (ref 11.9–15.1)
INR BLD: 1
INR BLD: 1
LACTIC ACID, WHOLE BLOOD: 0.6 MMOL/L (ref 0.7–2.1)
MAGNESIUM: 2.1 MG/DL (ref 1.6–2.6)
MAGNESIUM: 2.2 MG/DL (ref 1.6–2.6)
MAGNESIUM: 2.2 MG/DL (ref 1.6–2.6)
MAGNESIUM: 2.3 MG/DL (ref 1.6–2.6)
MCH RBC QN AUTO: 30.6 PG (ref 25.2–33.5)
MCH RBC QN AUTO: 30.7 PG (ref 25.2–33.5)
MCHC RBC AUTO-ENTMCNC: 30.9 G/DL (ref 28.4–34.8)
MCHC RBC AUTO-ENTMCNC: 32.3 G/DL (ref 28.4–34.8)
MCV RBC AUTO: 94.9 FL (ref 82.6–102.9)
MCV RBC AUTO: 99.3 FL (ref 82.6–102.9)
MODE: ABNORMAL
NEGATIVE BASE EXCESS, ART: 1 (ref 0–2)
NEGATIVE BASE EXCESS, ART: 2 (ref 0–2)
NEGATIVE BASE EXCESS, ART: 5 (ref 0–2)
NEGATIVE BASE EXCESS, VEN: 2 (ref 0–2)
NRBC AUTOMATED: 0 PER 100 WBC
NRBC AUTOMATED: 0 PER 100 WBC
O2 DEVICE/FLOW/%: ABNORMAL
O2 SAT, VEN: 90 % (ref 60–85)
PARTIAL THROMBOPLASTIN TIME: 30.4 SEC (ref 20.5–30.5)
PARTIAL THROMBOPLASTIN TIME: 34.5 SEC (ref 20.5–30.5)
PARTIAL THROMBOPLASTIN TIME: 77.2 SEC (ref 20.5–30.5)
PATIENT TEMP: ABNORMAL
PCO2, VEN: 41.9 MM HG (ref 41–51)
PDW BLD-RTO: 14.1 % (ref 11.8–14.4)
PDW BLD-RTO: 14.2 % (ref 11.8–14.4)
PH VENOUS: 7.36 (ref 7.32–7.43)
PLATELET # BLD: 121 K/UL (ref 138–453)
PLATELET # BLD: ABNORMAL K/UL (ref 138–453)
PLATELET, FLUORESCENCE: 149 K/UL (ref 138–453)
PLATELET, IMMATURE FRACTION: 11.8 % (ref 1.1–10.3)
PMV BLD AUTO: 12.9 FL (ref 8.1–13.5)
PMV BLD AUTO: ABNORMAL FL (ref 8.1–13.5)
PO2, VEN: 59.7 MM HG (ref 30–50)
POC CHLORIDE: 104 MMOL/L (ref 98–107)
POC CREATININE: 3.21 MG/DL (ref 0.51–1.19)
POC HCO3: 23.3 MMOL/L (ref 21–28)
POC HCO3: 24.1 MMOL/L (ref 21–28)
POC HCO3: 24.6 MMOL/L (ref 21–28)
POC HEMATOCRIT: 27 % (ref 36–46)
POC HEMOGLOBIN: 9 G/DL (ref 12–16)
POC IONIZED CALCIUM: 1.3 MMOL/L (ref 1.15–1.33)
POC LACTIC ACID: 1.18 MMOL/L (ref 0.56–1.39)
POC O2 SATURATION: 69 % (ref 94–98)
POC O2 SATURATION: 95 % (ref 94–98)
POC O2 SATURATION: 99 % (ref 94–98)
POC PCO2 TEMP: ABNORMAL MM HG
POC PCO2: 39.6 MM HG (ref 35–48)
POC PCO2: 46.9 MM HG (ref 35–48)
POC PCO2: 57.5 MM HG (ref 35–48)
POC PH TEMP: ABNORMAL
POC PH: 7.22 (ref 7.35–7.45)
POC PH: 7.33 (ref 7.35–7.45)
POC PH: 7.39 (ref 7.35–7.45)
POC PO2 TEMP: ABNORMAL MM HG
POC PO2: 152.7 MM HG (ref 83–108)
POC PO2: 39.1 MM HG (ref 83–108)
POC PO2: 89.6 MM HG (ref 83–108)
POC POTASSIUM: 4.4 MMOL/L (ref 3.5–4.5)
POC SODIUM: 138 MMOL/L (ref 138–146)
POSITIVE BASE EXCESS, ART: ABNORMAL (ref 0–3)
POSITIVE BASE EXCESS, VEN: ABNORMAL (ref 0–3)
POTASSIUM SERPL-SCNC: 3.9 MMOL/L (ref 3.7–5.3)
POTASSIUM SERPL-SCNC: 4.1 MMOL/L (ref 3.7–5.3)
POTASSIUM SERPL-SCNC: 4.8 MMOL/L (ref 3.7–5.3)
PROTHROMBIN TIME: 10.4 SEC (ref 9–12)
PROTHROMBIN TIME: 10.7 SEC (ref 9–12)
RBC # BLD: 2.77 M/UL (ref 3.95–5.11)
RBC # BLD: 2.84 M/UL (ref 3.95–5.11)
SAMPLE SITE: ABNORMAL
SODIUM BLD-SCNC: 133 MMOL/L (ref 135–144)
SODIUM BLD-SCNC: 136 MMOL/L (ref 135–144)
SODIUM BLD-SCNC: 137 MMOL/L (ref 135–144)
SODIUM BLD-SCNC: 138 MMOL/L (ref 135–144)
SODIUM BLD-SCNC: 138 MMOL/L (ref 135–144)
SODIUM BLD-SCNC: 139 MMOL/L (ref 135–144)
TCO2 (CALC), ART: 25 MMOL/L (ref 22–29)
TCO2 (CALC), ART: 25 MMOL/L (ref 22–29)
TCO2 (CALC), ART: 26 MMOL/L (ref 22–29)
TOTAL CO2, VENOUS: 25 MMOL/L (ref 23–30)
WBC # BLD: 11.3 K/UL (ref 3.5–11.3)
WBC # BLD: 13.9 K/UL (ref 3.5–11.3)

## 2020-03-07 PROCEDURE — 82947 ASSAY GLUCOSE BLOOD QUANT: CPT

## 2020-03-07 PROCEDURE — 87070 CULTURE OTHR SPECIMN AEROBIC: CPT

## 2020-03-07 PROCEDURE — 2100000001 HC CVICU R&B

## 2020-03-07 PROCEDURE — 82330 ASSAY OF CALCIUM: CPT

## 2020-03-07 PROCEDURE — 85384 FIBRINOGEN ACTIVITY: CPT

## 2020-03-07 PROCEDURE — 80048 BASIC METABOLIC PNL TOTAL CA: CPT

## 2020-03-07 PROCEDURE — 5A1945Z RESPIRATORY VENTILATION, 24-96 CONSECUTIVE HOURS: ICD-10-PCS | Performed by: INTERNAL MEDICINE

## 2020-03-07 PROCEDURE — 99233 SBSQ HOSP IP/OBS HIGH 50: CPT | Performed by: INTERNAL MEDICINE

## 2020-03-07 PROCEDURE — 31500 INSERT EMERGENCY AIRWAY: CPT

## 2020-03-07 PROCEDURE — 6370000000 HC RX 637 (ALT 250 FOR IP): Performed by: THORACIC SURGERY (CARDIOTHORACIC VASCULAR SURGERY)

## 2020-03-07 PROCEDURE — 84295 ASSAY OF SERUM SODIUM: CPT

## 2020-03-07 PROCEDURE — 93320 DOPPLER ECHO COMPLETE: CPT

## 2020-03-07 PROCEDURE — 36415 COLL VENOUS BLD VENIPUNCTURE: CPT

## 2020-03-07 PROCEDURE — 83735 ASSAY OF MAGNESIUM: CPT

## 2020-03-07 PROCEDURE — 2580000003 HC RX 258: Performed by: INTERNAL MEDICINE

## 2020-03-07 PROCEDURE — 94770 HC ETCO2 MONITOR DAILY: CPT

## 2020-03-07 PROCEDURE — 85730 THROMBOPLASTIN TIME PARTIAL: CPT

## 2020-03-07 PROCEDURE — 82565 ASSAY OF CREATININE: CPT

## 2020-03-07 PROCEDURE — 6360000002 HC RX W HCPCS: Performed by: INTERNAL MEDICINE

## 2020-03-07 PROCEDURE — 82803 BLOOD GASES ANY COMBINATION: CPT

## 2020-03-07 PROCEDURE — 6370000000 HC RX 637 (ALT 250 FOR IP): Performed by: NURSE PRACTITIONER

## 2020-03-07 PROCEDURE — 85055 RETICULATED PLATELET ASSAY: CPT

## 2020-03-07 PROCEDURE — 71045 X-RAY EXAM CHEST 1 VIEW: CPT

## 2020-03-07 PROCEDURE — 93005 ELECTROCARDIOGRAM TRACING: CPT | Performed by: STUDENT IN AN ORGANIZED HEALTH CARE EDUCATION/TRAINING PROGRAM

## 2020-03-07 PROCEDURE — 5A1D70Z PERFORMANCE OF URINARY FILTRATION, INTERMITTENT, LESS THAN 6 HOURS PER DAY: ICD-10-PCS | Performed by: INTERNAL MEDICINE

## 2020-03-07 PROCEDURE — 83605 ASSAY OF LACTIC ACID: CPT

## 2020-03-07 PROCEDURE — 94761 N-INVAS EAR/PLS OXIMETRY MLT: CPT

## 2020-03-07 PROCEDURE — 86901 BLOOD TYPING SEROLOGIC RH(D): CPT

## 2020-03-07 PROCEDURE — 2700000000 HC OXYGEN THERAPY PER DAY

## 2020-03-07 PROCEDURE — 84132 ASSAY OF SERUM POTASSIUM: CPT

## 2020-03-07 PROCEDURE — 93325 DOPPLER ECHO COLOR FLOW MAPG: CPT

## 2020-03-07 PROCEDURE — 85027 COMPLETE CBC AUTOMATED: CPT

## 2020-03-07 PROCEDURE — 85610 PROTHROMBIN TIME: CPT

## 2020-03-07 PROCEDURE — 0BH18EZ INSERTION OF ENDOTRACHEAL AIRWAY INTO TRACHEA, VIA NATURAL OR ARTIFICIAL OPENING ENDOSCOPIC: ICD-10-PCS | Performed by: INTERNAL MEDICINE

## 2020-03-07 PROCEDURE — 99291 CRITICAL CARE FIRST HOUR: CPT | Performed by: INTERNAL MEDICINE

## 2020-03-07 PROCEDURE — 93010 ELECTROCARDIOGRAM REPORT: CPT | Performed by: INTERNAL MEDICINE

## 2020-03-07 PROCEDURE — 82435 ASSAY OF BLOOD CHLORIDE: CPT

## 2020-03-07 PROCEDURE — 37799 UNLISTED PX VASCULAR SURGERY: CPT

## 2020-03-07 PROCEDURE — 86900 BLOOD TYPING SEROLOGIC ABO: CPT

## 2020-03-07 PROCEDURE — 90935 HEMODIALYSIS ONE EVALUATION: CPT

## 2020-03-07 PROCEDURE — 82805 BLOOD GASES W/O2 SATURATION: CPT

## 2020-03-07 PROCEDURE — 87205 SMEAR GRAM STAIN: CPT

## 2020-03-07 PROCEDURE — P9047 ALBUMIN (HUMAN), 25%, 50ML: HCPCS | Performed by: INTERNAL MEDICINE

## 2020-03-07 PROCEDURE — 6360000002 HC RX W HCPCS: Performed by: THORACIC SURGERY (CARDIOTHORACIC VASCULAR SURGERY)

## 2020-03-07 PROCEDURE — 86850 RBC ANTIBODY SCREEN: CPT

## 2020-03-07 PROCEDURE — 85014 HEMATOCRIT: CPT

## 2020-03-07 PROCEDURE — 93308 TTE F-UP OR LMTD: CPT

## 2020-03-07 PROCEDURE — 6360000002 HC RX W HCPCS: Performed by: NURSE PRACTITIONER

## 2020-03-07 PROCEDURE — 93005 ELECTROCARDIOGRAM TRACING: CPT | Performed by: INTERNAL MEDICINE

## 2020-03-07 PROCEDURE — 2580000003 HC RX 258: Performed by: NURSE PRACTITIONER

## 2020-03-07 RX ORDER — HEPARIN SODIUM 1000 [USP'U]/ML
4000 INJECTION, SOLUTION INTRAVENOUS; SUBCUTANEOUS ONCE
Status: DISCONTINUED | OUTPATIENT
Start: 2020-03-07 | End: 2020-03-12 | Stop reason: HOSPADM

## 2020-03-07 RX ORDER — PROPOFOL 10 MG/ML
INJECTION, EMULSION INTRAVENOUS
Status: DISPENSED
Start: 2020-03-07 | End: 2020-03-07

## 2020-03-07 RX ORDER — ASPIRIN 81 MG/1
81 TABLET, CHEWABLE ORAL DAILY
Status: DISCONTINUED | OUTPATIENT
Start: 2020-03-07 | End: 2020-03-11

## 2020-03-07 RX ORDER — HEPARIN SODIUM 10000 [USP'U]/100ML
12 INJECTION, SOLUTION INTRAVENOUS CONTINUOUS
Status: DISCONTINUED | OUTPATIENT
Start: 2020-03-07 | End: 2020-03-12 | Stop reason: HOSPADM

## 2020-03-07 RX ORDER — HEPARIN SODIUM 1000 [USP'U]/ML
4000 INJECTION, SOLUTION INTRAVENOUS; SUBCUTANEOUS PRN
Status: DISCONTINUED | OUTPATIENT
Start: 2020-03-07 | End: 2020-03-12 | Stop reason: HOSPADM

## 2020-03-07 RX ORDER — ALBUMIN (HUMAN) 12.5 G/50ML
12.5 SOLUTION INTRAVENOUS ONCE
Status: COMPLETED | OUTPATIENT
Start: 2020-03-07 | End: 2020-03-07

## 2020-03-07 RX ORDER — HEPARIN SODIUM 1000 [USP'U]/ML
2000 INJECTION, SOLUTION INTRAVENOUS; SUBCUTANEOUS PRN
Status: DISCONTINUED | OUTPATIENT
Start: 2020-03-07 | End: 2020-03-12 | Stop reason: HOSPADM

## 2020-03-07 RX ADMIN — HEPARIN SODIUM 1900 UNITS: 1000 INJECTION INTRAVENOUS; SUBCUTANEOUS at 15:15

## 2020-03-07 RX ADMIN — Medication 15 ML: at 09:44

## 2020-03-07 RX ADMIN — INSULIN LISPRO 1 UNITS: 100 INJECTION, SOLUTION INTRAVENOUS; SUBCUTANEOUS at 08:28

## 2020-03-07 RX ADMIN — HYDRALAZINE HYDROCHLORIDE 5 MG: 20 INJECTION INTRAMUSCULAR; INTRAVENOUS at 08:39

## 2020-03-07 RX ADMIN — HEPARIN SODIUM 5000 UNITS: 5000 INJECTION INTRAVENOUS; SUBCUTANEOUS at 06:34

## 2020-03-07 RX ADMIN — Medication 15 ML: at 21:23

## 2020-03-07 RX ADMIN — FOLIC ACID 1 MG: 1 TABLET ORAL at 08:16

## 2020-03-07 RX ADMIN — HEPARIN SODIUM 5000 UNITS: 5000 INJECTION INTRAVENOUS; SUBCUTANEOUS at 15:22

## 2020-03-07 RX ADMIN — CEFEPIME HYDROCHLORIDE 1 G: 1 INJECTION, POWDER, FOR SOLUTION INTRAMUSCULAR; INTRAVENOUS at 16:28

## 2020-03-07 RX ADMIN — FENTANYL CITRATE 50 MCG: 50 INJECTION, SOLUTION INTRAMUSCULAR; INTRAVENOUS at 07:35

## 2020-03-07 RX ADMIN — FAMOTIDINE 20 MG: 20 TABLET, FILM COATED ORAL at 08:16

## 2020-03-07 RX ADMIN — MULTIPLE VITAMINS W/ MINERALS TAB 1 TABLET: TAB at 08:16

## 2020-03-07 RX ADMIN — SENNOSIDES AND DOCUSATE SODIUM 1 TABLET: 8.6; 5 TABLET ORAL at 08:17

## 2020-03-07 RX ADMIN — ALBUMIN (HUMAN) 12.5 G: 0.25 INJECTION, SOLUTION INTRAVENOUS at 13:15

## 2020-03-07 RX ADMIN — MUPIROCIN: 20 OINTMENT TOPICAL at 08:19

## 2020-03-07 RX ADMIN — OXYCODONE HYDROCHLORIDE AND ACETAMINOPHEN 2 TABLET: 5; 325 TABLET ORAL at 08:28

## 2020-03-07 RX ADMIN — EPINEPHRINE 1 MG: 0.1 INJECTION INTRACARDIAC; INTRAVENOUS at 04:46

## 2020-03-07 RX ADMIN — INSULIN LISPRO 1 UNITS: 100 INJECTION, SOLUTION INTRAVENOUS; SUBCUTANEOUS at 13:02

## 2020-03-07 RX ADMIN — DESMOPRESSIN ACETATE 40 MG: 0.2 TABLET ORAL at 20:55

## 2020-03-07 RX ADMIN — SENNOSIDES AND DOCUSATE SODIUM 1 TABLET: 8.6; 5 TABLET ORAL at 20:55

## 2020-03-07 RX ADMIN — ASPIRIN 81 MG: 81 TABLET, CHEWABLE ORAL at 09:43

## 2020-03-07 RX ADMIN — Medication 1 MG/HR: at 05:32

## 2020-03-07 RX ADMIN — SODIUM CHLORIDE, PRESERVATIVE FREE 10 ML: 5 INJECTION INTRAVENOUS at 08:17

## 2020-03-07 RX ADMIN — HEPARIN SODIUM AND DEXTROSE 12 UNITS/KG/HR: 10000; 5 INJECTION INTRAVENOUS at 17:05

## 2020-03-07 RX ADMIN — SODIUM CHLORIDE, PRESERVATIVE FREE 10 ML: 5 INJECTION INTRAVENOUS at 20:33

## 2020-03-07 RX ADMIN — ALLOPURINOL 300 MG: 300 TABLET ORAL at 08:16

## 2020-03-07 RX ADMIN — OXYCODONE HYDROCHLORIDE AND ACETAMINOPHEN 2 TABLET: 5; 325 TABLET ORAL at 15:42

## 2020-03-07 RX ADMIN — CLOPIDOGREL 75 MG: 75 TABLET, FILM COATED ORAL at 08:16

## 2020-03-07 ASSESSMENT — PULMONARY FUNCTION TESTS
PIF_VALUE: 29
PIF_VALUE: 30
PIF_VALUE: 29
PIF_VALUE: 28
PIF_VALUE: 38
PIF_VALUE: 28
PIF_VALUE: 39
PIF_VALUE: 28

## 2020-03-07 ASSESSMENT — PAIN SCALES - GENERAL
PAINLEVEL_OUTOF10: 0
PAINLEVEL_OUTOF10: 0

## 2020-03-07 NOTE — PROGRESS NOTES
Spoke with Dr. Jackie Goode regarding CXR left central line placement. Suggests drawing blood gas to determine placement. Perfect serve message sent to Dr. Tiffany Blount to notify radiologist recommendations. Awaiting response. [facility] [address] Telephone call with Dr. Nitesh Richard. Orders to draw venous blood gas and arterial blood gas from right arterial line and left central line. Respiratory therapist notified.

## 2020-03-07 NOTE — PROGRESS NOTES
EMA GUZMAN called for the patient and [address] 1 1011 around 4:30 AM in the morning. When I arrived at the room, I was told that patient was given some water and suddenly vomited, eyes rolled back and became pale grayish and was unresponsive. Prior to my arrival, on telemetry monitoring the rhythm appeared to be V tach which rapidly degenerated into V. fib arrest.  Patient was given 2 rounds of CPR and 1 epinephrine and ROSC was achieved. Post cardiac arrest and restoration of rhythm, patient was having unstable labored respirations indicating impending respiratory failure. Hence patient required intubation. Intubation was performed by the RT under direct supervision of the ED resident. Cardiac rhythm was varying between A. fib and junctional rhythm but under pacing wires the patient still was having asystole. After rapid sequence intubation patient was hypotensive and Levophed had to be started on the patient. Patient's blood pressure slowly improved and he received 250 mL IV fluid bolus also. Post intubation chest x-ray was performed and indicated : Endotracheal tube distal tip overlying the mid trachea approximately 2.8 cm above the level of the nora. Adriana Bower M.D.   Internal Medicine Resident , PGY-2  [address] [place] [address]  03/07/20

## 2020-03-07 NOTE — PROGRESS NOTES
Dialysis Post Treatment Note  Vitals:    03/07/20 1625   BP: 129/41   Pulse: 81   Resp: 26   Temp: 98.2   SpO2: 100%     Pre-Weight = 73kg  Post-weight = Weight: 158 lb 8.2 oz (71.9 kg)  Total Liters Processed = Total Liters Processed (l/min): 47 l/min  Rinseback Volume (mL) = Rinseback Volume (ml): 340 ml  Net Removal (mL) = 1500mL  Type of access used= Right perm cath   Length of treatment= 120 mins    Pt tolerated treatment well; right perm cath dressing changed.

## 2020-03-07 NOTE — PLAN OF CARE
Problem: Skin Integrity:  Goal: Skin integrity will stabilize  Description: Skin integrity will stabilize  Outcome: Ongoing  Note: Patient turn every 2 hours, bilateral heels elevated off bed, skin assessment complete, preventative dressing on sacrum and no new signs of skin breakdown. Problem: Falls - Risk of:  Goal: Will remain free from falls  Description: Will remain free from falls  Outcome: Ongoing     Problem: Falls - Risk of:  Goal: Absence of physical injury  Description: Absence of physical injury  Outcome: Ongoing  Note: Bed lock and in lowest position, side rails up x 2, room free from clutter, bed alarm on and patient remains free from falls and physical injury. Problem: Fluid Volume:  Goal: Ability to maintain a balanced intake and output will improve  Description: Ability to maintain a balanced intake and output will improve  Outcome: Ongoing  Note: Patient currently on dialysis and oliguric. Problem: Restraint Use - Nonviolent/Non-Self-Destructive Behavior:  Goal: Absence of restraint indications  Description: Absence of restraint indications  Outcome: Ongoing  Note: Bilateral soft wrist restraints removed per protocol and patient immediately reaches towards airway. Patient is agitated at times and reminded bilateral soft wrist restraints are to protect airway. Bilateral soft wrist restraints reapplied. Problem: Restraint Use - Nonviolent/Non-Self-Destructive Behavior:  Goal: Absence of restraint-related injury  Description: Absence of restraint-related injury  Outcome: Ongoing  Note: Bilateral soft wrist restraints removed per protocol and sites assess. No signs of skin breakdown.

## 2020-03-07 NOTE — FLOWSHEET NOTE
SPIRITUAL CARE DEPARTMENT - [facility] [address]  PROGRESS NOTE    Shift date: 3/06/2020  Shift day: Friday   Shift # 3    Room # 1011/1011-01   Name: Carol Miller            Age: 78 y.o. Gender: female          Hoahaoism: [employer]ian   Place of Judaism: None    Referral: Code Blue    Admit Date & Time: 2/19/2020 11:01 PM    PATIENT/EVENT DESCRIPTION:  Carol Miller is a 78 y.o. female who \"became unresponsive and pulseless while bedside nurse was present in room. \" Patient received \"two rounds of CPR, pulses were regained, and patient was reintubated by code team.\"     SPIRITUAL ASSESSMENT/INTERVENTION:   responded to code and remained present throughout response.  and bedside nurse attempted contact with patient's daughter, with no success. Patient's nurse left voicemail for patient's daughter and requested she contact unit with a full update on patient's condition. SPIRITUAL CARE FOLLOW-UP PLAN:  Chaplains will remain available to offer spiritual and emotional support as needed.       Electronically signed by Lyla Cruz on 3/7/2020 at 5:36 AM.  [facility]  [phone]

## 2020-03-07 NOTE — PROGRESS NOTES
Mary Briones 21    Progress Note    3/7/2020    11:05 AM    Name:   Jaswant Solitario  MRN:     [id number]     Acct:      [de-identified]   Room:   [facility] [address] Day:  12  Admit Date:  2/19/2020 11:01 PM    PCP:   Leopoldo Goring, PA-C  Code Status:  Full Code    Subjective:     C/C:   Chief Complaint   Patient presents with    Shortness of Breath     Interval History Status: not changed. Patient seen and examined this morning. Patient was extubated yesterday afternoon. Reportedly was tolerating simple cannula without difficulty. She subsequently went into respiratory arrest with questionable A. fib with RVR versus PEA arrest.  Ultimately, ROSC was achieved, and patient was reintubated. After CODE BLUE, patient was awake and following commands. She did have hypotension after extubation and did require norepinephrine infusion. Son is currently at bedside. Vitals are currently within normal limits. She is off of norepinephrine and somewhat hypertensive this morning    Brief History:     Mrs. Yang Mcarthur is a 78year old  female who presented to the emergency dept initially on 2/19/2020 with c/c of shortness of breath. Her shortness of breath initially was only with exertion, but then became present at rest as well. She denied chest pain at that time. She was hypoxic with EMS and upon arrival to the ED. CXR in the ED revealed pulmonary edema. Patient was placed on BIPAP therapy with improved O2 saturations. Comorbidities include tobacco abuse, CKD IV (baseline creatinine 3.2 - 3.5), prior left-sided CVA, hyperlipidemia, gout, DM II, COPD. Troponins were: 56 --> 160 --> 409 --> 476 --> 500 --> 365 --> 430 --> 1422. EKG with inferolateral ST depressions. She was diagnosed with NSTEMI. Creatinine noted to be 3.97. Prior ECHO 12/2016: EF >55%, mild pulm HTN. Gerson cath placed 02/21 and HD started 02/22.  LHC on 2/24/2020 revealed: LMCA: 0% stenosis; LAD: Mid 99% in upper long branch (Reaching to apex) 80% in lower LAD branch; D1: proximal 90% stenosis; LCx: Mid 80% stenosis; OM1: Ostial 80% stenosis; OM2: Proximal 80% stenosis; RCA: 100% proximal stenosis, collateral from the left. Peripheral Arteries and Lesion Findings  Descending aorta: Severe iliac disease in both sides  80% in the left side and 60-70% in the right side     The LV gram was performed in the BAUGH 30 position. LVEF: 35%. LV Wall Motion: Severe basal anterior and basal inferior hypokinesis     Permacath was placed in subclavian vein on 3/24    CABG on 3/2/2020  Increased work of breathing on 3/3. Re-intubated on 3/3. Undergoing HD. SBT daily. Possible extubation on 3/6/2020. Extubated on 3/6. Respiratory arrest with PEA arrest on the morning of 3/7. Reintubated. Review of Systems:     Unable to obtain as patient is currently intubated and sedated    Medications:      Allergies:  No Known Allergies    Current Meds:   Scheduled Meds:    propofol        aspirin  81 mg Oral Daily    albumin human  12.5 g Intravenous Once    heparin (porcine)  5,000 Units Subcutaneous 3 times per day    insulin lispro  0-6 Units Subcutaneous TID WC    insulin lispro  0-3 Units Subcutaneous Nightly    sodium chloride flush  10 mL Intravenous 2 times per day    chlorhexidine  15 mL Mouth/Throat BID    therapeutic multivitamin-minerals  1 tablet Oral Daily with breakfast    sennosides-docusate sodium  1 tablet Oral BID    clopidogrel  75 mg Oral Daily    famotidine  20 mg Oral Daily    Or    famotidine (PEPCID) injection  20 mg Intravenous Daily    allopurinol  300 mg Oral Daily    atorvastatin  40 mg Oral Nightly    folic acid  1 mg Oral Daily    [MAR Hold] insulin glargine  25 Units Subcutaneous Nightly    [MAR Hold] vitamin D3  400 Units Oral BID    [MAR Hold] bumetanide  2 mg Intravenous Daily     Continuous Infusions:    EPINEPHrine infusion      midazolam 2 mg/hr Pulse 81   Temp 98.3 °F (36.8 °C) (Oral)   Resp 20   Ht 5' 3\" (1.6 m)   Wt 154 lb 5.2 oz (70 kg)   SpO2 100%   BMI 27.34 kg/m²   Temp (24hrs), Av.4 °F (36.9 °C), Min:97.6 °F (36.4 °C), Max:99.1 °F (37.3 °C)    Recent Labs     20  0504 20  0525 20  0821   POCGLU 171* 172* 245* 181*       I/O (24Hr):     Intake/Output Summary (Last 24 hours) at 3/7/2020 1105  Last data filed at 3/7/2020 0928  Gross per 24 hour   Intake 3243.23 ml   Output 1138 ml   Net 2105.23 ml       Labs:  Hematology:  Recent Labs     20  04420  0455   WBC 15.6* 13.9* 11.3   RBC 3.16* 2.77* 2.84*   HGB 9.9* 8.5* 8.7*   HCT 29.9* 26.3* 28.2*   MCV 94.6 94.9 99.3   MCH 31.3 30.7 30.6   MCHC 33.1 32.3 30.9   RDW 14.4 14.1 14.2   * 121* See Reflexed IPF Result   MPV 12.7 12.9 NOT REPORTED   INR 1.0 1.0 1.0     Chemistry:  Recent Labs     20  04220  0520  0919     --  137  --  138   K 4.1  --  4.8  --  3.9     --  101  --  102   CO2 21  --  20  --  21   GLUCOSE 177*  --  231*  --  170*   BUN 36*  --  36*  --  39*   CREATININE 3.26*  --  3.36* 3.21* 3.68*   MG 2.2  --  2.2  --  2.1   ANIONGAP 17  --  16  --  15   LABGLOM 14*  --  14* 14* 12*   GFRAA 17*  --  17*  --  15*   CALCIUM 9.5  --  9.1  --  9.2   CAION  --  1.27 1.25  --  1.19   LACTACIDWB  --   --   --   --  0.6*     Recent Labs     20  1206 20  1819 20  2031 20  0504 20  0525 20  0821   POCGLU 176* 182* 171* 172* 245* 181*     ABG:  Lab Results   Component Value Date    POCPH 7.216 2020    POCPCO2 57.5 2020    POCPO2 89.6 2020    POCHCO3 23.3 2020    NBEA 5 2020    PBEA NOT REPORTED 2020    HVO4GLY 25 2020    NWTZ6NCM 95 2020    FIO2 100.0 2020     Lab Results   Component Value Date/Time    SPECIAL NOT REPORTED 2020 11:14 AM     Lab Results   Component Value Date/Time    CULTURE NO GROWTH 03/02/2020 11:14 AM       Radiology:  Xr Chest Portable    Result Date: 3/4/2020  Cardiomegaly and pulmonary vascular congestion. Support tubes and lines as above, in grossly unchanged positioning. Right apical lucency favors overlying structures as lung markings appear to extend into the lung apex. Follow-up x-ray recommended. Xr Chest Portable    Result Date: 3/3/2020  No acute cardiopulmonary process. Support tubes as described above. Xr Chest Portable    Result Date: 3/3/2020  Unchanged chest radiograph. Xr Chest Portable    Result Date: 3/2/2020  Interval sternotomy and new life support apparatus. Mild edema or CHF unchanged. Ir Tunneled Cvc Place Wo Sq Port/pump > 5 Years    Result Date: 2/27/2020  Successful placement of 14.5 Croatian by 23 cm tip to cuff palindrome dialysis catheter and right IJ. Okay to use dialysis catheter. Physical Examination:        General appearance:  Intubated, sedated, more somnolent this morning  Mental Status:  Unable to obtain, as patient is intubated  Lungs:  Mechanical breath sounds; clear lung sounds, no wheezing appreciated  Heart:  regular rate and rhythm (71 bpm), no murmur  Chest wall: anterior chest wall with post-CABG changes, chest tubes have been removed, well-healing postsurgical scarring noted under gauze.   Epicardial pacemaker present  Abdomen:  Soft, nontender, nondistended, normal bowel sounds, no masses, hepatomegaly, splenomegaly  Extremities:  No edema, redness, tenderness in the calves, BALJIT hose in place  Lines: Right SC permacath; left IJ introducer with pigtail, Venegas present; right radial artery arterial line present  Skin:  no gross lesions, rashes, induration    Assessment:        Hospital Problems           Last Modified POA    * (Principal) Acute on chronic combined systolic and diastolic CHF (congestive heart failure) ([address] 3/7/2020 Yes    HERNÁN (acute kidney injury) ([address] 3/7/2020 Yes    NSTEMI (non-ST elevated myocardial infarction) ([facility] 3/7/2020 Yes    Moderate malnutrition ([facility] [facility] 3/7/2020 Yes    Cardiopulmonary arrest ([facility] 3/7/2020 Yes    Ventilator dependence ([facility] [facility] 3/7/2020 No    Acute respiratory failure ([facility] 3/7/2020 Yes    Anemia in stage 4 chronic kidney disease ([facility] 3/7/2020 Yes    Overview Signed 7/29/2017  8:09 PM by [address] [facility]     Updating deleted diagnoses         Type 2 diabetes mellitus with diabetic chronic kidney disease ([facility] 3/7/2020 Yes    Essential hypertension 3/7/2020 Yes    Hypercholesteremia 3/7/2020 Yes    COPD exacerbation ([facility] [facility] 3/7/2020 Yes    Thrombocytopenia ([facility] [facility] 3/7/2020 No          Plan:        1. S/P CPA overnight - ROSC achieved. ? Cause of arrest - respiratory vs cardiac. Cardio, check 2D echo today  2. Bradycardia - Per cardio - discontinuing amio and coreg, as patient was asystole without epicardial pacemaker yesterday  3. Shock, possibly secondary to excessive volume removal during hemodialysis on 3/6. Norepinephrine has been weaned overnight after CODE BLUE. 4. Acute CHF with pulmonary edema noted on chest x-ray- monitor I/O's, HD. Symptoms improved. Volume being removed with HD. -2.1 L over the past 24 hours  5. CKD IV --> ESRD on HD - continue dialysis as per nephrology. Volume removal critical for patient to be extubated safely  6. After next extubation, patient should wear BiPAP with sleep and with naps  7. Multivessel CAD / NSTEMI - s/p CABGx3 on 3/2/2020. Continue management per cardiology and CT surgery; Continue aspirin, plavix, high-intensity statin therapy  8. DM2 - BS stable, Lantus, SSI - glucoses currently stable  9. Atrial fibrillation with RVR - off of oral amiodarone and coreg secondary to bradycardia  10. Right apical pneumothorax appears to have resolved over the past 24 hours  11. Thrombocytopenia has improved. Heparin induced platelet antibody negative. Continue heparin. 12. Oral care  13. Diet: Resume tube feeds  14.  Bilateral

## 2020-03-07 NOTE — PLAN OF CARE
Problem: OXYGENATION/RESPIRATORY FUNCTION  Goal: Patient will maintain patent airway  Outcome: Ongoing     Problem: OXYGENATION/RESPIRATORY FUNCTION  Goal: Patient will achieve/maintain normal respiratory rate/effort  Description: Respiratory rate and effort will be within normal limits for the patient  Outcome: Ongoing     Problem: MECHANICAL VENTILATION  Goal: Patient will maintain patent airway  Outcome: Ongoing     Problem: MECHANICAL VENTILATION  Goal: Oral health is maintained or improved  Outcome: Ongoing     Problem: MECHANICAL VENTILATION  Goal: ET tube will be managed safely  Outcome: Ongoing     Problem: MECHANICAL VENTILATION  Goal: Ability to express needs and understand communication  Outcome: Ongoing     Problem: MECHANICAL VENTILATION  Goal: Mobility/activity is maintained at optimum level for patient  Outcome: Ongoing

## 2020-03-07 NOTE — PROGRESS NOTES
Date: 3/7/2020  Time: 0458  Patient identity confirmed:  Yes  Indications: post cardiac arrest/airway protection  Preoxygenation: yes    Laryngoscope size and type Glidescope  ETT size:a 7.5 cuffed  Number of attempts:1   Cords visualized:  [x] Clearly  [] Poorly  Breath sounds present bilaterally: Yes   ETCO2   [x] Positive   ETT secured at  23    ETT secured with [place]  Chest x-ray ordered: Yes     Was this a Code Situation:    Post code situation             BP: (!) 98/57        Procedure performed by: Merrick Gottron RRT Lajean Shin  5:05 AM

## 2020-03-07 NOTE — PROGRESS NOTES
Charting intubations and reintubations    ETT Size (e.g. 7.5) 7.5 cuffed  ETT Placement (e.g. 23 cm at lips) 22cm at lips  ETT secured with (commercial device name) mine    Tube placement verified by:  Auscultation (yes/no) yes  Positive color change on end tidal CO2 detector (yes/no) yes  CXR (yes/no) yes    Reason for intubation (jot a quick note/phrase e.g. Impending respiratory failure) code blue    If difficult airway, was red tape placed (yes/no) no  If code situation, was rescue pod used? (yes/no) no (pulses back during intubation)    -- Notify charge therapist regarding all intubations, extubations, reintubations and self extubations    Hilary Tapia  5:01 AM

## 2020-03-07 NOTE — PROGRESS NOTES
Writer called and spoke to NIKHIL MAYERS with CST and notified patient currently on levophed drip at 0.08 mcg/kg/min, no swan at this time and MAP remains between 51-58. New orders received to monitor CVP, Albumin 1 Liter 5% and if does not help ok to start epinephrine drip.

## 2020-03-07 NOTE — PROGRESS NOTES
SUBJECTIVE    Patient seen and examined. Events from this morning noted. Patient has a brief episode of pulseless electrical activity. She had 2 rounds of CPR. Patient got intubated. Patient is alert and awake and following commands. CVVHD was discontinued last night. Patient is in net negative fluid balance. Chest x-ray which was performed immediately or during code suggestive of pulmonary congestion  Patient is hemodynamically stable  CVVHD was stopped yesterday. OBJECTIVE      CURRENT TEMPERATURE:  Temp: 98.3 °F (36.8 °C)  MAXIMUM TEMPERATURE OVER 24HRS:  Temp (24hrs), Av.2 °F (36.8 °C), Min:97.6 °F (36.4 °C), Max:99.1 °F (37.3 °C)    CURRENT RESPIRATORY RATE:  Resp: 20  CURRENT PULSE:  Pulse: 81  CURRENT BLOOD PRESSURE:  BP: (!) 118/48  24HR BLOOD PRESSURE RANGE:  Systolic (40FRG), SQO:699 , Min:69 , YGW:570   ; Diastolic (05OJY), CXM:52, Min:36, Max:57    24HR INTAKE/OUTPUT:      Intake/Output Summary (Last 24 hours) at 3/7/2020 0947  Last data filed at 3/7/2020 4049  Gross per 24 hour   Intake 3243.23 ml   Output 1617 ml   Net 1626.23 ml     WEIGHT :  Patient Vitals for the past 96 hrs (Last 3 readings):   Weight   20 0600 154 lb 5.2 oz (70 kg)   20 0615 149 lb 11.1 oz (67.9 kg)   20 0600 171 lb 11.8 oz (77.9 kg)     PHYSICAL EXAM      GENERAL APPEARANCE: Patient is intubated and able to follow simple commands  SKIN: Warm to touch  ENT: No thrush or pharyngeal congestion  NECK: No JVD.   PULMONARY: Bilateral air entry and clear to auscultation  CADRDIOVASCULAR: S1 and S2 audible no S3   aBDOMEN: soft nontender, bowel sounds present, no organomegaly,  no ascites EXTREMITIES: No edema    CURRENT MEDICATIONS      propofol 1000 MG/100ML injection,   aspirin chewable tablet 81 mg, Daily  EPINEPHrine (EPINEPHrine HCL) 5 mg in dextrose 5 % 250 mL infusion, Continuous  albuterol (PROVENTIL) nebulizer solution 2.5 mg, Q6H PRN  midazolam (VERSED) 100 mg in dextrose 5% 100 mL infusion, Continuous  prismaSATE BK 0/3.5 5,000 mL with potassium chloride 10 mEq solution, Continuous  0.9 % sodium chloride infusion, Continuous  heparin (porcine) injection 5,000 Units, 3 times per day  insulin lispro (HUMALOG) injection vial 0-6 Units, TID WC  insulin lispro (HUMALOG) injection vial 0-3 Units, Nightly  sodium chloride flush 0.9 % injection 10 mL, 2 times per day  sodium chloride flush 0.9 % injection 10 mL, PRN  calcium chloride 1 g in sodium chloride 0.9 % 100 mL IVPB, PRN  magnesium sulfate 1 g in dextrose 5% 100 mL IVPB, PRN  potassium chloride 20 mEq/50 mL IVPB (Central Line), PRN  oxyCODONE-acetaminophen (PERCOCET) 5-325 MG per tablet 1 tablet, Q4H PRN    Or  oxyCODONE-acetaminophen (PERCOCET) 5-325 MG per tablet 2 tablet, Q4H PRN  fentaNYL (SUBLIMAZE) injection 25 mcg, Q1H PRN    Or  fentaNYL (SUBLIMAZE) injection 50 mcg, Q1H PRN  diphenhydrAMINE (BENADRYL) tablet 25 mg, Nightly PRN  chlorhexidine (PERIDEX) 0.12 % solution 15 mL, BID  hydrALAZINE (APRESOLINE) injection 5 mg, Q5 Min PRN  therapeutic multivitamin-minerals 1 tablet, Daily with breakfast  albumin human 5 % IV solution 25 g, PRN  glucose (GLUTOSE) 40 % oral gel 15 g, PRN  dextrose 50 % IV solution, PRN  glucagon (rDNA) injection 1 mg, PRN  dextrose 5 % solution, PRN  sennosides-docusate sodium (SENOKOT-S) 8.6-50 MG tablet 1 tablet, BID  bisacodyl (DULCOLAX) suppository 10 mg, Daily PRN  bisacodyl (DULCOLAX) EC tablet 5 mg, Daily PRN  phenylephrine (CLAUDETTE-SYNEPHRINE) 50 mg in dextrose 5 % 250 mL infusion, Continuous PRN  milrinone (PRIMACOR) 20 mg in dextrose 5 % 100 mL infusion, Continuous PRN  norepinephrine (LEVOPHED) 16 mg in dextrose 5% 250 mL infusion, Continuous PRN  EPINEPHrine (EPINEPHrine HCL) 5 mg in dextrose 5 % 250 mL infusion, Continuous PRN  nitroGLYCERIN 50 mg in dextrose 5% 250 mL infusion, Continuous PRN  clopidogrel (PLAVIX) tablet 75 mg, Daily  famotidine (PEPCID) tablet 20 mg, Daily    Or  famotidine (PEPCID) injection

## 2020-03-07 NOTE — PROGRESS NOTES
Integumentum:Intact with no rashes noted.       Diagnostic Studies:     EKG:   Sinus rhythm with possible old septal infarct and inferolateral ST depressions     ECHO:  3/4/2020 :  Global left ventricular systolic function is normal. Calculated ejection  fraction is 55 % by heart Model . Abnormal septal motion due to BBB or Paced rhythm  Normal right ventricular function. Pacemaker / ICD lead seen in right  ventricle. No pericardial effusion seen. 2/21/2020  Left ventricle is normal in size. Global left ventricular systolic function is moderately reduced. Calculated EF via heart model is 36%. Mild left ventricular hypertrophy. Grade II (moderate) left ventricular diastolic dysfunction. Left atrium is mildly dilated. The aortic valve is calcified with reduced cusp mobility. Moderate aortic valve stenosis with a mean gradient of 20 mmHg. Maybe underestimated due to poor LV function. Trivial aortic insufficiency. Thickened mitral valve leaflets. Mitral annular calcification is seen. At least Moderate mitral regurgitation. Mild tricuspid regurgitation. Moderate pulmonary hypertension with an estimated right ventricular systolic pressure of 56 mmHg  Mild pulmonic insufficiency. Small pericardial effusion. IVC Increased diameter and impaired or no inspiratory variation indicating elevated RA filling pressure (i.e. CVP) . Cath 02/24/2020  LMCA: Normal 0% stenosis.     LAD: Mid 99% in upper long branch (Reaching to apex) 80% in lower LAD branch. D1: proximal 90% stenosis     LCx: Mid 80% stenosis  OM1: Ostial 80% stenosis  OM2: Proximal 80% stenosis     RCA: 100% proximal stenosis  Collateral from the left        Peripheral Arteries and Lesion Findings  Descending aorta: Severe iliac disease in both sides  80% in the left side and 60-70% in the right side     The LV gram was performed in the BAUGH 30 position. LVEF: 35%.  LV Wall Motion: Severe basal anterior and basal inferior hypokinesis        · Hemodynamic Pressures                Site S/A (mmHg) D/V (mmHg) M/ED (mmHg)   Right Atrium 23 22 19   Right Ventricle 51 19 20   Pulmonary Artery 43 27 33   PCWP 26 26 25            · Oxygen Saturations     Site Oxygen Saturation (%)   Pulmonary Artery Main 54.5   Left Femoral Artery  89.5         · Cardiac Output     Calculation Method Cardiac Output (L/min) Cardiac Index (L/min/m2)   Christiano 4.25 2.4          Estimated Blood Loss: 10 mL     Conclusions:  1. Mildly elevated right heart pressures  2. Aortic valve gradient is not as bad as reported by echocardiogram: 9-11 mmHg  3. Severe multivessel CAD  4. Reduced LV systolic function with segmental wall motion abnormalities  5. Bilateral iliac disease         Assessment / Acute Cardiac Problems:   1. NSTEMI- s/p cath s/p CABG x3 (LIMA- LAD, SVG-D1, SVG2 - OM) on 3/2/2020  2. Bradycardia Post Op - requiring pacing   3. PEA arrest - 03/07/2020 - rhythm noticed to be Afib - episode immediately after receiving ice chip - possible respiratory related. 4. Acute CHF - volume overload in the setting of CKD  5. Preserved LVEF post CABG (36% prior to surgery)  6. Moderate AS - Mean gradient 20, CHEIKH (continuity : 0.87), no significant gradient noticed on cardiac cath   7. Moderate MR  8. B/L significant iliac disease on angiogram   9. CKD stage V-initiated on dialysis this admission  10. H/O CVA  6. DM-2  12. Hx smoking  13. Post OP anemia     Plan of Treatment:   1. Continue aspirin, Plavix,  and Lipitor  2. Limited ECHO to evaluate for pericardial effusion, LV function - post PEA arrest, reviewed no pericardial effusion, LV function 38% anterior and septal hypokinesis. Aortic valve findings unchanged from before consistent with mild to moderate aortic stenosis  3. Unclear cause of arrest, telemetry at the time of arrest patient had A. fib no episode of V. fib noticed, nonsustained VT episode post [place].   4. Hold amiodarone drip due to bradycardic

## 2020-03-07 NOTE — PROGRESS NOTES
Dr. Joshua Chauhan at bedside. Updated on patient hemodynamics and recent interventions. No orders received at this time.

## 2020-03-07 NOTE — PROGRESS NOTES
Writer notified Romel Wells NP patient is re-intubated and at this time agitated and reaching towards airway. At this time waiting for new orders.

## 2020-03-07 NOTE — PROGRESS NOTES
Subcutaneous Nightly    [MAR Hold] vitamin D3  400 Units Oral BID    [MAR Hold] bumetanide  2 mg Intravenous Daily     Continuous Infusions:    EPINEPHrine infusion      midazolam Stopped (03/07/20 1211)    CRRT dialysis builder 1,500 mL/hr (03/06/20 1052)    sodium chloride 100 mL/hr at 03/06/20 0321    dextrose      phenylephrine (CLAUDETTE-SYNEPHRINE) 50mg/250mL infusion Stopped (03/03/20 0215)    milrinone Stopped (03/04/20 0835)    norepinephrine Stopped (03/07/20 2078)    EPINEPHrine infusion Stopped (03/04/20 1515)    nitroGLYCERIN      dexmedetomidine (PRECEDEX) IV infusion Stopped (03/04/20 0830)       Physical Exam:     General: Intubated and sedated  Heart: Normal S1, S2, RRR, + systolic murmur   Sternum: CDI, No click noted  Lungs: Diminished BS bilaterally at the bases. Coarse BS bilaterally   Abdomen: soft, non tender, non distended, BSx4  Extremities: No edema noted bilateral LE. SCD's in place bilateral LE          Assessment & Plan:   Ms. Ace is a 78 y. o. year-old female status post CABG x 3 on 3/2/20 POD# 5. Newly diagnosed CKD Stage V - HD initiated pre-op on admission. Patient was V-Paced post-operatively until 3/5/20 when her intrinsic rhythm resumed. Post-op course complicated by hypoxemia and severe agitation s/p extubation on 3/3/20 which required re-intubation within 30 minutes post extubation. On 3/5/20 in the middle of the night patient had a brief episode of Afib/RVR and converted spontaneously to NSR. On 3/6/20 patient had another episode of Afib/RVR then went bradycardic briefly which required V-pacing. Amio bolus and gtt were started as per protocol. On 3/7/20 around 4:30am patients post-op course was further complicated by a Code blue. I was told that patient was given some water and suddenly vomited, eyes rolled back and became pale grayish and was unresponsive.  On telemetry monitoring the rhythm appeared to be V tach which rapidly degenerated into V. fib arrest.  Patient was given 2 rounds of CPR and 1 epinephrine and ROSC was achieved. Post cardiac arrest and restoration of rhythm, patient was having unstable labored respirations indicating impending respiratory failure.  Hence patient required intubation.  Intubation was performed by the RT under direct supervision of the ED resident. Lucrezia Clap rhythm was varying between A. fib and junctional rhythm but under pacing wires the patient still was having asystole. After rapid sequence intubation patient was hypotensive and Levophed had to be started on the patient.  Patient's blood pressure slowly improved and he received 250 mL IV fluid bolus also. I was notified around 5:30am about the above mentioned event. Unclear cause of arrest, telemetry at the time of arrest patient had A. fib no episode of V. fib noticed, nonsustained VT episode post ROSC. This morning patient upon examination the patient was off the Levophed and in NSR around 75-80 with V-pacer backup of 75. Patient is on mild sedation with a Versed gtt following commands. 1. Follow-up labs, CXR(exhibiting pulmonary edema) and ABG  2. Cont current care as per Internal Medicine, Nephrology and Cardiology   3. Cont SCD's bilateral LE  4. GI Prophylaxis  5. Continue celment for strict I&O's  6. Pain management PRN  7. Pulmonary following. Cont vent management as directed  8. Nephrology following. Cont Vancomycin as per Renal and dosed by Pharmacy. Will begin HD today for UF. Remove 1-1.5L of fluid as tolerated  9. D/c Versed gtt   10. Resume TF's as tolerated. Monitor residuals  11. Cardiology will begin a Heparin gtt for several episodes of Parox Afib and transition to PO AC as tolerated  12. Cont to hold Coreg and Amiodarone   13. Cont backup V-pacer at 79       The above recommendations including medications and orders were discussed and agreed upon with Dr. Chelo Lea, the attending on service for the cardiothoracic surgery group today.        Brooks Fine MBA, PA-C

## 2020-03-07 NOTE — PROGRESS NOTES
Dr. Jason Schilling at bedside. Updated on current hemodynamics and recent interventions. Plan to keep patient on ventilator. Will continue to monitor.

## 2020-03-07 NOTE — SIGNIFICANT EVENT
Responded to CODE BLUE paged overhead. Upon my arrival patient was not coding. ICU team as well as IM residents at bedside. Reported a respiratory arrest with loss of pulse. Rhythm was reportedly VT.  2 rounds of CPR and 1 epi was given with achieval of Rosc prior to my arrival.  Patient was recently extubated approximately 12 hours ago. Patient has a palpable femoral pulse. It is oxygenating well with nasal cannula and blow-by oxygen. Patient received 20mg of etomidate and 100 mg of succinylcholine for rapid sequence intubation. Patient preoxygenated well beforehand. Patient hypertensive in the 200s. Patient intubated with a 7.5 ET tube by RT Terrence under my direct supervision. Tube observed passing through the cords on glide scope. Good bilateral breath sounds. Tube secured. Postintubation chest x-ray ordered. Patient became hypotensive in the 95D systolic. Levophed restarted and post intubation sedation changed to Versed per ICU residents. I left bedside and chest x-ray is still pending. Patient oxygenating well.   Care handed off to ICU residents and Intermed NP

## 2020-03-08 ENCOUNTER — APPOINTMENT (OUTPATIENT)
Dept: GENERAL RADIOLOGY | Age: 70
DRG: 233 | End: 2020-03-08
Payer: MEDICARE

## 2020-03-08 ENCOUNTER — APPOINTMENT (OUTPATIENT)
Dept: CT IMAGING | Age: 70
DRG: 233 | End: 2020-03-08
Payer: MEDICARE

## 2020-03-08 LAB
ALLEN TEST: ABNORMAL
ANGLE TEG W HEPARIN: 66.2 DEG (ref 53–72)
ANION GAP SERPL CALCULATED.3IONS-SCNC: 16 MMOL/L (ref 9–17)
ANION GAP SERPL CALCULATED.3IONS-SCNC: 17 MMOL/L (ref 9–17)
BUN BLDV-MCNC: 52 MG/DL (ref 8–23)
BUN BLDV-MCNC: 58 MG/DL (ref 8–23)
BUN BLDV-MCNC: 61 MG/DL (ref 8–23)
BUN BLDV-MCNC: 65 MG/DL (ref 8–23)
BUN/CREAT BLD: ABNORMAL (ref 9–20)
CALCIUM IONIZED: 1.18 MMOL/L (ref 1.13–1.33)
CALCIUM IONIZED: 1.21 MMOL/L (ref 1.13–1.33)
CALCIUM IONIZED: 1.24 MMOL/L (ref 1.13–1.33)
CALCIUM IONIZED: 1.24 MMOL/L (ref 1.13–1.33)
CALCIUM SERPL-MCNC: 9.2 MG/DL (ref 8.6–10.4)
CALCIUM SERPL-MCNC: 9.2 MG/DL (ref 8.6–10.4)
CALCIUM SERPL-MCNC: 9.3 MG/DL (ref 8.6–10.4)
CALCIUM SERPL-MCNC: 9.3 MG/DL (ref 8.6–10.4)
CHLORIDE BLD-SCNC: 100 MMOL/L (ref 98–107)
CHLORIDE BLD-SCNC: 96 MMOL/L (ref 98–107)
CHLORIDE BLD-SCNC: 98 MMOL/L (ref 98–107)
CHLORIDE BLD-SCNC: 99 MMOL/L (ref 98–107)
CO2: 17 MMOL/L (ref 20–31)
CO2: 18 MMOL/L (ref 20–31)
CO2: 19 MMOL/L (ref 20–31)
CO2: 19 MMOL/L (ref 20–31)
CREAT SERPL-MCNC: 5.14 MG/DL (ref 0.5–0.9)
CREAT SERPL-MCNC: 5.53 MG/DL (ref 0.5–0.9)
CREAT SERPL-MCNC: 5.7 MG/DL (ref 0.5–0.9)
CREAT SERPL-MCNC: 5.88 MG/DL (ref 0.5–0.9)
EPL TEG, W/HEP: 0 % (ref 0–15)
FIO2: 40
GFR AFRICAN AMERICAN: 10 ML/MIN
GFR AFRICAN AMERICAN: 9 ML/MIN
GFR NON-AFRICAN AMERICAN: 7 ML/MIN
GFR NON-AFRICAN AMERICAN: 7 ML/MIN
GFR NON-AFRICAN AMERICAN: 8 ML/MIN
GFR NON-AFRICAN AMERICAN: 8 ML/MIN
GFR SERPL CREATININE-BSD FRML MDRD: ABNORMAL ML/MIN/{1.73_M2}
GLUCOSE BLD-MCNC: 114 MG/DL (ref 70–99)
GLUCOSE BLD-MCNC: 121 MG/DL (ref 65–105)
GLUCOSE BLD-MCNC: 128 MG/DL (ref 65–105)
GLUCOSE BLD-MCNC: 136 MG/DL (ref 65–105)
GLUCOSE BLD-MCNC: 136 MG/DL (ref 70–99)
GLUCOSE BLD-MCNC: 139 MG/DL (ref 70–99)
GLUCOSE BLD-MCNC: 141 MG/DL (ref 65–105)
GLUCOSE BLD-MCNC: 141 MG/DL (ref 70–99)
HCT VFR BLD CALC: 24.6 % (ref 36.3–47.1)
HEMOGLOBIN: 8.1 G/DL (ref 11.9–15.1)
HEPARIN THERAPY: YES
INHIBITION AA TEG W HEPARIN: 98.8 %
INHIBITION ADP TEG W HEPARIN: 32.4 %
INR BLD: 1.1
KINETICS TEG W HEPARIN: 1.8 MIN (ref 1–3)
LY30(LYSIS) TEG W HEPARIN: 0 % (ref 0–8)
MA (MAX CLOT) TEG W HEPARIN: 75.5 MM (ref 50–70)
MA(AA) TEG W HEPARIN: 27.7 MM
MA(ACTIVAT) TEG W HEPARIN: 27.1 MM
MA(ADP) TEG W HEPARIN: 59.8 MM
MAGNESIUM: 2.2 MG/DL (ref 1.6–2.6)
MAGNESIUM: 2.2 MG/DL (ref 1.6–2.6)
MAGNESIUM: 2.3 MG/DL (ref 1.6–2.6)
MAGNESIUM: 2.3 MG/DL (ref 1.6–2.6)
MCH RBC QN AUTO: 31.2 PG (ref 25.2–33.5)
MCHC RBC AUTO-ENTMCNC: 32.9 G/DL (ref 28.4–34.8)
MCV RBC AUTO: 94.6 FL (ref 82.6–102.9)
MODE: ABNORMAL
NEGATIVE BASE EXCESS, ART: 4 (ref 0–2)
NRBC AUTOMATED: 0 PER 100 WBC
O2 DEVICE/FLOW/%: ABNORMAL
PARTIAL THROMBOPLASTIN TIME: 42.7 SEC (ref 20.5–30.5)
PARTIAL THROMBOPLASTIN TIME: 51.1 SEC (ref 20.5–30.5)
PARTIAL THROMBOPLASTIN TIME: 60.1 SEC (ref 20.5–30.5)
PARTIAL THROMBOPLASTIN TIME: 69.2 SEC (ref 20.5–30.5)
PATIENT TEMP: ABNORMAL
PDW BLD-RTO: 14.5 % (ref 11.8–14.4)
PERFORMING LOCATION: ABNORMAL
PLATELET # BLD: 160 K/UL (ref 138–453)
PMV BLD AUTO: 11.9 FL (ref 8.1–13.5)
POC HCO3: 21.7 MMOL/L (ref 21–28)
POC O2 SATURATION: 94 % (ref 94–98)
POC PCO2 TEMP: ABNORMAL MM HG
POC PCO2: 38.7 MM HG (ref 35–48)
POC PH TEMP: ABNORMAL
POC PH: 7.36 (ref 7.35–7.45)
POC PO2 TEMP: ABNORMAL MM HG
POC PO2: 76.1 MM HG (ref 83–108)
POSITIVE BASE EXCESS, ART: ABNORMAL (ref 0–3)
POTASSIUM SERPL-SCNC: 3.6 MMOL/L (ref 3.7–5.3)
POTASSIUM SERPL-SCNC: 3.6 MMOL/L (ref 3.7–5.3)
POTASSIUM SERPL-SCNC: 3.8 MMOL/L (ref 3.7–5.3)
POTASSIUM SERPL-SCNC: 4.1 MMOL/L (ref 3.7–5.3)
PROTHROMBIN TIME: 11.3 SEC (ref 9–12)
RBC # BLD: 2.6 M/UL (ref 3.95–5.11)
REACTION TIME TEG W HEPARIN: 9.8 MIN (ref 5–10)
SAMPLE SITE: ABNORMAL
SODIUM BLD-SCNC: 131 MMOL/L (ref 135–144)
SODIUM BLD-SCNC: 132 MMOL/L (ref 135–144)
SODIUM BLD-SCNC: 133 MMOL/L (ref 135–144)
SODIUM BLD-SCNC: 135 MMOL/L (ref 135–144)
TCO2 (CALC), ART: 23 MMOL/L (ref 22–29)
TEG COMMENT: ABNORMAL
WBC # BLD: 13.5 K/UL (ref 3.5–11.3)

## 2020-03-08 PROCEDURE — 85730 THROMBOPLASTIN TIME PARTIAL: CPT

## 2020-03-08 PROCEDURE — 71275 CT ANGIOGRAPHY CHEST: CPT

## 2020-03-08 PROCEDURE — 85390 FIBRINOLYSINS SCREEN I&R: CPT

## 2020-03-08 PROCEDURE — 6360000002 HC RX W HCPCS: Performed by: INTERNAL MEDICINE

## 2020-03-08 PROCEDURE — 2100000001 HC CVICU R&B

## 2020-03-08 PROCEDURE — 82803 BLOOD GASES ANY COMBINATION: CPT

## 2020-03-08 PROCEDURE — 82947 ASSAY GLUCOSE BLOOD QUANT: CPT

## 2020-03-08 PROCEDURE — 2580000003 HC RX 258: Performed by: NURSE PRACTITIONER

## 2020-03-08 PROCEDURE — 94640 AIRWAY INHALATION TREATMENT: CPT

## 2020-03-08 PROCEDURE — 85384 FIBRINOGEN ACTIVITY: CPT

## 2020-03-08 PROCEDURE — 85610 PROTHROMBIN TIME: CPT

## 2020-03-08 PROCEDURE — 85027 COMPLETE CBC AUTOMATED: CPT

## 2020-03-08 PROCEDURE — 6360000002 HC RX W HCPCS: Performed by: THORACIC SURGERY (CARDIOTHORACIC VASCULAR SURGERY)

## 2020-03-08 PROCEDURE — 85347 COAGULATION TIME ACTIVATED: CPT

## 2020-03-08 PROCEDURE — 85576 BLOOD PLATELET AGGREGATION: CPT

## 2020-03-08 PROCEDURE — 6370000000 HC RX 637 (ALT 250 FOR IP): Performed by: NURSE PRACTITIONER

## 2020-03-08 PROCEDURE — 82330 ASSAY OF CALCIUM: CPT

## 2020-03-08 PROCEDURE — 6370000000 HC RX 637 (ALT 250 FOR IP): Performed by: THORACIC SURGERY (CARDIOTHORACIC VASCULAR SURGERY)

## 2020-03-08 PROCEDURE — 99233 SBSQ HOSP IP/OBS HIGH 50: CPT | Performed by: INTERNAL MEDICINE

## 2020-03-08 PROCEDURE — 71045 X-RAY EXAM CHEST 1 VIEW: CPT

## 2020-03-08 PROCEDURE — 6360000004 HC RX CONTRAST MEDICATION: Performed by: INTERNAL MEDICINE

## 2020-03-08 PROCEDURE — 99291 CRITICAL CARE FIRST HOUR: CPT | Performed by: INTERNAL MEDICINE

## 2020-03-08 PROCEDURE — 83735 ASSAY OF MAGNESIUM: CPT

## 2020-03-08 PROCEDURE — 2580000003 HC RX 258: Performed by: INTERNAL MEDICINE

## 2020-03-08 PROCEDURE — 80048 BASIC METABOLIC PNL TOTAL CA: CPT

## 2020-03-08 PROCEDURE — 94003 VENT MGMT INPAT SUBQ DAY: CPT

## 2020-03-08 PROCEDURE — 2500000003 HC RX 250 WO HCPCS: Performed by: NURSE PRACTITIONER

## 2020-03-08 PROCEDURE — 93005 ELECTROCARDIOGRAM TRACING: CPT | Performed by: INTERNAL MEDICINE

## 2020-03-08 PROCEDURE — 6360000002 HC RX W HCPCS: Performed by: NURSE PRACTITIONER

## 2020-03-08 RX ADMIN — INSULIN LISPRO 1 UNITS: 100 INJECTION, SOLUTION INTRAVENOUS; SUBCUTANEOUS at 17:23

## 2020-03-08 RX ADMIN — ASPIRIN 81 MG: 81 TABLET, CHEWABLE ORAL at 08:45

## 2020-03-08 RX ADMIN — SENNOSIDES AND DOCUSATE SODIUM 1 TABLET: 8.6; 5 TABLET ORAL at 19:51

## 2020-03-08 RX ADMIN — Medication 15 ML: at 20:01

## 2020-03-08 RX ADMIN — Medication 3 MG/HR: at 17:23

## 2020-03-08 RX ADMIN — CEFEPIME HYDROCHLORIDE 1 G: 1 INJECTION, POWDER, FOR SOLUTION INTRAMUSCULAR; INTRAVENOUS at 15:28

## 2020-03-08 RX ADMIN — DESMOPRESSIN ACETATE 40 MG: 0.2 TABLET ORAL at 19:52

## 2020-03-08 RX ADMIN — FOLIC ACID 1 MG: 1 TABLET ORAL at 08:45

## 2020-03-08 RX ADMIN — HEPARIN SODIUM AND DEXTROSE 12 UNITS/KG/HR: 10000; 5 INJECTION INTRAVENOUS at 22:02

## 2020-03-08 RX ADMIN — OXYCODONE HYDROCHLORIDE AND ACETAMINOPHEN 2 TABLET: 5; 325 TABLET ORAL at 20:13

## 2020-03-08 RX ADMIN — Medication 15 ML: at 09:00

## 2020-03-08 RX ADMIN — SODIUM CHLORIDE, PRESERVATIVE FREE 10 ML: 5 INJECTION INTRAVENOUS at 08:46

## 2020-03-08 RX ADMIN — CLOPIDOGREL 75 MG: 75 TABLET, FILM COATED ORAL at 08:45

## 2020-03-08 RX ADMIN — MULTIPLE VITAMINS W/ MINERALS TAB 1 TABLET: TAB at 08:45

## 2020-03-08 RX ADMIN — SENNOSIDES AND DOCUSATE SODIUM 1 TABLET: 8.6; 5 TABLET ORAL at 08:45

## 2020-03-08 RX ADMIN — FAMOTIDINE 20 MG: 10 INJECTION INTRAVENOUS at 08:45

## 2020-03-08 RX ADMIN — ALBUTEROL SULFATE 2.5 MG: 2.5 SOLUTION RESPIRATORY (INHALATION) at 03:08

## 2020-03-08 RX ADMIN — HEPARIN SODIUM 2000 UNITS: 1000 INJECTION, SOLUTION INTRAVENOUS; SUBCUTANEOUS at 17:22

## 2020-03-08 RX ADMIN — SODIUM CHLORIDE, PRESERVATIVE FREE 10 ML: 5 INJECTION INTRAVENOUS at 20:00

## 2020-03-08 RX ADMIN — FENTANYL CITRATE 25 MCG: 50 INJECTION, SOLUTION INTRAMUSCULAR; INTRAVENOUS at 15:28

## 2020-03-08 RX ADMIN — ALLOPURINOL 300 MG: 300 TABLET ORAL at 08:45

## 2020-03-08 RX ADMIN — IOHEXOL 75 ML: 350 INJECTION, SOLUTION INTRAVENOUS at 04:52

## 2020-03-08 ASSESSMENT — PULMONARY FUNCTION TESTS
PIF_VALUE: 29
PIF_VALUE: 28
PIF_VALUE: 29
PIF_VALUE: 28
PIF_VALUE: 29
PIF_VALUE: 29
PIF_VALUE: 30
PIF_VALUE: 28
PIF_VALUE: 29
PIF_VALUE: 28
PIF_VALUE: 16
PIF_VALUE: 28
PIF_VALUE: 29
PIF_VALUE: 29
PIF_VALUE: 28
PIF_VALUE: 29
PIF_VALUE: 28
PIF_VALUE: 26
PIF_VALUE: 29
PIF_VALUE: 28
PIF_VALUE: 29

## 2020-03-08 ASSESSMENT — PAIN SCALES - GENERAL: PAINLEVEL_OUTOF10: 5

## 2020-03-08 NOTE — PROGRESS NOTES
[facility]   Progress Note                    Date:   3/8/2020  Patient name:  Jaiden Nixon  Date of admission:  2/19/2020 11:01 PM  MRN:   [id number]  YOB: 1950  PCP:    Mikhail Blackmon PA-C    Reason for Admission:  HERNÁN (acute kidney injury) ([facility] [address] [N17.9]    Subjective:     No acute issue overnight  Remained in sinus rhythm  CT negative PE  On low doses of phenylephrine         I/O last 3 completed shifts: In: 930.9 [I.V.:740.9; NG/GT:190]  Out: 1985 [Urine:95; Emesis/NG output:50]  No intake/output data recorded.       In: 880.9 [I.V.:740.9; NG/GT:140]  Out: 1950 [Urine:60]      Intake/Output Summary (Last 24 hours) at 3/8/2020 1139  Last data filed at 3/8/2020 0630  Gross per 24 hour   Intake 880.94 ml   Output 1975 ml   Net -1094.06 ml       Medications:   Scheduled Meds:   aspirin  81 mg Oral Daily    cefepime  1 g Intravenous Q24H    heparin (porcine)  4,000 Units Intravenous Once    insulin lispro  0-6 Units Subcutaneous TID WC    insulin lispro  0-3 Units Subcutaneous Nightly    sodium chloride flush  10 mL Intravenous 2 times per day    chlorhexidine  15 mL Mouth/Throat BID    therapeutic multivitamin-minerals  1 tablet Oral Daily with breakfast    sennosides-docusate sodium  1 tablet Oral BID    clopidogrel  75 mg Oral Daily    famotidine  20 mg Oral Daily    Or    famotidine (PEPCID) injection  20 mg Intravenous Daily    allopurinol  300 mg Oral Daily    atorvastatin  40 mg Oral Nightly    folic acid  1 mg Oral Daily    [MAR Hold] insulin glargine  25 Units Subcutaneous Nightly    [MAR Hold] bumetanide  2 mg Intravenous Daily     Continuous Infusions:   heparin (porcine) 10 Units/kg/hr (03/07/20 6978)    EPINEPHrine infusion      midazolam 2 mg/hr (03/07/20 1410)    CRRT dialysis builder 1,500 mL/hr (03/06/20 1052)    sodium chloride 100 mL/hr at 03/06/20 0321    dextrose      phenylephrine (CLAUDETTE-SYNEPHRINE) 50mg/250mL infusion Stopped (03/08/20 1130)    milrinone Stopped (03/04/20 0835)    norepinephrine Stopped (03/07/20 2332)    EPINEPHrine infusion Stopped (03/04/20 1515)    nitroGLYCERIN      dexmedetomidine (PRECEDEX) IV infusion Stopped (03/04/20 0830)     CBC:   Recent Labs     03/07/20  0427 03/07/20  0455 03/08/20  0600   WBC 13.9* 11.3 13.5*   HGB 8.5* 8.7* 8.1*   * See Reflexed IPF Result 160     BMP:    Recent Labs     03/07/20  2122 03/08/20  0315 03/08/20  0944   * 131* 133*   K 3.9 3.6* 3.8   CL 98 96* 98   CO2 19* 19* 18*   BUN 48* 52* 58*   CREATININE 4.48* 5.14* 5.53*   GLUCOSE 135* 114* 139*     Hepatic:   No results for input(s): AST, ALT, ALB, BILITOT, ALKPHOS in the last 72 hours. Troponin: No results for input(s): TROPONINI in the last 72 hours. No results for input(s): TROPONINT in the last 72 hours. BNP:   No results for input(s): PROBNP in the last 72 hours. No results for input(s): BNP in the last 72 hours. Lipids: No results for input(s): CHOL, HDL in the last 72 hours. Invalid input(s): LDLCALCU  INR:   Recent Labs     03/07/20 0427 03/07/20  0455 03/08/20  0600   INR 1.0 1.0 1.1       Objective:   Vitals: BP (!) 113/45   Pulse 77   Temp 98.8 °F (37.1 °C) (Oral)   Resp 26   Ht 5' 3\" (1.6 m)   Wt 159 lb 6.3 oz (72.3 kg)   SpO2 98%   BMI 28.24 kg/m²    General appearance: Intubated and sedated  HEENT: Head: Normocephalic, atraumatic without any obvious abnormalities. Neck: JVD absent   Lungs:good bilateral equal air entry. No adventitious lung sounds auscultated. Heart: S1, S2, regular, + systolic murmur. Abdomen: soft, nontender with bowel sounds present in all 4 quadrants.   Extremities: + LE edema  Integumentum:Intact with no rashes noted.       Diagnostic Studies:     EKG:   Sinus rhythm with possible old septal infarct and inferolateral ST depressions     ECHO:  3/4/2020 :  Global left ventricular systolic function is normal. Calculated ejection  fraction is 55 % by heart Model

## 2020-03-08 NOTE — PROGRESS NOTES
Ostial 80% stenosis; OM2: Proximal 80% stenosis; RCA: 100% proximal stenosis, collateral from the left. Peripheral Arteries and Lesion Findings  Descending aorta: Severe iliac disease in both sides  80% in the left side and 60-70% in the right side     The LV gram was performed in the BAUGH 30 position. LVEF: 35%. LV Wall Motion: Severe basal anterior and basal inferior hypokinesis     Permacath was placed in subclavian vein on 3/24    CABG on 3/2/2020  Increased work of breathing on 3/3. Re-intubated on 3/3. Undergoing HD. SBT daily. Possible extubation on 3/6/2020. Extubated on 3/6. Respiratory arrest with PEA arrest on the morning of 3/7. Reintubated. Weaning slowly. CTA chest without evidence of PE, but does note effusions with airspace disease. On heparin infusion and antibiotic therapy. Review of Systems:     Unable to obtain as patient is currently intubated and sedated    Medications:      Allergies:  No Known Allergies    Current Meds:   Scheduled Meds:    aspirin  81 mg Oral Daily    cefepime  1 g Intravenous Q24H    heparin (porcine)  4,000 Units Intravenous Once    insulin lispro  0-6 Units Subcutaneous TID WC    insulin lispro  0-3 Units Subcutaneous Nightly    sodium chloride flush  10 mL Intravenous 2 times per day    chlorhexidine  15 mL Mouth/Throat BID    therapeutic multivitamin-minerals  1 tablet Oral Daily with breakfast    sennosides-docusate sodium  1 tablet Oral BID    clopidogrel  75 mg Oral Daily    famotidine  20 mg Oral Daily    Or    famotidine (PEPCID) injection  20 mg Intravenous Daily    allopurinol  300 mg Oral Daily    atorvastatin  40 mg Oral Nightly    folic acid  1 mg Oral Daily    [MAR Hold] insulin glargine  25 Units Subcutaneous Nightly    [MAR Hold] bumetanide  2 mg Intravenous Daily     Continuous Infusions:    heparin (porcine) 10 Units/kg/hr (03/07/20 4171)    EPINEPHrine infusion      midazolam 2 mg/hr (03/07/20 1410)    CRRT dialysis builder 1,500 mL/hr (03/06/20 1052)    sodium chloride 100 mL/hr at 03/06/20 0321    dextrose      phenylephrine (CLAUDETTE-SYNEPHRINE) 50mg/250mL infusion Stopped (03/08/20 1130)    milrinone Stopped (03/04/20 0835)    norepinephrine Stopped (03/07/20 2332)    EPINEPHrine infusion Stopped (03/04/20 1515)    nitroGLYCERIN      dexmedetomidine (PRECEDEX) IV infusion Stopped (03/04/20 0830)     PRN Meds: heparin (porcine), heparin (porcine), albuterol, sodium chloride flush, calcium chloride IVPB, magnesium sulfate, potassium chloride, oxyCODONE-acetaminophen **OR** oxyCODONE-acetaminophen, fentanNYL **OR** fentanNYL, diphenhydrAMINE, hydrALAZINE, albumin human, glucose, dextrose, glucagon (rDNA), dextrose, bisacodyl, bisacodyl, phenylephrine (CLAUDETTE-SYNEPHRINE) 50mg/250mL infusion, milrinone, norepinephrine, EPINEPHrine infusion, nitroGLYCERIN, acetaminophen **OR** acetaminophen, ondansetron, [MAR Hold] heparin (porcine), [MAR Hold] heparin (porcine), [MAR Hold] LORazepam, [MAR Hold] heparin (porcine), [MAR Hold] heparin (porcine), [MAR Hold] nicotine    Data:     Past Medical History:   has a past medical history of Acute CHF (congestive heart failure) ([facility] [address] Anemia in chronic kidney disease, Anemia in stage 4 chronic kidney disease ([facility] [address] Cerebral artery occlusion with cerebral infarction ([facility] [address] Chronic kidney disease, Gout, arthritis, Hyperlipidemia, Hypertension, Left sided lacunar infarction ([facility] [address] Obesity (BMI 30-39.9), Peripheral vascular disease ([facility] [address] and Type II or unspecified type diabetes mellitus without mention of complication, not stated as uncontrolled. Social History:   reports that she has been smoking cigarettes. She started smoking about 47 years ago. She has a 40.00 pack-year smoking history. She uses smokeless tobacco. She reports that she does not drink alcohol or use drugs.      Family History:   Family History   Problem Relation Age of Onset    Diabetes Mother     Heart Disease Father Vitals:  BP (!) 113/45   Pulse 87   Temp 98.8 °F (37.1 °C) (Oral)   Resp 26   Ht 5' 3\" (1.6 m)   Wt 159 lb 6.3 oz (72.3 kg)   SpO2 97%   BMI 28.24 kg/m²   Temp (24hrs), Av.7 °F (37.1 °C), Min:98 °F (36.7 °C), Max:99.8 °F (37.7 °C)    Recent Labs     20  1149 20  1637 20  2042 20  0725   POCGLU 146* 134* 132* 128*       I/O (24Hr): Intake/Output Summary (Last 24 hours) at 3/8/2020 1511  Last data filed at 3/8/2020 0630  Gross per 24 hour   Intake 880.94 ml   Output 1950 ml   Net -1069.06 ml       Labs:  Hematology:  Recent Labs     20  0427 20  0455 20  0600   WBC 13.9* 11.3 13.5*   RBC 2.77* 2.84* 2.60*   HGB 8.5* 8.7* 8.1*   HCT 26.3* 28.2* 24.6*   MCV 94.9 99.3 94.6   MCH 30.7 30.6 31.2   MCHC 32.3 30.9 32.9   RDW 14.1 14.2 14.5*   * See Reflexed IPF Result 160   MPV 12.9 NOT REPORTED 11.9   INR 1.0 1.0 1.1     Chemistry:  Recent Labs     20  0919  20  2122 20  0315 20  0944      < > 133* 131* 133*   K 3.9   < > 3.9 3.6* 3.8      < > 98 96* 98   CO2 21   < > 19* 19* 18*   GLUCOSE 170*   < > 135* 114* 139*   BUN 39*   < > 48* 52* 58*   CREATININE 3.68*   < > 4.48* 5.14* 5.53*   MG 2.1   < > 2.1 2.2 2.2   ANIONGAP 15   < > 16 16 17   LABGLOM 12*   < > 10* 8* 8*   GFRAA 15*   < > 12* 10* 9*   CALCIUM 9.2   < > 9.5 9.3 9.2   CAION 1.19   < > 1.21 1.24 1.21   LACTACIDWB 0.6*  --   --   --   --     < > = values in this interval not displayed.      Recent Labs     20  0525 20  0821 20  1149 20  1637 20  2042 20  0725   POCGLU 245* 181* 146* 134* 132* 128*     ABG:  Lab Results   Component Value Date    POCPH 7.357 2020    POCPCO2 38.7 2020    POCPO2 76.1 2020    POCHCO3 21.7 2020    NBEA 4 2020    PBEA NOT REPORTED 2020    DCZ9IQR 23 2020    XAUW8FCX 94 2020    FIO2 40.0 2020     Lab Results   Component Value Date/Time    SPECIAL NOT REPORTED 03/07/2020 04:08 PM     Lab Results   Component Value Date/Time    CULTURE CULTURE IN PROGRESS 03/07/2020 04:08 PM       Radiology:  Xr Chest Portable    Result Date: 3/4/2020  Cardiomegaly and pulmonary vascular congestion. Support tubes and lines as above, in grossly unchanged positioning. Right apical lucency favors overlying structures as lung markings appear to extend into the lung apex. Follow-up x-ray recommended. Xr Chest Portable    Result Date: 3/3/2020  No acute cardiopulmonary process. Support tubes as described above. Xr Chest Portable    Result Date: 3/3/2020  Unchanged chest radiograph. Xr Chest Portable    Result Date: 3/2/2020  Interval sternotomy and new life support apparatus. Mild edema or CHF unchanged. Ir Tunneled Cvc Place Wo Sq Port/pump > 5 Years    Result Date: 2/27/2020  Successful placement of 14.5 Spanish by 23 cm tip to cuff palindrome dialysis catheter and right IJ. Okay to use dialysis catheter. Physical Examination:        General appearance:  Intubated, sedated, awakens to voice, nods yes and no appropriately   Mental Status:  Unable to obtain, as patient is intubated  Lungs:  Mechanical breath sounds; clear lung sounds, no wheezing appreciated  Heart:  regular rate and rhythm (71 bpm), no murmur  Chest wall: anterior chest wall with post-CABG changes, chest tubes have been removed, well-healing postsurgical scarring noted under gauze. Epicardial pacemaker present  Abdomen:  Soft, nontender, nondistended, normal bowel sounds, no masses, hepatomegaly, splenomegaly  Extremities:  No edema, redness, tenderness in the calves, BALJIT hose in place  Lines: Right SC permacath; left IJ introducer with pigtail, Venegas present; right radial artery arterial line present  Neuro: moving all four extremities spontaneously. Awakens to voice.   Skin:  no gross lesions, rashes, induration    Assessment:        Hospital Problems           Last Modified POA    * (Principal) Acute on chronic combined systolic and diastolic CHF (congestive heart failure) ([address] 3/7/2020 Yes    HERNÁN (acute kidney injury) ([address] 3/7/2020 Yes    NSTEMI (non-ST elevated myocardial infarction) ([address] 3/7/2020 Yes    Moderate malnutrition ([address] 3/7/2020 Yes    Cardiopulmonary arrest ([address] 3/7/2020 Yes    Ventilator dependence ([address] 3/7/2020 No    Acute respiratory failure ([address] 3/7/2020 Yes    Anemia in stage 4 chronic kidney disease ([address] 3/7/2020 Yes    Overview Signed 7/29/2017  8:09 PM by [facility]     Updating deleted diagnoses         Type 2 diabetes mellitus with diabetic chronic kidney disease ([facility] [address] 3/7/2020 Yes    Essential hypertension 3/7/2020 Yes    Hypercholesteremia 3/7/2020 Yes    COPD exacerbation ([address] 3/7/2020 Yes    Thrombocytopenia ([address] 3/7/2020 No          Plan:        1. S/P CPA on 3/7 - unsure as to what the inciting event was. Possibly aspiration and subsequent hypoxia 2D echo was reviewed. No pericardial effusion. EF 38%. 2. Bradycardia - Per cardio - maintain off amio and coreg as intrinsic rhythm is asystole initially after CABG  3. Shock - resolving. Off of pressors this morning. 4. Acute HFrEF with pulmonary edema noted on chest x-ray- monitor I/O's, HD at discretion of nephrology for volume removal. . -1.8 L over the past 24 hours. ~ 100 cc of urine o/p  5. CKD IV --> ESRD on HD - continue dialysis as per nephrology  6. After next extubation, patient should wear BiPAP with sleep and with naps  7. Multivessel CAD / NSTEMI - s/p CABGx3 on 3/2/2020. Continue management per cardiology and CT surgery; Continue aspirin, plavix, high-intensity statin therapy  8. DM2 - BS stable, Lantus, SSI - glucoses currently stable  9. Atrial fibrillation with RVR - off of oral amiodarone and coreg secondary to bradycardia. On heparin infusion as this time  10. Monitor hemoglobin, which is downtreding  11. Right apical pneumothorax - resolved  12.  Thrombocytopenia improved

## 2020-03-08 NOTE — PROGRESS NOTES
0600 79 -- 96 % --   03/08/20 0554 -- -- -- 159 lb 6.3 oz (72.3 kg)         Intake/Output Summary (Last 24 hours) at 3/8/2020 1219  Last data filed at 3/8/2020 0630  Gross per 24 hour   Intake 880.94 ml   Output 1975 ml   Net -1094.06 ml     Date 03/08/20 0000 - 03/08/20 2359   Shift 5195-6037 1651-6798 4685-7590 24 Hour Total   INTAKE   I.V.(mL/kg) 211.2(2.9)   211.2(2.9)   NG/GT(mL/kg) 60(0.8)   60(0.8)   Shift Total(mL/kg) 271.2(3.8)   271.2(3.8)   OUTPUT   Urine(mL/kg/hr) 55(0.1)   55   Emesis/NG output(mL/kg) 50(0.7)   50(0.7)   Shift Total(mL/kg) 105(1.5)   105(1.5)   Weight (kg) 72.3 72.3 72.3 72.3     Wt Readings from Last 3 Encounters:   03/08/20 159 lb 6.3 oz (72.3 kg)   11/21/19 170 lb (77.1 kg)   09/05/19 169 lb 3.2 oz (76.7 kg)     Body mass index is 28.24 kg/m².         PHYSICAL EXAM:  Patient is intubated left IJ  [place]  in place right dialysis catheter  Lungs- clear bs ant   CVS S1-S2 regular  Abdomen soft nontender bowel sounds are present  Lower extremity edema  Neuro patient follows commands of sedation    MEDICATIONS:  Scheduled Meds:   aspirin  81 mg Oral Daily    cefepime  1 g Intravenous Q24H    heparin (porcine)  4,000 Units Intravenous Once    insulin lispro  0-6 Units Subcutaneous TID WC    insulin lispro  0-3 Units Subcutaneous Nightly    sodium chloride flush  10 mL Intravenous 2 times per day    chlorhexidine  15 mL Mouth/Throat BID    therapeutic multivitamin-minerals  1 tablet Oral Daily with breakfast    sennosides-docusate sodium  1 tablet Oral BID    clopidogrel  75 mg Oral Daily    famotidine  20 mg Oral Daily    Or    famotidine (PEPCID) injection  20 mg Intravenous Daily    allopurinol  300 mg Oral Daily    atorvastatin  40 mg Oral Nightly    folic acid  1 mg Oral Daily    [MAR Hold] insulin glargine  25 Units Subcutaneous Nightly    [MAR Hold] bumetanide  2 mg Intravenous Daily     Continuous Infusions:   heparin (porcine) 10 Units/kg/hr (03/07/20 1878)    Results   Component Value Date    PHOS 6.0 02/27/2020    PHOS 3.9 02/25/2020    PHOS 5.5 02/22/2020     Ionized Calcium:   Lab Results   Component Value Date    CAION 1.21 03/08/2020    CAION 1.24 03/08/2020    CAION 1.21 03/07/2020        Urinalysis:   Lab Results   Component Value Date    NITRU NEGATIVE 02/20/2020    COLORU YELLOW 02/20/2020    PHUR 6.0 02/20/2020    WBCUA 2 TO 5 02/20/2020    RBCUA 0 TO 2 02/20/2020    MUCUS NOT REPORTED 02/20/2020    TRICHOMONAS NOT REPORTED 02/20/2020    YEAST NOT REPORTED 02/20/2020    BACTERIA NOT REPORTED 02/20/2020    SPECGRAV 1.017 02/20/2020    LEUKOCYTESUR NEGATIVE 02/20/2020    UROBILINOGEN Normal 02/20/2020    BILIRUBINUR NEGATIVE 02/20/2020    GLUCOSEU 1+ 02/20/2020    KETUA TRACE 02/20/2020    AMORPHOUS NOT REPORTED 02/20/2020       HgBA1c:    Lab Results   Component Value Date    LABA1C 6.8 02/21/2020     TSH:    Lab Results   Component Value Date    TSH 4.66 12/19/2016       Lactic Acid: No results found for: LACTA   Troponin: No results for input(s): TROPONINI in the last 72 hours. Microbiology:  No + cultures       Xray   Pulmonary vascular congestion   Left basal atelectasis   Mediastinal tubes    ASSESSMENT:     Patient Active Problem List    Diagnosis Date Noted    [facility] 03/07/2020     Priority: High    Acute respiratory failure ([facility] 03/07/2020     Priority: High    Thrombocytopenia ([facility] 03/05/2020     Priority: High    Obesity (BMI 30-39. 9)     Cardiopulmonary arrest ([facility]     Moderate malnutrition ([facility] 03/03/2020    COPD exacerbation (HCC)     Acute on chronic combined systolic and diastolic CHF (congestive heart failure) ([facility] 02/27/2020    NSTEMI (non-ST elevated myocardial infarction) ([facility] 02/21/2020    Acute CHF (congestive heart failure) ([facility] 02/21/2020    HERNÁN (acute kidney injury) ([facility] 02/20/2020    Tobacco abuse 12/23/2018    Secondary hyperparathyroidism of renal origin [facility] 12/23/2018    Persistent proteinuria 44/51/5420    Metabolic acidosis 95/95/2487    Anemia due to stage 4 chronic kidney disease treated with erythropoietin ([facility] 10/11/2018    Cerebral aneurysm     Type 2 diabetes mellitus with stage 4 chronic kidney disease, with long-term current use of insulin ([facility] 01/18/2017    Acute infarct posterior limb internal capsule on the left 12/20/2016    Left sided lacunar infarction ([facility]     Hypokalemia 12/19/2016    Essential hypertension 12/10/2015    Hypercholesteremia 12/10/2015    Type 2 diabetes mellitus with diabetic chronic kidney disease ([facility] 03/11/2015    Chronic kidney disease (CKD) 03/11/2015    Anemia in stage 4 chronic kidney disease ([facility] 10/17/2014    Hypovitaminosis D 12/11/2013    Gout 06/12/2013      ACUTE HYPOXIC RESPIRATORY FAILURE POST RE INTUBATION 3/7/20   Pea arrest   ACUTE PULMONARY EDEMA   NSTEMI - MVCA POST CABG X3 3/2/20   MODERATE AS AND MR   HERNÁN ON CKD   PAD   ? COPD  Acute systolic chf   Group 2 pulmonary hypertension        PLAN:     WEAN PER PROTOCOL:  [x] No   [] Yes  [] N/A    DISCONTINUE ANY LABS:   [x] No   [] Yes    ICU PROPHYLAXIS:  Stress ulcer:  [] PPI Agent  [x] [id number] [] Sucralfate  [] Other:  VTE:   [] Enoxaparin  [] Unfract. Heparin Subcut  [] EPC Cuffs    NUTRITION:  [x] NPO [] Tube Feeding  [] TPN  [] PO        INSULIN DRIP:   [x] No   [] Yes        FAMILY UPDATED:    [x] No   [] Yes    TRANSFER OUT OF ICU:   [x] No   [] Yes    ADDITIONAL PLAN:  X-ray consistent  with bilateral pulmonary congestion  Cefepime for 5 days  Sputum culture pending  Chest negative for pulmonary embolism  On heparin for A. fib  Wean fio2 keep sats >92 %   Will Resume tube feeds today      Jonathan Cuevas MD  3/8/2020 12:19 PM    Attending Physician Statement  I have discussed the care of Bradley Lorenzana, including pertinent history and exam findings,  with the resident.  I have seen and examined the patient and the key elements of all parts of the encounter have been performed by me. I agree with the assessment, plan and orders as documented by the resident with additions . cta no pe   Acute pulmonary edema noted on xray and cta  Gas exchange better - had 1.5l removed yest   fio2 is 40 %   Sec small   Culture no growth   Start sbt   Start tf   Cont cefepime for 5 days   Family updated        Total critical care time caring for this patient with life threatening, unstable organ failure, including direct patient contact, management of life support systems, review of data including imaging and labs, discussions with other team members and physicians at least 27   Min so far today, excluding procedures. Treatment plan Discussed with nursing staff in detail , all questions answered . Electronically signed by Sonido Gil MD on   3/8/20 at 3:08 PM EDT    Please note that this chart was generated using voice recognition Dragon dictation software. Although every effort was made to ensure the accuracy of this automated transcription, some errors in transcription may have occurred.

## 2020-03-08 NOTE — PROGRESS NOTES
Progress Note    Patient - Debby James  Date of Admission -  2020 11:01 PM  Date of Evaluation -  3/7/2020  Room and Bed Number -  1011/1011-01   Hospital Day - 16    CHIEF COMPLAINT : respiratory failure    SUBJECTIVE:     OVERNIGHT EVENTS:       Re intubated this am   Overnight events noted       AWAKE & FOLLOWING COMMANDS:  [] No   [x] Yes    SECRETIONS Amount:  [x] Small [] Moderate  [] Large  [] None  Color:     [x] White [] Colored  [] Bloody    SEDATION:  RAAS Score:  [] Propofol gtt  [x] Versed gtt  [] Ativan gtt   [] No Sedation    PARALYZED:  [x] No    [] Yes    VASOPRESSORS:  [] No    [x] Yes  [] Levophed [] Dopamine [] Vasopressin  [] Dobutamine [] Phenylephrine [x] Epinephrine  Ros unable to perform due to intubation and sedation      OBJECTIVE:     VITAL SIGNS:  BP (!) 105/47   Pulse 73   Temp 98 °F (36.7 °C) (Oral)   Resp 26   Ht 5' 3\" (1.6 m)   Wt 158 lb 8.2 oz (71.9 kg)   SpO2 100%   BMI 28.08 kg/m²   Tmax over 24 hours:  Temp (24hrs), Av.3 °F (36.8 °C), Min:98 °F (36.7 °C), Max:98.8 °F (37.1 °C)      Patient Vitals for the past 8 hrs:   BP Temp Temp src Pulse Resp SpO2 Weight   20 2321 -- -- -- 73 -- 100 % --   20 2200 (!) 105/47 -- -- 75 -- 100 % --   20 2100 (!) 111/44 -- -- 74 -- 100 % --   20 (!) 99/42 -- -- 74 -- 100 % --   20 -- -- -- 74 -- 100 % --   20 -- 98 °F (36.7 °C) Oral 74 26 100 % --   20 -- -- -- 80 -- 100 % --   20 -- -- -- 76 -- 100 % --   03/07/20 1909 -- -- -- 77 -- 100 % --   20 1817 -- -- -- 75 -- 100 % --   20 1808 -- -- -- 75 -- 100 % --   20 1800 (!) 112/45 -- -- 75 -- 100 % --   20 1725 -- -- -- 76 -- 100 % --   20 1705 -- -- -- 78 -- 100 % --   20 1700 (!) 98/43 -- -- 78 -- 100 % --   20 1641 -- -- -- 79 -- 100 % --   20 1625 -- -- -- 81 -- 100 % --   20 1620 -- -- -- 81 -- 100 % --   20 1600 (!) 106/44 -- -- 81 -- 100 % insulin glargine  25 Units Subcutaneous Nightly    [MAR Hold] bumetanide  2 mg Intravenous Daily     Continuous Infusions:   heparin (porcine) 12 Units/kg/hr (03/07/20 1705)    EPINEPHrine infusion      midazolam 2 mg/hr (03/07/20 1410)    CRRT dialysis builder 1,500 mL/hr (03/06/20 1052)    sodium chloride 100 mL/hr at 03/06/20 0321    dextrose      phenylephrine (CLAUDETTE-SYNEPHRINE) 50mg/250mL infusion 40 mcg/min (03/07/20 1951)    milrinone Stopped (03/04/20 0835)    norepinephrine 0.02 mcg/kg/min (03/07/20 1817)    EPINEPHrine infusion Stopped (03/04/20 1515)    nitroGLYCERIN      dexmedetomidine (PRECEDEX) IV infusion Stopped (03/04/20 0830)     PRN Meds:   heparin (porcine), 4,000 Units, PRN  heparin (porcine), 2,000 Units, PRN  iohexol, 75 mL, ONCE PRN  albuterol, 2.5 mg, Q6H PRN  sodium chloride flush, 10 mL, PRN  calcium chloride IVPB, 1 g, PRN  magnesium sulfate, 1 g, PRN  potassium chloride, 20 mEq, PRN  oxyCODONE-acetaminophen, 1 tablet, Q4H PRN    Or  oxyCODONE-acetaminophen, 2 tablet, Q4H PRN  fentanNYL, 25 mcg, Q1H PRN    Or  fentanNYL, 50 mcg, Q1H PRN  diphenhydrAMINE, 25 mg, Nightly PRN  hydrALAZINE, 5 mg, Q5 Min PRN  albumin human, 25 g, PRN  glucose, 15 g, PRN  dextrose, 12.5 g, PRN  glucagon (rDNA), 1 mg, PRN  dextrose, 100 mL/hr, PRN  bisacodyl, 10 mg, Daily PRN  bisacodyl, 5 mg, Daily PRN  phenylephrine (CLAUDETTE-SYNEPHRINE) 50mg/250mL infusion, 50 mcg/min, Continuous PRN  milrinone, 0.375 mcg/kg/min, Continuous PRN  norepinephrine, 0.01 mcg/kg/min, Continuous PRN  EPINEPHrine infusion, 0.01 mcg/kg/min, Continuous PRN  nitroGLYCERIN, 10 mcg/min, Continuous PRN  acetaminophen, 650 mg, Q4H PRN    Or  acetaminophen, 650 mg, Q4H PRN  ondansetron, 4 mg, Q6H PRN  [MAR Hold] heparin (porcine), 1,900 Units, PRN  [MAR Hold] heparin (porcine), 1,900 Units, PRN  [MAR Hold] LORazepam, 0.5 mg, Q6H PRN  [MAR Hold] heparin (porcine), 1,200 Units, PRN  [MAR Hold] heparin (porcine), 1,300 Units, PRN  [facility]  99 98   CO2 21 22 19*   BUN 39* 44* 48*   CREATININE 3.68* 4.20* 4.48*   GLUCOSE 170* 140* 135*   CALCIUM 9.2 9.8 9.5      Magnesium:   Lab Results   Component Value Date    MG 2.1 03/07/2020    MG 2.3 03/07/2020    MG 2.1 03/07/2020     Phosphorus:   Lab Results   Component Value Date    PHOS 6.0 02/27/2020    PHOS 3.9 02/25/2020    PHOS 5.5 02/22/2020     Ionized Calcium:   Lab Results   Component Value Date    CAION 1.21 03/07/2020    CAION 1.24 03/07/2020    CAION 1.19 03/07/2020        Urinalysis:   Lab Results   Component Value Date    NITRU NEGATIVE 02/20/2020    COLORU YELLOW 02/20/2020    PHUR 6.0 02/20/2020    WBCUA 2 TO 5 02/20/2020    RBCUA 0 TO 2 02/20/2020    MUCUS NOT REPORTED 02/20/2020    TRICHOMONAS NOT REPORTED 02/20/2020    YEAST NOT REPORTED 02/20/2020    BACTERIA NOT REPORTED 02/20/2020    SPECGRAV 1.017 02/20/2020    LEUKOCYTESUR NEGATIVE 02/20/2020    UROBILINOGEN Normal 02/20/2020    BILIRUBINUR NEGATIVE 02/20/2020    GLUCOSEU 1+ 02/20/2020    KETUA TRACE 02/20/2020    AMORPHOUS NOT REPORTED 02/20/2020       HgBA1c:    Lab Results   Component Value Date    LABA1C 6.8 02/21/2020     TSH:    Lab Results   Component Value Date    TSH 4.66 12/19/2016       Lactic Acid: No results found for: LACTA   Troponin: No results for input(s): TROPONINI in the last 72 hours. Microbiology:  No + cultures       Xray   Pulmonary vascular congestion   Left basal atelectasis   Mediastinal tubes    ASSESSMENT:     Patient Active Problem List    Diagnosis Date Noted    Ventilator dependence ([facility] 03/07/2020     Priority: High    Acute respiratory failure ([facility] 03/07/2020     Priority: High    Thrombocytopenia ([facility] 03/05/2020     Priority: High    Obesity (BMI 30-39. 9)     Cardiopulmonary arrest ([facility]     Moderate malnutrition ([facility] 03/03/2020    COPD exacerbation (HCC)     Acute on chronic combined systolic and diastolic CHF (congestive heart failure) ([address] 02/27/2020    NSTEMI (non-ST elevated myocardial infarction) ([facility] 02/21/2020    Acute CHF (congestive heart failure) ([facility] 02/21/2020    HERNÁN (acute kidney injury) ([facility] 02/20/2020    Tobacco abuse 12/23/2018    Secondary hyperparathyroidism of renal origin ([facility] 12/23/2018    Persistent proteinuria 97/12/5866    Metabolic acidosis 30/55/8440    Anemia due to stage 4 chronic kidney disease treated with erythropoietin ([facility] 10/11/2018    Cerebral aneurysm     Type 2 diabetes mellitus with stage 4 chronic kidney disease, with long-term current use of insulin ([facility] 01/18/2017    Acute infarct posterior limb internal capsule on the left 12/20/2016    Left sided lacunar infarction ([facility]     Hypokalemia 12/19/2016    Essential hypertension 12/10/2015    Hypercholesteremia 12/10/2015    Type 2 diabetes mellitus with diabetic chronic kidney disease ([facility] 03/11/2015    Chronic kidney disease (CKD) 03/11/2015    Anemia in stage 4 chronic kidney disease ([facility] 10/17/2014    Hypovitaminosis D 12/11/2013    Gout 06/12/2013      ACUTE HYPOXIC RESPIRATORY FAILURE POST RE INTUBATION 3/7/20   Pea arrest   ACUTE PULMONARY EDEMA   NSTEMI - MVCA POST CABG X3 3/2/20   MODERATE AS AND MR   HERNÁN ON CKD   PAD   ? COPD  Acute systolic chf   Group 2 pulmonary hypertension        PLAN:     WEAN PER PROTOCOL:  [x] No   [] Yes  [] N/A    DISCONTINUE ANY LABS:   [x] No   [] Yes    ICU PROPHYLAXIS:  Stress ulcer:  [] PPI Agent  [x] [id number] [] Sucralfate  [] Other:  VTE:   [] Enoxaparin  [] Unfract.  Heparin Subcut  [] EPC Cuffs    NUTRITION:  [x] NPO [] Tube Feeding  [] TPN  [] PO        INSULIN DRIP:   [x] No   [] Yes        FAMILY UPDATED:    [x] No   [] Yes    TRANSFER OUT OF ICU:   [x] No   [] Yes    ADDITIONAL PLAN:  Xray chest is consistent with pulmonary vascular congestion but also has rt lower lobe infiltrate will get sputum culture start emperic cefepime for 5 days   Pea arrest r/o pe   D/w Dr [place] [address] - volume removal   Wean fio2 keep sats >92 %   She is now n heparin for afib       Taylor Pichardo MD  3/7/2020 11:28 PM    Total critical care time caring for this patient with life threatening, unstable organ failure, including direct patient contact, management of life support systems, review of data including imaging and labs, discussions with other team members and physicians at least 27  Min so far today, excluding procedures.

## 2020-03-08 NOTE — PROGRESS NOTES
(EPINEPHrine HCL) 5 mg in dextrose 5 % 250 mL infusion, Continuous  albuterol (PROVENTIL) nebulizer solution 2.5 mg, Q6H PRN  midazolam (VERSED) 100 mg in dextrose 5% 100 mL infusion, Continuous  prismaSATE BK 0/3.5 5,000 mL with potassium chloride 10 mEq solution, Continuous  0.9 % sodium chloride infusion, Continuous  insulin lispro (HUMALOG) injection vial 0-6 Units, TID WC  insulin lispro (HUMALOG) injection vial 0-3 Units, Nightly  sodium chloride flush 0.9 % injection 10 mL, 2 times per day  sodium chloride flush 0.9 % injection 10 mL, PRN  calcium chloride 1 g in sodium chloride 0.9 % 100 mL IVPB, PRN  magnesium sulfate 1 g in dextrose 5% 100 mL IVPB, PRN  potassium chloride 20 mEq/50 mL IVPB (Central Line), PRN  oxyCODONE-acetaminophen (PERCOCET) 5-325 MG per tablet 1 tablet, Q4H PRN    Or  oxyCODONE-acetaminophen (PERCOCET) 5-325 MG per tablet 2 tablet, Q4H PRN  fentaNYL (SUBLIMAZE) injection 25 mcg, Q1H PRN    Or  fentaNYL (SUBLIMAZE) injection 50 mcg, Q1H PRN  diphenhydrAMINE (BENADRYL) tablet 25 mg, Nightly PRN  chlorhexidine (PERIDEX) 0.12 % solution 15 mL, BID  hydrALAZINE (APRESOLINE) injection 5 mg, Q5 Min PRN  therapeutic multivitamin-minerals 1 tablet, Daily with breakfast  albumin human 5 % IV solution 25 g, PRN  glucose (GLUTOSE) 40 % oral gel 15 g, PRN  dextrose 50 % IV solution, PRN  glucagon (rDNA) injection 1 mg, PRN  dextrose 5 % solution, PRN  sennosides-docusate sodium (SENOKOT-S) 8.6-50 MG tablet 1 tablet, BID  bisacodyl (DULCOLAX) suppository 10 mg, Daily PRN  bisacodyl (DULCOLAX) EC tablet 5 mg, Daily PRN  phenylephrine (CLAUDETTE-SYNEPHRINE) 50 mg in dextrose 5 % 250 mL infusion, Continuous PRN  milrinone (PRIMACOR) 20 mg in dextrose 5 % 100 mL infusion, Continuous PRN  norepinephrine (LEVOPHED) 16 mg in dextrose 5% 250 mL infusion, Continuous PRN  EPINEPHrine (EPINEPHrine HCL) 5 mg in dextrose 5 % 250 mL infusion, Continuous PRN  nitroGLYCERIN 50 mg in dextrose 5% 250 mL infusion, Continuous PRN  clopidogrel (PLAVIX) tablet 75 mg, Daily  famotidine (PEPCID) tablet 20 mg, Daily    Or  famotidine (PEPCID) injection 20 mg, Daily  allopurinol (ZYLOPRIM) tablet 300 mg, Daily  atorvastatin (LIPITOR) tablet 40 mg, Nightly  folic acid (FOLVITE) tablet 1 mg, Daily  acetaminophen (TYLENOL) tablet 650 mg, Q4H PRN    Or  acetaminophen (TYLENOL) suppository 650 mg, Q4H PRN  ondansetron (ZOFRAN) injection 4 mg, Q6H PRN  dexmedetomidine (PRECEDEX) 400 mcg in sodium chloride 0.9 % 100 mL infusion, Continuous  [MAR Hold] heparin (porcine) injection 1,900 Units, PRN  [MAR Hold] heparin (porcine) injection 1,900 Units, PRN  [MAR Hold] LORazepam (ATIVAN) tablet 0.5 mg, Q6H PRN  [MAR Hold] heparin (porcine) injection 1,200 Units, PRN  [MAR Hold] heparin (porcine) injection 1,300 Units, PRN  [MAR Hold] insulin glargine (LANTUS) injection vial 25 Units, Nightly  [MAR Hold] nicotine (NICODERM CQ) 21 MG/24HR 1 patch, Daily PRN  [MAR Hold] bumetanide (BUMEX) injection 2 mg, Daily      LABS      CBC:   Recent Labs     03/07/20  0427 03/07/20  0455 03/08/20  0600   WBC 13.9* 11.3 13.5*   RBC 2.77* 2.84* 2.60*   HGB 8.5* 8.7* 8.1*   HCT 26.3* 28.2* 24.6*   MCV 94.9 99.3 94.6   MCH 30.7 30.6 31.2   MCHC 32.3 30.9 32.9   RDW 14.1 14.2 14.5*   * See Reflexed IPF Result 160   MPV 12.9 NOT REPORTED 11.9      BMP:   Recent Labs     03/07/20  2122 03/08/20  0315 03/08/20  0944   * 131* 133*   K 3.9 3.6* 3.8   CL 98 96* 98   CO2 19* 19* 18*   BUN 48* 52* 58*   CREATININE 4.48* 5.14* 5.53*   GLUCOSE 135* 114* 139*   CALCIUM 9.5 9.3 9.2       RADIOLOGY      CXR 3/8/20  FINDINGS:   There is underlying chronic pulmonary change.  There is bilateral pulmonary   congestion that is similar.  There is no pneumothorax.  The mediastinal   structures are unremarkable.  The upper abdomen is unremarkable.  The   extrathoracic soft tissues are unremarkable.  There is an endotracheal tube   with the tip in the midtrachea.  There is

## 2020-03-08 NOTE — PROGRESS NOTES
[facility]  Daily Progress Note    Surgeon:  Dr. Brittany Munoz      Subjective:  Ms. Craig Bermudez is a 78y.o. year-old female status post CABG x 3 on 3/2/20 POD# 6. Patient was seen and examined at bedside this morning. Currently intubated and sedated on a low dose Versed gtt loosely following commands. Vital Signs: BP (!) 113/45   Pulse 76   Temp 98.8 °F (37.1 °C) (Oral)   Resp 26   Ht 5' 3\" (1.6 m)   Wt 159 lb 6.3 oz (72.3 kg)   SpO2 98%   BMI 28.24 kg/m²  O2 Flow Rate (L/min): 2 L/min   Admit Weight: Weight: 170 lb (77.1 kg)   WEIGHTWeight: 159 lb 6.3 oz (72.3 kg)     I/O's:  I/O last 3 completed shifts:   In: 930.9 [I.V.:740.9; NG/GT:190]  Out: 1985 [Urine:95; Emesis/NG output:50]    Data:    CBC:   Recent Labs     03/07/20 0427 03/07/20  0455 03/08/20  0600   WBC 13.9* 11.3 13.5*   HGB 8.5* 8.7* 8.1*   HCT 26.3* 28.2* 24.6*   MCV 94.9 99.3 94.6   * See Reflexed IPF Result 160     BMP:   Recent Labs     03/07/20  1525 03/07/20  2122 03/08/20  0315    133* 131*   K 3.9 3.9 3.6*   CL 99 98 96*   CO2 22 19* 19*   BUN 44* 48* 52*   CREATININE 4.20* 4.48* 5.14*     PT/INR:   Recent Labs     03/07/20  0427 03/07/20  0455 03/08/20  0600   PROTIME 10.4 10.7 11.3   INR 1.0 1.0 1.1     APTT:   Recent Labs     03/07/20  0455 03/07/20  2300 03/08/20  0600   APTT 30.4 77.2* 69.2*       Chest X-Ray:   ONE XRAY VIEW OF THE CHEST       3/8/2020 7:21 am       COMPARISON:   Chest radiograph performed 03/07/2020.       HISTORY:   ORDERING SYSTEM PROVIDED HISTORY: follow up CHF   TECHNOLOGIST PROVIDED HISTORY:   follow up CHF       FINDINGS:   There is underlying chronic pulmonary change.  There is bilateral pulmonary   congestion that is similar.  There is no pneumothorax.  The mediastinal   structures are unremarkable.  The upper abdomen is unremarkable.  The   extrathoracic soft tissues are unremarkable.  There is an endotracheal tube   with the tip in the midtrachea.  There is a gastric tube with the tip   presumably in the stomach.  There is a right internal jugular catheter.           Impression   Chronic pulmonary change and bilateral pulmonary congestion that is stable   compared to prior.       Support tubes as described above. Scheduled Meds:    aspirin  81 mg Oral Daily    cefepime  1 g Intravenous Q24H    heparin (porcine)  4,000 Units Intravenous Once    insulin lispro  0-6 Units Subcutaneous TID WC    insulin lispro  0-3 Units Subcutaneous Nightly    sodium chloride flush  10 mL Intravenous 2 times per day    chlorhexidine  15 mL Mouth/Throat BID    therapeutic multivitamin-minerals  1 tablet Oral Daily with breakfast    sennosides-docusate sodium  1 tablet Oral BID    clopidogrel  75 mg Oral Daily    famotidine  20 mg Oral Daily    Or    famotidine (PEPCID) injection  20 mg Intravenous Daily    allopurinol  300 mg Oral Daily    atorvastatin  40 mg Oral Nightly    folic acid  1 mg Oral Daily    [MAR Hold] insulin glargine  25 Units Subcutaneous Nightly    [MAR Hold] bumetanide  2 mg Intravenous Daily     Continuous Infusions:    heparin (porcine) 10 Units/kg/hr (03/07/20 2348)    EPINEPHrine infusion      midazolam 2 mg/hr (03/07/20 1410)    CRRT dialysis builder 1,500 mL/hr (03/06/20 1052)    sodium chloride 100 mL/hr at 03/06/20 0321    dextrose      phenylephrine (CLAUDETTE-SYNEPHRINE) 50mg/250mL infusion 15 mcg/min (03/08/20 0835)    milrinone Stopped (03/04/20 0835)    norepinephrine Stopped (03/07/20 2332)    EPINEPHrine infusion Stopped (03/04/20 1515)    nitroGLYCERIN      dexmedetomidine (PRECEDEX) IV infusion Stopped (03/04/20 0830)           Physical Exam:     General: Intubated and mildly sedated   Heart: Normal S1, S2, RRR, + systolic murmur   Sternum: CSD in place. CDI  Lungs: Diminished BS bilaterally at the bases.  Coarse BS noted bilaterally   Abdomen: soft, non tender, non distended, BSx4  Extremities: + edema noted bilateral LE. EVH sites: [facility]. SCD's in place         Assessment & Plan:     Ms. Ace is a 78 y. o. year-old female status post CABG x 3 on 3/2/20 POD# 6. Newly diagnosed CKD Stage V - HD initiated pre-op on admission. Patient was V-Paced post-operatively until 3/5/20 when her intrinsic rhythm resumed. Post-op course complicated by hypoxemia and severe agitation s/p extubation on 3/3/20 which required re-intubation within 30 minutes post extubation. On 3/5/20 in the middle of the night patient had a brief episode of Afib/RVR and converted spontaneously to NSR. On 3/6/20 patient had another episode of Afib/RVR then went bradycardic briefly which required V-pacing. Amio bolus and gtt were started as per protocol. On 3/7/20 around 4:30am patients post-op course was further complicated by a Code blue. I was told that patient was given some water and suddenly vomited, eyes rolled back and became pale grayish and was unresponsive. On telemetry monitoring the rhythm appeared to be V tach which rapidly degenerated into V. fib arrest.  Patient was given 2 rounds of CPR and 1 epinephrine and ROSC was achieved. Post cardiac arrest and restoration of rhythm, patient was having unstable labored respirations indicating impending respiratory failure.  Hence patient required intubation.  Intubation was performed by the RT under direct supervision of the ED resident. [facility] varying between A. fib and junctional rhythm but under pacing wires the patient still was having asystole. After rapid sequence intubation patient was hypotensive and Levophed had to be started on the patient.  Patient's blood pressure slowly improved and he received 250 mL IV fluid bolus also. I was notified around 5:30am about the above mentioned event. Unclear cause of arrest, telemetry at the time of arrest patient had A. fib no episode of V. fib noticed, nonsustained VT episode post ROSC.  This morning patient upon examination the patient is on a low dose Filippo gtt@ 20 mcg/min and in NSR around 75-80 with V-pacer backup of 75. Patient is on mild sedation with a Versed gtt following commands. No issues or events noted with the patient overnight.      1. Follow-up labs, CXR(improved pulmonary edema from yesterday) and ABG  2. Cont current care as per Internal Medicine, Nephrology and Cardiology   3. Cont SCD's bilateral LE  4. GI Prophylaxis  5. Continue clement for strict I&O's  6. Pain management PRN  7. Pulmonary following. Cont vent management as directed  8. Nephrology following. Cont Vancomycin as per Renal and dosed by Pharmacy. Cont HD as per Renal  9. Cont low dose Versed gtt until patient is ready to be extubated as per renal   10. Cont TF's at goal as tolerated. Monitor residuals  11. Cont Heparin gtt for Parox Afib and transition to PO AC as tolerated  12. Cont to hold Coreg and Amiodarone while on low dose Filippo gtt  13. Cont backup V-pacer at 70  14. Limited ECHO to evaluate for pericardial effusion, LV function post PEA arrest on 3/7/20 revealed no pericardial effusion, LV function 38% anterior and septal hypokinesis. Aortic valve findings unchanged from before consistent with mild to moderate aortic stenosis  15.  CTA chest performed on 3/7/20 revealed: No convincing evidence of pulmonary embolism, small bilateral pleural effusions and adjacent atelectasis/airspace disease, pulmonary vascular congestion and interstitial edema       The above recommendations including medications and orders were discussed and agreed upon with Dr. Katelynn Durham, the attending on service for the cardiothoracic surgery group today.        Krista Goss MBA, PA-C

## 2020-03-09 ENCOUNTER — APPOINTMENT (OUTPATIENT)
Dept: GENERAL RADIOLOGY | Age: 70
DRG: 233 | End: 2020-03-09
Payer: MEDICARE

## 2020-03-09 LAB
ALLEN TEST: ABNORMAL
ANION GAP SERPL CALCULATED.3IONS-SCNC: 16 MMOL/L (ref 9–17)
ANION GAP SERPL CALCULATED.3IONS-SCNC: 17 MMOL/L (ref 9–17)
ANION GAP SERPL CALCULATED.3IONS-SCNC: 19 MMOL/L (ref 9–17)
BUN BLDV-MCNC: 26 MG/DL (ref 8–23)
BUN BLDV-MCNC: 28 MG/DL (ref 8–23)
BUN BLDV-MCNC: 68 MG/DL (ref 8–23)
BUN/CREAT BLD: ABNORMAL (ref 9–20)
CALCIUM IONIZED: 1.06 MMOL/L (ref 1.13–1.33)
CALCIUM IONIZED: 1.23 MMOL/L (ref 1.13–1.33)
CALCIUM IONIZED: 1.24 MMOL/L (ref 1.13–1.33)
CALCIUM SERPL-MCNC: 8.9 MG/DL (ref 8.6–10.4)
CALCIUM SERPL-MCNC: 8.9 MG/DL (ref 8.6–10.4)
CALCIUM SERPL-MCNC: 9.4 MG/DL (ref 8.6–10.4)
CHLORIDE BLD-SCNC: 93 MMOL/L (ref 98–107)
CHLORIDE BLD-SCNC: 96 MMOL/L (ref 98–107)
CHLORIDE BLD-SCNC: 99 MMOL/L (ref 98–107)
CO2: 17 MMOL/L (ref 20–31)
CO2: 21 MMOL/L (ref 20–31)
CO2: 22 MMOL/L (ref 20–31)
CREAT SERPL-MCNC: 2.65 MG/DL (ref 0.5–0.9)
CREAT SERPL-MCNC: 3.54 MG/DL (ref 0.5–0.9)
CREAT SERPL-MCNC: 6.66 MG/DL (ref 0.5–0.9)
CULTURE: NO GROWTH
DIRECT EXAM: NORMAL
EKG ATRIAL RATE: 80 BPM
EKG ATRIAL RATE: 80 BPM
EKG P AXIS: 62 DEGREES
EKG P AXIS: 65 DEGREES
EKG P-R INTERVAL: 148 MS
EKG P-R INTERVAL: 152 MS
EKG Q-T INTERVAL: 392 MS
EKG Q-T INTERVAL: 406 MS
EKG QRS DURATION: 102 MS
EKG QRS DURATION: 96 MS
EKG QTC CALCULATION (BAZETT): 452 MS
EKG QTC CALCULATION (BAZETT): 468 MS
EKG R AXIS: 88 DEGREES
EKG R AXIS: 89 DEGREES
EKG T AXIS: 24 DEGREES
EKG T AXIS: 34 DEGREES
EKG VENTRICULAR RATE: 80 BPM
EKG VENTRICULAR RATE: 80 BPM
FERRITIN: 3040 UG/L (ref 13–150)
FIO2: 40
GFR AFRICAN AMERICAN: 16 ML/MIN
GFR AFRICAN AMERICAN: 22 ML/MIN
GFR AFRICAN AMERICAN: 7 ML/MIN
GFR NON-AFRICAN AMERICAN: 13 ML/MIN
GFR NON-AFRICAN AMERICAN: 18 ML/MIN
GFR NON-AFRICAN AMERICAN: 6 ML/MIN
GFR SERPL CREATININE-BSD FRML MDRD: ABNORMAL ML/MIN/{1.73_M2}
GLUCOSE BLD-MCNC: 155 MG/DL (ref 70–99)
GLUCOSE BLD-MCNC: 164 MG/DL (ref 65–105)
GLUCOSE BLD-MCNC: 177 MG/DL (ref 65–105)
GLUCOSE BLD-MCNC: 192 MG/DL (ref 65–105)
GLUCOSE BLD-MCNC: 194 MG/DL (ref 70–99)
GLUCOSE BLD-MCNC: 220 MG/DL (ref 65–105)
GLUCOSE BLD-MCNC: 220 MG/DL (ref 70–99)
HCT VFR BLD CALC: 24.9 % (ref 36.3–47.1)
HEMOGLOBIN: 7.9 G/DL (ref 11.9–15.1)
INR BLD: 1.2
IRON SATURATION: 26 % (ref 20–55)
IRON: 43 UG/DL (ref 37–145)
Lab: NORMAL
MAGNESIUM: 1.9 MG/DL (ref 1.6–2.6)
MAGNESIUM: 1.9 MG/DL (ref 1.6–2.6)
MAGNESIUM: 2.4 MG/DL (ref 1.6–2.6)
MCH RBC QN AUTO: 30.7 PG (ref 25.2–33.5)
MCHC RBC AUTO-ENTMCNC: 31.7 G/DL (ref 28.4–34.8)
MCV RBC AUTO: 96.9 FL (ref 82.6–102.9)
MODE: ABNORMAL
NEGATIVE BASE EXCESS, ART: 4 (ref 0–2)
NRBC AUTOMATED: 0 PER 100 WBC
O2 DEVICE/FLOW/%: ABNORMAL
PARTIAL THROMBOPLASTIN TIME: 52.2 SEC (ref 20.5–30.5)
PATIENT TEMP: ABNORMAL
PDW BLD-RTO: 14.5 % (ref 11.8–14.4)
PLATELET # BLD: 196 K/UL (ref 138–453)
PMV BLD AUTO: 11.5 FL (ref 8.1–13.5)
POC HCO3: 20.5 MMOL/L (ref 21–28)
POC O2 SATURATION: 93 % (ref 94–98)
POC PCO2 TEMP: ABNORMAL MM HG
POC PCO2: 35.6 MM HG (ref 35–48)
POC PH TEMP: ABNORMAL
POC PH: 7.37 (ref 7.35–7.45)
POC PO2 TEMP: ABNORMAL MM HG
POC PO2: 69.4 MM HG (ref 83–108)
POSITIVE BASE EXCESS, ART: ABNORMAL (ref 0–3)
POTASSIUM SERPL-SCNC: 3.2 MMOL/L (ref 3.7–5.3)
POTASSIUM SERPL-SCNC: 3.7 MMOL/L (ref 3.7–5.3)
POTASSIUM SERPL-SCNC: 3.8 MMOL/L (ref 3.7–5.3)
PROTHROMBIN TIME: 12.1 SEC (ref 9–12)
RBC # BLD: 2.57 M/UL (ref 3.95–5.11)
SAMPLE SITE: ABNORMAL
SODIUM BLD-SCNC: 132 MMOL/L (ref 135–144)
SODIUM BLD-SCNC: 133 MMOL/L (ref 135–144)
SODIUM BLD-SCNC: 135 MMOL/L (ref 135–144)
SPECIMEN DESCRIPTION: NORMAL
TCO2 (CALC), ART: 22 MMOL/L (ref 22–29)
TOTAL IRON BINDING CAPACITY: 163 UG/DL (ref 250–450)
UNSATURATED IRON BINDING CAPACITY: 120 UG/DL (ref 112–347)
WBC # BLD: 11.8 K/UL (ref 3.5–11.3)

## 2020-03-09 PROCEDURE — 6360000002 HC RX W HCPCS: Performed by: INTERNAL MEDICINE

## 2020-03-09 PROCEDURE — 6370000000 HC RX 637 (ALT 250 FOR IP): Performed by: NURSE PRACTITIONER

## 2020-03-09 PROCEDURE — 87205 SMEAR GRAM STAIN: CPT

## 2020-03-09 PROCEDURE — 85730 THROMBOPLASTIN TIME PARTIAL: CPT

## 2020-03-09 PROCEDURE — 2580000003 HC RX 258: Performed by: INTERNAL MEDICINE

## 2020-03-09 PROCEDURE — 99233 SBSQ HOSP IP/OBS HIGH 50: CPT | Performed by: INTERNAL MEDICINE

## 2020-03-09 PROCEDURE — 94003 VENT MGMT INPAT SUBQ DAY: CPT

## 2020-03-09 PROCEDURE — 71045 X-RAY EXAM CHEST 1 VIEW: CPT

## 2020-03-09 PROCEDURE — 2500000003 HC RX 250 WO HCPCS: Performed by: INTERNAL MEDICINE

## 2020-03-09 PROCEDURE — 90935 HEMODIALYSIS ONE EVALUATION: CPT

## 2020-03-09 PROCEDURE — 80048 BASIC METABOLIC PNL TOTAL CA: CPT

## 2020-03-09 PROCEDURE — 2580000003 HC RX 258: Performed by: NURSE PRACTITIONER

## 2020-03-09 PROCEDURE — 87070 CULTURE OTHR SPECIMN AEROBIC: CPT

## 2020-03-09 PROCEDURE — 6370000000 HC RX 637 (ALT 250 FOR IP): Performed by: THORACIC SURGERY (CARDIOTHORACIC VASCULAR SURGERY)

## 2020-03-09 PROCEDURE — 2700000000 HC OXYGEN THERAPY PER DAY

## 2020-03-09 PROCEDURE — 37799 UNLISTED PX VASCULAR SURGERY: CPT

## 2020-03-09 PROCEDURE — 83550 IRON BINDING TEST: CPT

## 2020-03-09 PROCEDURE — P9041 ALBUMIN (HUMAN),5%, 50ML: HCPCS | Performed by: NURSE PRACTITIONER

## 2020-03-09 PROCEDURE — 83540 ASSAY OF IRON: CPT

## 2020-03-09 PROCEDURE — 97116 GAIT TRAINING THERAPY: CPT

## 2020-03-09 PROCEDURE — 82728 ASSAY OF FERRITIN: CPT

## 2020-03-09 PROCEDURE — 94761 N-INVAS EAR/PLS OXIMETRY MLT: CPT

## 2020-03-09 PROCEDURE — 99291 CRITICAL CARE FIRST HOUR: CPT | Performed by: INTERNAL MEDICINE

## 2020-03-09 PROCEDURE — 2100000001 HC CVICU R&B

## 2020-03-09 PROCEDURE — 83735 ASSAY OF MAGNESIUM: CPT

## 2020-03-09 PROCEDURE — 85610 PROTHROMBIN TIME: CPT

## 2020-03-09 PROCEDURE — 36620 INSERTION CATHETER ARTERY: CPT

## 2020-03-09 PROCEDURE — 82803 BLOOD GASES ANY COMBINATION: CPT

## 2020-03-09 PROCEDURE — 82330 ASSAY OF CALCIUM: CPT

## 2020-03-09 PROCEDURE — 85027 COMPLETE CBC AUTOMATED: CPT

## 2020-03-09 PROCEDURE — 82947 ASSAY GLUCOSE BLOOD QUANT: CPT

## 2020-03-09 PROCEDURE — 94640 AIRWAY INHALATION TREATMENT: CPT

## 2020-03-09 PROCEDURE — 93970 EXTREMITY STUDY: CPT

## 2020-03-09 PROCEDURE — 5A1D70Z PERFORMANCE OF URINARY FILTRATION, INTERMITTENT, LESS THAN 6 HOURS PER DAY: ICD-10-PCS | Performed by: INTERNAL MEDICINE

## 2020-03-09 PROCEDURE — 93010 ELECTROCARDIOGRAM REPORT: CPT | Performed by: INTERNAL MEDICINE

## 2020-03-09 PROCEDURE — 6360000002 HC RX W HCPCS: Performed by: NURSE PRACTITIONER

## 2020-03-09 RX ORDER — LIDOCAINE HYDROCHLORIDE 10 MG/ML
5 INJECTION, SOLUTION INFILTRATION; PERINEURAL ONCE
Status: DISCONTINUED | OUTPATIENT
Start: 2020-03-09 | End: 2020-03-12 | Stop reason: HOSPADM

## 2020-03-09 RX ORDER — SODIUM CHLORIDE 0.9 % (FLUSH) 0.9 %
10 SYRINGE (ML) INJECTION EVERY 12 HOURS SCHEDULED
Status: DISCONTINUED | OUTPATIENT
Start: 2020-03-09 | End: 2020-03-12 | Stop reason: HOSPADM

## 2020-03-09 RX ORDER — SODIUM CHLORIDE 0.9 % (FLUSH) 0.9 %
10 SYRINGE (ML) INJECTION PRN
Status: DISCONTINUED | OUTPATIENT
Start: 2020-03-09 | End: 2020-03-12 | Stop reason: HOSPADM

## 2020-03-09 RX ADMIN — ALBUTEROL SULFATE 2.5 MG: 2.5 SOLUTION RESPIRATORY (INHALATION) at 11:30

## 2020-03-09 RX ADMIN — Medication 15 ML: at 09:53

## 2020-03-09 RX ADMIN — ALLOPURINOL 300 MG: 300 TABLET ORAL at 09:47

## 2020-03-09 RX ADMIN — FENTANYL CITRATE 25 MCG: 50 INJECTION, SOLUTION INTRAMUSCULAR; INTRAVENOUS at 11:17

## 2020-03-09 RX ADMIN — SODIUM CHLORIDE, PRESERVATIVE FREE 10 ML: 5 INJECTION INTRAVENOUS at 09:47

## 2020-03-09 RX ADMIN — FAMOTIDINE 20 MG: 10 INJECTION INTRAVENOUS at 09:47

## 2020-03-09 RX ADMIN — INSULIN LISPRO 1 UNITS: 100 INJECTION, SOLUTION INTRAVENOUS; SUBCUTANEOUS at 21:30

## 2020-03-09 RX ADMIN — DARBEPOETIN ALFA 40 MCG: 40 INJECTION, SOLUTION INTRAVENOUS; SUBCUTANEOUS at 11:07

## 2020-03-09 RX ADMIN — FOLIC ACID 1 MG: 1 TABLET ORAL at 09:47

## 2020-03-09 RX ADMIN — Medication 15 ML: at 21:13

## 2020-03-09 RX ADMIN — DESMOPRESSIN ACETATE 40 MG: 0.2 TABLET ORAL at 21:13

## 2020-03-09 RX ADMIN — HEPARIN SODIUM 1900 UNITS: 1000 INJECTION INTRAVENOUS; SUBCUTANEOUS at 12:50

## 2020-03-09 RX ADMIN — ASPIRIN 81 MG: 81 TABLET, CHEWABLE ORAL at 09:47

## 2020-03-09 RX ADMIN — MULTIPLE VITAMINS W/ MINERALS TAB 1 TABLET: TAB at 09:47

## 2020-03-09 RX ADMIN — CLOPIDOGREL 75 MG: 75 TABLET, FILM COATED ORAL at 09:47

## 2020-03-09 RX ADMIN — INSULIN LISPRO 1 UNITS: 100 INJECTION, SOLUTION INTRAVENOUS; SUBCUTANEOUS at 09:47

## 2020-03-09 RX ADMIN — Medication 10 ML: at 21:12

## 2020-03-09 RX ADMIN — SENNOSIDES AND DOCUSATE SODIUM 1 TABLET: 8.6; 5 TABLET ORAL at 21:13

## 2020-03-09 RX ADMIN — SENNOSIDES AND DOCUSATE SODIUM 1 TABLET: 8.6; 5 TABLET ORAL at 09:47

## 2020-03-09 RX ADMIN — ALBUMIN (HUMAN) 25 G: 12.5 INJECTION, SOLUTION INTRAVENOUS at 18:12

## 2020-03-09 RX ADMIN — INSULIN LISPRO 1 UNITS: 100 INJECTION, SOLUTION INTRAVENOUS; SUBCUTANEOUS at 11:37

## 2020-03-09 RX ADMIN — INSULIN LISPRO 1 UNITS: 100 INJECTION, SOLUTION INTRAVENOUS; SUBCUTANEOUS at 17:05

## 2020-03-09 RX ADMIN — CEFEPIME HYDROCHLORIDE 1 G: 1 INJECTION, POWDER, FOR SOLUTION INTRAMUSCULAR; INTRAVENOUS at 15:53

## 2020-03-09 RX ADMIN — DEXMEDETOMIDINE HYDROCHLORIDE 0.4 MCG/KG/HR: 100 INJECTION, SOLUTION INTRAVENOUS at 15:50

## 2020-03-09 ASSESSMENT — PULMONARY FUNCTION TESTS
PIF_VALUE: 35
PIF_VALUE: 48
PIF_VALUE: 39
PIF_VALUE: 33
PIF_VALUE: 41
PIF_VALUE: 31
PIF_VALUE: 30
PIF_VALUE: 34
PIF_VALUE: 32
PIF_VALUE: 15
PIF_VALUE: 35
PIF_VALUE: 38
PIF_VALUE: 34

## 2020-03-09 ASSESSMENT — PAIN SCALES - GENERAL: PAINLEVEL_OUTOF10: 0

## 2020-03-09 NOTE — PROGRESS NOTES
ventricular systolic function is mild to moderately reduced visually  estimated at 40%. .  Calculated EF via Morris's method is 38%. Anterior/septal wall hypokinetic compared to prior echo. Aortic valve appears calcified with restriction of motion. Mild/mod stenosis- maybe underestimated due to poor LVEF         3/4/2020 :  Global left ventricular systolic function is normal. Calculated ejection  fraction is 55 % by heart Model . Abnormal septal motion due to BBB or Paced rhythm  Normal right ventricular function. Pacemaker / ICD lead seen in right  ventricle. No pericardial effusion seen. 2/21/2020  Left ventricle is normal in size. Global left ventricular systolic function is moderately reduced. Calculated EF via heart model is 36%. Mild left ventricular hypertrophy. Grade II (moderate) left ventricular diastolic dysfunction. Left atrium is mildly dilated. The aortic valve is calcified with reduced cusp mobility. Moderate aortic valve stenosis with a mean gradient of 20 mmHg. Maybe underestimated due to poor LV function. Trivial aortic insufficiency. Thickened mitral valve leaflets. Mitral annular calcification is seen. At least Moderate mitral regurgitation. Mild tricuspid regurgitation. Moderate pulmonary hypertension with an estimated right ventricular systolic pressure of 56 mmHg  Mild pulmonic insufficiency. Small pericardial effusion. IVC Increased diameter and impaired or no inspiratory variation indicating elevated RA filling pressure (i.e. CVP) . Cath 02/24/2020  LMCA: Normal 0% stenosis.     LAD: Mid 99% in upper long branch (Reaching to apex) 80% in lower LAD branch.   D1: proximal 90% stenosis     LCx: Mid 80% stenosis  OM1: Ostial 80% stenosis  OM2: Proximal 80% stenosis     RCA: 100% proximal stenosis  Collateral from the left        Peripheral Arteries and Lesion Findings  Descending aorta: Severe iliac disease in both sides  80% in the left side and 60-70% in the right

## 2020-03-09 NOTE — PLAN OF CARE
PROVIDE ADEQUATE OXYGENATION WITH ACCEPTABLE SP02/ABG'S    [x]  IDENTIFY APPROPRIATE OXYGEN THERAPY  [x]   MONITOR SP02/ABG'S AS NEEDED   [x]   PATIENT EDUCATION AS NEEDED    BRONCHOSPASM/BRONCHOCONSTRICTION     [x]         IMPROVE AERATION/BREATH SOUNDS  [x]   ADMINISTER BRONCHODILATOR THERAPY AS APPROPRIATE  [x]   ASSESS BREATH SOUNDS  []   IMPLEMENT AEROSOL/MDI PROTOCOL  [x]   PATIENT EDUCATION AS NEEDED    MECHANICAL VENTILATION     [x]   PROVIDE OPTIMAL VENTILATION  [x]   ASSESS FOR EXTUBATION READINESS  [x]   ASSESS FOR WEANING READINESS  [x]  EXTUBATE AS TOLERATED  [x]  IMPLEMENT ADULT MECHANICAL VENTILATION PROTOCOL  [x]  MAINTAIN ADEQUATE OXYGENATION  [x]  PERFORM SPONTANEOUS WEANING TRIAL AS TOLERATED    Assessment for weaning readiness    PRE-TRIAL PATIENT ASSESSMENT - COMPLETED AT 0750    Completed by:  Yomi Contreras  7:59 AM    PARAMETER CRITERIA FOR WEANING CRITERIA FOR WEANING   Diaphoresis, agitation or dyspnea None present No   Heart Rate    Pulse: 74 Pulse less than 50 or greater than 140   No   Blood Pressure  BP: (!) 493/95 Systolic Blood Pressure less than 90 mmHg No   Vaspressors Vasopressors greater than or equal to 5mcg Yes   SpO2: 100 %  FiO2 : 40 %     SpO2 less than 90% and or   FiO2 is greater than 50%  Peep is greater than 8   No   P/F ratio: 172.5  :No results found for: PHART, NGC7ZQO, PO2ART, C3QBHKBR, NLM1FYI, BEART   PaO2/FiO2 less than or equal to 150 No   Sedation Patient in unable to follow simple commands on a continuous infusion of Midazolam, Ativan, Diprivan, or other hypnosedatives with the exception of PCA morphine, Fentanyl and Dilaudid with a basal rate Yes     Pt is a ventilator wean candidate at this time.

## 2020-03-09 NOTE — PROGRESS NOTES
Dr. Pelon Meredith at bedside, stated to give Cefepime today and will be last dose after dialysis. Order rec'd to switch versed to precedex 1 hr after HD treatment is complete, also stated to have RT document pressure support and CPAP for 1-1.5 hrs.

## 2020-03-09 NOTE — PROGRESS NOTES
[facility] 21    Progress Note    3/9/2020    2:52 PM    Name:   Lucretia Schuster  MRN:     [id number]     Acct:      [de-identified]   [facility]:   [facility] Day:  25  Admit Date:  2/19/2020 11:01 PM    PCP:   Beatriz Montano PA-C  Code Status:  Full Code    Subjective:     C/C:   Chief Complaint   Patient presents with    Shortness of Breath     Interval History Status: not changed. Patient seen and examined this morning. Remains intubated. Versed is being weaned. Patient is awake and following commands. Chest x-ray this morning reveals multifocal airspace disease which may be multifocal pneumonia/infiltrate. Remains off of any sort of pressor support. To undergo CPAP trial for possible extubation today. No family currently at bedside. Brief History:     Mrs. Erin Martínez is a 78year old  female who presented to the emergency dept initially on 2/19/2020 with c/c of shortness of breath. Her shortness of breath initially was only with exertion, but then became present at rest as well. She denied chest pain at that time. She was hypoxic with EMS and upon arrival to the ED. CXR in the ED revealed pulmonary edema. Patient was placed on BIPAP therapy with improved O2 saturations. Comorbidities include tobacco abuse, CKD IV (baseline creatinine 3.2 - 3.5), prior left-sided CVA, hyperlipidemia, gout, DM II, COPD. Troponins were: 56 --> 160 --> 409 --> 476 --> 500 --> 365 --> 430 --> 1422. EKG with inferolateral ST depressions. She was diagnosed with NSTEMI. Creatinine noted to be 3.97. Prior ECHO 12/2016: EF >55%, mild pulm HTN. Gerson cath placed 02/21 and HD started 02/22.  LHC on 2/24/2020 revealed: LMCA: 0% stenosis; LAD: Mid 99% in upper long branch (Reaching to apex) 80% in lower LAD branch; D1: proximal 90% stenosis; LCx: Mid 80% stenosis; OM1: Ostial 80% stenosis; OM2: Proximal 80% stenosis; RCA: 100% proximal stenosis, collateral from the left. Peripheral Arteries and Lesion Findings  Descending aorta: Severe iliac disease in both sides  80% in the left side and 60-70% in the right side     The LV gram was performed in the BAUGH 30 position. LVEF: 35%. LV Wall Motion: Severe basal anterior and basal inferior hypokinesis     Permacath was placed in subclavian vein on 3/24    CABG on 3/2/2020  Increased work of breathing on 3/3. Re-intubated on 3/3. Undergoing HD. SBT daily. Possible extubation on 3/6/2020. Extubated on 3/6. Respiratory arrest with PEA arrest on the morning of 3/7. Reintubated. Weaning slowly. CTA chest without evidence of PE, but does note effusions with airspace disease. On heparin infusion and antibiotic therapy. Chest x-ray on 3/9 concerning for worsening effusion/airspace disease  Unable to extubate on 3/9    Review of Systems:     Unable to obtain as patient is currently intubated and sedated    Medications:      Allergies:  No Known Allergies    Current Meds:   Scheduled Meds:    darbepoetin sonya-polysorbate  40 mcg Intravenous Weekly    lidocaine 1 % injection  5 mL Intradermal Once    sodium chloride flush  10 mL Intravenous 2 times per day    famotidine (PEPCID) injection  20 mg Intravenous Daily    aspirin  81 mg Oral Daily    cefepime  1 g Intravenous Q24H    heparin (porcine)  4,000 Units Intravenous Once    insulin lispro  0-6 Units Subcutaneous TID WC    insulin lispro  0-3 Units Subcutaneous Nightly    chlorhexidine  15 mL Mouth/Throat BID    therapeutic multivitamin-minerals  1 tablet Oral Daily with breakfast    sennosides-docusate sodium  1 tablet Oral BID    clopidogrel  75 mg Oral Daily    allopurinol  300 mg Oral Daily    atorvastatin  40 mg Oral Nightly    folic acid  1 mg Oral Daily    [MAR Hold] insulin glargine  25 Units Subcutaneous Nightly    [MAR Hold] bumetanide  2 mg Intravenous Daily     Continuous Infusions:    dexmedetomidine (PRECEDEX) IV infusion      heparin (porcine) 12 Units/kg/hr (03/09/20 0533)    midazolam 3 mg/hr (03/08/20 1723)    CRRT dialysis builder 1,500 mL/hr (03/06/20 1052)    sodium chloride 100 mL/hr at 03/06/20 0321    dextrose      phenylephrine (CLAUDETTE-SYNEPHRINE) 50mg/250mL infusion Stopped (03/08/20 1130)    milrinone Stopped (03/04/20 0835)    norepinephrine Stopped (03/07/20 2332)    EPINEPHrine infusion Stopped (03/04/20 1515)    nitroGLYCERIN       PRN Meds: sodium chloride flush, heparin (porcine), heparin (porcine), albuterol, calcium chloride IVPB, magnesium sulfate, potassium chloride, oxyCODONE-acetaminophen **OR** oxyCODONE-acetaminophen, fentanNYL **OR** fentanNYL, diphenhydrAMINE, hydrALAZINE, albumin human, glucose, dextrose, glucagon (rDNA), dextrose, bisacodyl, bisacodyl, phenylephrine (CLAUDETTE-SYNEPHRINE) 50mg/250mL infusion, milrinone, norepinephrine, EPINEPHrine infusion, nitroGLYCERIN, acetaminophen **OR** acetaminophen, ondansetron, [MAR Hold] heparin (porcine), [MAR Hold] heparin (porcine), [MAR Hold] LORazepam, [MAR Hold] heparin (porcine), [MAR Hold] heparin (porcine), [MAR Hold] nicotine    Data:     Past Medical History:   has a past medical history of Acute CHF (congestive heart failure) ([facility] Anemia in chronic kidney disease, Anemia in stage 4 chronic kidney disease ([facility] Cerebral artery occlusion with cerebral infarction ([facility] [facility] Chronic kidney disease, Gout, arthritis, Hyperlipidemia, Hypertension, Left sided lacunar infarction ([facility] [facility] Obesity (BMI 30-39.9), Peripheral vascular disease ([facility] [address] [facility] and Type II or unspecified type diabetes mellitus without mention of complication, not stated as uncontrolled. Social History:   reports that she has been smoking cigarettes. She started smoking about 47 years ago. She has a 40.00 pack-year smoking history. She uses smokeless tobacco. She reports that she does not drink alcohol or use drugs.      Family History:   Family History   Problem Relation Age of Onset    Diabetes Right SC permacath; left IJ introducer with pigtail, Venegas present; right radial artery arterial line present; left IJ introducer is with a significant amount of oozing around incision site  Neuro: moving all four extremities spontaneously. Awakens to voice. Skin:  no gross lesions, rashes, induration    Assessment:        Hospital Problems           Last Modified POA    * (Principal) Acute on chronic combined systolic and diastolic CHF (congestive heart failure) ([address] 3/7/2020 Yes    HERNÁN (acute kidney injury) ([address] 3/7/2020 Yes    NSTEMI (non-ST elevated myocardial infarction) ([address] 3/7/2020 Yes    Moderate malnutrition ([address] 3/7/2020 Yes    Cardiopulmonary arrest ([address] 3/7/2020 Yes    Ventilator dependence ([address] 3/7/2020 No    Acute respiratory failure ([address] 3/7/2020 Yes    Anemia in stage 4 chronic kidney disease ([address] 3/7/2020 Yes    Overview Signed 7/29/2017  8:09 PM by [address] [facility]     Updating deleted diagnoses         Type 2 diabetes mellitus with diabetic chronic kidney disease ([address] 3/7/2020 Yes    Essential hypertension 3/7/2020 Yes    Hypercholesteremia 3/7/2020 Yes    COPD exacerbation ([address] 3/7/2020 Yes    Thrombocytopenia ([address] 3/7/2020 No          Plan:        1. S/P CPA on 3/7 - unsure as to what the inciting event was. Possibly aspiration and subsequent hypoxia; 2D echo was reviewed. No pericardial effusion. EF 38%. 2. Bradycardia - Per cardio - maintain off amio and coreg as intrinsic rhythm is asystole initially after CABG; maintain epicardial pacemaker which is in place. 3. Shock - resolving. Off of pressors. 4. Acute HFrEF with pulmonary edema noted on chest x-ray with worsening airspace disease. Monitor I/O's, HD at discretion of nephrology for volume removal. . - 3 L removed today. Turn 55 cc of urine over the past 24 hours  5. CKD IV --> ESRD on HD - continue dialysis as per nephrology  6. After next extubation, patient should wear BiPAP with sleep and with naps  7.  Multivessel CAD / NSTEMI - s/p CABGx3 on 3/2/2020. Continue management per cardiology and CT surgery; Continue aspirin, plavix, high-intensity statin therapy  8. DM2 - BS stable, Lantus, SSI - glucoses currently stable  9. Atrial fibrillation with RVR - off of oral amiodarone and coreg secondary to bradycardia. On heparin infusion as this time  10. Arterial line is a day #7 at this point, as is the left IJ introducer. Left IJ introducer to be removed as soon as possible with tip to be cultured. 11. Checking sputum cultures secondary to increased airspace disease  12. Monitor hemoglobin, which is downtreding  13. Right apical pneumothorax - resolved  14. Thrombocytopenia improved   15. Oral care   16. Diet: Resume tube feeds  17. Bilateral iliac artery disease - will need outpatient follow up with vascular surgery  18. Daily labs  19. Monitor I/Os -3 L removed with HD today  20. Gi ppx -IV Pepcid (renally dosed by pharmacy, appreciate their assistance)  21. DVT ppx with heparin SQ  22. Disposition - patient is status post CABG. Currently intubated. Status post cardiac/pulmonary arrest. Daily SBTs. Extubate when appropriate. However, she has worsening airspace disease. Pulmonology is following. IV antibiotics are in place. Hemodialysis at the discretion of nephrology. I am concerned that palliative care will need to become involved in the near future, if patient is unable to wean from mechanical ventilation. Discussed with son 2 days ago, patient would never want to be maintained on mechanical ventilation to sustain her life.     [address],   3/9/2020  2:52 PM

## 2020-03-09 NOTE — PROGRESS NOTES
Physical Therapy  DATE: 3/9/2020  NAME: Alma Doll  MRN: [id number]   : 1950    Discharge Recommendations: Continue to Assess (pending progress)   Subjective: RN agreeable to ROM; Pt sleeping upon arrival, able to open eye at mention of name, Pt initially resistant to ROM, increase time to relax and participate. Pain: JEFF; some facial grimacing with L UE ROM  Patient follows: Most  Commands  Is patient on ventilator: Yes  Is patient on sedation:Yes  Precautions: General,     Therapeutic exercises:  AAROM to BUE and BLEs x-15 reps all planes. Pt demo resistance to ROM when fatigue . Bilateral gastrocnemius stretching3 reps x 20 seconds  All vitals remained stable throughout. Goals  Short Term Goals  Short term goal 1: prevent loss of strength/mobility/independence while pt is in the hospital  Short term goal 2: pt to ambulate 100' independently without device  Short term goal 3: stair ambulation x 1 flight with 1 HR independently          Plan: Progress functional mobility as medically appropriate.    Time In: 832  Time Out: 855  Time Coded Minutes (treatment minutes): 23  Rehab Potential: good  Treatments/week: 5x/wk    John Gerard, PTA

## 2020-03-09 NOTE — PROGRESS NOTES
SBT, Spontaneous Breathing Trial, attempted at this time. Pt went from normal breathing pattern to marked distress, moderate abdominal use, moderate air hungry head pull, respiratory rate went from 16 bpm to 46 bpm within 25 seconds, tidal volumes 451 mL to 221 mL  Placed pt back on previous settings d/t unable to wean at this time. GARFIELD Donohue, aware.

## 2020-03-09 NOTE — PROGRESS NOTES
[facility]  Daily Progress Note    Surgeon:  Dr. Blanca Boyce      Subjective:  Ms. Alli Espino is a 78y.o. year-old female status post CABG x 3 on 3/2/20 POD# 7. Patient was seen and examined at bedside this morning. Currently intubated and sedated on a low dose Versed gtt loosely following commands.     Vital Signs: BP (!) 159/61   Pulse 93   Temp 97.3 °F (36.3 °C)   Resp 22   Ht 5' 3\" (1.6 m)   Wt 158 lb 15.2 oz (72.1 kg)   SpO2 97%   BMI 28.16 kg/m²  O2 Flow Rate (L/min): 2 L/min   Admit Weight: Weight: 170 lb (77.1 kg)   WEIGHTWeight: 158 lb 15.2 oz (72.1 kg)     I/O's:  I/O last 3 completed shifts:   In: 570.6 [I.V.:520.6; NG/GT:50]  Out: 255 [Urine:255]    Data:    CBC:   Recent Labs     03/07/20 0455 03/08/20  0600 03/09/20  0421   WBC 11.3 13.5* 11.8*   HGB 8.7* 8.1* 7.9*   HCT 28.2* 24.6* 24.9*   MCV 99.3 94.6 96.9   PLT See Reflexed IPF Result 160 196     BMP:   Recent Labs     03/08/20  2113 03/09/20  0421 03/09/20  1056   * 132* 133*   K 3.6* 3.8 3.2*   CL 99 99 93*   CO2 17* 17* 21   BUN 65* 68* 28*   CREATININE 5.88* 6.66* 2.65*     PT/INR:   Recent Labs     03/07/20 0455 03/08/20  0600 03/09/20  0421   PROTIME 10.7 11.3 12.1*   INR 1.0 1.1 1.2     APTT:   Recent Labs     03/08/20  1624 03/08/20  2249 03/09/20  0421   APTT 42.7* 60.1* 52.2*       Chest X-Ray:   EXAMINATION:   ONE XRAY VIEW OF THE CHEST       3/9/2020 7:39 am       COMPARISON:   CTA chest from 03/08/2020       HISTORY:   ORDERING SYSTEM PROVIDED HISTORY: pulm edema   TECHNOLOGIST PROVIDED HISTORY:   pulm edema       30-year-old female with pulmonary edema       FINDINGS:   Portable upright view of the chest.       Endotracheal tube distal tip overlying the mid trachea approximately 3.6 cm   above the level of the nora.       Right subclavian approach dialysis catheter distal tip overlying the lower   right atrium, stable.       Enteric tube traverses the GE junction with distal tip overlying the left   upper quadrant likely in the body of the stomach.       Left IJ approach catheter again projecting over the aortic knob, stable in   position.  This does not appear to cross the midline.  This is stable in   position and was located within the left brachiocephalic vein on recent CT of   03/08/2020.       Atherosclerotic calcification of the thoracic aorta.       Mild cardiomegaly.  Patchy airspace opacities throughout both lungs.  Trace   bilateral pleural effusions.  No pneumothorax.  No free air.  Visualized   osseous structures remain unchanged.           Impression   1. Tubes and lines as detailed above. 2. Multifocal airspace disease throughout both lungs which may represent   multifocal pneumonia/infiltrate.  Trace bilateral pleural effusions. Follow-up is recommended to document resolution.    3. Mild cardiomegaly.             Scheduled Meds:    darbepoetin sonya-polysorbate  40 mcg Intravenous Weekly    lidocaine 1 % injection  5 mL Intradermal Once    sodium chloride flush  10 mL Intravenous 2 times per day    famotidine (PEPCID) injection  20 mg Intravenous Daily    aspirin  81 mg Oral Daily    cefepime  1 g Intravenous Q24H    heparin (porcine)  4,000 Units Intravenous Once    insulin lispro  0-6 Units Subcutaneous TID WC    insulin lispro  0-3 Units Subcutaneous Nightly    chlorhexidine  15 mL Mouth/Throat BID    therapeutic multivitamin-minerals  1 tablet Oral Daily with breakfast    sennosides-docusate sodium  1 tablet Oral BID    clopidogrel  75 mg Oral Daily    allopurinol  300 mg Oral Daily    atorvastatin  40 mg Oral Nightly    folic acid  1 mg Oral Daily    [MAR Hold] insulin glargine  25 Units Subcutaneous Nightly    [MAR Hold] bumetanide  2 mg Intravenous Daily     Continuous Infusions:    heparin (porcine) 12 Units/kg/hr (03/09/20 7633)    midazolam 3 mg/hr (03/08/20 1723)    CRRT dialysis builder 1,500 mL/hr (03/06/20 1052)    sodium chloride 100 mL/hr at 03/06/20 0321    dextrose      phenylephrine (CLAUDETTE-SYNEPHRINE) 50mg/250mL infusion Stopped (03/08/20 1130)    milrinone Stopped (03/04/20 0835)    norepinephrine Stopped (03/07/20 2332)    EPINEPHrine infusion Stopped (03/04/20 1515)    nitroGLYCERIN         Physical Exam:      General: Intubated and mildly sedated. Follows commands   Heart: Normal S1, S2, RRR, + systolic murmur   Sternum: CSD in place. CDI  Lungs: Diminished BS bilaterally at the bases. Coarse BS noted bilaterally   Abdomen: soft, non tender, non distended, BSx4  Extremities: + edema noted bilateral LE. EVH sites: CDI. SCD's in place            Assessment & Plan:      Ms. Ace is a 78 y. o. year-old female status post CABG x 3 on 3/2/20 POD# 7. Newly diagnosed CKD Stage V - HD initiated pre-op on admission. Patient was V-Paced post-operatively until 3/5/20 when her intrinsic rhythm resumed. Post-op course complicated by hypoxemia and severe agitation s/p extubation on 3/3/20 which required re-intubation within 30 minutes post extubation. On 3/5/20 in the middle of the night patient had a brief episode of Afib/RVR and converted spontaneously to NSR. On 3/6/20 patient had another episode of Afib/RVR then went bradycardic briefly which required V-pacing. Amio bolus and gtt were started as per protocol. On 3/7/20 around 4:30am patients post-op course was further complicated by a Code blue. I was told that patient was given some water and suddenly vomited, eyes rolled back and became pale grayish and was unresponsive. On telemetry monitoring the rhythm appeared to be V tach which rapidly degenerated into V. fib arrest.  Patient was given 2 rounds of CPR and 1 epinephrine and ROSC was achieved. Post cardiac arrest and restoration of rhythm, patient was having unstable labored respirations indicating impending respiratory failure.  Hence patient required intubation.  Intubation was performed by the RT under direct supervision of the ED resident. Afib and transition to PO AC as tolerated. Monitor PTT as per protocol   12. Cont to hold Coreg and Amiodarone  13. Cont backup V-pacer at 70  14. F/u left subclavian TLC culture tip   15.  New radial A-line was on the left side today       The above recommendations including medications and orders were discussed and agreed upon with Dr. Syed, the attending on service for the cardiothoracic surgery group today.         Ervin Sandifer, MBA, PA-C

## 2020-03-09 NOTE — PROGRESS NOTES
NEPHROLOGY PROGRESS NOTE      SUBJECTIVE     All events noted. Last HD/UF on Sat. Continus to be on MV. Fio2 40  Off pressors and tolerating enteral feedings well  Has perm cath in place     OBJECTIVE     Vitals:    03/09/20 0536 03/09/20 0600 03/09/20 0725 03/09/20 0750   BP:  (!) 121/50     Pulse:  76  74   Resp:  26  28   Temp:   97.7 °F (36.5 °C)    TempSrc:   Oral    SpO2: 97% 96%  100%   Weight:       Height:         24HR INTAKE/OUTPUT:      Intake/Output Summary (Last 24 hours) at 3/9/2020 0842  Last data filed at 3/9/2020 0600  Gross per 24 hour   Intake 570.59 ml   Output 255 ml   Net 315.59 ml       General appearance:MV  Respiratory::vesicular breath sounds,no wheeze/crackles  Cardiovascular:S1 S2 normal,no gallop or organic murmur. Abdomen:Non tender/non distended. Bowel sounds present  Extremities: No Cyanosis or Clubbing,Lower extremity edema  Neurological:MV      MEDICATIONS     Scheduled Meds:    famotidine (PEPCID) injection  20 mg Intravenous Daily    aspirin  81 mg Oral Daily    cefepime  1 g Intravenous Q24H    heparin (porcine)  4,000 Units Intravenous Once    insulin lispro  0-6 Units Subcutaneous TID WC    insulin lispro  0-3 Units Subcutaneous Nightly    sodium chloride flush  10 mL Intravenous 2 times per day    chlorhexidine  15 mL Mouth/Throat BID    therapeutic multivitamin-minerals  1 tablet Oral Daily with breakfast    sennosides-docusate sodium  1 tablet Oral BID    clopidogrel  75 mg Oral Daily    allopurinol  300 mg Oral Daily    atorvastatin  40 mg Oral Nightly    folic acid  1 mg Oral Daily    [MAR Hold] insulin glargine  25 Units Subcutaneous Nightly    [MAR Hold] bumetanide  2 mg Intravenous Daily     Continuous Infusions:    heparin (porcine) 12 Units/kg/hr (03/09/20 0533)    EPINEPHrine infusion      midazolam 3 mg/hr (03/08/20 2043)    CRRT dialysis builder 1,500 mL/hr (03/06/20 1052)    sodium chloride 100 mL/hr at 03/06/20 0321    dextrose      daily  vitamin D (ERGOCALCIFEROL) 400 UNITS CAPS, Take 1 capsule by mouth daily. (Patient taking differently: Take 400 Units by mouth 2 times daily )    INVESTIGATIONS     Last 3 CMP:    Recent Labs     03/08/20  1624 03/08/20  2113 03/09/20  0421    132* 132*   K 4.1 3.6* 3.8    99 99   CO2 19* 17* 17*   BUN 61* 65* 68*   CREATININE 5.70* 5.88* 6.66*   CALCIUM 9.3 9.2 9.4       Last 3 CBC:  Recent Labs     03/07/20  0455 03/08/20  0600 03/09/20  0421   WBC 11.3 13.5* 11.8*   RBC 2.84* 2.60* 2.57*   HGB 8.7* 8.1* 7.9*   HCT 28.2* 24.6* 24.9*   MCV 99.3 94.6 96.9   MCH 30.6 31.2 30.7   MCHC 30.9 32.9 31.7   RDW 14.2 14.5* 14.5*   PLT See Reflexed IPF Result 160 196   MPV NOT REPORTED 11.9 11.5       ASSESSMENT     1. ESRD from DM - new start - perm cath - MWF  2. VDRF  3. PEA arrest 3/7 - suspect resp etiology from aspiration/hypoxia  4. S/ p CABG 3/2 EF 40%  5. Mod AS/ MR  6. Anemia  7. DM2  8. PVD    PLAN     1. HD today. 2.5 - 3 kg off  2. Add Aranesp. Iron profile/Ferritin  3. Continue enteral feedings  4. Abx as per ESRD dosing  5.  Will follow    Please do not hesitate to call with questions    This note is created with the assistance of a speech-recognition program. While intending to generate a document that actually reflects the content of the visit, no guarantees can be provided that every mistake has been identified and corrected by editing    Agnes Akbar MD, [facility] [place], [address]   3/9/2020 8:42 AM  [facility] [place]

## 2020-03-10 ENCOUNTER — APPOINTMENT (OUTPATIENT)
Dept: GENERAL RADIOLOGY | Age: 70
DRG: 233 | End: 2020-03-10
Payer: MEDICARE

## 2020-03-10 PROBLEM — N18.6 ESRD (END STAGE RENAL DISEASE) (HCC): Status: ACTIVE | Noted: 2020-03-10

## 2020-03-10 PROBLEM — I48.0 PAF (PAROXYSMAL ATRIAL FIBRILLATION) (HCC): Status: ACTIVE | Noted: 2020-03-10

## 2020-03-10 LAB
ACTION: NORMAL
ACTION: NORMAL
ALLEN TEST: ABNORMAL
ALLEN TEST: ABNORMAL
ANION GAP SERPL CALCULATED.3IONS-SCNC: 17 MMOL/L (ref 9–17)
ANION GAP: 12 MMOL/L (ref 7–16)
ANION GAP: 12 MMOL/L (ref 7–16)
BUN BLDV-MCNC: 39 MG/DL (ref 8–23)
BUN/CREAT BLD: ABNORMAL (ref 9–20)
CALCIUM IONIZED: 1.12 MMOL/L (ref 1.13–1.33)
CALCIUM SERPL-MCNC: 9.3 MG/DL (ref 8.6–10.4)
CHLORIDE BLD-SCNC: 94 MMOL/L (ref 98–107)
CO2: 22 MMOL/L (ref 20–31)
CREAT SERPL-MCNC: 4.76 MG/DL (ref 0.5–0.9)
CULTURE: NO GROWTH
DATE AND TIME: NORMAL
DATE AND TIME: NORMAL
DIRECT EXAM: NORMAL
FIO2: 35
FIO2: 60
GFR AFRICAN AMERICAN: 11 ML/MIN
GFR NON-AFRICAN AMERICAN: 8 ML/MIN
GFR NON-AFRICAN AMERICAN: 8 ML/MIN
GFR NON-AFRICAN AMERICAN: 9 ML/MIN
GFR SERPL CREATININE-BSD FRML MDRD: 10 ML/MIN
GFR SERPL CREATININE-BSD FRML MDRD: 9 ML/MIN
GFR SERPL CREATININE-BSD FRML MDRD: ABNORMAL ML/MIN/{1.73_M2}
GLUCOSE BLD-MCNC: 195 MG/DL (ref 65–105)
GLUCOSE BLD-MCNC: 199 MG/DL (ref 65–105)
GLUCOSE BLD-MCNC: 200 MG/DL (ref 74–100)
GLUCOSE BLD-MCNC: 204 MG/DL (ref 70–99)
GLUCOSE BLD-MCNC: 208 MG/DL (ref 65–105)
GLUCOSE BLD-MCNC: 228 MG/DL (ref 65–105)
GLUCOSE BLD-MCNC: 229 MG/DL (ref 65–105)
GLUCOSE BLD-MCNC: 239 MG/DL (ref 74–100)
HCT VFR BLD CALC: 24.3 % (ref 36.3–47.1)
HEMOGLOBIN: 7.9 G/DL (ref 11.9–15.1)
INR BLD: 1.1
Lab: NORMAL
MAGNESIUM: 2 MG/DL (ref 1.6–2.6)
MCH RBC QN AUTO: 30.2 PG (ref 25.2–33.5)
MCHC RBC AUTO-ENTMCNC: 32.5 G/DL (ref 28.4–34.8)
MCV RBC AUTO: 92.7 FL (ref 82.6–102.9)
MODE: ABNORMAL
MODE: ABNORMAL
NEGATIVE BASE EXCESS, ART: ABNORMAL (ref 0–2)
NEGATIVE BASE EXCESS, ART: ABNORMAL (ref 0–2)
NOTIFY: NORMAL
NOTIFY: NORMAL
NRBC AUTOMATED: 0.2 PER 100 WBC
O2 DEVICE/FLOW/%: ABNORMAL
O2 DEVICE/FLOW/%: ABNORMAL
PARTIAL THROMBOPLASTIN TIME: 42.8 SEC (ref 20.5–30.5)
PARTIAL THROMBOPLASTIN TIME: 43.3 SEC (ref 20.5–30.5)
PARTIAL THROMBOPLASTIN TIME: 50 SEC (ref 20.5–30.5)
PATIENT TEMP: ABNORMAL
PATIENT TEMP: ABNORMAL
PDW BLD-RTO: 14.3 % (ref 11.8–14.4)
PLATELET # BLD: 244 K/UL (ref 138–453)
PMV BLD AUTO: 11.5 FL (ref 8.1–13.5)
POC CHLORIDE: 95 MMOL/L (ref 98–107)
POC CHLORIDE: 96 MMOL/L (ref 98–107)
POC CREATININE: 5.33 MG/DL (ref 0.51–1.19)
POC CREATININE: 5.45 MG/DL (ref 0.51–1.19)
POC HCO3: 25.6 MMOL/L (ref 21–28)
POC HCO3: 27 MMOL/L (ref 21–28)
POC HEMATOCRIT: 27 % (ref 36–46)
POC HEMATOCRIT: 28 % (ref 36–46)
POC HEMOGLOBIN: 9.3 G/DL (ref 12–16)
POC HEMOGLOBIN: 9.5 G/DL (ref 12–16)
POC IONIZED CALCIUM: 1.19 MMOL/L (ref 1.15–1.33)
POC IONIZED CALCIUM: 1.2 MMOL/L (ref 1.15–1.33)
POC LACTIC ACID: 0.34 MMOL/L (ref 0.56–1.39)
POC LACTIC ACID: <0.3 MMOL/L (ref 0.56–1.39)
POC O2 SATURATION: 92 % (ref 94–98)
POC O2 SATURATION: 99 % (ref 94–98)
POC PCO2 TEMP: ABNORMAL MM HG
POC PCO2 TEMP: ABNORMAL MM HG
POC PCO2: 42.4 MM HG (ref 35–48)
POC PCO2: 43.3 MM HG (ref 35–48)
POC PH TEMP: ABNORMAL
POC PH TEMP: ABNORMAL
POC PH: 7.38 (ref 7.35–7.45)
POC PH: 7.41 (ref 7.35–7.45)
POC PO2 TEMP: ABNORMAL MM HG
POC PO2 TEMP: ABNORMAL MM HG
POC PO2: 129.8 MM HG (ref 83–108)
POC PO2: 64.1 MM HG (ref 83–108)
POC POTASSIUM: 3.2 MMOL/L (ref 3.5–4.5)
POC POTASSIUM: 3.3 MMOL/L (ref 3.5–4.5)
POC SODIUM: 134 MMOL/L (ref 138–146)
POC SODIUM: 134 MMOL/L (ref 138–146)
POSITIVE BASE EXCESS, ART: 0 (ref 0–3)
POSITIVE BASE EXCESS, ART: 2 (ref 0–3)
POTASSIUM SERPL-SCNC: 3.5 MMOL/L (ref 3.7–5.3)
PROTHROMBIN TIME: 11.4 SEC (ref 9–12)
RBC # BLD: 2.62 M/UL (ref 3.95–5.11)
READ BACK: YES
READ BACK: YES
SAMPLE SITE: ABNORMAL
SAMPLE SITE: ABNORMAL
SODIUM BLD-SCNC: 133 MMOL/L (ref 135–144)
SPECIMEN DESCRIPTION: NORMAL
TCO2 (CALC), ART: 27 MMOL/L (ref 22–29)
TCO2 (CALC), ART: 28 MMOL/L (ref 22–29)
WBC # BLD: 13.1 K/UL (ref 3.5–11.3)

## 2020-03-10 PROCEDURE — 85730 THROMBOPLASTIN TIME PARTIAL: CPT

## 2020-03-10 PROCEDURE — 94003 VENT MGMT INPAT SUBQ DAY: CPT

## 2020-03-10 PROCEDURE — 2700000000 HC OXYGEN THERAPY PER DAY

## 2020-03-10 PROCEDURE — 82330 ASSAY OF CALCIUM: CPT

## 2020-03-10 PROCEDURE — 6360000002 HC RX W HCPCS: Performed by: INTERNAL MEDICINE

## 2020-03-10 PROCEDURE — 94660 CPAP INITIATION&MGMT: CPT

## 2020-03-10 PROCEDURE — 2500000003 HC RX 250 WO HCPCS: Performed by: PHYSICIAN ASSISTANT

## 2020-03-10 PROCEDURE — 84295 ASSAY OF SERUM SODIUM: CPT

## 2020-03-10 PROCEDURE — 71045 X-RAY EXAM CHEST 1 VIEW: CPT

## 2020-03-10 PROCEDURE — 94640 AIRWAY INHALATION TREATMENT: CPT

## 2020-03-10 PROCEDURE — 2580000003 HC RX 258: Performed by: INTERNAL MEDICINE

## 2020-03-10 PROCEDURE — 97110 THERAPEUTIC EXERCISES: CPT

## 2020-03-10 PROCEDURE — 84132 ASSAY OF SERUM POTASSIUM: CPT

## 2020-03-10 PROCEDURE — 83605 ASSAY OF LACTIC ACID: CPT

## 2020-03-10 PROCEDURE — 6360000002 HC RX W HCPCS: Performed by: NURSE PRACTITIONER

## 2020-03-10 PROCEDURE — 37799 UNLISTED PX VASCULAR SURGERY: CPT

## 2020-03-10 PROCEDURE — 85027 COMPLETE CBC AUTOMATED: CPT

## 2020-03-10 PROCEDURE — 6370000000 HC RX 637 (ALT 250 FOR IP): Performed by: NURSE PRACTITIONER

## 2020-03-10 PROCEDURE — 99233 SBSQ HOSP IP/OBS HIGH 50: CPT | Performed by: INTERNAL MEDICINE

## 2020-03-10 PROCEDURE — 6370000000 HC RX 637 (ALT 250 FOR IP): Performed by: INTERNAL MEDICINE

## 2020-03-10 PROCEDURE — 85610 PROTHROMBIN TIME: CPT

## 2020-03-10 PROCEDURE — 6370000000 HC RX 637 (ALT 250 FOR IP): Performed by: THORACIC SURGERY (CARDIOTHORACIC VASCULAR SURGERY)

## 2020-03-10 PROCEDURE — 82565 ASSAY OF CREATININE: CPT

## 2020-03-10 PROCEDURE — 82947 ASSAY GLUCOSE BLOOD QUANT: CPT

## 2020-03-10 PROCEDURE — 94770 HC ETCO2 MONITOR DAILY: CPT

## 2020-03-10 PROCEDURE — 82435 ASSAY OF BLOOD CHLORIDE: CPT

## 2020-03-10 PROCEDURE — 2500000003 HC RX 250 WO HCPCS: Performed by: INTERNAL MEDICINE

## 2020-03-10 PROCEDURE — 85014 HEMATOCRIT: CPT

## 2020-03-10 PROCEDURE — 94761 N-INVAS EAR/PLS OXIMETRY MLT: CPT

## 2020-03-10 PROCEDURE — 99221 1ST HOSP IP/OBS SF/LOW 40: CPT | Performed by: PHYSICAL MEDICINE & REHABILITATION

## 2020-03-10 PROCEDURE — 99291 CRITICAL CARE FIRST HOUR: CPT | Performed by: INTERNAL MEDICINE

## 2020-03-10 PROCEDURE — 2100000001 HC CVICU R&B

## 2020-03-10 PROCEDURE — 80048 BASIC METABOLIC PNL TOTAL CA: CPT

## 2020-03-10 PROCEDURE — 82803 BLOOD GASES ANY COMBINATION: CPT

## 2020-03-10 PROCEDURE — 83735 ASSAY OF MAGNESIUM: CPT

## 2020-03-10 PROCEDURE — 76937 US GUIDE VASCULAR ACCESS: CPT

## 2020-03-10 RX ORDER — POTASSIUM CHLORIDE 20 MEQ/1
20 TABLET, EXTENDED RELEASE ORAL ONCE
Status: COMPLETED | OUTPATIENT
Start: 2020-03-10 | End: 2020-03-10

## 2020-03-10 RX ORDER — NITROGLYCERIN 20 MG/100ML
5 INJECTION INTRAVENOUS CONTINUOUS
Status: DISCONTINUED | OUTPATIENT
Start: 2020-03-10 | End: 2020-03-12 | Stop reason: HOSPADM

## 2020-03-10 RX ADMIN — HEPARIN SODIUM AND DEXTROSE 12 UNITS/KG/HR: 10000; 5 INJECTION INTRAVENOUS at 02:28

## 2020-03-10 RX ADMIN — ALLOPURINOL 300 MG: 300 TABLET ORAL at 07:35

## 2020-03-10 RX ADMIN — Medication 15 ML: at 07:48

## 2020-03-10 RX ADMIN — INSULIN LISPRO 2 UNITS: 100 INJECTION, SOLUTION INTRAVENOUS; SUBCUTANEOUS at 07:48

## 2020-03-10 RX ADMIN — Medication 10 ML: at 07:35

## 2020-03-10 RX ADMIN — ONDANSETRON 4 MG: 2 INJECTION INTRAMUSCULAR; INTRAVENOUS at 18:06

## 2020-03-10 RX ADMIN — ALBUTEROL SULFATE 2.5 MG: 2.5 SOLUTION RESPIRATORY (INHALATION) at 12:11

## 2020-03-10 RX ADMIN — ACETAMINOPHEN 650 MG: 325 TABLET ORAL at 09:13

## 2020-03-10 RX ADMIN — INSULIN LISPRO 2 UNITS: 100 INJECTION, SOLUTION INTRAVENOUS; SUBCUTANEOUS at 12:00

## 2020-03-10 RX ADMIN — POTASSIUM CHLORIDE 20 MEQ: 1500 TABLET, EXTENDED RELEASE ORAL at 10:11

## 2020-03-10 RX ADMIN — ASPIRIN 81 MG: 81 TABLET, CHEWABLE ORAL at 07:35

## 2020-03-10 RX ADMIN — SENNOSIDES AND DOCUSATE SODIUM 1 TABLET: 8.6; 5 TABLET ORAL at 07:35

## 2020-03-10 RX ADMIN — MULTIPLE VITAMINS W/ MINERALS TAB 1 TABLET: TAB at 07:35

## 2020-03-10 RX ADMIN — INSULIN LISPRO 1 UNITS: 100 INJECTION, SOLUTION INTRAVENOUS; SUBCUTANEOUS at 20:33

## 2020-03-10 RX ADMIN — FAMOTIDINE 20 MG: 10 INJECTION INTRAVENOUS at 07:35

## 2020-03-10 RX ADMIN — FOLIC ACID 1 MG: 1 TABLET ORAL at 07:35

## 2020-03-10 RX ADMIN — NITROGLYCERIN 10 MCG/MIN: 20 INJECTION INTRAVENOUS at 14:15

## 2020-03-10 RX ADMIN — CLOPIDOGREL 75 MG: 75 TABLET, FILM COATED ORAL at 07:35

## 2020-03-10 RX ADMIN — ONDANSETRON 4 MG: 2 INJECTION INTRAMUSCULAR; INTRAVENOUS at 10:22

## 2020-03-10 RX ADMIN — HEPARIN SODIUM 2000 UNITS: 1000 INJECTION, SOLUTION INTRAVENOUS; SUBCUTANEOUS at 05:36

## 2020-03-10 RX ADMIN — Medication 10 ML: at 20:40

## 2020-03-10 ASSESSMENT — PULMONARY FUNCTION TESTS
PIF_VALUE: 29
PIF_VALUE: 29
PIF_VALUE: 15
PIF_VALUE: 15
PIF_VALUE: 30
PIF_VALUE: 29
PIF_VALUE: 31

## 2020-03-10 ASSESSMENT — PAIN SCALES - GENERAL
PAINLEVEL_OUTOF10: 0

## 2020-03-10 NOTE — PROGRESS NOTES
[facility]    Progress Note    3/10/2020    9:56 AM    Name:   Sujit Gallo  MRN:     [id number]     Acct:      [de-identified]   Room:   [address] [place] [address] Day:  23  Admit Date:  2/19/2020 11:01 PM    PCP:   Daisy Nolen PA-C  Code Status:  Full Code    Subjective:     C/C:   Chief Complaint   Patient presents with    Shortness of Breath     Interval History Status: not changed. No acute issues overnight  Weaning this AM  Currently on ventilator support  Follows commands appropriately       Brief History:     Mrs. Diana Xavier is a 78year old  female with hx of tobacco abuse, CKD IV (baseline creatinine 3.2 - 3.5), prior left-sided CVA, hyperlipidemia, gout, DM II, COPD who presented with shortness of breath. Her shortness of breath initially was only with exertion, but then became present at rest as well. She denied chest pain at that time. She was hypoxic with EMS and upon arrival to the ED. CXR in the ED revealed pulmonary edema. Patient was placed on BIPAP therapy with improved O2 saturations. Troponin increased, EKG with inferolateral ST depressions. She was diagnosed with NSTEMI. Creatinine noted to be 3.97. Prior ECHO 12/2016: EF >55%, mild pulm HTN. Gerson cath placed 02/21 and HD started 02/22. LHC on 2/24/2020 revealed: LMCA: 0% stenosis; LAD: Mid 99% in upper long branch (Reaching to apex) 80% in lower LAD branch; D1: proximal 90% stenosis; LCx: Mid 80% stenosis; OM1: Ostial 80% stenosis; OM2: Proximal 80% stenosis; RCA: 100% proximal stenosis, collateral from the left.     Permacath was placed in subclavian vein on 3/24  Underwent CABG on 3/2/2020    Increased work of breathing on 3/3. Re-intubated on 3/3. Extubated on 3/6. Respiratory arrest with PEA arrest on the morning of 3/7. Reintubated. CTA chest without evidence of PE, but does note effusions with airspace disease.    On heparin infusion and Correlate with blood gas. 2. Endotracheal tube, enteric tube, and right IJ approach dialysis catheter as detailed above. 3. Multifocal airspace disease which may represent multifocal pneumonia. Follow-up is recommended to document resolution. The findings were sent to the Radiology Results [address] at 9:53 am on 3/7/2020to be communicated to a licensed caregiver. Xr Chest Portable    Result Date: 3/7/2020  1. Stable right perihilar ill-defined consolidation suggesting pneumonia. 2. Improved lung aeration. 3. Interval decreased volume with now trace right apical pneumothorax. Xr Chest Portable    Result Date: 3/6/2020  1. Small right apical pneumothorax is now evident. 2. Removal of left chest tube. No pneumothorax on the left side. 3.  Similar appearance of right basilar atelectasis. 4.  Endotracheal tube remains in appropriate position. 5.  Removal of [place] catheter. Central lines otherwise remain unchanged. Xr Chest Portable    Result Date: 3/5/2020  1. Stable position of support lines and tubes. 2. No pneumothorax identified. 3. Persistent pulmonary vascular congestion and suspected worsened volume loss in the right lower lobe. Xr Chest Portable    Result Date: 3/4/2020  Cardiomegaly and pulmonary vascular congestion. Support tubes and lines as above, in grossly unchanged positioning. Right apical lucency favors overlying structures as lung markings appear to extend into the lung apex. Follow-up x-ray recommended. Xr Chest Portable    Result Date: 3/3/2020  No acute cardiopulmonary process. Support tubes as described above. Cta Chest W Contrast    Result Date: 3/8/2020  No convincing evidence of pulmonary embolism. Small bilateral pleural effusions and adjacent atelectasis/airspace disease. Pulmonary vascular congestion and interstitial edema. Physical Examination:        General appearance:  alert, intubated  Mental Status:   Follows commands   Lungs:  clear to

## 2020-03-10 NOTE — PROGRESS NOTES
Writer sharma served Dr. Tr Suazo for change in pt respiratory status. Dr. Tr Suazo returned with phone call at 200, abg and chest x-ray in 20 mins.

## 2020-03-10 NOTE — PROGRESS NOTES
Writer consulted respiratory CT about pt change in status, Dr. King Bass gave orders to call anesthesia and re-intubate pt.

## 2020-03-10 NOTE — PROGRESS NOTES
[facility]   Progress Note                    Date:   3/10/2020  Patient name:  Zheng Almonte  Date of admission:  2/19/2020 11:01 PM  MRN:   [id number]  YOB: 1950  PCP:    Harinder Nunez PA-C    Reason for Admission:  [facility] [N17.9]    Subjective:     Patient remains intubated. Patient became slightly hypotensive overnight and was given albumin infusion. She has been off pressors and blood pressure stable this morning. I/O last 3 completed shifts: In: 1669.2 [I.V.:356.2; NG/GT:943]  Out: 5079 [Urine:190]  No intake/output data recorded.       In: 1299.2 [I.V.:356.2; NG/GT:943]  Out: 190 [Urine:190]      Intake/Output Summary (Last 24 hours) at 3/10/2020 0844  Last data filed at 3/10/2020 0604  Gross per 24 hour   Intake 1669.2 ml   Output 3210 ml   Net -1540.8 ml       Medications:   Scheduled Meds:   darbepoetin sonya-polysorbate  40 mcg Intravenous Weekly    lidocaine 1 % injection  5 mL Intradermal Once    sodium chloride flush  10 mL Intravenous 2 times per day    famotidine (PEPCID) injection  20 mg Intravenous Daily    aspirin  81 mg Oral Daily    cefepime  1 g Intravenous Q24H    heparin (porcine)  4,000 Units Intravenous Once    insulin lispro  0-6 Units Subcutaneous TID WC    insulin lispro  0-3 Units Subcutaneous Nightly    chlorhexidine  15 mL Mouth/Throat BID    therapeutic multivitamin-minerals  1 tablet Oral Daily with breakfast    sennosides-docusate sodium  1 tablet Oral BID    clopidogrel  75 mg Oral Daily    allopurinol  300 mg Oral Daily    atorvastatin  40 mg Oral Nightly    folic acid  1 mg Oral Daily    [MAR Hold] insulin glargine  25 Units Subcutaneous Nightly    [MAR Hold] bumetanide  2 mg Intravenous Daily     Continuous Infusions:   dexmedetomidine (PRECEDEX) IV infusion Stopped (03/09/20 1900)    heparin (porcine) 14 Units/kg/hr (03/10/20 0537)    midazolam Stopped (03/09/20 1550)    CRRT dialysis builder 1,500 mL/hr (03/06/20 1052)    sodium chloride 100 mL/hr at 03/06/20 0321    dextrose      phenylephrine (CLAUDETTE-SYNEPHRINE) 50mg/250mL infusion Stopped (03/08/20 1130)    milrinone Stopped (03/04/20 0835)    norepinephrine Stopped (03/07/20 2332)    EPINEPHrine infusion Stopped (03/04/20 1515)    nitroGLYCERIN       CBC:   Recent Labs     03/08/20  0600 03/09/20  0421 03/10/20  0427   WBC 13.5* 11.8* 13.1*   HGB 8.1* 7.9* 7.9*    196 244     BMP:    Recent Labs     03/09/20  1056 03/09/20  1647 03/10/20  0427   * 135 133*   K 3.2* 3.7 3.5*   CL 93* 96* 94*   CO2 21 22 22   BUN 28* 26* 39*   CREATININE 2.65* 3.54* 4.76*   GLUCOSE 220* 194* 204*     Hepatic:   No results for input(s): AST, ALT, ALB, BILITOT, ALKPHOS in the last 72 hours. Troponin: No results for input(s): TROPONINI in the last 72 hours. No results for input(s): TROPONINT in the last 72 hours. BNP:   No results for input(s): PROBNP in the last 72 hours. No results for input(s): BNP in the last 72 hours. Lipids: No results for input(s): CHOL, HDL in the last 72 hours. Invalid input(s): LDLCALCU  INR:   Recent Labs     03/08/20  0600 03/09/20  0421 03/10/20  0427   INR 1.1 1.2 1.1       Objective:   Vitals: BP (!) 123/44   Pulse 81   Temp 97.9 °F (36.6 °C) (Axillary)   Resp 26   Ht 5' 3\" (1.6 m)   Wt 151 lb 7.3 oz (68.7 kg)   SpO2 98%   BMI 26.83 kg/m²    General appearance: Intubated and sedated  HEENT: Head: Normocephalic, atraumatic without any obvious abnormalities. Neck: JVD absent   Lungs:good bilateral equal air entry. No adventitious lung sounds auscultated. Heart: S1, S2, regular, + systolic murmur. Abdomen: soft, nontender with bowel sounds present in all 4 quadrants.   Extremities: + LE edema  Integumentum:Intact with no rashes noted.       Diagnostic Studies:     EKG:   Sinus rhythm with possible old septal infarct and inferolateral ST depressions     ECHO:  Limited echo: 3/7/2020  Left ventricle is normal in size. Left ventricular systolic function is mild to moderately reduced visually  estimated at 40%. .  Calculated EF via Morris's method is 38%. Anterior/septal wall hypokinetic compared to prior echo. Aortic valve appears calcified with restriction of motion. Mild/mod stenosis- maybe underestimated due to poor LVEF         3/4/2020 :  Global left ventricular systolic function is normal. Calculated ejection  fraction is 55 % by heart Model . Abnormal septal motion due to BBB or Paced rhythm  Normal right ventricular function. Pacemaker / ICD lead seen in right  ventricle. No pericardial effusion seen. 2/21/2020  Left ventricle is normal in size. Global left ventricular systolic function is moderately reduced. Calculated EF via heart model is 36%. Mild left ventricular hypertrophy. Grade II (moderate) left ventricular diastolic dysfunction. Left atrium is mildly dilated. The aortic valve is calcified with reduced cusp mobility. Moderate aortic valve stenosis with a mean gradient of 20 mmHg. Maybe underestimated due to poor LV function. Trivial aortic insufficiency. Thickened mitral valve leaflets. Mitral annular calcification is seen. At least Moderate mitral regurgitation. Mild tricuspid regurgitation. Moderate pulmonary hypertension with an estimated right ventricular systolic pressure of 56 mmHg  Mild pulmonic insufficiency. Small pericardial effusion. IVC Increased diameter and impaired or no inspiratory variation indicating elevated RA filling pressure (i.e. CVP) . Cath 02/24/2020  LMCA: Normal 0% stenosis.     LAD: Mid 99% in upper long branch (Reaching to apex) 80% in lower LAD branch.   D1: proximal 90% stenosis     LCx: Mid 80% stenosis  OM1: Ostial 80% stenosis  OM2: Proximal 80% stenosis     RCA: 100% proximal stenosis  Collateral from the left        Peripheral Arteries and Lesion Findings  Descending aorta: Severe iliac disease in both sides  80% in the left side and 60-70% in the right side     The LV gram was performed in the BAUGH 30 position. LVEF: 35%. LV Wall Motion: Severe basal anterior and basal inferior hypokinesis        · Hemodynamic Pressures                Site S/A (mmHg) D/V (mmHg) M/ED (mmHg)   Right Atrium 23 22 19   Right Ventricle 51 19 20   Pulmonary Artery 43 27 33   PCWP 26 26 25            · Oxygen Saturations     Site Oxygen Saturation (%)   Pulmonary Artery Main 54.5   Left Femoral Artery  89.5         · Cardiac Output     Calculation Method Cardiac Output (L/min) Cardiac Index (L/min/m2)   Christiano 4.25 2.4          Estimated Blood Loss: 10 mL     Conclusions:  1. Mildly elevated right heart pressures  2. Aortic valve gradient is not as bad as reported by echocardiogram: 9-11 mmHg  3. Severe multivessel CAD  4. Reduced LV systolic function with segmental wall motion abnormalities  5. Bilateral iliac disease         Assessment / Acute Cardiac Problems:   1. NSTEMI- s/p cath s/p CABG x3 (LIMA- LAD, SVG-D1, SVG2 - OM) on 3/2/2020  2. Bradycardia Post Op - requiring pacing  3. Schema cardiomyopathy EF 38-40%  4. PEA arrest - 03/07/2020 - rhythm noticed to be Afib - episode immediately after receiving ice chip - possible respiratory related. 5. Paroxysmal Afib - isolated episode postop  6. Acute CHF - volume overload in the setting of CKD  7. Preserved LVEF post CABG (36% prior to surgery)  8. Moderate AS - Mean gradient 20, CHEIKH (continuity : 0.87), no significant gradient noticed on cardiac cath   9. Moderate MR  10. B/L significant iliac disease on angiogram   11. CKD stage V-initiated on dialysis this admission  12. H/O CVA  15. DM-2  14. Hx smoking  15. Post OP anemia     Plan of Treatment:   1. Continue aspirin, Plavix,  and Lipitor  2. Hold amiodarone drip due to bradycardic episode and asystole initially post OP  3. Continue heparin for anticoagulation, will switch to NOAC after extubation  4.  Was on Coreg 37.5 mg bid, lisinopril 10 and

## 2020-03-10 NOTE — PROGRESS NOTES
Physical Therapy  DATE: 3/10/2020  NAME: Symone Smalls  MRN: [id number]   : 1950    Discharge Recommendations: Continue to Assess (pending progress)   Subjective: RN agreeable to ROM; Pt sleeping upon arrival, able to open eye at mention of name, good effort participating with AROM this date , limited by fatigue and decrease endurance. Pt weaning at this time. Pain: Pt nods no to pain. Patient follows: All  Commands  Is patient on ventilator: Yes  Is patient on sedation:Yes  Precautions: General,     Therapeutic exercises:  AROM to BUE and BLEs x-15 reps all planes. Shoulder shrugs x 10  Pt takes rest as needed when fatigue . Bilateral gastrocnemius stretching3 reps x 20 seconds  All vitals remained stable throughout. Goals  Short Term Goals  Short term goal 1: prevent loss of strength/mobility/independence while pt is in the hospital  Short term goal 2: pt to ambulate 100' independently without device  Short term goal 3: stair ambulation x 1 flight with 1 HR independently          Plan: Progress functional mobility as medically appropriate.    Time In: 856  Time Out: 920  Time Coded Minutes (treatment minutes): 24  Rehab Potential: Good  Treatments/week: 5x/wk    Debby Munroe PTA

## 2020-03-10 NOTE — CARE COORDINATION
Spoke with patient's daughter Kaela Fletcher over the phone. Plan is for patient to be extubated today. First choice is [facility], if patient is not a candidate for ARU, referral to [facility]. [place] [facility] [phone] [place] pre-[place]val.  Order for PM&R consult obtained from Dr. Graciela Alexandre    [phone] Discussed referral with Tamika Seaman at [facility], she will review.   I notified her that family first choice is [facility]

## 2020-03-10 NOTE — PROGRESS NOTES
Progress Note    Paramjit Soto  Date of Admission -  2020 11:01 PM  Date of Evaluation -  3/10/2020  Room and Bed Number -  1011/1011-   Hospital Day -     CHIEF COMPLAINT : respiratory failure    SUBJECTIVE:     OVERNIGHT EVENTS:         Afebrile, VSS except blood pressure elevated  Extubated, BiPAP  FiO2 40, PEEP 8, PS above PEEP 8  Patient did not have any bowel movement  Plan for swallow study tomorrow  Significant for potassium 3.5  pH 7.4, PO2 129.8, PCO2 42.4, bicarbonate 20  Dialysis done yesterday, 2.6 kg taken off, MICHELLE was given during dialysis    AWAKE & FOLLOWING COMMANDS:  [x] No   [] Yes    SECRETIONS Amount:  [x] Small [] Moderate  [] Large  [] None  Color:     [x] White [] Colored  [] Bloody    SEDATION:  RAAS Score:  [] Propofol gtt  [] Versed gtt  [] Ativan gtt   [] No Sedation    PARALYZED:  [] No    [] Yes    VASOPRESSORS:  [] No    [] Yes  [] Levophed [] Dopamine [] Vasopressin  [] Dobutamine [] Phenylephrine [] Epinephrine  Ros unable to perform due to intubation and sedation      OBJECTIVE:     VITAL SIGNS:  BP (!) 161/62   Pulse 100   Temp 97.5 °F (36.4 °C) (Axillary)   Resp 23   Ht 5' 3\" (1.6 m)   Wt 151 lb 7.3 oz (68.7 kg)   SpO2 94%   BMI 26.83 kg/m²   Tmax over 24 hours:  Temp (24hrs), Av.2 °F (36.8 °C), Min:97.5 °F (36.4 °C), Max:98.8 °F (37.1 °C)      Patient Vitals for the past 8 hrs:   BP Temp Temp src Pulse Resp SpO2   03/10/20 1522 -- -- -- -- 23 94 %   03/10/20 1500 -- -- -- -- 26 100 %   03/10/20 1215 -- -- -- -- 26 100 %   03/10/20 1214 -- -- -- -- 27 100 %   03/10/20 1212 -- -- -- -- 27 100 %   03/10/20 1137 -- -- -- -- 27 100 %   03/10/20 1127 -- -- -- -- 28 100 %   03/10/20 1100 (!) 161/62 97.5 °F (36.4 °C) Axillary 100 29 (!) 88 %   03/10/20 1055 -- -- -- -- 30 94 %   03/10/20 1000 (!) 134/56 -- -- 91 -- 95 %   03/10/20 0955 -- -- -- 91 -- 96 %   03/10/20 0900 (!) 129/57 -- -- 94 -- 95 %   03/10/20 0824 -- -- -- 81 -- 98 %   03/10/20 0823 -- -- -- 82 -- 97 %   03/10/20 0815 -- -- -- -- 26 98 %   03/10/20 0812 -- -- -- 82 26 97 %   03/10/20 0800 (!) 131/50 -- -- 81 -- 98 %         Intake/Output Summary (Last 24 hours) at 3/10/2020 1558  Last data filed at 3/10/2020 0800  Gross per 24 hour   Intake 1414.2 ml   Output 190 ml   Net 1224.2 ml     Date 03/10/20 0000 - 03/10/20 2359   Shift 2603-6715 1777-7527 8491-2000 24 Hour Total   INTAKE   I.V.(mL/kg) 98.6(1.4)   98.6(1.4)   NG/GT(mL/kg) 450(6.6) 115(1.7)  565(8.2)   Shift Total(mL/kg) 548. 6(8) 115(1.7)  663.6(9.7)   OUTPUT   Urine(mL/kg/hr) 50(0.1)   50   Shift Total(mL/kg) 50(0.7)   50(0.7)   Weight (kg) 68.7 68.7 68.7 68.7     Wt Readings from Last 3 Encounters:   03/10/20 151 lb 7.3 oz (68.7 kg)   11/21/19 170 lb (77.1 kg)   09/05/19 169 lb 3.2 oz (76.7 kg)     Body mass index is 26.83 kg/m².         PHYSICAL EXAM:  Extubated, on BiPAP  Lungs- clear bs ant   CVS S1-S2 regular  Abdomen soft nontender bowel sounds are present  Lower extremity edema  Neuro patient follows commands of sedation    MEDICATIONS:  Scheduled Meds:   darbepoetin sonya-polysorbate  40 mcg Intravenous Weekly    lidocaine 1 % injection  5 mL Intradermal Once    sodium chloride flush  10 mL Intravenous 2 times per day    famotidine (PEPCID) injection  20 mg Intravenous Daily    aspirin  81 mg Oral Daily    cefepime  1 g Intravenous Q24H    heparin (porcine)  4,000 Units Intravenous Once    insulin lispro  0-6 Units Subcutaneous TID WC    insulin lispro  0-3 Units Subcutaneous Nightly    chlorhexidine  15 mL Mouth/Throat BID    therapeutic multivitamin-minerals  1 tablet Oral Daily with breakfast    sennosides-docusate sodium  1 tablet Oral BID    clopidogrel  75 mg Oral Daily    allopurinol  300 mg Oral Daily    atorvastatin  40 mg Oral Nightly    folic acid  1 mg Oral Daily    [MAR Hold] insulin glargine  25 Units Subcutaneous Nightly     Continuous Infusions:   nitroGLYCERIN      heparin (porcine) 14 Units/kg/hr Persistent proteinuria 63/19/0139    Metabolic acidosis 63/32/5865    Anemia due to stage 4 chronic kidney disease treated with erythropoietin ([facility] 10/11/2018    Cerebral aneurysm     Type 2 diabetes mellitus with stage 4 chronic kidney disease, with long-term current use of insulin ([facility] 01/18/2017    Acute infarct posterior limb internal capsule on the left 12/20/2016    Left sided lacunar infarction ([facility]     Hypokalemia 12/19/2016    Essential hypertension 12/10/2015    Hypercholesteremia 12/10/2015    Type 2 diabetes mellitus with diabetic chronic kidney disease ([facility] 03/11/2015    Chronic kidney disease (CKD) 03/11/2015    Anemia in stage 4 chronic kidney disease ([facility] 10/17/2014    Hypovitaminosis D 12/11/2013    Gout 06/12/2013      ACUTE HYPOXIC RESPIRATORY FAILURE POST RE INTUBATION 3/7/20   Pea arrest   ACUTE PULMONARY EDEMA   NSTEMI - MVCA POST CABG X3 3/2/20   MODERATE AS AND MR   HERNÁN ON CKD   PAD   ? COPD  Acute systolic chf   Group 2 pulmonary hypertension        PLAN:     WEAN PER PROTOCOL:  [] No   [] Yes  [x] N/A    DISCONTINUE ANY LABS:   [x] No   [] Yes    ICU PROPHYLAXIS:  Stress ulcer:  [] PPI Agent  [x] D4Hhexi [] Sucralfate  [] Other:  VTE:   [] Enoxaparin  [] Unfract.  Heparin Subcut  [] EPC Cuffs    NUTRITION:  [] NPO [x] Tube Feeding  [] TPN  [] PO        INSULIN DRIP:   [x] No   [] Yes        FAMILY UPDATED:    [x] No   [] Yes    TRANSFER OUT OF ICU:   [x] No   [] Yes    ADDITIONAL PLAN:  CXR Consistent with patchy infiltrate along with pulmonary  vascular congestion  Cefepime for 5 days, today 4th  dose   ET secretions culture no growth  On heparin for A. fib  Extubated, on BiPAP  Hemodynamically stable  Continue aspirin, statin, Plavix  Plan For dialysis in the morning  Will follow    Luana Palm MD  3/10/2020 3:58 PM

## 2020-03-10 NOTE — PROGRESS NOTES
Dr. Yulia Nevarez requested to have anesthesia called to reintubate patient. Updated Marah Coreas w/ PAT and will update Dr. Anabel Iniguez.

## 2020-03-10 NOTE — PROGRESS NOTES
at bedside, patient's children and other fmialy member at bedside. Patient answered all orientation questions appropriately. Patient has verbalized that she does not want to be reintubated.

## 2020-03-10 NOTE — PROGRESS NOTES
[facility]  Occupational Therapy Not Seen Note    DATE: 3/10/2020  Name: Isaura Soto  : 1950  MRN: [id number]    Patient not available for Occupational Therapy due to:    [] Testing:    [] Hemodialysis    [] Blood Transfusion in Progress    []Refusal by Patient:    [] Surgery/Procedure:    [] Strict Bedrest    [] Sedation    [] Spine Precautions     [] Pt being transferred to palliative care at this time. Spoke with pt/family and OT services to be defered. [] Pt independent with functional mobility and functional tasks.  Pt with no OT acute care needs at this time, will defer OT eval.    [x] Other: Intubated, possible wean off vent     Next Scheduled Treatment: Ck in pm as able or 3/11    Lieutenant Arvind, OTR/L

## 2020-03-10 NOTE — CONSULTS
Intravenous, PRN  potassium chloride 20 mEq/50 mL IVPB (Central Line), 20 mEq, Intravenous, PRN  oxyCODONE-acetaminophen (PERCOCET) 5-325 MG per tablet 1 tablet, 1 tablet, Oral, Q4H PRN **OR** oxyCODONE-acetaminophen (PERCOCET) 5-325 MG per tablet 2 tablet, 2 tablet, Oral, Q4H PRN  fentaNYL (SUBLIMAZE) injection 25 mcg, 25 mcg, Intravenous, Q1H PRN **OR** fentaNYL (SUBLIMAZE) injection 50 mcg, 50 mcg, Intravenous, Q1H PRN  diphenhydrAMINE (BENADRYL) tablet 25 mg, 25 mg, Oral, Nightly PRN  chlorhexidine (PERIDEX) 0.12 % solution 15 mL, 15 mL, Mouth/Throat, BID  hydrALAZINE (APRESOLINE) injection 5 mg, 5 mg, Intravenous, Q5 Min PRN  therapeutic multivitamin-minerals 1 tablet, 1 tablet, Oral, Daily with breakfast  albumin human 5 % IV solution 25 g, 25 g, Intravenous, PRN  glucose (GLUTOSE) 40 % oral gel 15 g, 15 g, Oral, PRN  dextrose 50 % IV solution, 12.5 g, Intravenous, PRN  glucagon (rDNA) injection 1 mg, 1 mg, Intramuscular, PRN  dextrose 5 % solution, 100 mL/hr, Intravenous, PRN  sennosides-docusate sodium (SENOKOT-S) 8.6-50 MG tablet 1 tablet, 1 tablet, Oral, BID  bisacodyl (DULCOLAX) suppository 10 mg, 10 mg, Rectal, Daily PRN  bisacodyl (DULCOLAX) EC tablet 5 mg, 5 mg, Oral, Daily PRN  phenylephrine (CLAUDETTE-SYNEPHRINE) 50 mg in dextrose 5 % 250 mL infusion, 50 mcg/min, Intravenous, Continuous PRN  milrinone (PRIMACOR) 20 mg in dextrose 5 % 100 mL infusion, 0.375 mcg/kg/min, Intravenous, Continuous PRN  norepinephrine (LEVOPHED) 16 mg in dextrose 5% 250 mL infusion, 0.01 mcg/kg/min, Intravenous, Continuous PRN  EPINEPHrine (EPINEPHrine HCL) 5 mg in dextrose 5 % 250 mL infusion, 0.01 mcg/kg/min, Intravenous, Continuous PRN  nitroGLYCERIN 50 mg in dextrose 5% 250 mL infusion, 10 mcg/min, Intravenous, Continuous PRN  clopidogrel (PLAVIX) tablet 75 mg, 75 mg, Oral, Daily  allopurinol (ZYLOPRIM) tablet 300 mg, 300 mg, Oral, Daily  atorvastatin (LIPITOR) tablet 40 mg, 40 mg, Oral, Nightly  folic acid (FOLVITE) tablet 1 mg, 1 mg, Oral, Daily  acetaminophen (TYLENOL) tablet 650 mg, 650 mg, Oral, Q4H PRN **OR** acetaminophen (TYLENOL) suppository 650 mg, 650 mg, Rectal, Q4H PRN  ondansetron (ZOFRAN) injection 4 mg, 4 mg, Intravenous, Q6H PRN  [MAR Hold] heparin (porcine) injection 1,900 Units, 1,900 Units, Intracatheter, PRN  [MAR Hold] heparin (porcine) injection 1,900 Units, 1,900 Units, Intracatheter, PRN  [MAR Hold] LORazepam (ATIVAN) tablet 0.5 mg, 0.5 mg, Oral, Q6H PRN  [MAR Hold] heparin (porcine) injection 1,200 Units, 1,200 Units, Intracatheter, PRN  [MAR Hold] heparin (porcine) injection 1,300 Units, 1,300 Units, Intracatheter, PRN  [MAR Hold] insulin glargine (LANTUS) injection vial 25 Units, 25 Units, Subcutaneous, Nightly  [MAR Hold] nicotine (NICODERM CQ) 21 MG/24HR 1 patch, 1 patch, Transdermal, Daily PRN    Social History:  Lives with:   Alone  Home setup:   Floors in home:  #2. Bed/bathroom on floor  2. Steps into home 6 .    Device:   None  Social History     Socioeconomic History    Marital status: Legally      Spouse name: None    Number of children: None    Years of education: None    Highest education level: None   Occupational History    None   Social Needs    Financial resource strain: None    Food insecurity     Worry: None     Inability: None    Transportation needs     Medical: None     Non-medical: None   Tobacco Use    Smoking status: Current Every Day Smoker     Packs/day: 1.00     Years: 40.00     Pack years: 40.00     Types: Cigarettes     Start date: 3/15/1973     Last attempt to quit: 2014     Years since quittin.5    Smokeless tobacco: Current User    Tobacco comment: 5-10 cigs daily as of 21   Substance and Sexual Activity    Alcohol use: No    Drug use: No    Sexual activity: None   Lifestyle    Physical activity     Days per week: None     Minutes per session: None    Stress: None   Relationships    Social connections     Talks on phone: None     Gets

## 2020-03-10 NOTE — PROGRESS NOTES
NEPHROLOGY PROGRESS NOTE      SUBJECTIVE     Had hemodialysis yesterday. Tolerated the procedure well. 2.6 kg taken off. Erythropoietin stimulating agent was also instituted during dialysis. According to nursing staff overnight her blood pressure dropped and received albumin. Blood pressure much stable now. Plans for extubation today noted. OBJECTIVE     Vitals:    03/10/20 0812 03/10/20 0815 03/10/20 0823 03/10/20 0824   BP:       Pulse: 82  82 81   Resp: 26 26     Temp:       TempSrc:       SpO2: 97% 98% 97% 98%   Weight:       Height:         24HR INTAKE/OUTPUT:      Intake/Output Summary (Last 24 hours) at 3/10/2020 0956  Last data filed at 3/10/2020 0604  Gross per 24 hour   Intake 1669.2 ml   Output 3210 ml   Net -1540.8 ml       General appearance:MV  Respiratory::vesicular breath sounds,no wheeze/crackles  Cardiovascular:S1 S2 normal,no gallop or organic murmur. Abdomen:Non tender/non distended. Bowel sounds present  Extremities: No Cyanosis or Clubbing,Lower extremity edema  Neurological:MV      MEDICATIONS     Scheduled Meds:    potassium chloride  20 mEq Oral Once    darbepoetin sonya-polysorbate  40 mcg Intravenous Weekly    lidocaine 1 % injection  5 mL Intradermal Once    sodium chloride flush  10 mL Intravenous 2 times per day    famotidine (PEPCID) injection  20 mg Intravenous Daily    aspirin  81 mg Oral Daily    cefepime  1 g Intravenous Q24H    heparin (porcine)  4,000 Units Intravenous Once    insulin lispro  0-6 Units Subcutaneous TID WC    insulin lispro  0-3 Units Subcutaneous Nightly    chlorhexidine  15 mL Mouth/Throat BID    therapeutic multivitamin-minerals  1 tablet Oral Daily with breakfast    sennosides-docusate sodium  1 tablet Oral BID    clopidogrel  75 mg Oral Daily    allopurinol  300 mg Oral Daily    atorvastatin  40 mg Oral Nightly    folic acid  1 mg Oral Daily    [MAR Hold] insulin glargine  25 Units Subcutaneous Nightly     Continuous Infusions:   

## 2020-03-10 NOTE — PROGRESS NOTES
Attempted to call patient's daughter, Kameron Walters, regarding consent for reintubation. Voicemail left. Will attempt again in a few minutes.

## 2020-03-10 NOTE — PLAN OF CARE
Problem: Gas Exchange - Impaired:  Goal: Levels of oxygenation will improve  Description: Levels of oxygenation will improve  Outcome: Ongoing  Goal: Ability to maintain adequate ventilation will improve  Description: Ability to maintain adequate ventilation will improve  Outcome: Ongoing     Problem: OXYGENATION/RESPIRATORY FUNCTION  Goal: Patient will maintain patent airway  3/10/2020 0821 by Justa Jeffery RCP  Outcome: Ongoing  3/9/2020 2001 by Diane Leroy RCP  Outcome: Ongoing  Goal: Patient will achieve/maintain normal respiratory rate/effort  Description: Respiratory rate and effort will be within normal limits for the patient  3/10/2020 4433 by Justa Jeffery RCP  Outcome: Ongoing  3/9/2020 2001 by Diane Leroy RCP  Outcome: Ongoing     Problem: MECHANICAL VENTILATION  Goal: Patient will maintain patent airway  3/10/2020 0821 by Justa Jeffery RCP  Outcome: Ongoing  3/9/2020 2001 by Diane Leroy RCP  Outcome: Ongoing  Goal: Oral health is maintained or improved  3/10/2020 0821 by Justa Jeffery RCP  Outcome: Ongoing  3/9/2020 2001 by Diane Leroy RCP  Outcome: Ongoing  Goal: ET tube will be managed safely  3/10/2020 0821 by Justa Jeffery RCP  Outcome: Ongoing  3/9/2020 2001 by Diane Leroy RCP  Outcome: Ongoing  Goal: Ability to express needs and understand communication  3/10/2020 0821 by Justa Jeffery RCP  Outcome: Ongoing  3/9/2020 2001 by Diane Leroy RCP  Outcome: Ongoing  Goal: Mobility/activity is maintained at optimum level for patient  3/10/2020 6196 by Justa Jeffery RCP  Outcome: Ongoing  3/9/2020 2001 by Diane Leroy RCP  Outcome: Ongoing Expiration Date (Month Year): 11/2021

## 2020-03-10 NOTE — PROGRESS NOTES
[facility]  Daily Progress Note    Surgeon:  Dr. Vanessa Vieyra      Subjective:  Ms. Nahomi Valdivia is a 78y.o. year-old female status post CABG x 3 on 3/2/20 POD# 8. Patient was seen and examined at bedside this morning. Currently intubated. Off sedation. Following commands appropriately.      Vital Signs: BP (!) 123/44   Pulse 81   Temp 97.9 °F (36.6 °C) (Axillary)   Resp 26   Ht 5' 3\" (1.6 m)   Wt 151 lb 7.3 oz (68.7 kg)   SpO2 98%   BMI 26.83 kg/m²  O2 Flow Rate (L/min): 2 L/min   Admit Weight: Weight: 170 lb (77.1 kg)   WEIGHTWeight: 151 lb 7.3 oz (68.7 kg)     I/O's:  I/O last 3 completed shifts:   In: 1669.2 [I.V.:356.2; NG/GT:943]  Out: 1379 [Urine:190]    Data:    CBC:   Recent Labs     03/08/20  0600 03/09/20  0421 03/10/20  0427   WBC 13.5* 11.8* 13.1*   HGB 8.1* 7.9* 7.9*   HCT 24.6* 24.9* 24.3*   MCV 94.6 96.9 92.7    196 244     BMP:   Recent Labs     03/09/20  1056 03/09/20  1647 03/10/20  0427   * 135 133*   K 3.2* 3.7 3.5*   CL 93* 96* 94*   CO2 21 22 22   BUN 28* 26* 39*   CREATININE 2.65* 3.54* 4.76*     PT/INR:   Recent Labs     03/08/20  0600 03/09/20  0421 03/10/20  0427   PROTIME 11.3 12.1* 11.4   INR 1.1 1.2 1.1     APTT:   Recent Labs     03/08/20  2249 03/09/20  0421 03/10/20  0427   APTT 60.1* 52.2* 43.3*       Chest X-Ray: Pending      Scheduled Meds:    darbepoetin sonya-polysorbate  40 mcg Intravenous Weekly    lidocaine 1 % injection  5 mL Intradermal Once    sodium chloride flush  10 mL Intravenous 2 times per day    famotidine (PEPCID) injection  20 mg Intravenous Daily    aspirin  81 mg Oral Daily    cefepime  1 g Intravenous Q24H    heparin (porcine)  4,000 Units Intravenous Once    insulin lispro  0-6 Units Subcutaneous TID WC    insulin lispro  0-3 Units Subcutaneous Nightly    chlorhexidine  15 mL Mouth/Throat BID    therapeutic multivitamin-minerals  1 tablet Oral Daily with breakfast    sennosides-docusate sodium  1 tablet Oral BID    clopidogrel  75 mg Oral Daily    allopurinol  300 mg Oral Daily    atorvastatin  40 mg Oral Nightly    folic acid  1 mg Oral Daily    [MAR Hold] insulin glargine  25 Units Subcutaneous Nightly    [MAR Hold] bumetanide  2 mg Intravenous Daily     Continuous Infusions:    dexmedetomidine (PRECEDEX) IV infusion Stopped (03/09/20 1900)    heparin (porcine) 14 Units/kg/hr (03/10/20 0537)    midazolam Stopped (03/09/20 1550)    CRRT dialysis builder 1,500 mL/hr (03/06/20 1052)    sodium chloride 100 mL/hr at 03/06/20 0321    dextrose      phenylephrine (CLAUDETTE-SYNEPHRINE) 50mg/250mL infusion Stopped (03/08/20 1130)    milrinone Stopped (03/04/20 0835)    norepinephrine Stopped (03/07/20 2332)    EPINEPHrine infusion Stopped (03/04/20 1515)    nitroGLYCERIN         Physical Exam:      General: Intubated. Off sedation. Follows commands appropriately   Heart: Normal S1, S2, RRR, + systolic murmur   Sternum: CSD in place. CDI  Lungs: Diminished BS bilaterally at the bases. Coarse BS noted bilaterally   Abdomen: soft, non tender, non distended, BSx4  Extremities: No edema noted bilateral LE. EVH sites: CDI. SCD's in place            Assessment & Plan:      Ms. Ace is a 78 y. o. year-old female status post CABG x 3 on 3/2/20 POD# 8. Newly diagnosed CKD Stage V - HD initiated pre-op on admission. Patient was V-Paced post-operatively until 3/5/20 when her intrinsic rhythm resumed. Post-op course complicated by hypoxemia and severe agitation s/p extubation on 3/3/20 which required re-intubation within 30 minutes post extubation. On 3/5/20 in the middle of the night patient had a brief episode of Afib/RVR and converted spontaneously to NSR. On 3/6/20 patient had another episode of Afib/RVR then went bradycardic briefly which required V-pacing. Amio bolus and gtt were started as per protocol. On 3/7/20 around 4:30am patients post-op course was further complicated by a Code blue.  I was told that patient was given CXR and ABG  2. Cont current care as per Pulmonary, Nephrology and Cardiology   3. Cont SCD's bilateral LE  4. GI Prophylaxis  5. Continue clement for strict I&O's  6. Pain management PRN  7. Pulmonary following. Plan is to extubate today. Cont Cefepime(Day 4 of 5) for purulent sputum. Sputum cx on 3/7 has been negative to date   8. Nephrology following. Will follow M-W-F HD schedule   9. Cont backup V-pacer at 61  10. TF's held for extubation today. Will resume post extubation when appropriate  11. Cont Heparin gtt for Parox Afib and transition to Eliquis as per Cardiology. Monitor PTT as per protocol   12. Cont to hold Coreg and Amiodarone  13. Speech and Swallow evaluation today for a bedside swallow study given the patients intubation history  14.  F/u left subclavian TLC culture tip sent on 3/9 - NGTD         The above recommendations including medications and orders were discussed and agreed upon with Dr. Syed, the attending on service for the cardiothoracic surgery group today.         Oleksandr Grande MBA, PA-C

## 2020-03-11 ENCOUNTER — APPOINTMENT (OUTPATIENT)
Dept: GENERAL RADIOLOGY | Age: 70
DRG: 233 | End: 2020-03-11
Payer: MEDICARE

## 2020-03-11 LAB
ANION GAP SERPL CALCULATED.3IONS-SCNC: 20 MMOL/L (ref 9–17)
BUN BLDV-MCNC: 56 MG/DL (ref 8–23)
BUN/CREAT BLD: ABNORMAL (ref 9–20)
CALCIUM SERPL-MCNC: 9.7 MG/DL (ref 8.6–10.4)
CHLORIDE BLD-SCNC: 90 MMOL/L (ref 98–107)
CO2: 23 MMOL/L (ref 20–31)
CREAT SERPL-MCNC: 6.12 MG/DL (ref 0.5–0.9)
GFR AFRICAN AMERICAN: 8 ML/MIN
GFR NON-AFRICAN AMERICAN: 7 ML/MIN
GFR SERPL CREATININE-BSD FRML MDRD: ABNORMAL ML/MIN/{1.73_M2}
GFR SERPL CREATININE-BSD FRML MDRD: ABNORMAL ML/MIN/{1.73_M2}
GLUCOSE BLD-MCNC: 188 MG/DL (ref 65–105)
GLUCOSE BLD-MCNC: 190 MG/DL (ref 65–105)
GLUCOSE BLD-MCNC: 195 MG/DL (ref 70–99)
HCT VFR BLD CALC: 24.3 % (ref 36.3–47.1)
HEMOGLOBIN: 8 G/DL (ref 11.9–15.1)
INR BLD: 1
MAGNESIUM: 2.4 MG/DL (ref 1.6–2.6)
MCH RBC QN AUTO: 30.4 PG (ref 25.2–33.5)
MCHC RBC AUTO-ENTMCNC: 32.9 G/DL (ref 28.4–34.8)
MCV RBC AUTO: 92.4 FL (ref 82.6–102.9)
NRBC AUTOMATED: 0.1 PER 100 WBC
PARTIAL THROMBOPLASTIN TIME: 53.2 SEC (ref 20.5–30.5)
PDW BLD-RTO: 14.2 % (ref 11.8–14.4)
PLATELET # BLD: 290 K/UL (ref 138–453)
PMV BLD AUTO: 11 FL (ref 8.1–13.5)
POTASSIUM SERPL-SCNC: 3.6 MMOL/L (ref 3.7–5.3)
PROTHROMBIN TIME: 11 SEC (ref 9–12)
RBC # BLD: 2.63 M/UL (ref 3.95–5.11)
SODIUM BLD-SCNC: 133 MMOL/L (ref 135–144)
WBC # BLD: 13.9 K/UL (ref 3.5–11.3)

## 2020-03-11 PROCEDURE — 85610 PROTHROMBIN TIME: CPT

## 2020-03-11 PROCEDURE — 6370000000 HC RX 637 (ALT 250 FOR IP): Performed by: NURSE PRACTITIONER

## 2020-03-11 PROCEDURE — 99223 1ST HOSP IP/OBS HIGH 75: CPT | Performed by: FAMILY MEDICINE

## 2020-03-11 PROCEDURE — 94761 N-INVAS EAR/PLS OXIMETRY MLT: CPT

## 2020-03-11 PROCEDURE — 2580000003 HC RX 258: Performed by: INTERNAL MEDICINE

## 2020-03-11 PROCEDURE — 80048 BASIC METABOLIC PNL TOTAL CA: CPT

## 2020-03-11 PROCEDURE — 6360000002 HC RX W HCPCS: Performed by: INTERNAL MEDICINE

## 2020-03-11 PROCEDURE — 2500000003 HC RX 250 WO HCPCS: Performed by: INTERNAL MEDICINE

## 2020-03-11 PROCEDURE — 2500000003 HC RX 250 WO HCPCS: Performed by: STUDENT IN AN ORGANIZED HEALTH CARE EDUCATION/TRAINING PROGRAM

## 2020-03-11 PROCEDURE — 2500000003 HC RX 250 WO HCPCS: Performed by: PHYSICIAN ASSISTANT

## 2020-03-11 PROCEDURE — 99232 SBSQ HOSP IP/OBS MODERATE 35: CPT | Performed by: INTERNAL MEDICINE

## 2020-03-11 PROCEDURE — 85027 COMPLETE CBC AUTOMATED: CPT

## 2020-03-11 PROCEDURE — 82947 ASSAY GLUCOSE BLOOD QUANT: CPT

## 2020-03-11 PROCEDURE — 99233 SBSQ HOSP IP/OBS HIGH 50: CPT | Performed by: INTERNAL MEDICINE

## 2020-03-11 PROCEDURE — 2140000001 HC CVICU INTERMEDIATE R&B

## 2020-03-11 PROCEDURE — 2500000003 HC RX 250 WO HCPCS

## 2020-03-11 PROCEDURE — 71045 X-RAY EXAM CHEST 1 VIEW: CPT

## 2020-03-11 PROCEDURE — 2700000000 HC OXYGEN THERAPY PER DAY

## 2020-03-11 PROCEDURE — 83735 ASSAY OF MAGNESIUM: CPT

## 2020-03-11 PROCEDURE — 85730 THROMBOPLASTIN TIME PARTIAL: CPT

## 2020-03-11 PROCEDURE — 6360000002 HC RX W HCPCS: Performed by: NURSE PRACTITIONER

## 2020-03-11 RX ORDER — GLYCOPYRROLATE 0.2 MG/ML
0.2 INJECTION INTRAMUSCULAR; INTRAVENOUS EVERY 4 HOURS PRN
Status: DISCONTINUED | OUTPATIENT
Start: 2020-03-11 | End: 2020-03-12 | Stop reason: HOSPADM

## 2020-03-11 RX ORDER — GLYCOPYRROLATE 0.2 MG/ML
INJECTION INTRAMUSCULAR; INTRAVENOUS
Status: COMPLETED
Start: 2020-03-11 | End: 2020-03-11

## 2020-03-11 RX ORDER — LORAZEPAM 2 MG/ML
0.5 INJECTION INTRAMUSCULAR EVERY 4 HOURS PRN
Status: DISCONTINUED | OUTPATIENT
Start: 2020-03-11 | End: 2020-03-12 | Stop reason: HOSPADM

## 2020-03-11 RX ORDER — LABETALOL 20 MG/4 ML (5 MG/ML) INTRAVENOUS SYRINGE
10 ONCE
Status: COMPLETED | OUTPATIENT
Start: 2020-03-11 | End: 2020-03-11

## 2020-03-11 RX ORDER — LORAZEPAM 2 MG/ML
1 INJECTION INTRAMUSCULAR EVERY 4 HOURS PRN
Status: DISCONTINUED | OUTPATIENT
Start: 2020-03-11 | End: 2020-03-12 | Stop reason: HOSPADM

## 2020-03-11 RX ORDER — FENTANYL CITRATE 50 UG/ML
50 INJECTION, SOLUTION INTRAMUSCULAR; INTRAVENOUS
Status: DISCONTINUED | OUTPATIENT
Start: 2020-03-11 | End: 2020-03-12 | Stop reason: HOSPADM

## 2020-03-11 RX ORDER — GLYCOPYRROLATE 1 MG/5 ML
0.2 SYRINGE (ML) INTRAVENOUS EVERY 4 HOURS PRN
Status: DISCONTINUED | OUTPATIENT
Start: 2020-03-11 | End: 2020-03-11

## 2020-03-11 RX ORDER — FENTANYL CITRATE 50 UG/ML
100 INJECTION, SOLUTION INTRAMUSCULAR; INTRAVENOUS
Status: DISCONTINUED | OUTPATIENT
Start: 2020-03-11 | End: 2020-03-12 | Stop reason: HOSPADM

## 2020-03-11 RX ORDER — FENTANYL CITRATE 50 UG/ML
25 INJECTION, SOLUTION INTRAMUSCULAR; INTRAVENOUS
Status: DISCONTINUED | OUTPATIENT
Start: 2020-03-11 | End: 2020-03-12 | Stop reason: HOSPADM

## 2020-03-11 RX ADMIN — FAMOTIDINE 20 MG: 10 INJECTION INTRAVENOUS at 12:13

## 2020-03-11 RX ADMIN — Medication 10 ML: at 22:00

## 2020-03-11 RX ADMIN — NITROGLYCERIN 80 MCG/MIN: 20 INJECTION INTRAVENOUS at 02:05

## 2020-03-11 RX ADMIN — ONDANSETRON 4 MG: 2 INJECTION INTRAMUSCULAR; INTRAVENOUS at 01:58

## 2020-03-11 RX ADMIN — GLYCOPYRROLATE 0.2 MG: 0.2 INJECTION INTRAMUSCULAR; INTRAVENOUS at 21:59

## 2020-03-11 RX ADMIN — GLYCOPYRROLATE 0.4 MG: 0.2 INJECTION INTRAMUSCULAR; INTRAVENOUS at 16:53

## 2020-03-11 RX ADMIN — HEPARIN SODIUM AND DEXTROSE 14 UNITS/KG/HR: 10000; 5 INJECTION INTRAVENOUS at 05:09

## 2020-03-11 RX ADMIN — Medication 15 ML: at 12:12

## 2020-03-11 RX ADMIN — LABETALOL 20 MG/4 ML (5 MG/ML) INTRAVENOUS SYRINGE 10 MG: at 08:56

## 2020-03-11 ASSESSMENT — PAIN SCALES - GENERAL
PAINLEVEL_OUTOF10: 0

## 2020-03-11 NOTE — PROGRESS NOTES
acetaminophen, ondansetron, [MAR Hold] heparin (porcine), [MAR Hold] heparin (porcine), [MAR Hold] LORazepam, [MAR Hold] heparin (porcine), [MAR Hold] heparin (porcine), [MAR Hold] nicotine    Data:     Past Medical History:   has a past medical history of Acute CHF (congestive heart failure) ([facility] Anemia in chronic kidney disease, Anemia in stage 4 chronic kidney disease ([facility] Cerebral artery occlusion with cerebral infarction ([facility] Chronic kidney disease, Gout, arthritis, Hyperlipidemia, Hypertension, Left sided lacunar infarction ([facility] Obesity (BMI 30-39.9), [facility]ipheral vascular [facility] and Type II or unspecified type diabetes mellitus without mention of complication, not stated as uncontrolled. Social History:   reports that she has been smoking cigarettes. She started smoking about 47 years ago. She has a 40.00 pack-year smoking history. She uses smokeless tobacco. She reports that she does not drink alcohol or use drugs. Family History:   Family History   Problem Relation Age of Onset    Diabetes Mother     Heart Disease Father        Vitals:  BP (!) 153/58   Pulse 87   Temp 97.9 °F (36.6 °C) (Axillary)   Resp 24   Ht 5' 3\" (1.6 m)   Wt 152 lb 5.4 oz (69.1 kg)   SpO2 96%   BMI 26.99 kg/m²   Temp (24hrs), Av.2 °F (36.8 °C), Min:97.5 °F (36.4 °C), Max:98.8 °F (37.1 °C)    Recent Labs     03/10/20  1702 03/10/20  1950/20  2139 20  0701   POCGLU 199* 208* 195* 190*       I/O (24Hr):     Intake/Output Summary (Last 24 hours) at 3/11/2020 0947  Last data filed at 3/11/2020 0205  Gross per 24 hour   Intake --   Output 75 ml   Net -75 ml       Labs:  Hematology:  Recent Labs     20  0421 03/10/20  0427 20  0417   WBC 11.8* 13.1* 13.9*   RBC 2.57* 2.62* 2.63*   HGB 7.9* 7.9* 8.0*   HCT 24.9* 24.3* 24.3*   MCV 96.9 92.7 92.4   MCH 30.7 30.2 30.4   MCHC 31.7 32.5 32.9   RDW 14.5* 14.3 14.2    244 290   MPV 11.5 11.5 11.0   INR 1.2 1.1 1.0     Chemistry:  Recent dialysis catheter as detailed above. 3. Multifocal airspace disease which may represent multifocal pneumonia. Follow-up is recommended to document resolution. The findings were sent to the Radiology Results [address] at 9:53 am on 3/7/2020to be communicated to a licensed caregiver. Xr Chest Portable    Result Date: 3/7/2020  1. Stable right perihilar ill-defined consolidation suggesting pneumonia. 2. Improved lung aeration. 3. Interval decreased volume with now trace right apical pneumothorax. Xr Chest Portable    Result Date: 3/6/2020  1. Small right apical pneumothorax is now evident. 2. Removal of left chest tube. No pneumothorax on the left side. 3.  Similar appearance of right basilar atelectasis. 4.  Endotracheal tube remains in appropriate position. 5.  Removal of [place] catheter. Central lines otherwise remain unchanged. Xr Chest Portable    Result Date: 3/5/2020  1. Stable position of support lines and tubes. 2. No pneumothorax identified. 3. Persistent pulmonary vascular congestion and suspected worsened volume loss in the right lower lobe. Xr Chest Portable    Result Date: 3/4/2020  Cardiomegaly and pulmonary vascular congestion. Support tubes and lines as above, in grossly unchanged positioning. Right apical lucency favors overlying structures as lung markings appear to extend into the lung apex. Follow-up x-ray recommended. Xr Chest Portable    Result Date: 3/3/2020  No acute cardiopulmonary process. Support tubes as described above. Cta Chest W Contrast    Result Date: 3/8/2020  No convincing evidence of pulmonary embolism. Small bilateral pleural effusions and adjacent atelectasis/airspace disease. Pulmonary vascular congestion and interstitial edema.        Physical Examination:        General appearance:  alert, no distress  Mental Status:  Responds appropriately, minimally verbal   Lungs:  clear to auscultation bilaterally, coarse breath sounds   Heart: cough/mentation       Radha Carlos MD  3/11/2020  9:47 AM

## 2020-03-11 NOTE — PROGRESS NOTES
Spoke with Dr. Casey Obregon regarding pt refusing dialysis. He will come to bedside to talk with pt.

## 2020-03-11 NOTE — PROGRESS NOTES
[facility]   Progress Note                    Date:   3/11/2020  Patient name:  Gab Flores  Date of admission:  2/19/2020 11:01 PM  MRN:   [id number]  YOB: 1950  PCP:    Yulissa Eden PA-C    Reason for Admission:  HERNÁN (acute kidney injury) ([facility] [address] [N17.9]    Subjective:     Patient was extubated yesterday and was on BiPAP for a few hours. However, patient continued to have accessory muscle use and difficulty breathing although her oxygen saturation was normal.  Decision for intubation was made but patient refused intubation or BiPAP and changed her CODE STATUS to DNR CCA. Patient later refused dialysis and NG tube. Palliative care was consulted and upon palliative care's discussion with the patient, decision was made to change the patient's CODE STATUS to comfort care. I/O last 3 completed shifts: In: 115 [NG/GT:115]  Out: 75 [Urine:75]  No intake/output data recorded.       In: -   Out: 75 [Urine:75]      Intake/Output Summary (Last 24 hours) at 3/11/2020 0812  Last data filed at 3/11/2020 0205  Gross per 24 hour   Intake --   Output 75 ml   Net -75 ml       Medications:   Scheduled Meds:   labetalol  10 mg Intravenous Once    darbepoetin sonya-polysorbate  40 mcg Intravenous Weekly    lidocaine 1 % injection  5 mL Intradermal Once    sodium chloride flush  10 mL Intravenous 2 times per day    famotidine (PEPCID) injection  20 mg Intravenous Daily    aspirin  81 mg Oral Daily    cefepime  1 g Intravenous Q24H    heparin (porcine)  4,000 Units Intravenous Once    insulin lispro  0-6 Units Subcutaneous TID WC    insulin lispro  0-3 Units Subcutaneous Nightly    chlorhexidine  15 mL Mouth/Throat BID    therapeutic multivitamin-minerals  1 tablet Oral Daily with breakfast    sennosides-docusate sodium  1 tablet Oral BID    clopidogrel  75 mg Oral Daily    allopurinol  300 mg Oral Daily    atorvastatin  40 mg Oral Nightly    folic acid  1 mg Oral Daily    edema  Integumentum:Intact with no rashes noted.       Diagnostic Studies:     EKG:   Sinus rhythm with possible old septal infarct and inferolateral ST depressions     ECHO:  Limited echo: 3/7/2020  Left ventricle is normal in size. Left ventricular systolic function is mild to moderately reduced visually  estimated at 40%. .  Calculated EF via Morris's method is 38%. Anterior/septal wall hypokinetic compared to prior echo. Aortic valve appears calcified with restriction of motion. Mild/mod stenosis- maybe underestimated due to poor LVEF         3/4/2020 :  Global left ventricular systolic function is normal. Calculated ejection  fraction is 55 % by heart Model . Abnormal septal motion due to BBB or Paced rhythm  Normal right ventricular function. Pacemaker / ICD lead seen in right  ventricle. No pericardial effusion seen. 2/21/2020  Left ventricle is normal in size. Global left ventricular systolic function is moderately reduced. Calculated EF via heart model is 36%. Mild left ventricular hypertrophy. Grade II (moderate) left ventricular diastolic dysfunction. Left atrium is mildly dilated. The aortic valve is calcified with reduced cusp mobility. Moderate aortic valve stenosis with a mean gradient of 20 mmHg. Maybe underestimated due to poor LV function. Trivial aortic insufficiency. Thickened mitral valve leaflets. Mitral annular calcification is seen. At least Moderate mitral regurgitation. Mild tricuspid regurgitation. Moderate pulmonary hypertension with an estimated right ventricular systolic pressure of 56 mmHg  Mild pulmonic insufficiency. Small pericardial effusion. IVC Increased diameter and impaired or no inspiratory variation indicating elevated RA filling pressure (i.e. CVP) . Cath 02/24/2020  LMCA: Normal 0% stenosis.     LAD: Mid 99% in upper long branch (Reaching to apex) 80% in lower LAD branch.   D1: proximal 90% stenosis     LCx: Mid 80% stenosis  OM1: Ostial 80% stenosis  OM2: Proximal 80% stenosis     RCA: 100% proximal stenosis  Collateral from the left        Peripheral Arteries and Lesion Findings  Descending aorta: Severe iliac disease in both sides  80% in the left side and 60-70% in the right side     The LV gram was performed in the BAUGH 30 position. LVEF: 35%. LV Wall Motion: Severe basal anterior and basal inferior hypokinesis        · Hemodynamic Pressures                Site S/A (mmHg) D/V (mmHg) M/ED (mmHg)   Right Atrium 23 22 19   Right Ventricle 51 19 20   Pulmonary Artery 43 27 33   PCWP 26 26 25            · Oxygen Saturations     Site Oxygen Saturation (%)   Pulmonary Artery Main 54.5   Left Femoral Artery  89.5         · Cardiac Output     Calculation Method Cardiac Output (L/min) Cardiac Index (L/min/m2)   Christiano 4.25 2.4          Estimated Blood Loss: 10 mL     Conclusions:  1. Mildly elevated right heart pressures  2. Aortic valve gradient is not as bad as reported by echocardiogram: 9-11 mmHg  3. Severe multivessel CAD  4. Reduced LV systolic function with segmental wall motion abnormalities  5. Bilateral iliac disease         Assessment / Acute Cardiac Problems:   1. NSTEMI- s/p cath s/p CABG x3 (LIMA- LAD, SVG-D1, SVG2 - OM) on 3/2/2020  2. Bradycardia Post Op - requiring pacing  3. Schema cardiomyopathy EF 38-40%  4. PEA arrest - 03/07/2020 - rhythm noticed to be Afib - episode immediately after receiving ice chip - possible respiratory related. 5. Paroxysmal Afib - isolated episode postop  6. Moderate AS - Mean gradient 20, CHEIKH (continuity : 0.87)  7. Moderate MR  8. B/L significant iliac disease on angiogram   9. CKD stage V-initiated on dialysis this admission  10. H/O CVA  6. DM-2  12. Hx smoking  13. Post OP anemia   14. DNR CC    Plan of Treatment:   1. All cardiac meds discontinued  2.  Further management per primary team/palliative care    Electronically signed on 03/11/20 at 8:12 AM by:    Cristobal Sosa MD   Fellow, Cardiovascular

## 2020-03-11 NOTE — PLAN OF CARE
of fluid imbalance  Outcome: Ongoing     Problem: Health Behavior:  Goal: Ability to identify and utilize available resources and services will improve  Description: Ability to identify and utilize available resources and services will improve  Outcome: Ongoing  Goal: Ability to manage health-related needs will improve  Description: Ability to manage health-related needs will improve  Outcome: Ongoing     Problem: Metabolic:  Goal: Ability to maintain appropriate glucose levels will improve  Description: Ability to maintain appropriate glucose levels will improve  Outcome: Ongoing     Problem: Nutritional:  Goal: Maintenance of adequate nutrition will improve  Description: Maintenance of adequate nutrition will improve  Outcome: Ongoing  Goal: Progress toward achieving an optimal weight will improve  Description: Progress toward achieving an optimal weight will improve  Outcome: Ongoing  Goal: Ability to identify appropriate dietary choices will improve  Description: Ability to identify appropriate dietary choices will improve  Outcome: Ongoing     Problem: Physical Regulation:  Goal: Complications related to the disease process, condition or treatment will be avoided or minimized  Description: Complications related to the disease process, condition or treatment will be avoided or minimized  Outcome: Ongoing  Goal: Diagnostic test results will improve  Description: Diagnostic test results will improve  Outcome: Ongoing  Goal: Ability to maintain clinical measurements within normal limits will improve  Description: Ability to maintain clinical measurements within normal limits will improve  Outcome: Ongoing     Problem: Skin Integrity:  Goal: Risk for impaired skin integrity will decrease  Description: Risk for impaired skin integrity will decrease  Outcome: Ongoing     Problem: Tissue Perfusion:  Goal: Adequacy of tissue perfusion will improve  Description: Adequacy of tissue perfusion will improve  Outcome: MECHANICAL VENTILATION  Goal: Patient will maintain patent airway  3/11/2020 1340 by America Sanchez RN  Outcome: Ongoing  3/11/2020 0827 by Adryan Burt RCP  Outcome: Ongoing  Goal: Oral health is maintained or improved  3/11/2020 1340 by America Sanchez RN  Outcome: Ongoing  3/11/2020 0827 by Adryan Burt RCP  Outcome: Ongoing  Goal: Ability to express needs and understand communication  3/11/2020 1340 by America Sanchez RN  Outcome: Ongoing  3/11/2020 0827 by Adryan Burt RCP  Outcome: Ongoing  Goal: Mobility/activity is maintained at optimum level for patient  3/11/2020 1340 by America Sanchez RN  Outcome: Ongoing  3/11/2020 0827 by Adryan Burt RCP  Outcome: Ongoing

## 2020-03-11 NOTE — FLOWSHEET NOTE
Assessment;  Patient is a 78 y.o. female. Patient and family requested assistance with Advanced directives documents. Intervention:   assisted patient in completing AD. Outcome:  Gratitude expressed    Plan:  Chaplains will remain available for spiritual and emotional support as needed and may be paged for a specific request and or visit at any time. 03/11/20 1943   Encounter Summary   Services provided to: Patient;Patient and family together   Referral/Consult From: Nurse;Family   Support System Family members   Continue Visiting   (3/11/2020)   Complexity of Encounter Moderate   Length of Encounter 45 minutes   Spiritual Assessment Completed Yes   Advance Care Planning Yes   Advance Directives (For Healthcare)   Healthcare Directive Yes, patient has an advance directive for healthcare treatment   Type of Healthcare Directive Durable power of  for health care; Health care treatment directive; Living will   Copy in Chart Yes, copy in chart   Chart Copy Status : Active;Current   Date Reviewed and Current: 03/11/20   Information on Healthcare Directives Requested Yes   Advance Directives Documents explained;Living will completed; Healthcare power of attornery completed   Healthcare Agent Appointed Adult [address] Agent's Name   (Manju Christian.  Amanda Iniguez  Daughter)   Healthcare Agent's Phone Number   ([phone])

## 2020-03-11 NOTE — PROGRESS NOTES
[facility]  Daily Progress Note    Surgeon:  Dr. Leti Puentes      Subjective:  Ms. Milena Powers is a 78y.o. year-old female status post CABG x 3 on 3/2/20 POD# 9. Patient was seen and examined at bedside this morning. Currently extubated on 4L NC. Alert and oriented, following commands appropriately.      Vital Signs: BP (!) 153/58   Pulse 101   Temp 97.9 °F (36.6 °C) (Axillary)   Resp 26   Ht 5' 3\" (1.6 m)   Wt 152 lb 5.4 oz (69.1 kg)   SpO2 98%   BMI 26.99 kg/m²  O2 Flow Rate (L/min): 3 L/min   Admit Weight: Weight: 170 lb (77.1 kg)   WEIGHTWeight: 152 lb 5.4 oz (69.1 kg)     I/O's:  I/O last 3 completed shifts: In: 115 [NG/GT:115]  Out: 75 [Urine:75]    Data:    CBC:   Recent Labs     03/09/20  0421 03/10/20  0427 03/11/20  0417   WBC 11.8* 13.1* 13.9*   HGB 7.9* 7.9* 8.0*   HCT 24.9* 24.3* 24.3*   MCV 96.9 92.7 92.4    244 290     BMP:   Recent Labs     03/09/20  1647 03/10/20  0427 03/10/20  1133 03/10/20  1559 03/11/20 0417    133*  --   --  133*   K 3.7 3.5*  --   --  3.6*   CL 96* 94*  --   --  90*   CO2 22 22  --   --  23   BUN 26* 39*  --   --  56*   CREATININE 3.54* 4.76* 5.33* 5.45* 6.12*     PT/INR:   Recent Labs     03/09/20  0421 03/10/20  0427 03/11/20  0417   PROTIME 12.1* 11.4 11.0   INR 1.2 1.1 1.0     APTT:   Recent Labs     03/10/20  1242 03/10/20  2218 03/11/20 0417   APTT 42.8* 50.0* 53.2*       Chest X-Ray:   EXAMINATION:   ONE XRAY VIEW OF THE CHEST       3/11/2020 6:35 am       COMPARISON:   AP chest 310/20       HISTORY:   ORDERING SYSTEM PROVIDED HISTORY: s/p CABG   TECHNOLOGIST PROVIDED HISTORY:   s/p CABG   Reason for Exam: port upr       Recent extubation.  On hemodialysis.       FINDINGS:   Midline sternotomy closure devices clips.  Overlying monitor leads gown   snaps.  Right IJ PermCath deep right atrium.       Cardiomediastinal shadow stable.  Mild vascular congestion, also stable.    Unchanged basilar atelectasis or scarring and elevation by hypoxemia and severe agitation s/p extubation on 3/3/20 which required re-intubation within 30 minutes post extubation. On 3/5/20 in the middle of the night patient had a brief episode of Afib/RVR and converted spontaneously to NSR. On 3/6/20 patient had another episode of Afib/RVR then went bradycardic briefly which required V-pacing. Amio bolus and gtt were started as per protocol. On 3/7/20 around 4:30am patients post-op course was further complicated by a Code blue. I was told that the patient was given some water and suddenly vomited, eyes rolled back and became pale grayish and was unresponsive. On telemetry monitoring the rhythm appeared to be V tach which rapidly degenerated into V. fib arrest.  Patient was given 2 rounds of CPR and 1 epinephrine and ROSC was achieved. Post cardiac arrest and restoration of rhythm, patient was having unstable labored respirations indicating impending respiratory failure.  Hence patient required intubation.  Intubation was performed by the RT under direct supervision of the ED resident. Zev [facility] rhythm was varying between A. fib and junctional rhythm but, under the V pacing the patient still was asystolic. After rapid sequence intubation patient was hypotensive and Levophed had to be started on the patient.  Patient's blood pressure slowly improved and she received 250 mL IV fluid bolus also. I was notified around 5:30am about the above mentioned event. Unclear cause of arrest, telemetry at the time of arrest patient had A. fib no episode of V. fib noticed, nonsustained VT episode post ROSC. Yesterday morning the patient was extubated tolerating PS/CPAP. Late afternoon patients respiratory status worsened (respirations were more labored and ABG was not acceptable as per Pulmonary). At that time Pulmonary requested for RT to reintubate the patient. Patient was informed and she refused. Patients status was changed to DNI/DNR.  Patient was unable to be on BiPAP secondary to vomiting when she coughs and get anxious. Patient had an uneventful night. This morning patient upon examination the patient is on 4L NC saturating 97-98% in NAD. In NSR around in the 80's with V-pacer backup of 60. Nitro gtt is @ 110 for HTN. * Limited ECHO to evaluate for pericardial effusion, LV function post PEA arrest on 3/7/20 revealed no pericardial effusion, LV function 38% anterior and septal hypokinesis.  Aortic valve findings unchanged from before consistent with mild to moderate aortic stenosis    * CTA chest performed on 3/7/20 revealed: No convincing evidence of pulmonary embolism, small bilateral pleural effusions and adjacent atelectasis/airspace disease, pulmonary vascular congestion and interstitial edema     * Bilateral LE Venous Duplex performed on 3/9/20 revealed: No evidence of superficial or deep venous thrombosis in both lower extremities       1. Follow-up labs, CXR and ABG  2. Cont current care as per Pulmonary, Nephrology and Cardiology   3. Cont SCD's bilateral LE  4. GI Prophylaxis  5. Continue clement for strict I&O's  6. Pain management PRN  7. Pulmonary following. Cont Cefepime(Day 5 of 5) for purulent sputum. Sputum cx on 3/7 has been negative to date   8. Nephrology following. Will follow M-W-F HD schedule. Dr. Angela Sales was called this morning because patient is refusing HD today    9. Cont backup V-pacer at 60  10. F/u left subclavian TLC culture tip sent on 3/9 - NGTD  11. Cont Heparin gtt for Parox Afib and transition to Eliquis as per Cardiology. Monitor PTT as per protocol   12. Cont to hold Coreg and Amiodarone  13. Speech and Swallow evaluation today to assess for PO intake. Will need nutritional access if unable to tolerate PO intake   14. DNI/DNR   15.  Condition guarded       The above recommendations including medications and orders were discussed and agreed upon with Dr. Syed, the attending on service for the cardiothoracic surgery group today.         Peng WATKINS

## 2020-03-11 NOTE — PROGRESS NOTES
Pt refusing Dialysis this morning. Pt alert and oriented x4. RN asked pt if she understood that dialysis is part of her care plan and that it is needed to increase chances of obtainable optimal health. Pt states she understands and does not want dialysis. Pt states \" its to much to soon. \" Will notify Nephrology.

## 2020-03-11 NOTE — PROGRESS NOTES
Pt refusing NG at this time. Education given and pt states she understands. Pt is alert and oriented x4.

## 2020-03-11 NOTE — CARE COORDINATION
Approached by Dr. Carlton Munoz, who states that after meeting with patient and family, they'd like to pursue Inpatient hospice of [facility] ([place] location). Spoke with Tabatha Kurtz at [facility] intake, who can have Barbra Monteiro here at 5:30 pm for family meeting. Patient and family updated, agreeable    [phone] with [facility] here, states they can admit to [facility] tomorrow morning. PS Dr. Shannan Tim to inform. [phone] confirmed 1000 pickup time tomorrow with Lyn Velez at [facility].   Patient and family updated, agreeable

## 2020-03-11 NOTE — PROGRESS NOTES
Nutrition Assessment (Enteral Nutrition)    Type and Reason for Visit: Reassess, Consult(TF ordering and management )    Nutrition Recommendations:   -Continue NPO status  -Recommend increasing tube feeding of Nepro (Renal and carb-controlled) @ 40 mL/hr x 24 hrs ~>1728 kcals, 78 gms pro/d  -Will continue to monitor EN and labs    Nutrition Assessment: Pt declining from a nutritional standpoint aeb tube feeding held per EMR. Pt was w/ ID team and family during time of visit. Pt was extubated on 3/9. Pt is declining HD today. Pt remains nutritionally compromised aeb moderate malnutrition. Will modify tube feeding and continue to monitor EN and weights. Malnutrition Assessment:  · Malnutrition Status: Meets the criteria for moderate malnutrition  · Context: Acute illness or injury  · Findings of the 6 clinical characteristics of malnutrition (Minimum of 2 out of 6 clinical characteristics is required to make the diagnosis of moderate or severe Protein Calorie Malnutrition based on AND/ASPEN Guidelines):  1. Energy Intake-Unable to assess, Unable to assess    2. Weight Loss-10% loss or greater, (x 3 wks )  3. Fat Loss-Unable to assess,    4. Muscle Loss-Unable to assess,    5. Fluid Accumulation-Mild fluid accumulation, Extremities, Generalized  6.  Strength-Not measured    Nutrition Risk Level: High    Nutrition Needs:  · Estimated Daily Total Kcal: 1.3-1.4 ~>3275-6124 kcals/d   · Estimated Daily Protein (g): 1.3-1.5 gm/kg ~>68-78 gms/d     Nutrition Diagnosis:   · Problem:  Moderate malnutrition, In context of acute illness or injury  · Etiology: related to Insufficient energy/nutrient consumption, Difficulty swallowing     Signs and symptoms:  as evidenced by NPO status due to medical condition, Nutrition support - EN, Weight loss greater than or equal to 2% in 1 week, Localized or generalized fluid accumulation    Objective Information:  · Wound Type: Multiple, Open Wounds, Surgical Wound  · Current

## 2020-03-11 NOTE — FLOWSHEET NOTE
Assessment: Patient was awake and having a rough time when  visited. Family was present and open to spiritual care. Per family, patient was raised Jewish. Family requested that patient be anointed. Intervention:  maintained listening presence, offered support and prayed with family at patient's bedside. Patient received sacrament of anointing of the sick. Outcome: Family expressed appreciation for the anointing patient received. Chaplains would continue to visit for on going assessment of patient's condition and for more spiritual and emotional support.        03/11/20 1325   Encounter Summary   Services provided to: Patient and family together   Support System Family members   Continue Visiting   (03/11/2020)   Complexity of Encounter High   Length of Encounter 45 minutes   Spiritual Assessment Completed Yes   Routine   Type Follow up   Spiritual/Baptist   Type Spiritual support   Assessment Calm; Approachable   Intervention Active listening;Prayer; Anointing   Outcome Expressed gratitude   Sacraments   Sacrament of Sick-Anointing Anointed

## 2020-03-11 NOTE — PROGRESS NOTES
Progress Note    Patient - Ly Del Rosario  Date of Admission -  2020 11:01 PM  Date of Evaluation -  3/11/2020  Room and Bed Number -  1011/1011-01   Hospital Day -     CHIEF COMPLAINT : respiratory failure    SUBJECTIVE:     OVERNIGHT EVENTS:         Afebrile, blood pressure  elevated, tachypneic  Cannula, 5 L oxygen  Patient is denies NG tube placement   Patient denies hemodialysis today  Patient did not have any bowel movement  Chest x-ray showed mild vascular congestion, sclerotic scarring or atelectasis  Labs significant for potassium 3.6, WBC stable  Status changed to DNR CC    aWAKE & FOLLOWING COMMANDS:  [] No   [x] Yes    SECRETIONS Amount:  [] Small [] Moderate  [] Large  [] None  Color:     [] White [] Colored  [] Bloody    SEDATION:  RAAS Score:  [] Propofol gtt  [] Versed gtt  [] Ativan gtt   [] No Sedation    PARALYZED:  [] No    [] Yes    VASOPRESSORS:  [] No    [] Yes  [] Levophed [] Dopamine [] Vasopressin  [] Dobutamine [] Phenylephrine [] Epinephrine    Review of Systems - Respiratory ROS: no cough, shortness of breath, or wheezing  Cardiovascular ROS: no chest pain or dyspnea on exertion  Musculoskeletal ROS: negative  Neurological ROS: no TIA or stroke symptoms        OBJECTIVE:     VITAL SIGNS:  BP (!) 142/54   Pulse 94   Temp 97.9 °F (36.6 °C) (Axillary)   Resp 26   Ht 5' 3\" (1.6 m)   Wt 152 lb 5.4 oz (69.1 kg)   SpO2 99%   BMI 26.99 kg/m²   Tmax over 24 hours:  Temp (24hrs), Av.4 °F (36.9 °C), Min:97.9 °F (36.6 °C), Max:98.8 °F (37.1 °C)      Patient Vitals for the past 8 hrs:   BP Temp Temp src Pulse Resp SpO2 Weight   20 1200 (!) 142/54 -- -- 94 26 99 % --   20 1100 (!) 153/54 -- -- 92 26 98 % --   20 1000 -- -- -- 89 24 99 % --   20 0925 -- -- -- 87 24 96 % --   20 0900 (!) 119/47 -- -- 81 25 97 % --   2024 -- -- -- 101 26 98 % --   20 08 (!) 141/56 -- -- 95 25 99 % --   20 07 (!) 153/58 -- -- 94 24 100 % -- 03/11/20 0658 -- 97.9 °F (36.6 °C) Axillary -- -- -- --   03/11/20 0625 -- -- -- 93 27 100 % --   03/11/20 0600 (!) 163/56 -- -- 95 23 99 % 152 lb 5.4 oz (69.1 kg)         Intake/Output Summary (Last 24 hours) at 3/11/2020 1339  Last data filed at 3/11/2020 0205  Gross per 24 hour   Intake --   Output 75 ml   Net -75 ml     Date 03/11/20 0000 - 03/11/20 2359   Shift 3928-1560 5679-2125 0180-9042 24 Hour Total   INTAKE   Shift Total(mL/kg)       OUTPUT   Urine(mL/kg/hr) 45(0.1)   45   Shift Total(mL/kg) 45(0.7)   45(0.7)   Weight (kg) 69.1 69.1 69.1 69.1     Wt Readings from Last 3 Encounters:   03/11/20 152 lb 5.4 oz (69.1 kg)   11/21/19 170 lb (77.1 kg)   09/05/19 169 lb 3.2 oz (76.7 kg)     Body mass index is 26.99 kg/m².         PHYSICAL EXAM:  On Nasal cannula  Lungs- clear bs ant   CVS S1-S2 regular  Abdomen soft nontender bowel sounds are present  Lower extremity edema  Neuro patient follows commands of sedation    MEDICATIONS:  Scheduled Meds:   lidocaine 1 % injection  5 mL Intradermal Once    sodium chloride flush  10 mL Intravenous 2 times per day    famotidine (PEPCID) injection  20 mg Intravenous Daily    cefepime  1 g Intravenous Q24H    heparin (porcine)  4,000 Units Intravenous Once    insulin lispro  0-6 Units Subcutaneous TID WC    insulin lispro  0-3 Units Subcutaneous Nightly    therapeutic multivitamin-minerals  1 tablet Oral Daily with breakfast    sennosides-docusate sodium  1 tablet Oral BID    allopurinol  300 mg Oral Daily    [MAR Hold] insulin glargine  25 Units Subcutaneous Nightly     Continuous Infusions:   clevidipine      nitroGLYCERIN 90 mcg/min (03/11/20 0856)    heparin (porcine) 14 Units/kg/hr (03/11/20 0731)    dextrose      phenylephrine (CLAUDETTE-SYNEPHRINE) 50mg/250mL infusion Stopped (03/08/20 1130)    milrinone Stopped (03/04/20 0835)    norepinephrine Stopped (03/07/20 2332)    EPINEPHrine infusion Stopped (03/04/20 1515)    nitroGLYCERIN       PRN Meds: fentanNYL, 25 mcg, Q3H PRN    Or  fentanNYL, 50 mcg, Q3H PRN    Or  fentanNYL, 100 mcg, Q3H PRN  LORazepam, 0.5 mg, Q4H PRN    Or  LORazepam, 1 mg, Q4H PRN  glycopyrrolate, 0.2 mg, Q4H PRN  sodium chloride flush, 10 mL, PRN  heparin (porcine), 4,000 Units, PRN  heparin (porcine), 2,000 Units, PRN  albuterol, 2.5 mg, Q6H PRN  calcium chloride IVPB, 1 g, PRN  magnesium sulfate, 1 g, PRN  potassium chloride, 20 mEq, PRN  oxyCODONE-acetaminophen, 1 tablet, Q4H PRN    Or  oxyCODONE-acetaminophen, 2 tablet, Q4H PRN  fentanNYL, 25 mcg, Q1H PRN    Or  fentanNYL, 50 mcg, Q1H PRN  diphenhydrAMINE, 25 mg, Nightly PRN  hydrALAZINE, 5 mg, Q5 Min PRN  albumin human, 25 g, PRN  glucose, 15 g, PRN  dextrose, 12.5 g, PRN  glucagon (rDNA), 1 mg, PRN  dextrose, 100 mL/hr, PRN  bisacodyl, 10 mg, Daily PRN  bisacodyl, 5 mg, Daily PRN  phenylephrine (CLAUDETTE-SYNEPHRINE) 50mg/250mL infusion, 50 mcg/min, Continuous PRN  milrinone, 0.375 mcg/kg/min, Continuous PRN  norepinephrine, 0.01 mcg/kg/min, Continuous PRN  EPINEPHrine infusion, 0.01 mcg/kg/min, Continuous PRN  nitroGLYCERIN, 10 mcg/min, Continuous PRN  acetaminophen, 650 mg, Q4H PRN    Or  acetaminophen, 650 mg, Q4H PRN  ondansetron, 4 mg, Q6H PRN  [MAR Hold] heparin (porcine), 1,900 Units, PRN  [MAR Hold] heparin (porcine), 1,900 Units, PRN  [MAR Hold] LORazepam, 0.5 mg, Q6H PRN  [MAR Hold] heparin (porcine), 1,200 Units, PRN  [MAR Hold] heparin (porcine), 1,300 Units, PRN  [MAR Hold] nicotine, 1 patch, Daily PRN        SUPPORT DEVICES: [x] Ventilator [] BIPAP  [] Nasal Cannula [] Room Air    VENT SETTINGS (Comprehensive) (if applicable):    Vent Information  $Ventilation: $Subsequent Day  Ventilator Started: Yes  Ventilator Stopped: (S) Yes  Ventilation Day(s): 3  Skin Assessment: Clean, dry, & intact  Equipment ID: [id number]  Equipment Changed: Vent Circuit  Vent Type: Servo i  Vent Mode: CPAP  Vt Ordered: (S) 0 mL  Pressure Ordered: (S) 14  Rate Set: (S) 0 bmp  Pressure Support: 10 cmH20  FiO2 : 32 %  Sensitivity: 5  PEEP/CPAP: 5  I Time/ I Time %: 0.9 s  Cuff Pressure (cm H2O): (mov)  Humidification Source: HME  Additional Respiratory  Assessments  Pulse: 94  Resp: 26  SpO2: 99 %  End Tidal CO2: 34 (%)  Position: Semi-Hampton's  Humidification Source: HME  Oral Care Completed?: No  Oral Care: Mouth swabbed, Mouth moisturizer  Subglottic Suction Done?: Yes  Cuff Pressure (cm H2O): (mov)  Skin barrier applied: No    ABGs:     Lab Results   Component Value Date    AZU3KUP 27 03/10/2020    FIO2 35.0 03/10/2020       DATA:  Complete Blood Count:   Recent Labs     03/09/20  0421 03/10/20  0427 03/11/20  0417   WBC 11.8* 13.1* 13.9*   RBC 2.57* 2.62* 2.63*   HGB 7.9* 7.9* 8.0*   HCT 24.9* 24.3* 24.3*   MCV 96.9 92.7 92.4   MCH 30.7 30.2 30.4   MCHC 31.7 32.5 32.9   RDW 14.5* 14.3 14.2    244 290   MPV 11.5 11.5 11.0        Last 3 Blood Glucose:   Recent Labs     03/08/20  1624 03/08/20  2113 03/09/20  0421 03/09/20  1056 03/09/20  1647 03/10/20  0427 03/11/20  0417   GLUCOSE 141* 136* 155* 220* 194* 204* 195*        PT/INR:    Lab Results   Component Value Date    PROTIME 11.0 03/11/2020    INR 1.0 03/11/2020     PTT:    Lab Results   Component Value Date    APTT 53.2 03/11/2020       Comprehensive Metabolic Profile:   Recent Labs     03/09/20  1647 03/10/20  0427 03/10/20  1133 03/10/20  1559 03/11/20  0417    133*  --   --  133*   K 3.7 3.5*  --   --  3.6*   CL 96* 94*  --   --  90*   CO2 22 22  --   --  23   BUN 26* 39*  --   --  56*   CREATININE 3.54* 4.76* 5.33* 5.45* 6.12*   GLUCOSE 194* 204*  --   --  195*   CALCIUM 8.9 9.3  --   --  9.7      Magnesium:   Lab Results   Component Value Date    MG 2.4 03/11/2020    MG 2.0 03/10/2020    MG 1.9 03/09/2020     Phosphorus:   Lab Results   Component Value Date    PHOS 6.0 02/27/2020    PHOS 3.9 02/25/2020    PHOS 5.5 02/22/2020     Ionized Calcium:   Lab Results   Component Value Date    CAION 1.12 03/10/2020    CAION 1.06 03/09/2020

## 2020-03-11 NOTE — CONSULTS
Palliative Care Inpatient Consult    NAME:  Negin Escamilla RECORD NUMBER:  [id number]  AGE: 78 y.o. GENDER: female  : 1950  TODAY'S DATE:  3/11/2020    Reasons for Consultation:    Symptom and/or pain management  Provision of information regarding PC and/or hospice philosophies  Complex, time-intensive communication and interdisciplinary psychosocial support  Clarification of goals of care and/or assistance with difficult decision-making  Guidance in regards to resources and transition(s)    Members of PC team contributing to this consultation are :  Dr. Praful Oquendo palliative care attending  History of Present Illness     The patient is a 78 y.o. Non-/non  female who presents with Shortness of Breath      Referred to Palliative Care by   [x] Physician   [] Nursing  [] Family Request   [] Other:       She was admitted to the internal medicine service for HERNÁN (acute kidney injury) ([facility] [N17.9]. Her hospital course has been associated with NSTEMI (non-ST elevated myocardial infarction) ([facility] The patient has a complicated medical history and has been hospitalized since 2020 11:01 PM.  The patient was admitted initially due to shortness of breath, patient was hypoxic for the EMS. Upon arrival patient had pulmonary edema and EKG changes showed NSTEMI, cardiology was consulted and patient underwent cardiac cath which confirmed CAD and three-vessel CABG was done on 3/2/2020. Patient was intubated and then extubated on 3/10/2020. Patient was using intermittent BiPAP and nasal oxygen. Nephrology was consulted and were managing patient's recently initiated hemodialysis but patient has been refusing dialysis and also NG tube placement. Palliative care consulted for goals of care.     Active Hospital Problems    Diagnosis Date Noted    Ventilator dependence [facility]) [Z99.11] 2020     Priority: High    Acute respiratory failure ([facility] [J96.00] 2020     Priority: High    Thrombocytopenia ([facility] [D69.6] 03/05/2020     Priority: High    PAF (paroxysmal atrial fibrillation) ([facility] [I48.0] 03/10/2020    ESRD (end stage renal disease) ([facility] [N18.6] 03/10/2020    Cardiopulmonary arrest ([facility] [I46.9]     Moderate malnutrition ([facility] [E44.0] 03/03/2020    COPD exacerbation (HCC) [J44.1]     Acute on chronic combined systolic and diastolic CHF (congestive heart failure) ([facility] [I50.43] 02/27/2020    NSTEMI (non-ST elevated myocardial infarction) ([facility] [I21.4] 02/21/2020    HERNÁN (acute kidney injury) ([facility] [N17.9] 02/20/2020    Essential hypertension [I10] 12/10/2015    Hypercholesteremia [E78.00] 12/10/2015    Type 2 diabetes mellitus with diabetic chronic kidney disease ([facility] [E11.22] 03/11/2015    Anemia in stage 4 chronic kidney disease ([facility] [N18.4, D63.1] 10/17/2014       PAST MEDICAL HISTORY      Diagnosis Date    Acute CHF (congestive heart failure) ([facility] 2/21/2020    Anemia in chronic kidney disease     Anemia in stage 4 chronic kidney disease ([facility] 10/17/2014     Updating deleted diagnoses    Cerebral artery occlusion with cerebral infarction ([facility] 12/2016    LEFT SIDED LACUNAL INFARCTION    Chronic kidney disease     STAGE 4 / FOLLOWS WITH DR RODRIGUEZ    Gout, arthritis     Hyperlipidemia     Hypertension     Left sided lacunar infarction ([facility] 12/2016    Obesity (BMI 30-39. 9)     Peripheral vascular disease ([facility]     Type II or unspecified type diabetes mellitus without mention of complication, not stated as uncontrolled        PAST SURGICAL HISTORY  Past Surgical History:   Procedure Laterality Date    COLONOSCOPY      X2    CORONARY ARTERY BYPASS GRAFT N/A 3/2/2020    CABG CORONARY ARTERY BYPASS X3,STERNALOCK 360 STERNAL CLOSURE SYSTEM, PREVENA DRESSING APPLIED performed by Ryanne Weiss MD at [place] [address]  01/27/2017    [facility] / DIAGNOSTIC / DR Dilcia Saucedo    TONSILLECTOMY         SOCIAL HISTORY  Social History     Tobacco 24   Ht 5' 3\" (1.6 m)   Wt 152 lb 5.4 oz (69.1 kg)   SpO2 96%   BMI 26.99 kg/m²     Wt Readings from Last 3 Encounters:   03/11/20 152 lb 5.4 oz (69.1 kg)   11/21/19 170 lb (77.1 kg)   09/05/19 169 lb 3.2 oz (76.7 kg)        Code Status: DNR-CCA     ADVANCED CARE PLANNING:  Patient has capacity for medical decisions: yes  Health Care Power of : no  Living Will: no     Personal, Social, and Family History  Marital Status:    Living situation:alone  Importance of jr/Confucianism/spiritual beliefs: [] Very [x] Somewhat [] Not   Psychological Distress: mild  Does patient understand diagnosis/treatment? yes  Does caregiver understand diagnosis/treatment? yes      Assessment        REVIEW OF SYSTEMS  Constitutional: no fever, no chills or weight loss, + fatigue, + weakness  Eyes: no eye pain or blurred vision  ENT: no hearing loss, congestion, or difficulty swallowing   Respiratory: no wheezing, chest tightness, or shortness of breath   Cardiovascular: no chest pain or pressure, no palpitations, no diaphoresis   Gastrointestinal: no nausea, vomiting,abdominal pain, diarrhea or constipation, no melena   Genitourinary: no dysuria, frequency,hematuria , or nocturia   Musculoskeletal: no myalgias or arthralgias, no back pain   Skin: no rashes or sores   Neurological: no focal weakness, numbness, tingling, or headache, no seizures    PHYSICAL ASSESSMENT:  Constitutional: Alert and oriented to person, place, and time, ill-appearing  Head: Normocephalic and atraumatic. Eyes: EOM are normal. Pupils are equal, round   Neck: Normal range of motion. Neck supple. No tracheal deviation present. Cardiovascular: Normal rate and regular rhythm, S1, S2, no murmur   Pulmonary/Chest: Decreased breath sounds, no rales or wheezes. Abdomen: Soft. No tenderness, not distended, no ascites, no organomegaly   Musculoskeletal: Normal range of motion, + edema lower ext.    Neurological: Alert, awake, oriented x3, following commands  Skin: Normal turgor, no bleeding, no bruising     Palliative Performance Scale:  ___60%  Ambulation reduced; Significant disease; Can't do hobbies/housework; intake normal or reduced; occasional assist; LOC full/confusion  ___50%  Mainly sit/lie; Extensive disease; Can't do any work; Considerable assist; intake normal or reduced; LOC full/confusion  ___40%  Mainly in bed; Extensive disease; Mainly assist; intake normal or reduced; LOC full/confusion   __x_30%  Bed Bound; Extensive disease; Total care; intake reduced; LOCfull/confusion  ___20%  Bed Bound; Extensive disease; Total care; intake minimal; Drowsy/coma  ___10%  Bed Bound; Extensive disease;  Total care; Mouth care only; Drowsy/coma  ___0       Death      Plan      Palliative Interaction:  The patient was seen today along with medical student Callum Ovalle, patient was lying comfortably on the bed, alert, awake, oriented and following commands, ill-appearing    Patient's daughter Fortunato Jones, son Nakita Razo and other family members were present in patient's room    I discussed patient's current medical conditions with her and Giuseppe Razo    Patient informed me that she is still legally  but has been  from her  for many years    I explained the significance of POA paperwork for health to the patient and family and patient stated that she does not have POA paperwork for health but would want her daughter Fortunato Jones to make decisions for her when needed    Patient's  Alcira Monsalve walked into the room while I was discussing all this with the patient and patient as well as Giuseppe Razo were okay with that    I explained to the patient that she has been refusing hemodialysis and if she continues to refuse then her mentation and health will suffer and decline    Patient stated that she does not want to have dialysis and any other treatments    I explained the different types of codes to the patient and family and patient stated that she wants to be comfortable    I explained to the patient and family regarding hospice care and the family stated that they would want hospice [facility] location to be contacted    I changed patient's CODE STATUS to DNR CC    I explained to the family that patient's  will contact the hospice facility and will inform them about the timings of the hospice nurse visit    I met with patient's  China Hernadez and updated her regarding family wanting hospice [facility] to be contacted for the patient    I ordered hospice consult    I reviewed patient's comfort care medications with patient's nurse Dania Weston and ordered the needful    Education/support to staff  Education/support to family  Education/support to patient  Discharge planning/helping to coordinate care  Communications with primary service  Caregiver support/education  Code status clarified: Full Code  Code status clarified: [facility]  Code status clarified: [facility]  Other major issues     Principle Problem/Diagnosis:  NSTEMI (non-ST elevated myocardial infarction) ([facility]    Additional Assessments:   Principal Problem:    NSTEMI (non-ST elevated myocardial infarction) ([facility]  Active Problems: Thrombocytopenia (HCC)    Ventilator dependence (HCC)    Acute respiratory failure (HCC)    Anemia in stage 4 chronic kidney disease (HCC)    Type 2 diabetes mellitus with diabetic chronic kidney disease ([facility]    Essential hypertension    Hypercholesteremia    HERNÁN (acute kidney injury) ([facility]    Acute on chronic combined systolic and diastolic CHF (congestive heart failure) (HCC)    COPD exacerbation (HCC)    Moderate malnutrition (HCC)    Cardiopulmonary arrest (HCC)    PAF (paroxysmal atrial fibrillation) ([facility])    ESRD (end stage renal disease) ([facility])    1- Symptom management/ pain control     Pain Assessment:  Pain is controlled with current analgesics.   Medication(s) being used: acetaminophen, narcotic analgesics including fentanyl IV, oxycodone . Anxiety:  fatigue                          Dyspnea:  acute dyspnea and chronic dyspnea                          Fatigue:  Tiredness and weakness    Other: Intermittent BiPAP    We feel the patient symptoms are being controlled. her current regimen is reviewed by myself and discussed with the staff. CODE STATUS changed to DNR CC  Hospice consult ordered    All comfort care medications ordered as follows -    Fentanyl 25 mcg IV every 3 hours as needed for mild pain  Fentanyl 50 mcg IV every 3 hours as needed for moderate pain  Fentanyl 100 mcg IV every 3 hours as needed for severe pain  Ativan 0.5 mg IV every 4 hours as needed for anxiety  Ativan 1 mg IV every 4 hours as needed for increased anxiety and restlessness  Glycopyrrolate 0.2 mg IV every 4 hours as needed for secretions  Repositioning every 2 hours  Soft oral suction as needed    We will follow-up with hospice consult    2- Goals of care evaluation   The patient goals of care are provide comfort care/support/palliation/relieve suffering, spiritual needs, strengthening relationships, preserve independence/autonomy/control and support for family/caregiver   Goals of care discussed with:    [] Patient independently    [x] Patient and Family    [] Family or Healthcare DPOA independently    [] Unable to discuss with patient, family/DPOA not present    3- Code Status  DNR CC    4- Other recommendations   - We will continue to provide comfort and support to the patient and the family  Please call with any palliative questions or concerns. Palliative Care Team is available via perfect serve or via phone. Palliative Care will continue to follow Ms. Ace's care as needed. Thank you for allowing Palliative Care to participate in the care of Ms. Val Fletcher . This note has been dictated by dragon, typing errors may be a possibility.     The total time I spent in seeing the patient, discussing goals of care, advanced directives, code

## 2020-03-11 NOTE — FLOWSHEET NOTE
SPIRITUAL CARE DEPARTMENT - [facility] [address]  PROGRESS NOTE    Shift date: 03/11/2020  Shift day: Wednesday   Shift # 1    Room # 1011/1011-01   Name: Cristopher Balbuena            Age: 78 y.o. Gender: female          L[employer]-day: [address] [employer]:     Referral: FAMILY REQUESTED CHAPALIN SPEAK TO PATIENT    Admit Date & Time: 2/19/2020 11:01 PM    PATIENT/EVENT DESCRIPTION:  Cristopher Balbuena is a 78 y.o. female   PATIENT HAD UNDERGONE A BYPASS PROCEDURE AND HAD NT RESPONDED WELL PHYSICALLY;  PATIENT HAD JUST REFUSED BEING INTUBATED FOR A THIRD TIME;  STATUS CHANGED TO DNR-CCA;  FAMILY TALKING IN [place]; ENCOUNTERD ;       SPIRITUAL ASSESSMENT/INTERVENTION:   WENT TO PATIENT; PATIENT AT [facility] LETTING GOD DECIDE NEXT THINGS;  SPOKE WITH NURSE; ATTEMPT TO ADMINISTER MEDS CONFUSED BY PATIENT AMBIVALENCE/FAMILY QUESTIONS. SPIRITUAL CARE FOLLOW-UP PLAN:  Chaplains will remain available to offer spiritual and emotional support as needed. Electronically signed by Dwight Dakins Resident, on 3/11/2020 at 11:41 AM.  [facility]  [phone]               03/11/20 7325   Encounter Summary   Services provided to: Patient;Patient and family together   Referral/Consult From: Family   Support System Children;Family members   Place of [facility]   ([facility])   Continue Visiting   (03/11/2020)   Complexity of Encounter High   Length of Encounter 45 minutes   Spiritual Assessment Completed Yes   Routine   Type   (CODE STATUS; REFUSED INTUBATION )   Assessment Approachable;Passive;Grieving; Anxious; Fearful; Hopeful;Coping;Peaceful   Intervention Active listening;Explored feelings, thoughts, concerns;Explored coping resources;Nurtured hope;Prayer;[place];Ritual;Sustaining presence/ Ministry of presence   Outcome Acceptance;Comfort;Expressed gratitude; Concerns relieved;Engaged in conversation;Coping

## 2020-03-11 NOTE — PROGRESS NOTES
Labs     03/09/20  1647 03/10/20  0427 03/10/20  1133 03/10/20  1559 03/11/20  0417    133*  --   --  133*   K 3.7 3.5*  --   --  3.6*   CL 96* 94*  --   --  90*   CO2 22 22  --   --  23   BUN 26* 39*  --   --  56*   CREATININE 3.54* 4.76* 5.33* 5.45* 6.12*   CALCIUM 8.9 9.3  --   --  9.7       Last 3 CBC:  Recent Labs     03/09/20  0421 03/10/20  0427 03/11/20  0417   WBC 11.8* 13.1* 13.9*   RBC 2.57* 2.62* 2.63*   HGB 7.9* 7.9* 8.0*   HCT 24.9* 24.3* 24.3*   MCV 96.9 92.7 92.4   MCH 30.7 30.2 30.4   MCHC 31.7 32.5 32.9   RDW 14.5* 14.3 14.2    244 290   MPV 11.5 11.5 11.0       ASSESSMENT     1. ESRD from DM - new start - perm cath - MWF  2. VDRF  3. PEA arrest 3/7 - suspect resp etiology from aspiration/hypoxia  4. S/ p CABG 3/2 EF 40%  5. Mod AS/ MR  6. Anemia  7. DM2  8. PVD    PLAN     1. HD today  2. Abx as per ESRD dosing  3. Will arrange for outpatient hemodialysis placement    ATTEND  She again has refused hemodialysis. Explained to her in great details earlier also in regards to consequences which potentially could be life-threatening if she refuses dialysis. She verbalized understanding and still does not want to dialysis today. Explained to the family members also.   Please do not hesitate to call with questions    This note is created with the assistance of a speech-recognition program. While intending to generate a document that actually reflects the content of the visit, no guarantees can be provided that every mistake has been identified and corrected by editing    Tabby Avelar MD, [facility] Kaylynn Liu   3/11/2020 9:45 AM  [facility]

## 2020-03-11 NOTE — PROGRESS NOTES
Family at bedside. Writer notified family that pt had agreed to HD today. Daughter very upset and feels that we are pushing her mother into gettimg HD. Pt is alert and oriented. Daughter ask to speak with Dr. Jer Pina regarding her mother getting HD.

## 2020-03-12 ENCOUNTER — APPOINTMENT (OUTPATIENT)
Dept: GENERAL RADIOLOGY | Age: 70
DRG: 233 | End: 2020-03-12
Payer: MEDICARE

## 2020-03-12 VITALS
HEART RATE: 86 BPM | RESPIRATION RATE: 20 BRPM | SYSTOLIC BLOOD PRESSURE: 129 MMHG | OXYGEN SATURATION: 100 % | BODY MASS INDEX: 26.99 KG/M2 | WEIGHT: 152.34 LBS | DIASTOLIC BLOOD PRESSURE: 68 MMHG | HEIGHT: 63 IN | TEMPERATURE: 99.1 F

## 2020-03-12 PROCEDURE — 94761 N-INVAS EAR/PLS OXIMETRY MLT: CPT

## 2020-03-12 PROCEDURE — 2500000003 HC RX 250 WO HCPCS: Performed by: FAMILY MEDICINE

## 2020-03-12 PROCEDURE — 99239 HOSP IP/OBS DSCHRG MGMT >30: CPT | Performed by: INTERNAL MEDICINE

## 2020-03-12 RX ADMIN — GLYCOPYRROLATE 0.2 MG: 0.2 INJECTION INTRAMUSCULAR; INTRAVENOUS at 04:58

## 2020-03-12 ASSESSMENT — PAIN SCALES - GENERAL
PAINLEVEL_OUTOF10: 0

## 2020-03-12 ASSESSMENT — PAIN SCALES - WONG BAKER: WONGBAKER_NUMERICALRESPONSE: 0

## 2020-03-12 NOTE — PROGRESS NOTES
nitroGLYCERIN 90 mcg/min (03/11/20 0856)    heparin (porcine) 14 Units/kg/hr (03/11/20 0731)    dextrose      phenylephrine (CLAUDETTE-SYNEPHRINE) 50mg/250mL infusion Stopped (03/08/20 1130)    milrinone Stopped (03/04/20 0835)    norepinephrine Stopped (03/07/20 2332)    EPINEPHrine infusion Stopped (03/04/20 1515)    nitroGLYCERIN         Physical Exam:      General: A&O x 3 on 3L NC. Following commands appropriately. NAD  Heart: Normal S1, S2, RRR, + systolic murmur   Sternum: CSD in place. CDI  Lungs: Diminished BS bilaterally at the bases. Scattered rhonchi  Abdomen: soft, non tender, non distended, BSx4  Extremities: Trace edema noted bilateral LE. EVH sites: CDI           Assessment & Plan:      Ms. Ace is a 78 y. o. year-old female status post CABG x 3 on 3/2/20 POD# 10. Newly diagnosed CKD Stage V - HD initiated pre-op on admission. Patient was V-Paced post-operatively until 3/5/20 when her intrinsic rhythm resumed. Post-op course complicated by hypoxemia and severe agitation s/p extubation on 3/3/20 which required re-intubation within 30 minutes post extubation. On 3/5/20 in the middle of the night patient had a brief episode of Afib/RVR and converted spontaneously to NSR. On 3/6/20 patient had another episode of Afib/RVR then went bradycardic briefly which required V-pacing. Amio bolus and gtt were started as per protocol. On 3/7/20 around 4:30am patients post-op course was further complicated by a Code blue.  I was told that the patient was given some water and suddenly vomited, eyes rolled back and became pale grayish and was unresponsive. On telemetry monitoring the rhythm appeared to be V tach which rapidly degenerated into V. fib arrest.  Patient was given 2 rounds of CPR and 1 epinephrine and ROSC was achieved. Post cardiac arrest and restoration of rhythm, patient was having unstable labored respirations indicating impending respiratory failure.  Hence patient required intubation.  Intubation was

## 2020-03-12 NOTE — FLOWSHEET NOTE
Pt. Resting in bed quietly with eyes closed. Appears to be comfortable. Daughter & Son at bedside. Requested patient to be left alone at this time. Will continue to check in with patient and family periodically.

## 2020-03-12 NOTE — PROGRESS NOTES
Occupational Therapy Not Seen Note    DATE: 3/12/2020  Name: Mila Meyer  : 1950  MRN: [id number]    Patient not available for Occupational Therapy due to:    Pt being transferred to hospice care at this time. OT services discontinued.      Electronically signed by MITCHEL Sanchez on 3/12/2020 at 9:25 AM

## 2020-03-12 NOTE — PLAN OF CARE
PROVIDE ADEQUATE OXYGENATION WITH ACCEPTABLE SP02/ABG'S    [x]  IDENTIFY APPROPRIATE OXYGEN THERAPY  [x]   MONITOR SP02/ABG'S AS NEEDED   [x]   PATIENT EDUCATION AS NEEDED    PATIENT REFUSES TO WEAR BIPAP     [x] Risks and benefits explained to patient   [x] Patient refuses to wear Bipap stating, pt uncomfortable with NIV, pt is DNR-CC. [x] Patient verbalizes understanding of information presented.

## 2020-03-12 NOTE — PLAN OF CARE
Problem: Gas Exchange - Impaired:  Goal: Levels of oxygenation will improve  Description: Levels of oxygenation will improve  Outcome: Ongoing     Problem: Gas Exchange - Impaired:  Goal: Levels of oxygenation will improve  Description: Levels of oxygenation will improve  Outcome: Ongoing     Problem: Breathing Pattern - Ineffective:  Goal: Ability to achieve and maintain a regular respiratory rate will improve  Description: Ability to achieve and maintain a regular respiratory rate will improve  Outcome: Ongoing

## 2020-03-12 NOTE — PROGRESS NOTES
Mary Briones 21    Progress Note    3/12/2020    8:57 AM    Name:   Carol Miller  MRN:     [id number]     Acct:      [de-identified]   Room:   [address] [place], [place] [address] Day:  21  Admit Date:  2/19/2020 11:01 PM    PCP:   Emily Sahni PA-C  Code Status:  DNR-CC    Subjective:     C/C:   Chief Complaint   Patient presents with    Shortness of Breath     Interval History Status: not changed. No acute issues overnight  Code status changed to [facility] yesterday per patient family   Dc to hospice today    Brief History:     Mrs. Nydia Weiss is a 78year old  female with hx of tobacco abuse, CKD IV (baseline creatinine 3.2 - 3.5), prior left-sided CVA, hyperlipidemia, gout, DM II, COPD who presented with shortness of breath. Her shortness of breath initially was only with exertion, but then became present at rest as well. She denied chest pain at that time. She was hypoxic with EMS and upon arrival to the ED. CXR in the ED revealed pulmonary edema. Patient was placed on BIPAP therapy with improved O2 saturations. Troponin increased, EKG with inferolateral ST depressions. She was diagnosed with NSTEMI. Creatinine noted to be 3.97. Prior ECHO 12/2016: EF >55%, mild pulm HTN. Gerson cath placed 02/21 and HD started 02/22. LHC on 2/24/2020 revealed: LMCA: 0% stenosis; LAD: Mid 99% in upper long branch (Reaching to apex) 80% in lower LAD branch; D1: proximal 90% stenosis; LCx: Mid 80% stenosis; OM1: Ostial 80% stenosis; OM2: Proximal 80% stenosis; RCA: 100% proximal stenosis, collateral from the left.     Permacath was placed in subclavian vein on 3/24  Underwent CABG on 3/2/2020    Increased work of breathing on 3/3. Re-intubated on 3/3. Extubated on 3/6. Respiratory arrest with PEA arrest on the morning of 3/7. Reintubated. CTA chest without evidence of PE, but does note effusions with airspace disease.    On heparin infusion and antibiotic Acute CHF (congestive heart failure) (HCC), Anemia in chronic kidney disease, Anemia in stage 4 chronic kidney disease ([facility] [address] Cerebral artery occlusion with cerebral infarction ([facility] [address] Chronic kidney disease, Gout, arthritis, Hyperlipidemia, Hypertension, Left sided lacunar infarction ([facility] [address] Obesity (BMI 30-39.9), Peripheral vascular disease ([facility] [address] and Type II or unspecified type diabetes mellitus without mention of complication, not stated as uncontrolled. Social History:   reports that she has been smoking cigarettes. She started smoking about 47 years ago. She has a 40.00 pack-year smoking history. She uses smokeless tobacco. She reports that she does not drink alcohol or use drugs. Family History:   Family History   Problem Relation Age of Onset    Diabetes Mother     Heart Disease Father        Vitals:  /62   Pulse 88   Temp 99.1 °F (37.3 °C) (Axillary)   Resp 14   Ht 5' 3\" (1.6 m)   Wt 152 lb 5.4 oz (69.1 kg)   SpO2 99%   BMI 26.99 kg/m²   Temp (24hrs), Av.6 °F (37 °C), Min:98.1 °F (36.7 °C), Max:99.1 °F (37.3 °C)    Recent Labs     03/10/20  1950/20  2139 20  0701 20  1136   POCGLU 208* 195* 190* 188*       I/O (24Hr):   No intake or output data in the 24 hours ending 20 0857    Labs:  Hematology:  Recent Labs     03/10/20  0427 03/11/20  0417   WBC 13.1* 13.9*   RBC 2.62* 2.63*   HGB 7.9* 8.0*   HCT 24.3* 24.3*   MCV 92.7 92.4   MCH 30.2 30.4   MCHC 32.5 32.9   RDW 14.3 14.2    290   MPV 11.5 11.0   INR 1.1 1.0     Chemistry:  Recent Labs     20  1057 20  1647 03/10/20  0427 03/10/20  1133 03/10/20  1559 20  0417   NA  --  135 133*  --   --  133*   K  --  3.7 3.5*  --   --  3.6*   CL  --  96* 94*  --   --  90*   CO2  --    --   --  23   GLUCOSE  --  194* 204*  --   --  195*   BUN  --  26* 39*  --   --  56*   CREATININE  --  3.54* 4.76* 5.33* 5.45* 6.12*   MG  --  1.9 2.0  --   --  2.4   ANIONGAP  --    --   --  20*   LABGLOM

## 2020-03-12 NOTE — FLOWSHEET NOTE
Assessment: Patient is a 78 y.o. female who will be transported into \"hospice care,\" tomorrow morning, per family. Patient was sitting up in hospital bed, whispering words to her children during visit, and indicating that she was not in pain. Patient's children were holding patient's hand, supporting patient at bedside, and shedding tears throughout visit. Patient opened her eyes briefly, recognizing 's voice, and thanking  for visiting. Per family, patient will remain in the hospital overnight and will be taken into hospice care in the morning. Intervention:  visited patient per follow-up visit.  visited with patient at bedside and provided comfort to patient's grieving children.  remained present as her children shared words of comfort and encouragement to patient.  offered blessing on patient's rest and shared words of gratitude for having met patient and her loving family. Outcome: Patient and family were receptive of 's visit. Plan: Chaplains can make follow-up visit, per request. Jesus Gutierrez can be reached 24/7 via [employer]. Alisa Winkler     03/11/20 2100   Encounter Summary   Services provided to: Patient and family together   Referral/Consult From: Family   Support System Children;Family members   Continue Visiting   (3/11/2020)   Complexity of Encounter Moderate   Length of Encounter 30 minutes   Routine   Type Follow up   Spiritual/Yazidism   Type Spiritual support   Grief and Life Adjustment   Type End of life;Grief and loss;Palliative care   Assessment Approachable;Tearful;Grieving;Helplessness; Concerns with suffering   Intervention Active listening;Explored feelings, thoughts, concerns;Explored coping resources;Nurtured hope;Sustaining presence/ Ministry of presence;Grief care; Discussed relationship with God;Discussed belief system/Shinto practices/jr;Discussed illness/injury and it's impact   Outcome Comfort;Expressed

## 2020-03-12 NOTE — PROGRESS NOTES
Pt discharged to inpatient Hospice. [facility] present for transport. Pt. Transported on 5L nasal cannula. IVs removed. All belongings gathered and to be transported per family. Daughter and Son present at bedside and will meet patient at [facility].

## 2020-03-13 NOTE — DISCHARGE SUMMARY
Mary Briones 21    Discharge Summary     Patient ID: Mila Meyer  :  1950   MRN: [id number]     ACCOUNT:  [de-identified]   Patient's PCP: Aurelia Juarez PA-C  Admit Date: 2020   Discharge Date: 3/12/2020  Length of Stay: 21  Code Status:  Prior  Admitting Physician: Ghazal Holloway DO  Discharge Physician: Tobias Bermudez MD     Active Discharge Diagnoses:     Hospital Problem Lists:  Principal Problem:    NSTEMI (non-ST elevated myocardial infarction) [facility])  Active Problems: Thrombocytopenia (HCC)    Ventilator dependence (HCC)    Acute respiratory failure (HCC)    Anemia in stage 4 chronic kidney disease (HCC)    Type 2 diabetes mellitus with diabetic chronic kidney disease ([facility] [address]    Essential hypertension    Hypercholesteremia    HERNÁN (acute kidney injury) ([facility] [address]    Acute on chronic combined systolic and diastolic CHF (congestive heart failure) (HCC)    COPD exacerbation (HCC)    Moderate malnutrition (HCC)    Cardiopulmonary arrest (HCC)    PAF (paroxysmal atrial fibrillation) ([facility] [address]    ESRD (end stage renal disease) ([facility] [address]  Resolved Problems:    * No resolved hospital problems. *      Admission Condition:  serious     Discharged Condition: stable    Hospital Stay:     Hospital Course:   Mrs. Tadeo Stiles is a 78year old  female with hx of tobacco abuse, CKD IV (baseline creatinine 3.2 - 3.5), prior left-sided CVA, hyperlipidemia, gout, DM II, COPD who presented with shortness of breath. Her shortness of breath initially was only with exertion, but then became present at rest as well. She denied chest pain at that time. She was hypoxic with EMS and upon arrival to the ED.  CXR in the ED revealed pulmonary edema. Patient was placed on BIPAP therapy with improved O2 saturations. Troponin increased, EKG with inferolateral ST depressions. She was diagnosed with NSTEMI.  Creatinine noted to be 3.97.       Prior ECHO 12/2016: EF >55%, mild pulm HTN. Gerson cath placed 02/21 and HD started 02/22. LHC on 2/24/2020 revealed: LMCA: 0% stenosis; LAD: Mid 99% in upper long branch (Reaching to apex) 80% in lower LAD branch; D1: proximal 90% stenosis; LCx: Mid 80% stenosis; OM1: Ostial 80% stenosis; OM2: Proximal 80% stenosis; RCA: 100% proximal stenosis, collateral from the left.     Permacath was placed in subclavian vein on 3/24  Underwent CABG on 3/2/2020     Increased work of breathing on 3/3. Re-intubated on 3/3. Extubated on 3/6. Respiratory arrest with PEA arrest on the morning of 3/7.  Reintubated. CTA chest without evidence of PE, but does note effusions with airspace disease. On heparin infusion and antibiotic therapy.     Patient extubated 3/10. Concern for worsening respiratory status post extubation. Patient requested no re-intubation. Decided she did not want to continue dialysis. Decision by patient and family for [facility]. Transferred to hospice.          Significant therapeutic interventions:   - Post op management per CT surgery  - Pulmonology - extubated yesterday   - HD per nephrology - working on OP HD placement  - Cardiology following - hold amio due to bradycardia - NOAC on d/c for A fib  - S/P cardiac arrest and re intubation   - S/P pressor support  - Continue BIPAP nightly and PRN post extubation  - Labs and medications reviewed  - Monitor renal function, further HD per nephrology  - Continue selected home medications  - On Cefepime   - Last A1c - 6.8 (2/23) - continue correction scale   - PT/OT as tolerated  - Code status changed to [facility]  - IN planning to hospice today     Significant Diagnostic Studies:   Labs / Micro:  CBC:   Lab Results   Component Value Date    WBC 13.9 03/11/2020    RBC 2.63 03/11/2020    HGB 8.0 03/11/2020    HCT 24.3 03/11/2020    MCV 92.4 03/11/2020    MCH 30.4 03/11/2020    MCHC 32.9 03/11/2020    RDW 14.2 03/11/2020     03/11/2020     BMP:    Lab Results   Component Value CONSULT TO DIETITIAN  IP CONSULT TO SPIRITUAL SERVICES  PHARMACY TO DOSE VANCOMYCIN  IP CONSULT TO PULMONOLOGY  IP CONSULT TO PHYSICAL MEDICINE REHAB  IP CONSULT TO IV TEAM  IP CONSULT TO DIETITIAN  PALLIATIVE CARE EVAL  IP CONSULT TO PALLIATIVE CARE  IP CONSULT TO HOSPICE      The patient was seen and examined on day of discharge and this discharge summary is in conjunction with any daily progress note from day of discharge. Discharge plan:     Disposition: Hospice    Time Spent on discharge is  33 mins in patient examination, evaluation, counseling as well as medication reconciliation, prescriptions for required medications, discharge plan and follow up. Electronically signed by   Edith Nixon MD  3/13/2020  3:36 PM      Thank you Dr. Demetrice Tabares PA-C for the opportunity to be involved in this patient's care.

## 2022-11-28 NOTE — PROGRESS NOTES
Patient here for labs and possible Aranesp injetion. Labs reviewed  Hemoglobin =10.7. No Aranesp needed. Has a return visit scheduled. Discharged ambulatory per self.
Price (Do Not Change): 0.00
Instructions: This plan will send the code FBSD to the PM system.  DO NOT or CHANGE the price.
Detail Level: Simple

## (undated) DEVICE — Device: Brand: VIRTUOSAPH PLUS WITH RADIAL INDICATION

## (undated) DEVICE — CHLORAPREP 26ML ORANGE

## (undated) DEVICE — GLOVE SURG SZ 7 L12IN FNGR THK79MIL GRN LTX FREE

## (undated) DEVICE — SUTURE VCRL + SZ 4-0 L27IN ABSRB UD L26MM SH 1/2 CIR VCP415H

## (undated) DEVICE — Device: Brand: ZDRIVE™

## (undated) DEVICE — GLOVE SURG SZ 65 CRM LTX FREE POLYISOPRENE POLYMER BEAD ANTI

## (undated) DEVICE — CONTAINER,SPECIMEN,4OZ,OR STRL: Brand: MEDLINE

## (undated) DEVICE — BLADE OPHTH ORNG GRINDLESS SMALLER ALTERNATIVE TO NO15 GEN

## (undated) DEVICE — SUTURE ETHIB EXCL BR GRN TAPR PT 2-0 30 X563H X563H

## (undated) DEVICE — CANNULA PERFUSION 5.5IN 9FR AORTIC ROOT

## (undated) DEVICE — COVER,LIGHT HANDLE,FLX,2/PK: Brand: MEDLINE INDUSTRIES, INC.

## (undated) DEVICE — Z DISCONTINUED NO SUB IDED DRAIN SURG 2 COLL PT TB FOR ATS BG OASIS

## (undated) DEVICE — CONNECTOR TBNG WHT PLAS SUCT STR 5IN1 LTWT W/ M CONN

## (undated) DEVICE — GOWN,SIRUS,POLYRNF,BRTHSLV,2XL,18/CS: Brand: MEDLINE

## (undated) DEVICE — PROTECTOR ULN NRV PUR FOAM HK LOOP STRP ANATOMICALLY

## (undated) DEVICE — GLOVE SURG SZ 65 L12IN FNGR THK79MIL GRN LTX FREE

## (undated) DEVICE — .: Brand: PERFECTCUT AORTOTOMY SYSTEM

## (undated) DEVICE — INSUFFLATION TUBING SET WITH FILTER, FUNNEL CONNECTOR AND LUER LOCK: Brand: JOSNOE MEDICAL INC

## (undated) DEVICE — PACK PROCEDURE SURG OPN HRT ADD ON

## (undated) DEVICE — BNDG,ELSTC,MATRIX,STRL,6"X5YD,LF,HOOK&LP: Brand: MEDLINE

## (undated) DEVICE — SUTURE PROL SZ 7-0 L24IN NONABSORBABLE BLU L8MM BV175-6 3/8 8735H

## (undated) DEVICE — Z INACTIVE USE 2540311 LEAD PACE L475MM CHN A OR V MYOCARDIAL STEROID ELUT SIL

## (undated) DEVICE — GLOVE SURG SZ 8 CRM LTX FREE POLYISOPRENE POLYMER BEAD ANTI

## (undated) DEVICE — BATTERY DRVR W/ SELF DRL TAP FOR THINFLAP PWR DRVR

## (undated) DEVICE — SUTURE VCRL + SZ 4-0 L18IN ABSRB UD L19MM PS-2 3/8 CIR PRIM VCP496H

## (undated) DEVICE — GLOVE SURG SZ 7.5 L11.73IN FNGR THK9.8MIL STRW LTX POLYMER

## (undated) DEVICE — Z DISCONTINUED APPLICATOR SURG PREP 0.35OZ 2% CHG 70% ISO ALC W/ HI LT

## (undated) DEVICE — RETRACTOR SURG INSRT SUT HLD OCTOBASE

## (undated) DEVICE — SUTURE PROL SZ 6-0 L18IN NONABSORBABLE BLU RB-2 L13MM 1/2 8714H

## (undated) DEVICE — Z DISCONTINUED BY MEDLINE USE 2711682 TRAY SKIN PREP DRY W/ PREM GLV

## (undated) DEVICE — SUTURE PDS II SZ 0 L27IN ABSRB VLT L36MM CT-1 1/2 CIR Z340H

## (undated) DEVICE — SUTURE PROL SZ 4-0 L36IN NONABSORBABLE BLU L26MM SH 1/2 CIR 8521H

## (undated) DEVICE — SUTURE MCRYL SZ 4-0 L18IN ABSRB UD L19MM PS-2 3/8 CIR PRIM Y496G

## (undated) DEVICE — TTL1LYR 16FR10ML 100%SIL TMPST TR: Brand: MEDLINE

## (undated) DEVICE — ADHESIVE SKIN CLSR 0.7ML TOP DERMBND ADV

## (undated) DEVICE — DRAPE SLUSH DISC W44XL66IN ST FOR RND BSIN HUSH SLUSH SYS

## (undated) DEVICE — SUTURE PERMA-HAND SZ 0 L18IN NONABSORBABLE BLK CT-2 L26MM C027D

## (undated) DEVICE — 48" PROBE COVER W/GEL, ULTRASOUND, STERILE: Brand: SITE-RITE

## (undated) DEVICE — TOWEL,OR,DSP,ST,BLUE,DLX,XR,4/PK,20PK/CS: Brand: MEDLINE

## (undated) DEVICE — CANNULA PERF L2IN BLNT TIP 2MM VES CLR RADPQ BODY FEM LUER

## (undated) DEVICE — E-Z CLEAN, NON-STICK, PTFE COATED, ELECTROSURGICAL BLADE ELECTRODE, 6.5 INCH (16.5 CM): Brand: MEGADYNE

## (undated) DEVICE — GLOVE SURG SZ 75 CRM LTX FREE POLYISOPRENE POLYMER BEAD ANTI

## (undated) DEVICE — BLADE SCRDRVR W2.4MM STD PRI CLSR SYS STERNALOCK BLU

## (undated) DEVICE — BNDG,ELSTC,MATRIX,STRL,4"X5YD,LF,HOOK&LP: Brand: MEDLINE

## (undated) DEVICE — BLADE SAW W6.35XL32MM STRNM CUT STRNOTMY

## (undated) DEVICE — 3M™ STERI-STRIP™ COMPOUND BENZOIN TINCTURE 40 BAGS/CARTON 4 CARTONS/CASE C1544: Brand: 3M™ STERI-STRIP™

## (undated) DEVICE — TUBE CARDIAC SUCTION 6FR SOFT TIP 10FR

## (undated) DEVICE — PLEDGET SURG W3.5XL7MM THK1.5MM WHT PTFE RECT FIRM TFE

## (undated) DEVICE — BLADE OPHTH D5MM 15DEG GRN W/ RND KNURLED HNDL MICRO-SHARP

## (undated) DEVICE — PLATELET CONCENTRATION PACK PROC 14-20 ML SMARTPREP 2

## (undated) DEVICE — PREVENA INCISION MANAGEMENT SYSTEM- PEEL & PLACE DRESSING: Brand: PREVENA™ PEEL & PLACE™

## (undated) DEVICE — SUTURE VCRL + SZ 3-0 L27IN ABSRB WHT CT-1 1/2 CIR VCP258H

## (undated) DEVICE — PACK PROCEDURE SURG OPN HRT

## (undated) DEVICE — AGENT HEMSTAT W2XL4IN OXIDIZED REGENERATED CELOS ABSRB SFT

## (undated) DEVICE — WAX SURG 2.5GM HEMSTAT BNE BEESWAX PARAFFIN ISO PALMITATE

## (undated) DEVICE — GOWN,AURORA,NONREINFORCED,LARGE: Brand: MEDLINE

## (undated) DEVICE — GLOVE SURG SZ 8 L12IN FNGR THK79MIL GRN LTX FREE

## (undated) DEVICE — CATHETER,URETHRAL,REDRUBBER,STRL,18FR: Brand: MEDLINE

## (undated) DEVICE — FOGARTY - HYDRAGRIP SURGICAL - CLAMP INSERTS: Brand: FOGARTY HYDRAJAW

## (undated) DEVICE — POSITIONER,HEAD,MULTIRING,36CS: Brand: MEDLINE

## (undated) DEVICE — GOWN,AURORA,NONRNF,XL,30/CS: Brand: MEDLINE